# Patient Record
Sex: MALE | Race: BLACK OR AFRICAN AMERICAN | NOT HISPANIC OR LATINO | URBAN - METROPOLITAN AREA
[De-identification: names, ages, dates, MRNs, and addresses within clinical notes are randomized per-mention and may not be internally consistent; named-entity substitution may affect disease eponyms.]

---

## 2021-04-05 ENCOUNTER — EMERGENCY (EMERGENCY)
Facility: HOSPITAL | Age: 81
LOS: 0 days | Discharge: HOME | End: 2021-04-05
Attending: EMERGENCY MEDICINE
Payer: MEDICAID

## 2021-04-05 VITALS
TEMPERATURE: 99 F | HEART RATE: 58 BPM | OXYGEN SATURATION: 100 % | DIASTOLIC BLOOD PRESSURE: 59 MMHG | SYSTOLIC BLOOD PRESSURE: 132 MMHG | RESPIRATION RATE: 18 BRPM

## 2021-04-05 VITALS
HEART RATE: 62 BPM | OXYGEN SATURATION: 100 % | RESPIRATION RATE: 16 BRPM | TEMPERATURE: 99 F | DIASTOLIC BLOOD PRESSURE: 62 MMHG | SYSTOLIC BLOOD PRESSURE: 128 MMHG

## 2021-04-05 DIAGNOSIS — Z79.4 LONG TERM (CURRENT) USE OF INSULIN: ICD-10-CM

## 2021-04-05 DIAGNOSIS — E11.65 TYPE 2 DIABETES MELLITUS WITH HYPERGLYCEMIA: ICD-10-CM

## 2021-04-05 DIAGNOSIS — Z20.822 CONTACT WITH AND (SUSPECTED) EXPOSURE TO COVID-19: ICD-10-CM

## 2021-04-05 DIAGNOSIS — E16.2 HYPOGLYCEMIA, UNSPECIFIED: ICD-10-CM

## 2021-04-05 LAB
ALBUMIN SERPL ELPH-MCNC: 3.8 G/DL — SIGNIFICANT CHANGE UP (ref 3.3–5)
ALP SERPL-CCNC: 94 U/L — SIGNIFICANT CHANGE UP (ref 40–120)
ALT FLD-CCNC: 29 U/L — SIGNIFICANT CHANGE UP (ref 12–78)
ANION GAP SERPL CALC-SCNC: 7 MMOL/L — SIGNIFICANT CHANGE UP (ref 5–17)
APPEARANCE UR: CLEAR — SIGNIFICANT CHANGE UP
APTT BLD: 30 SEC — SIGNIFICANT CHANGE UP (ref 27.5–35.5)
AST SERPL-CCNC: 15 U/L — SIGNIFICANT CHANGE UP (ref 15–37)
BASOPHILS # BLD AUTO: 0.04 K/UL — SIGNIFICANT CHANGE UP (ref 0–0.2)
BASOPHILS NFR BLD AUTO: 0.8 % — SIGNIFICANT CHANGE UP (ref 0–2)
BILIRUB SERPL-MCNC: 0.4 MG/DL — SIGNIFICANT CHANGE UP (ref 0.2–1.2)
BILIRUB UR-MCNC: NEGATIVE — SIGNIFICANT CHANGE UP
BUN SERPL-MCNC: 34 MG/DL — HIGH (ref 7–23)
CALCIUM SERPL-MCNC: 8.4 MG/DL — LOW (ref 8.5–10.1)
CHLORIDE SERPL-SCNC: 104 MMOL/L — SIGNIFICANT CHANGE UP (ref 96–108)
CO2 SERPL-SCNC: 22 MMOL/L — SIGNIFICANT CHANGE UP (ref 22–31)
COLOR SPEC: YELLOW — SIGNIFICANT CHANGE UP
CREAT SERPL-MCNC: 1.93 MG/DL — HIGH (ref 0.5–1.3)
DIFF PNL FLD: NEGATIVE — SIGNIFICANT CHANGE UP
EOSINOPHIL # BLD AUTO: 0.02 K/UL — SIGNIFICANT CHANGE UP (ref 0–0.5)
EOSINOPHIL NFR BLD AUTO: 0.4 % — SIGNIFICANT CHANGE UP (ref 0–6)
FLUAV AG NPH QL: SIGNIFICANT CHANGE UP
FLUBV AG NPH QL: SIGNIFICANT CHANGE UP
GLUCOSE BLDC GLUCOMTR-MCNC: 116 MG/DL — HIGH (ref 70–99)
GLUCOSE BLDC GLUCOMTR-MCNC: 121 MG/DL — HIGH (ref 70–99)
GLUCOSE BLDC GLUCOMTR-MCNC: 65 MG/DL — LOW (ref 70–99)
GLUCOSE SERPL-MCNC: 108 MG/DL — HIGH (ref 70–99)
GLUCOSE UR QL: NEGATIVE MG/DL — SIGNIFICANT CHANGE UP
HCT VFR BLD CALC: 34.1 % — LOW (ref 39–50)
HGB BLD-MCNC: 11.5 G/DL — LOW (ref 13–17)
IMM GRANULOCYTES NFR BLD AUTO: 0.2 % — SIGNIFICANT CHANGE UP (ref 0–1.5)
INR BLD: 1.05 RATIO — SIGNIFICANT CHANGE UP (ref 0.88–1.16)
KETONES UR-MCNC: NEGATIVE — SIGNIFICANT CHANGE UP
LEUKOCYTE ESTERASE UR-ACNC: NEGATIVE — SIGNIFICANT CHANGE UP
LYMPHOCYTES # BLD AUTO: 1.53 K/UL — SIGNIFICANT CHANGE UP (ref 1–3.3)
LYMPHOCYTES # BLD AUTO: 30.2 % — SIGNIFICANT CHANGE UP (ref 13–44)
MCHC RBC-ENTMCNC: 27.8 PG — SIGNIFICANT CHANGE UP (ref 27–34)
MCHC RBC-ENTMCNC: 33.7 GM/DL — SIGNIFICANT CHANGE UP (ref 32–36)
MCV RBC AUTO: 82.6 FL — SIGNIFICANT CHANGE UP (ref 80–100)
MONOCYTES # BLD AUTO: 0.66 K/UL — SIGNIFICANT CHANGE UP (ref 0–0.9)
MONOCYTES NFR BLD AUTO: 13 % — SIGNIFICANT CHANGE UP (ref 2–14)
NEUTROPHILS # BLD AUTO: 2.8 K/UL — SIGNIFICANT CHANGE UP (ref 1.8–7.4)
NEUTROPHILS NFR BLD AUTO: 55.4 % — SIGNIFICANT CHANGE UP (ref 43–77)
NITRITE UR-MCNC: NEGATIVE — SIGNIFICANT CHANGE UP
NRBC # BLD: 0 /100 WBCS — SIGNIFICANT CHANGE UP (ref 0–0)
PH UR: 5 — SIGNIFICANT CHANGE UP (ref 5–8)
PLATELET # BLD AUTO: 185 K/UL — SIGNIFICANT CHANGE UP (ref 150–400)
POTASSIUM SERPL-MCNC: 3.9 MMOL/L — SIGNIFICANT CHANGE UP (ref 3.5–5.3)
POTASSIUM SERPL-SCNC: 3.9 MMOL/L — SIGNIFICANT CHANGE UP (ref 3.5–5.3)
PROT SERPL-MCNC: 8 GM/DL — SIGNIFICANT CHANGE UP (ref 6–8.3)
PROT UR-MCNC: NEGATIVE MG/DL — SIGNIFICANT CHANGE UP
PROTHROM AB SERPL-ACNC: 12.1 SEC — SIGNIFICANT CHANGE UP (ref 10.6–13.6)
RBC # BLD: 4.13 M/UL — LOW (ref 4.2–5.8)
RBC # FLD: 13 % — SIGNIFICANT CHANGE UP (ref 10.3–14.5)
SARS-COV-2 RNA SPEC QL NAA+PROBE: SIGNIFICANT CHANGE UP
SODIUM SERPL-SCNC: 133 MMOL/L — LOW (ref 135–145)
SP GR SPEC: 1.01 — SIGNIFICANT CHANGE UP (ref 1.01–1.02)
TROPONIN I SERPL-MCNC: <.015 NG/ML — SIGNIFICANT CHANGE UP (ref 0.01–0.04)
UROBILINOGEN FLD QL: NEGATIVE MG/DL — SIGNIFICANT CHANGE UP
WBC # BLD: 5.06 K/UL — SIGNIFICANT CHANGE UP (ref 3.8–10.5)
WBC # FLD AUTO: 5.06 K/UL — SIGNIFICANT CHANGE UP (ref 3.8–10.5)

## 2021-04-05 PROCEDURE — 70450 CT HEAD/BRAIN W/O DYE: CPT | Mod: 26,MA

## 2021-04-05 PROCEDURE — 93010 ELECTROCARDIOGRAM REPORT: CPT

## 2021-04-05 PROCEDURE — 99285 EMERGENCY DEPT VISIT HI MDM: CPT

## 2021-04-05 RX ORDER — SODIUM CHLORIDE 9 MG/ML
1000 INJECTION INTRAMUSCULAR; INTRAVENOUS; SUBCUTANEOUS ONCE
Refills: 0 | Status: COMPLETED | OUTPATIENT
Start: 2021-04-05 | End: 2021-04-05

## 2021-04-05 RX ADMIN — SODIUM CHLORIDE 1000 MILLILITER(S): 9 INJECTION INTRAMUSCULAR; INTRAVENOUS; SUBCUTANEOUS at 18:29

## 2021-04-05 NOTE — ED PROVIDER NOTE - CLINICAL SUMMARY MEDICAL DECISION MAKING FREE TEXT BOX
hypoglycemia noted - will give food and hydrate - otherwise will dc with family pending continued improvement of sugar levels. hypoglycemia noted - will give food and hydrate - otherwise will dc with family pending continued improvement of sugar levels -noted improvement - will dc with PMD follow up.

## 2021-04-05 NOTE — ED PROVIDER NOTE - OBJECTIVE STATEMENT
80 year old male with PMH of insulin dependent DM II presenting to ED due to low sugar level and syncope related to hypoglycemia noted to 20 - EMS gave D10 250mL en route with improvement - pt at triage fully awake without issue or complaint.  Pt admits to having very light breakfast with some rice and coffee but had not yet had lunch at 3pm when this occurred. No new doses of insulin taken otherwise FS here in ED was

## 2021-04-05 NOTE — ED ADULT TRIAGE NOTE - CHIEF COMPLAINT QUOTE
pt became unresponsive at home. on ems blood glucose 20. d 10 given by ems. history of diabetes on insulin. unknown when he last took insulin. fs 116 at triage. pt awake alert.

## 2021-04-05 NOTE — ED PROVIDER NOTE - PATIENT PORTAL LINK FT
You can access the FollowMyHealth Patient Portal offered by [facility] by registering at the following website: [email or web address]. By joining [employer]’s FollowMyHealth portal, you will also be able to view your health information using other applications (apps) compatible with our system.

## 2021-04-06 LAB
CULTURE RESULTS: NO GROWTH — SIGNIFICANT CHANGE UP
SPECIMEN SOURCE: SIGNIFICANT CHANGE UP

## 2023-01-29 ENCOUNTER — INPATIENT (INPATIENT)
Facility: HOSPITAL | Age: 83
LOS: 2 days | Discharge: HOME | End: 2023-02-01
Attending: INTERNAL MEDICINE | Admitting: INTERNAL MEDICINE
Payer: COMMERCIAL

## 2023-01-29 VITALS
HEART RATE: 57 BPM | TEMPERATURE: 98 F | RESPIRATION RATE: 18 BRPM | DIASTOLIC BLOOD PRESSURE: 54 MMHG | SYSTOLIC BLOOD PRESSURE: 121 MMHG | OXYGEN SATURATION: 100 %

## 2023-01-29 DIAGNOSIS — I10 ESSENTIAL (PRIMARY) HYPERTENSION: ICD-10-CM

## 2023-01-29 DIAGNOSIS — Z29.9 ENCOUNTER FOR PROPHYLACTIC MEASURES, UNSPECIFIED: ICD-10-CM

## 2023-01-29 DIAGNOSIS — E11.9 TYPE 2 DIABETES MELLITUS WITHOUT COMPLICATIONS: ICD-10-CM

## 2023-01-29 DIAGNOSIS — G93.41 METABOLIC ENCEPHALOPATHY: ICD-10-CM

## 2023-01-29 DIAGNOSIS — N40.1 BENIGN PROSTATIC HYPERPLASIA WITH LOWER URINARY TRACT SYMPTOMS: ICD-10-CM

## 2023-01-29 DIAGNOSIS — R55 SYNCOPE AND COLLAPSE: ICD-10-CM

## 2023-01-29 DIAGNOSIS — N39.0 URINARY TRACT INFECTION, SITE NOT SPECIFIED: ICD-10-CM

## 2023-01-29 DIAGNOSIS — N18.30 CHRONIC KIDNEY DISEASE, STAGE 3 UNSPECIFIED: ICD-10-CM

## 2023-01-29 DIAGNOSIS — B20 HUMAN IMMUNODEFICIENCY VIRUS [HIV] DISEASE: ICD-10-CM

## 2023-01-29 LAB
ALBUMIN SERPL ELPH-MCNC: 3.5 G/DL — SIGNIFICANT CHANGE UP (ref 3.3–5)
ALP SERPL-CCNC: 96 U/L — SIGNIFICANT CHANGE UP (ref 40–120)
ALT FLD-CCNC: SIGNIFICANT CHANGE UP U/L (ref 4–41)
ANION GAP SERPL CALC-SCNC: 10 MMOL/L — SIGNIFICANT CHANGE UP (ref 7–14)
ANION GAP SERPL CALC-SCNC: 13 MMOL/L — SIGNIFICANT CHANGE UP (ref 7–14)
APPEARANCE UR: ABNORMAL
AST SERPL-CCNC: 78 U/L — HIGH (ref 4–40)
BACTERIA # UR AUTO: ABNORMAL
BASOPHILS # BLD AUTO: 0.03 K/UL — SIGNIFICANT CHANGE UP (ref 0–0.2)
BASOPHILS NFR BLD AUTO: 0.6 % — SIGNIFICANT CHANGE UP (ref 0–2)
BILIRUB SERPL-MCNC: 0.4 MG/DL — SIGNIFICANT CHANGE UP (ref 0.2–1.2)
BILIRUB UR-MCNC: NEGATIVE — SIGNIFICANT CHANGE UP
BLD GP AB SCN SERPL QL: NEGATIVE — SIGNIFICANT CHANGE UP
BUN SERPL-MCNC: 42 MG/DL — HIGH (ref 7–23)
BUN SERPL-MCNC: 42 MG/DL — HIGH (ref 7–23)
CALCIUM SERPL-MCNC: 8.9 MG/DL — SIGNIFICANT CHANGE UP (ref 8.4–10.5)
CALCIUM SERPL-MCNC: 9 MG/DL — SIGNIFICANT CHANGE UP (ref 8.4–10.5)
CHLORIDE SERPL-SCNC: 101 MMOL/L — SIGNIFICANT CHANGE UP (ref 98–107)
CHLORIDE SERPL-SCNC: 99 MMOL/L — SIGNIFICANT CHANGE UP (ref 98–107)
CO2 SERPL-SCNC: 17 MMOL/L — LOW (ref 22–31)
CO2 SERPL-SCNC: 19 MMOL/L — LOW (ref 22–31)
COLOR SPEC: SIGNIFICANT CHANGE UP
CREAT SERPL-MCNC: 1.62 MG/DL — HIGH (ref 0.5–1.3)
CREAT SERPL-MCNC: 1.73 MG/DL — HIGH (ref 0.5–1.3)
DIFF PNL FLD: ABNORMAL
EGFR: 39 ML/MIN/1.73M2 — LOW
EGFR: 42 ML/MIN/1.73M2 — LOW
EOSINOPHIL # BLD AUTO: 0.04 K/UL — SIGNIFICANT CHANGE UP (ref 0–0.5)
EOSINOPHIL NFR BLD AUTO: 0.7 % — SIGNIFICANT CHANGE UP (ref 0–6)
EPI CELLS # UR: 1 /HPF — SIGNIFICANT CHANGE UP (ref 0–5)
FLUAV AG NPH QL: SIGNIFICANT CHANGE UP
FLUBV AG NPH QL: SIGNIFICANT CHANGE UP
GLUCOSE SERPL-MCNC: 236 MG/DL — HIGH (ref 70–99)
GLUCOSE SERPL-MCNC: 237 MG/DL — HIGH (ref 70–99)
GLUCOSE UR QL: NEGATIVE — SIGNIFICANT CHANGE UP
HCT VFR BLD CALC: 37.3 % — LOW (ref 39–50)
HGB BLD-MCNC: 12 G/DL — LOW (ref 13–17)
HYALINE CASTS # UR AUTO: 1 /LPF — SIGNIFICANT CHANGE UP (ref 0–7)
IANC: 3.16 K/UL — SIGNIFICANT CHANGE UP (ref 1.8–7.4)
IMM GRANULOCYTES NFR BLD AUTO: 0.6 % — SIGNIFICANT CHANGE UP (ref 0–0.9)
KETONES UR-MCNC: NEGATIVE — SIGNIFICANT CHANGE UP
LEUKOCYTE ESTERASE UR-ACNC: ABNORMAL
LYMPHOCYTES # BLD AUTO: 1.53 K/UL — SIGNIFICANT CHANGE UP (ref 1–3.3)
LYMPHOCYTES # BLD AUTO: 28.1 % — SIGNIFICANT CHANGE UP (ref 13–44)
MCHC RBC-ENTMCNC: 27 PG — SIGNIFICANT CHANGE UP (ref 27–34)
MCHC RBC-ENTMCNC: 32.2 GM/DL — SIGNIFICANT CHANGE UP (ref 32–36)
MCV RBC AUTO: 83.8 FL — SIGNIFICANT CHANGE UP (ref 80–100)
MONOCYTES # BLD AUTO: 0.65 K/UL — SIGNIFICANT CHANGE UP (ref 0–0.9)
MONOCYTES NFR BLD AUTO: 11.9 % — SIGNIFICANT CHANGE UP (ref 2–14)
NEUTROPHILS # BLD AUTO: 3.16 K/UL — SIGNIFICANT CHANGE UP (ref 1.8–7.4)
NEUTROPHILS NFR BLD AUTO: 58.1 % — SIGNIFICANT CHANGE UP (ref 43–77)
NITRITE UR-MCNC: NEGATIVE — SIGNIFICANT CHANGE UP
NRBC # BLD: 0 /100 WBCS — SIGNIFICANT CHANGE UP (ref 0–0)
NRBC # FLD: 0 K/UL — SIGNIFICANT CHANGE UP (ref 0–0)
PH UR: 5.5 — SIGNIFICANT CHANGE UP (ref 5–8)
PLATELET # BLD AUTO: 216 K/UL — SIGNIFICANT CHANGE UP (ref 150–400)
POTASSIUM SERPL-MCNC: 4.3 MMOL/L — SIGNIFICANT CHANGE UP (ref 3.5–5.3)
POTASSIUM SERPL-MCNC: SIGNIFICANT CHANGE UP MMOL/L (ref 3.5–5.3)
POTASSIUM SERPL-SCNC: 4.3 MMOL/L — SIGNIFICANT CHANGE UP (ref 3.5–5.3)
POTASSIUM SERPL-SCNC: SIGNIFICANT CHANGE UP MMOL/L (ref 3.5–5.3)
PROT SERPL-MCNC: SIGNIFICANT CHANGE UP G/DL (ref 6–8.3)
PROT UR-MCNC: ABNORMAL
RBC # BLD: 4.45 M/UL — SIGNIFICANT CHANGE UP (ref 4.2–5.8)
RBC # FLD: 13.2 % — SIGNIFICANT CHANGE UP (ref 10.3–14.5)
RBC CASTS # UR COMP ASSIST: 20 /HPF — SIGNIFICANT CHANGE UP (ref 0–4)
RH IG SCN BLD-IMP: POSITIVE — SIGNIFICANT CHANGE UP
RSV RNA NPH QL NAA+NON-PROBE: SIGNIFICANT CHANGE UP
SARS-COV-2 RNA SPEC QL NAA+PROBE: SIGNIFICANT CHANGE UP
SODIUM SERPL-SCNC: 126 MMOL/L — LOW (ref 135–145)
SODIUM SERPL-SCNC: 133 MMOL/L — LOW (ref 135–145)
SP GR SPEC: 1.01 — SIGNIFICANT CHANGE UP (ref 1.01–1.05)
TROPONIN T, HIGH SENSITIVITY RESULT: 10 NG/L — SIGNIFICANT CHANGE UP
UROBILINOGEN FLD QL: SIGNIFICANT CHANGE UP
WBC # BLD: 5.44 K/UL — SIGNIFICANT CHANGE UP (ref 3.8–10.5)
WBC # FLD AUTO: 5.44 K/UL — SIGNIFICANT CHANGE UP (ref 3.8–10.5)
WBC UR QL: >720 /HPF — SIGNIFICANT CHANGE UP (ref 0–5)

## 2023-01-29 PROCEDURE — 99223 1ST HOSP IP/OBS HIGH 75: CPT

## 2023-01-29 PROCEDURE — 71045 X-RAY EXAM CHEST 1 VIEW: CPT | Mod: 26

## 2023-01-29 PROCEDURE — 99053 MED SERV 10PM-8AM 24 HR FAC: CPT

## 2023-01-29 PROCEDURE — 72125 CT NECK SPINE W/O DYE: CPT | Mod: 26,MA

## 2023-01-29 PROCEDURE — 76770 US EXAM ABDO BACK WALL COMP: CPT | Mod: 26

## 2023-01-29 PROCEDURE — 70450 CT HEAD/BRAIN W/O DYE: CPT | Mod: 26,MA

## 2023-01-29 PROCEDURE — 99285 EMERGENCY DEPT VISIT HI MDM: CPT

## 2023-01-29 RX ORDER — METOPROLOL TARTRATE 50 MG
25 TABLET ORAL DAILY
Refills: 0 | Status: DISCONTINUED | OUTPATIENT
Start: 2023-01-29 | End: 2023-02-01

## 2023-01-29 RX ORDER — HEPARIN SODIUM 5000 [USP'U]/ML
5000 INJECTION INTRAVENOUS; SUBCUTANEOUS EVERY 12 HOURS
Refills: 0 | Status: DISCONTINUED | OUTPATIENT
Start: 2023-01-29 | End: 2023-02-01

## 2023-01-29 RX ORDER — INSULIN LISPRO 100/ML
VIAL (ML) SUBCUTANEOUS
Refills: 0 | Status: DISCONTINUED | OUTPATIENT
Start: 2023-01-29 | End: 2023-02-01

## 2023-01-29 RX ORDER — INSULIN GLARGINE 100 [IU]/ML
30 INJECTION, SOLUTION SUBCUTANEOUS
Qty: 0 | Refills: 0 | DISCHARGE

## 2023-01-29 RX ORDER — INSULIN GLARGINE 100 [IU]/ML
30 INJECTION, SOLUTION SUBCUTANEOUS AT BEDTIME
Refills: 0 | Status: DISCONTINUED | OUTPATIENT
Start: 2023-01-29 | End: 2023-02-01

## 2023-01-29 RX ORDER — INSULIN LISPRO 100/ML
VIAL (ML) SUBCUTANEOUS AT BEDTIME
Refills: 0 | Status: DISCONTINUED | OUTPATIENT
Start: 2023-01-29 | End: 2023-02-01

## 2023-01-29 RX ORDER — TAMSULOSIN HYDROCHLORIDE 0.4 MG/1
0.4 CAPSULE ORAL AT BEDTIME
Refills: 0 | Status: DISCONTINUED | OUTPATIENT
Start: 2023-01-29 | End: 2023-02-01

## 2023-01-29 RX ORDER — CEFTRIAXONE 500 MG/1
1000 INJECTION, POWDER, FOR SOLUTION INTRAMUSCULAR; INTRAVENOUS ONCE
Refills: 0 | Status: COMPLETED | OUTPATIENT
Start: 2023-01-29 | End: 2023-01-29

## 2023-01-29 RX ORDER — ATORVASTATIN CALCIUM 80 MG/1
1 TABLET, FILM COATED ORAL
Qty: 0 | Refills: 0 | DISCHARGE

## 2023-01-29 RX ORDER — DEXTROSE 50 % IN WATER 50 %
25 SYRINGE (ML) INTRAVENOUS ONCE
Refills: 0 | Status: DISCONTINUED | OUTPATIENT
Start: 2023-01-29 | End: 2023-02-01

## 2023-01-29 RX ORDER — INSULIN LISPRO 100/ML
4 VIAL (ML) SUBCUTANEOUS
Refills: 0 | Status: DISCONTINUED | OUTPATIENT
Start: 2023-01-29 | End: 2023-02-01

## 2023-01-29 RX ORDER — ATORVASTATIN CALCIUM 80 MG/1
10 TABLET, FILM COATED ORAL AT BEDTIME
Refills: 0 | Status: DISCONTINUED | OUTPATIENT
Start: 2023-01-29 | End: 2023-02-01

## 2023-01-29 RX ORDER — TAMSULOSIN HYDROCHLORIDE 0.4 MG/1
1 CAPSULE ORAL
Qty: 0 | Refills: 0 | DISCHARGE

## 2023-01-29 RX ORDER — INSULIN ASPART 100 [IU]/ML
0 INJECTION, SOLUTION SUBCUTANEOUS
Qty: 0 | Refills: 0 | DISCHARGE

## 2023-01-29 RX ORDER — METOPROLOL TARTRATE 50 MG
1 TABLET ORAL
Qty: 0 | Refills: 0 | DISCHARGE

## 2023-01-29 RX ORDER — DEXTROSE 50 % IN WATER 50 %
12.5 SYRINGE (ML) INTRAVENOUS ONCE
Refills: 0 | Status: DISCONTINUED | OUTPATIENT
Start: 2023-01-29 | End: 2023-02-01

## 2023-01-29 RX ORDER — DEXTROSE 50 % IN WATER 50 %
15 SYRINGE (ML) INTRAVENOUS ONCE
Refills: 0 | Status: DISCONTINUED | OUTPATIENT
Start: 2023-01-29 | End: 2023-02-01

## 2023-01-29 RX ORDER — CEFTRIAXONE 500 MG/1
1000 INJECTION, POWDER, FOR SOLUTION INTRAMUSCULAR; INTRAVENOUS EVERY 24 HOURS
Refills: 0 | Status: COMPLETED | OUTPATIENT
Start: 2023-01-30 | End: 2023-02-01

## 2023-01-29 RX ADMIN — Medication 25 MILLIGRAM(S): at 16:23

## 2023-01-29 RX ADMIN — INSULIN GLARGINE 30 UNIT(S): 100 INJECTION, SOLUTION SUBCUTANEOUS at 22:21

## 2023-01-29 RX ADMIN — ATORVASTATIN CALCIUM 10 MILLIGRAM(S): 80 TABLET, FILM COATED ORAL at 22:19

## 2023-01-29 RX ADMIN — HEPARIN SODIUM 5000 UNIT(S): 5000 INJECTION INTRAVENOUS; SUBCUTANEOUS at 17:58

## 2023-01-29 RX ADMIN — CEFTRIAXONE 100 MILLIGRAM(S): 500 INJECTION, POWDER, FOR SOLUTION INTRAMUSCULAR; INTRAVENOUS at 08:08

## 2023-01-29 RX ADMIN — TAMSULOSIN HYDROCHLORIDE 0.4 MILLIGRAM(S): 0.4 CAPSULE ORAL at 22:20

## 2023-01-29 RX ADMIN — Medication 3: at 16:24

## 2023-01-29 RX ADMIN — Medication 4 UNIT(S): at 16:23

## 2023-01-29 NOTE — ED PROVIDER NOTE - ATTENDING APP SHARED VISIT CONTRIBUTION OF CARE
I have personally performed a face to face medical and diagnostic evaluation of the patient. I have discussed with and reviewed the PREETHI's note and agree with the History, ROS, Physical Exam and MDM unless otherwise indicated. A brief summary of my personal evaluation and impression can be found below.    Shantell MACEDO: 82-year-old male with a history of diabetes hypertension presents with son with a chief complaint of concern for syncopal episode earlier this morning.  Patient is a limited historian son providing most of the history, patient and son reporting that patient woke up this morning went to the bathroom and strained while urinating and was found on the floor, unclear if had head strike, however his son reported the patient complaining of chest pain shortly thereafter.  Patient son was in [place] was called by family earlier in the week in the setting of poor p.o. intake and patient possibly not acting like his normal self.  Son reports that this happened a few years ago requiring hospitalization in [place] in the setting of difficulty urinating as well.  No fever.  No nausea no vomiting.  Son noted after syncopal episode today possible alterations in patient's breathing.  However he is more acting like his normal self now.  However on extended questioning patient is unable to recall the season and the year and according to the son this is not normal for him.    All other ROS negative, except as above and as per HPI and ROS section.    VITALS: Initial triage and subsequent vitals have been reviewed by me.  GEN APPEARANCE: WDWN, alert, non-toxic, NAD  HEAD: Atraumatic.  EYES: PERRLa, EOMI, vision grossly intact.   NECK: Supple  CV: RRR, S1S2, no c/r/m/g. Cap refill <2 seconds. No bruits.   LUNGS: CTAB. No abnormal breath sounds.  ABDOMEN: Soft, NTND. No guarding or rebound.   MSK/EXT: No spinal or extremity point tenderness. No CVA ttp. Pelvis stable. No peripheral edema.  NEURO: Alert, follows commands. Weight bearing normal. Speech normal. Sensation and motor normal x4 extremities.   SKIN: Warm, dry and intact. No rash.  PSYCH: Appropriate    Plan/MDM: Shantell MACEDO: 82-year-old male with a history of diabetes hypertension presents with son with a chief complaint of concern for syncopal episode earlier this morning.  Patient is a limited historian son providing most of the history, patient and son reporting that patient woke up this morning went to the bathroom and strained while urinating and was found on the floor, unclear if had head strike, however his son reported the patient complaining of chest pain shortly thereafter.  Patient son was in [place] was called by family earlier in the week in the setting of poor p.o. intake and patient possibly not acting like his normal self.  Son reports that this happened a few years ago requiring hospitalization in [place] in the setting of difficulty urinating as well.  Exam vital signs stable nontoxic and well-appearing with physical exam as above.  No obvious trauma on exam, DDx concern for infectious metabolic etiology causing patient's possible syncope today, consider urinary tract infection, given age and unclear head strike will check CT head labs chest x-ray urine, EKG is reassuring without acute ischemia and aside from first-degree block intervals are normal, will reassess and dispo accordingly thereafter.

## 2023-01-29 NOTE — H&P ADULT - NSHPPHYSICALEXAM_GEN_ALL_CORE
T(C): 36.6 (01-29-23 @ 12:20), Max: 36.9 (01-29-23 @ 05:56)  HR: 66 (01-29-23 @ 12:20) (55 - 66)  BP: 120/92 (01-29-23 @ 12:20) (112/54 - 126/51)  RR: 18 (01-29-23 @ 12:20) (18 - 20)  SpO2: 100% (01-29-23 @ 12:20) (99% - 100%)    CAPILLARY BLOOD GLUCOSE  POCT Blood Glucose.: 211 mg/dL (29 Jan 2023 05:59)    PHYSICAL EXAM:  GENERAL: NAD, thin   HEAD:  Atraumatic, Normocephalic  EYES: EOMI, PERRLA, conjunctiva and sclera clear  NECK: Supple, no JVD  CHEST/LUNG: Clear to auscultation bilaterally; no wheeze  HEART: Regular rate and rhythm; no murmurs, rubs, or gallops  ABDOMEN: Soft, Nontender, Nondistended; Bowel sounds present  EXTREMITIES:  warm and well perfused, no clubbing, cyanosis, or edema  PSYCH: AAOx2  NEUROLOGY: non-focal  SKIN: no rashes or lesions on visible skin

## 2023-01-29 NOTE — H&P ADULT - PROBLEM SELECTOR PLAN 2
Baseline has mild cognitive dysfxn, now off baseline more confused likely due to infection   - CTH microvascular change  - treat UTI  - orient as needed

## 2023-01-29 NOTE — H&P ADULT - HISTORY OF PRESENT ILLNESS
patient seen in spot 25A in ED, no family present    82M HIV (well controlled, VLUD, 20 yrs), HTN, DM2 on insulin, CKD3,  BPH presents to the ED s/p syncope this morning.   Patient states went to BR to urinate, nothing would come out and passed out.  States it hurts, but cannot state where.  Patient unable to report current or prior symptoms, does know his name and that he is in hospital.  Per ED notes  pt states this morning he went to urinate and only small amount of urine came out. pt states he synopsizes and was found on the floor by his wife.  Not known how long he was out or if he hit his head. Has been having painful with urination for the last one week. pt denies back pain,   Daughter confirms this story,  states could not have been down for long. At home having intermittent CP and dizziness. Family thinks he had diarrhea because for imodium.   Mild dementia worse than usual.  Family unaware of CKD.

## 2023-01-29 NOTE — ED ADULT NURSE NOTE - OBJECTIVE STATEMENT
83yo male received in room 25. pt A&Ox4, ambulatory with cane, hx of BPH, HTN, DM, BIBEMS for syncope. Family states patient went to the bathroom and he was found passed out on the toilet five minutes later. Denies fall or head strike. Patient states he was having pain urinating that's why he passed out. Pt offered no complains at present. Denies cp, sob, dizziness, and palpitation. Respiration even and non-labored. in NAD. NSR on monitor. MD emerson in progress. Awaiting for further order. Side rails up, bed at lowest position, call bell within reach, patient oriented to the unit, safety maintained. 83yo male received in room 25. pt A&Ox4, ambulatory with cane, hx of BPH, HTN, DM, BIBEMS for syncope. Family states patient went to the bathroom and he was found passed out on the toilet five minutes later. Denies fall or head strike. Patient states he was having pain urinating that's why he passed out. Pt offered no complains at present. Denies cp, sob, dizziness, and palpitation. Respiration even and non-labored. in NAD. NSR on monitor. MD emerson in progress. Awaiting for further order. 20G IV placed in LAC, lab drawn and sent. Side rails up, bed at lowest position, call bell within reach, patient oriented to the unit, safety maintained. 83yo male received in room 25. pt A&Ox4, ambulatory with cane, hx of BPH, HTN, DM, BIBEMS for syncope. Family states patient went to the bathroom and he was found passed out on the toilet five minutes later. Unsure if he had strike his head, denies blood thinner use. Patient states he was having pain urinating that's why he passed out. Pt offered no complains at present. Denies cp, sob, dizziness, and palpitation. Respiration even and non-labored. in NAD. NSR on monitor. MD emerson in progress. Awaiting for further order. 20G IV placed in LAC, lab drawn and sent. Side rails up, bed at lowest position, call bell within reach, patient oriented to the unit, safety maintained.

## 2023-01-29 NOTE — ED ADULT NURSE NOTE - CHIEF COMPLAINT QUOTE
Pt BIBEMS for syncopal episode while using toilet today. Denies chest pain, sob, abd pain, n/v/d, fevers/chills. Found by family on toilet, Pt denies fall to ground or head trauma. Pmhx: HTN, DM, BPH

## 2023-01-29 NOTE — H&P ADULT - NSHPLABSRESULTS_GEN_ALL_CORE
LABS:                        12.0   5.44  )-----------( 216      ( 2023 06:50 )             37.3         133<L>  |  101  |  42<H>  ----------------------------<  237<H>  4.3   |  19<L>  |  1.73<H>    Ca    9.0      2023 09:36    TPro  TNP  /  Alb  3.5  /  TBili  0.4  /  DBili  x   /  AST  78<H>  /  ALT  TNP  /  AlkPhos  96      Urinalysis Basic - ( 2023 06:51 )  Color: Light Yellow / Appearance: Turbid / S.014 / pH: x  Gluc: x / Ketone: Negative  / Bili: Negative / Urobili: <2 mg/dL   Blood: x / Protein: 100 mg/dL / Nitrite: Negative   Leuk Esterase: Large / RBC: 20 /HPF / WBC >720 /HPF   Sq Epi: x / Non Sq Epi: 1 /HPF / Bacteria: Few      EKG personally reviewed SR w/ 1st degree and PVCs

## 2023-01-29 NOTE — ED PROVIDER NOTE - OBJECTIVE STATEMENT
Universal Safety Interventions
81 yo male with pmhx of HTN, DM, BPH presents to the ED s/p syncope this morning. pt states this morning he went to urinate and only small amount of urine came out. pt states he synopsizes and was found on the floor by his wife. pt states he does not know how long he was out. pt states he does not know if he hid his head. pt states he also has been having painful with urination for the last one week. pt denies back pain, N/V, abd pain, fever, chills.

## 2023-01-29 NOTE — ED PROVIDER NOTE - CLINICAL SUMMARY MEDICAL DECISION MAKING FREE TEXT BOX
83 yo male with syncope and urianry symptoms.  ddx is vaso vagal vs UTI  Will obtain Labs, CT head, UA, EKG, CXR

## 2023-01-29 NOTE — ED ADULT TRIAGE NOTE - CHIEF COMPLAINT QUOTE
Pt BIBEMS for syncopal episode while using toilet today. Denies chest pain, sob, abd pain, n/v/d, fevers/chills. Found by family on toilet, denies fall to ground or head trauma. Pmhx: HTN, DM, BPH Pt BIBEMS for syncopal episode while using toilet today. Denies chest pain, sob, abd pain, n/v/d, fevers/chills. Found by family on toilet, Pt denies fall to ground or head trauma. Pmhx: HTN, DM, BPH

## 2023-01-29 NOTE — ED ADULT NURSE REASSESSMENT NOTE - NS ED NURSE REASSESS COMMENT FT1
BREAK RN: pt. remains A&Ox4, awake and resting. pt. offers no new complaints at this time. no acute distress noted. respirations even and unlabored. NSR on cardiac monitor. VS as noted.

## 2023-01-29 NOTE — H&P ADULT - PROBLEM SELECTOR PLAN 1
Possible vasovagal in context of micturition, cannot r/o arrthymia, family reports dizziness in preceding days  - HsT 10  - c/w tele  - TTE  - orthostatics

## 2023-01-29 NOTE — H&P ADULT - ASSESSMENT
82M HIV (well controlled, VLUD, 20 yrs), HTN, DM2 on insulin, CKD3,  BPH presents to the ED s/p syncope this morning.

## 2023-01-29 NOTE — ED ADULT NURSE NOTE - NS ED NOTE  TALK SOMEONE YN
What Is The Reason For Today's Visit?: Full Body Skin Examination
What Is The Reason For Today's Visit? (Being Monitored For X): concerning skin lesions on an annual basis
How Severe Are Your Spot(S)?: mild
No

## 2023-01-30 LAB
ANION GAP SERPL CALC-SCNC: 10 MMOL/L — SIGNIFICANT CHANGE UP (ref 7–14)
BUN SERPL-MCNC: 33 MG/DL — HIGH (ref 7–23)
CALCIUM SERPL-MCNC: 9.6 MG/DL — SIGNIFICANT CHANGE UP (ref 8.4–10.5)
CHLORIDE SERPL-SCNC: 103 MMOL/L — SIGNIFICANT CHANGE UP (ref 98–107)
CO2 SERPL-SCNC: 21 MMOL/L — LOW (ref 22–31)
CREAT SERPL-MCNC: 1.34 MG/DL — HIGH (ref 0.5–1.3)
EGFR: 53 ML/MIN/1.73M2 — LOW
GLUCOSE BLDC GLUCOMTR-MCNC: 137 MG/DL — HIGH (ref 70–99)
GLUCOSE BLDC GLUCOMTR-MCNC: 163 MG/DL — HIGH (ref 70–99)
GLUCOSE BLDC GLUCOMTR-MCNC: 180 MG/DL — HIGH (ref 70–99)
GLUCOSE BLDC GLUCOMTR-MCNC: 187 MG/DL — HIGH (ref 70–99)
GLUCOSE SERPL-MCNC: 122 MG/DL — HIGH (ref 70–99)
HCT VFR BLD CALC: 40.9 % — SIGNIFICANT CHANGE UP (ref 39–50)
HGB BLD-MCNC: 12.9 G/DL — LOW (ref 13–17)
MAGNESIUM SERPL-MCNC: 2.1 MG/DL — SIGNIFICANT CHANGE UP (ref 1.6–2.6)
MCHC RBC-ENTMCNC: 26.7 PG — LOW (ref 27–34)
MCHC RBC-ENTMCNC: 31.5 GM/DL — LOW (ref 32–36)
MCV RBC AUTO: 84.5 FL — SIGNIFICANT CHANGE UP (ref 80–100)
NRBC # BLD: 0 /100 WBCS — SIGNIFICANT CHANGE UP (ref 0–0)
NRBC # FLD: 0 K/UL — SIGNIFICANT CHANGE UP (ref 0–0)
PHOSPHATE SERPL-MCNC: 2.4 MG/DL — LOW (ref 2.5–4.5)
PLATELET # BLD AUTO: 224 K/UL — SIGNIFICANT CHANGE UP (ref 150–400)
POTASSIUM SERPL-MCNC: 4.6 MMOL/L — SIGNIFICANT CHANGE UP (ref 3.5–5.3)
POTASSIUM SERPL-SCNC: 4.6 MMOL/L — SIGNIFICANT CHANGE UP (ref 3.5–5.3)
RBC # BLD: 4.84 M/UL — SIGNIFICANT CHANGE UP (ref 4.2–5.8)
RBC # FLD: 13.2 % — SIGNIFICANT CHANGE UP (ref 10.3–14.5)
SODIUM SERPL-SCNC: 134 MMOL/L — LOW (ref 135–145)
WBC # BLD: 5.78 K/UL — SIGNIFICANT CHANGE UP (ref 3.8–10.5)
WBC # FLD AUTO: 5.78 K/UL — SIGNIFICANT CHANGE UP (ref 3.8–10.5)

## 2023-01-30 PROCEDURE — 93306 TTE W/DOPPLER COMPLETE: CPT | Mod: 26

## 2023-01-30 RX ORDER — POTASSIUM PHOSPHATE, MONOBASIC POTASSIUM PHOSPHATE, DIBASIC 236; 224 MG/ML; MG/ML
15 INJECTION, SOLUTION INTRAVENOUS ONCE
Refills: 0 | Status: COMPLETED | OUTPATIENT
Start: 2023-01-30 | End: 2023-01-30

## 2023-01-30 RX ADMIN — INSULIN GLARGINE 30 UNIT(S): 100 INJECTION, SOLUTION SUBCUTANEOUS at 21:10

## 2023-01-30 RX ADMIN — ATORVASTATIN CALCIUM 10 MILLIGRAM(S): 80 TABLET, FILM COATED ORAL at 21:10

## 2023-01-30 RX ADMIN — Medication 4 UNIT(S): at 09:49

## 2023-01-30 RX ADMIN — HEPARIN SODIUM 5000 UNIT(S): 5000 INJECTION INTRAVENOUS; SUBCUTANEOUS at 19:25

## 2023-01-30 RX ADMIN — TAMSULOSIN HYDROCHLORIDE 0.4 MILLIGRAM(S): 0.4 CAPSULE ORAL at 21:10

## 2023-01-30 RX ADMIN — CEFTRIAXONE 100 MILLIGRAM(S): 500 INJECTION, POWDER, FOR SOLUTION INTRAMUSCULAR; INTRAVENOUS at 04:13

## 2023-01-30 RX ADMIN — Medication 4 UNIT(S): at 17:45

## 2023-01-30 RX ADMIN — POTASSIUM PHOSPHATE, MONOBASIC POTASSIUM PHOSPHATE, DIBASIC 62.5 MILLIMOLE(S): 236; 224 INJECTION, SOLUTION INTRAVENOUS at 16:49

## 2023-01-30 RX ADMIN — Medication 4 UNIT(S): at 13:41

## 2023-01-30 RX ADMIN — Medication 1: at 13:41

## 2023-01-30 RX ADMIN — HEPARIN SODIUM 5000 UNIT(S): 5000 INJECTION INTRAVENOUS; SUBCUTANEOUS at 05:37

## 2023-01-30 NOTE — PROGRESS NOTE ADULT - SUBJECTIVE AND OBJECTIVE BOX
Patient is a 82y old  Male who presents with a chief complaint of syncope, dysuria/UTI (2023 13:06)      INTERVAL HPI/OVERNIGHT EVENTS: NAD   T(C): 37.1 (23 @ 20:04), Max: 37.1 (23 @ 20:04)  HR: 69 (23 @ 20:04) (58 - 70)  BP: 127/55 (23 @ 20:04) (124/59 - 141/66)  RR: 16 (23 @ 20:04) (16 - 18)  SpO2: 100% (23 @ 20:04) (100% - 100%)  Wt(kg): --  I&O's Summary    2023 07:01  -  2023 07:00  --------------------------------------------------------  IN: 120 mL / OUT: 560 mL / NET: -440 mL        PAST MEDICAL & SURGICAL HISTORY:  Diabetes      HTN (hypertension)      Family history of BPH      No significant past surgical history          SOCIAL HISTORY  Alcohol:  Tobacco:  Illicit substance use:    FAMILY HISTORY:    REVIEW OF SYSTEMS:  CONSTITUTIONAL: No fever, weight loss, or fatigue  EYES: No eye pain, visual disturbances, or discharge  ENMT:  No difficulty hearing, tinnitus, vertigo; No sinus or throat pain  NECK: No pain or stiffness  RESPIRATORY: No cough, wheezing, chills or hemoptysis; No shortness of breath  CARDIOVASCULAR: No chest pain, palpitations, dizziness, or leg swelling  GASTROINTESTINAL: No abdominal or epigastric pain. No nausea, vomiting, or hematemesis; No diarrhea or constipation. No melena or hematochezia.  GENITOURINARY: No dysuria, frequency, hematuria, or incontinence  NEUROLOGICAL: No headaches, memory loss, loss of strength, numbness, or tremors  SKIN: No itching, burning, rashes, or lesions   LYMPH NODES: No enlarged glands  ENDOCRINE: No heat or cold intolerance; No hair loss  MUSCULOSKELETAL: No joint pain or swelling; No muscle, back, or extremity pain  PSYCHIATRIC: No depression, anxiety, mood swings, or difficulty sleeping  HEME/LYMPH: No easy bruising, or bleeding gums  ALLERY AND IMMUNOLOGIC: No hives or eczema    RADIOLOGY & ADDITIONAL TESTS:    Imaging Personally Reviewed:  [ ] YES  [ ] NO    Consultant(s) Notes Reviewed:  [ ] YES  [ ] NO    PHYSICAL EXAM:  GENERAL: NAD, well-groomed, well-developed  HEAD:  Atraumatic, Normocephalic  EYES: EOMI, PERRLA, conjunctiva and sclera clear  ENMT: No tonsillar erythema, exudates, or enlargement; Moist mucous membranes, Good dentition, No lesions  NECK: Supple, No JVD, Normal thyroid  NERVOUS SYSTEM:  Alert & Oriented X3, Good concentration; Motor Strength 5/5 B/L upper and lower extremities; DTRs 2+ intact and symmetric  CHEST/LUNG: Clear to percussion bilaterally; No rales, rhonchi, wheezing, or rubs  HEART: Regular rate and rhythm; No murmurs, rubs, or gallops  ABDOMEN: Soft, Nontender, Nondistended; Bowel sounds present  EXTREMITIES:  2+ Peripheral Pulses, No clubbing, cyanosis, or edema  LYMPH: No lymphadenopathy noted  SKIN: No rashes or lesions    LABS:                        12.9   5.78  )-----------( 224      ( 2023 05:15 )             40.9         134<L>  |  103  |  33<H>  ----------------------------<  122<H>  4.6   |  21<L>  |  1.34<H>    Ca    9.6      2023 05:15  Phos  2.4       Mg     2.10         TPro  TNP  /  Alb  3.5  /  TBili  0.4  /  DBili  x   /  AST  78<H>  /  ALT  TNP  /  AlkPhos  96        Urinalysis Basic - ( 2023 06:51 )    Color: Light Yellow / Appearance: Turbid / S.014 / pH: x  Gluc: x / Ketone: Negative  / Bili: Negative / Urobili: <2 mg/dL   Blood: x / Protein: 100 mg/dL / Nitrite: Negative   Leuk Esterase: Large / RBC: 20 /HPF / WBC >720 /HPF   Sq Epi: x / Non Sq Epi: 1 /HPF / Bacteria: Few      CAPILLARY BLOOD GLUCOSE      POCT Blood Glucose.: 180 mg/dL (2023 22:21)  POCT Blood Glucose.: 187 mg/dL (2023 21:18)  POCT Blood Glucose.: 137 mg/dL (2023 17:40)  POCT Blood Glucose.: 163 mg/dL (2023 12:58)  POCT Blood Glucose.: 141 mg/dL (2023 09:10)        Urinalysis Basic - ( 2023 06:51 )    Color: Light Yellow / Appearance: Turbid / S.014 / pH: x  Gluc: x / Ketone: Negative  / Bili: Negative / Urobili: <2 mg/dL   Blood: x / Protein: 100 mg/dL / Nitrite: Negative   Leuk Esterase: Large / RBC: 20 /HPF / WBC >720 /HPF   Sq Epi: x / Non Sq Epi: 1 /HPF / Bacteria: Few        MEDICATIONS  (STANDING):  atorvastatin 10 milliGRAM(s) Oral at bedtime  cefTRIAXone   IVPB 1000 milliGRAM(s) IV Intermittent every 24 hours  dextrose 50% Injectable 25 Gram(s) IV Push once  dextrose 50% Injectable 12.5 Gram(s) IV Push once  dextrose 50% Injectable 25 Gram(s) IV Push once  heparin   Injectable 5000 Unit(s) SubCutaneous every 12 hours  insulin glargine Injectable (LANTUS) 30 Unit(s) SubCutaneous at bedtime  insulin lispro (ADMELOG) corrective regimen sliding scale   SubCutaneous three times a day before meals  insulin lispro (ADMELOG) corrective regimen sliding scale   SubCutaneous at bedtime  insulin lispro Injectable (ADMELOG) 4 Unit(s) SubCutaneous three times a day before meals  metoprolol succinate ER 25 milliGRAM(s) Oral daily  tamsulosin 0.4 milliGRAM(s) Oral at bedtime    MEDICATIONS  (PRN):  dextrose Oral Gel 15 Gram(s) Oral once PRN Blood Glucose LESS THAN 70 milliGRAM(s)/deciliter      Care Discussed with Consultants/Other Providers [ ] YES  [ ] NO

## 2023-01-31 LAB
ANION GAP SERPL CALC-SCNC: 14 MMOL/L — SIGNIFICANT CHANGE UP (ref 7–14)
BUN SERPL-MCNC: 33 MG/DL — HIGH (ref 7–23)
CALCIUM SERPL-MCNC: 9.4 MG/DL — SIGNIFICANT CHANGE UP (ref 8.4–10.5)
CHLORIDE SERPL-SCNC: 103 MMOL/L — SIGNIFICANT CHANGE UP (ref 98–107)
CO2 SERPL-SCNC: 19 MMOL/L — LOW (ref 22–31)
CREAT SERPL-MCNC: 1.31 MG/DL — HIGH (ref 0.5–1.3)
EGFR: 54 ML/MIN/1.73M2 — LOW
GLUCOSE BLDC GLUCOMTR-MCNC: 156 MG/DL — HIGH (ref 70–99)
GLUCOSE BLDC GLUCOMTR-MCNC: 187 MG/DL — HIGH (ref 70–99)
GLUCOSE BLDC GLUCOMTR-MCNC: 193 MG/DL — HIGH (ref 70–99)
GLUCOSE BLDC GLUCOMTR-MCNC: 215 MG/DL — HIGH (ref 70–99)
GLUCOSE BLDC GLUCOMTR-MCNC: 92 MG/DL — SIGNIFICANT CHANGE UP (ref 70–99)
GLUCOSE SERPL-MCNC: 84 MG/DL — SIGNIFICANT CHANGE UP (ref 70–99)
HCT VFR BLD CALC: 38.8 % — LOW (ref 39–50)
HGB BLD-MCNC: 12.5 G/DL — LOW (ref 13–17)
MAGNESIUM SERPL-MCNC: 2.1 MG/DL — SIGNIFICANT CHANGE UP (ref 1.6–2.6)
MCHC RBC-ENTMCNC: 27.1 PG — SIGNIFICANT CHANGE UP (ref 27–34)
MCHC RBC-ENTMCNC: 32.2 GM/DL — SIGNIFICANT CHANGE UP (ref 32–36)
MCV RBC AUTO: 84 FL — SIGNIFICANT CHANGE UP (ref 80–100)
NRBC # BLD: 0 /100 WBCS — SIGNIFICANT CHANGE UP (ref 0–0)
NRBC # FLD: 0 K/UL — SIGNIFICANT CHANGE UP (ref 0–0)
PHOSPHATE SERPL-MCNC: 3.1 MG/DL — SIGNIFICANT CHANGE UP (ref 2.5–4.5)
PLATELET # BLD AUTO: 262 K/UL — SIGNIFICANT CHANGE UP (ref 150–400)
POTASSIUM SERPL-MCNC: 4.1 MMOL/L — SIGNIFICANT CHANGE UP (ref 3.5–5.3)
POTASSIUM SERPL-SCNC: 4.1 MMOL/L — SIGNIFICANT CHANGE UP (ref 3.5–5.3)
RBC # BLD: 4.62 M/UL — SIGNIFICANT CHANGE UP (ref 4.2–5.8)
RBC # FLD: 13.2 % — SIGNIFICANT CHANGE UP (ref 10.3–14.5)
SODIUM SERPL-SCNC: 136 MMOL/L — SIGNIFICANT CHANGE UP (ref 135–145)
WBC # BLD: 4.62 K/UL — SIGNIFICANT CHANGE UP (ref 3.8–10.5)
WBC # FLD AUTO: 4.62 K/UL — SIGNIFICANT CHANGE UP (ref 3.8–10.5)

## 2023-01-31 RX ORDER — DARUNAVIR, COBICISTAT, EMTRICITABINE, AND TENOFOVIR ALAFENAMIDE 800; 150; 200; 10 MG/1; MG/1; MG/1; MG/1
1 TABLET, FILM COATED ORAL DAILY
Refills: 0 | Status: DISCONTINUED | OUTPATIENT
Start: 2023-01-31 | End: 2023-02-01

## 2023-01-31 RX ADMIN — Medication 4 UNIT(S): at 13:34

## 2023-01-31 RX ADMIN — Medication 2: at 17:54

## 2023-01-31 RX ADMIN — Medication 1: at 13:43

## 2023-01-31 RX ADMIN — CEFTRIAXONE 100 MILLIGRAM(S): 500 INJECTION, POWDER, FOR SOLUTION INTRAMUSCULAR; INTRAVENOUS at 03:29

## 2023-01-31 RX ADMIN — ATORVASTATIN CALCIUM 10 MILLIGRAM(S): 80 TABLET, FILM COATED ORAL at 22:05

## 2023-01-31 RX ADMIN — HEPARIN SODIUM 5000 UNIT(S): 5000 INJECTION INTRAVENOUS; SUBCUTANEOUS at 17:53

## 2023-01-31 RX ADMIN — INSULIN GLARGINE 30 UNIT(S): 100 INJECTION, SOLUTION SUBCUTANEOUS at 22:12

## 2023-01-31 RX ADMIN — HEPARIN SODIUM 5000 UNIT(S): 5000 INJECTION INTRAVENOUS; SUBCUTANEOUS at 05:04

## 2023-01-31 RX ADMIN — DARUNAVIR, COBICISTAT, EMTRICITABINE, AND TENOFOVIR ALAFENAMIDE 1 TABLET(S): 800; 150; 200; 10 TABLET, FILM COATED ORAL at 13:31

## 2023-01-31 RX ADMIN — Medication 4 UNIT(S): at 17:54

## 2023-01-31 RX ADMIN — TAMSULOSIN HYDROCHLORIDE 0.4 MILLIGRAM(S): 0.4 CAPSULE ORAL at 22:05

## 2023-01-31 NOTE — CONSULT NOTE ADULT - ASSESSMENT
82M HIV (well controlled, VLUD, 20 yrs), HTN, DM2 on insulin, CKD3,  BPH presents to the ED s/p syncope     #Syncope  -in setting of UTI  -no tele events  -TTE normal  -orthostatics    #HTN  -bp well controlled  -cont current meds    #DM  -mgmt per med    #UTI  -abx per med    70 minutes spent on total encounter; more than 50% of the visit was spent counseling and/or coordinating care by the attending physician.

## 2023-01-31 NOTE — DIETITIAN INITIAL EVALUATION ADULT - ORAL INTAKE PTA/DIET HISTORY
Pt is poor historian, collateral obtained from chart review and RN. Pt does not comply to CHO consistent diet.

## 2023-01-31 NOTE — DIETITIAN INITIAL EVALUATION ADULT - PERTINENT MEDS FT
MEDICATIONS  (STANDING):  atorvastatin 10 milliGRAM(s) Oral at bedtime  cefTRIAXone   IVPB 1000 milliGRAM(s) IV Intermittent every 24 hours  darunavir 800 mG/cobicistat 150 mG/emtricitabine 200 mG/tenofovir alafenamide 10 mG (SYMTUZA) 1 Tablet(s) Oral daily  dextrose 50% Injectable 25 Gram(s) IV Push once  dextrose 50% Injectable 12.5 Gram(s) IV Push once  dextrose 50% Injectable 25 Gram(s) IV Push once  heparin   Injectable 5000 Unit(s) SubCutaneous every 12 hours  insulin glargine Injectable (LANTUS) 30 Unit(s) SubCutaneous at bedtime  insulin lispro (ADMELOG) corrective regimen sliding scale   SubCutaneous three times a day before meals  insulin lispro (ADMELOG) corrective regimen sliding scale   SubCutaneous at bedtime  insulin lispro Injectable (ADMELOG) 4 Unit(s) SubCutaneous three times a day before meals  metoprolol succinate ER 25 milliGRAM(s) Oral daily  tamsulosin 0.4 milliGRAM(s) Oral at bedtime    MEDICATIONS  (PRN):  dextrose Oral Gel 15 Gram(s) Oral once PRN Blood Glucose LESS THAN 70 milliGRAM(s)/deciliter

## 2023-01-31 NOTE — DIETITIAN INITIAL EVALUATION ADULT - PERTINENT LABORATORY DATA
01-31    136  |  103  |  33<H>  ----------------------------<  84  4.1   |  19<L>  |  1.31<H>    Ca    9.4      31 Jan 2023 05:14  Phos  3.1     01-31  Mg     2.10     01-31    POCT Blood Glucose.: 156 mg/dL (01-31-23 @ 13:33)  A1C with Estimated Average Glucose Result: 10.0 % (01-29-23 @ 06:50)

## 2023-01-31 NOTE — DIETITIAN INITIAL EVALUATION ADULT - ADD RECOMMEND
Honor food and beverage preferences within diet restriction of patient in an effort to maximize level of nutrient intake;   Monitor PO intake, tolerance to nutrition supplement, weight trends, nutrition related lab values, BMs/GI distress, hydration status, skin integrity.

## 2023-01-31 NOTE — PROGRESS NOTE ADULT - SUBJECTIVE AND OBJECTIVE BOX
Patient is a 82y old  Male who presents with a chief complaint of Syncope and collapse     (31 Jan 2023 14:25)      INTERVAL HPI/OVERNIGHT EVENTS:  T(C): 37.1 (01-31-23 @ 22:20), Max: 37.4 (01-31-23 @ 05:02)  HR: 91 (01-31-23 @ 22:20) (58 - 91)  BP: 153/71 (01-31-23 @ 22:20) (111/55 - 153/71)  RR: 18 (01-31-23 @ 22:20) (16 - 18)  SpO2: 97% (01-31-23 @ 22:20) (97% - 100%)  Wt(kg): --  I&O's Summary    30 Jan 2023 07:01  -  31 Jan 2023 07:00  --------------------------------------------------------  IN: 0 mL / OUT: 500 mL / NET: -500 mL        PAST MEDICAL & SURGICAL HISTORY:  Diabetes      HTN (hypertension)      Family history of BPH      No significant past surgical history          SOCIAL HISTORY  Alcohol:  Tobacco:  Illicit substance use:    FAMILY HISTORY:    REVIEW OF SYSTEMS:  CONSTITUTIONAL: No fever, weight loss, or fatigue  EYES: No eye pain, visual disturbances, or discharge  ENMT:  No difficulty hearing, tinnitus, vertigo; No sinus or throat pain  NECK: No pain or stiffness  RESPIRATORY: No cough, wheezing, chills or hemoptysis; No shortness of breath  CARDIOVASCULAR: No chest pain, palpitations, dizziness, or leg swelling  GASTROINTESTINAL: No abdominal or epigastric pain. No nausea, vomiting, or hematemesis; No diarrhea or constipation. No melena or hematochezia.  GENITOURINARY: No dysuria, frequency, hematuria, or incontinence  NEUROLOGICAL: No headaches, memory loss, loss of strength, numbness, or tremors  SKIN: No itching, burning, rashes, or lesions   LYMPH NODES: No enlarged glands  ENDOCRINE: No heat or cold intolerance; No hair loss  MUSCULOSKELETAL: No joint pain or swelling; No muscle, back, or extremity pain  PSYCHIATRIC: No depression, anxiety, mood swings, or difficulty sleeping  HEME/LYMPH: No easy bruising, or bleeding gums  ALLERY AND IMMUNOLOGIC: No hives or eczema    RADIOLOGY & ADDITIONAL TESTS:    Imaging Personally Reviewed:  [ ] YES  [ ] NO    Consultant(s) Notes Reviewed:  [ ] YES  [ ] NO    PHYSICAL EXAM:  GENERAL: NAD, well-groomed, well-developed  HEAD:  Atraumatic, Normocephalic  EYES: EOMI, PERRLA, conjunctiva and sclera clear  ENMT: No tonsillar erythema, exudates, or enlargement; Moist mucous membranes, Good dentition, No lesions  NECK: Supple, No JVD, Normal thyroid  NERVOUS SYSTEM:  Alert & Oriented X3, Good concentration; Motor Strength 5/5 B/L upper and lower extremities; DTRs 2+ intact and symmetric  CHEST/LUNG: Clear to percussion bilaterally; No rales, rhonchi, wheezing, or rubs  HEART: Regular rate and rhythm; No murmurs, rubs, or gallops  ABDOMEN: Soft, Nontender, Nondistended; Bowel sounds present  EXTREMITIES:  2+ Peripheral Pulses, No clubbing, cyanosis, or edema  LYMPH: No lymphadenopathy noted  SKIN: No rashes or lesions    LABS:                        12.5   4.62  )-----------( 262      ( 31 Jan 2023 05:14 )             38.8     01-31    136  |  103  |  33<H>  ----------------------------<  84  4.1   |  19<L>  |  1.31<H>    Ca    9.4      31 Jan 2023 05:14  Phos  3.1     01-31  Mg     2.10     01-31          CAPILLARY BLOOD GLUCOSE      POCT Blood Glucose.: 193 mg/dL (31 Jan 2023 22:11)  POCT Blood Glucose.: 215 mg/dL (31 Jan 2023 17:45)  POCT Blood Glucose.: 156 mg/dL (31 Jan 2023 13:33)  POCT Blood Glucose.: 187 mg/dL (31 Jan 2023 12:27)  POCT Blood Glucose.: 92 mg/dL (31 Jan 2023 08:44)            MEDICATIONS  (STANDING):  atorvastatin 10 milliGRAM(s) Oral at bedtime  cefTRIAXone   IVPB 1000 milliGRAM(s) IV Intermittent every 24 hours  darunavir 800 mG/cobicistat 150 mG/emtricitabine 200 mG/tenofovir alafenamide 10 mG (SYMTUZA) 1 Tablet(s) Oral daily  dextrose 50% Injectable 25 Gram(s) IV Push once  dextrose 50% Injectable 12.5 Gram(s) IV Push once  dextrose 50% Injectable 25 Gram(s) IV Push once  heparin   Injectable 5000 Unit(s) SubCutaneous every 12 hours  insulin glargine Injectable (LANTUS) 30 Unit(s) SubCutaneous at bedtime  insulin lispro (ADMELOG) corrective regimen sliding scale   SubCutaneous three times a day before meals  insulin lispro (ADMELOG) corrective regimen sliding scale   SubCutaneous at bedtime  insulin lispro Injectable (ADMELOG) 4 Unit(s) SubCutaneous three times a day before meals  metoprolol succinate ER 25 milliGRAM(s) Oral daily  tamsulosin 0.4 milliGRAM(s) Oral at bedtime    MEDICATIONS  (PRN):  dextrose Oral Gel 15 Gram(s) Oral once PRN Blood Glucose LESS THAN 70 milliGRAM(s)/deciliter      Care Discussed with Consultants/Other Providers [ ] YES  [ ] NO Patient is a 82y old  Male who presents with a chief complaint of Syncope and collapse     (31 Jan 2023 14:25)      INTERVAL HPI/OVERNIGHT EVENTS: seen and examined   T(C): 37.1 (01-31-23 @ 22:20), Max: 37.4 (01-31-23 @ 05:02)  HR: 91 (01-31-23 @ 22:20) (58 - 91)  BP: 153/71 (01-31-23 @ 22:20) (111/55 - 153/71)  RR: 18 (01-31-23 @ 22:20) (16 - 18)  SpO2: 97% (01-31-23 @ 22:20) (97% - 100%)  Wt(kg): --  I&O's Summary    30 Jan 2023 07:01  -  31 Jan 2023 07:00  --------------------------------------------------------  IN: 0 mL / OUT: 500 mL / NET: -500 mL        PAST MEDICAL & SURGICAL HISTORY:  Diabetes      HTN (hypertension)      Family history of BPH      No significant past surgical history          SOCIAL HISTORY  Alcohol:  Tobacco:  Illicit substance use:    FAMILY HISTORY:    REVIEW OF SYSTEMS:  CONSTITUTIONAL: No fever, weight loss, or fatigue  EYES: No eye pain, visual disturbances, or discharge  ENMT:  No difficulty hearing, tinnitus, vertigo; No sinus or throat pain  NECK: No pain or stiffness  RESPIRATORY: No cough, wheezing, chills or hemoptysis; No shortness of breath  CARDIOVASCULAR: No chest pain, palpitations, dizziness, or leg swelling  GASTROINTESTINAL: No abdominal or epigastric pain. No nausea, vomiting, or hematemesis; No diarrhea or constipation. No melena or hematochezia.  GENITOURINARY: No dysuria, frequency, hematuria, or incontinence  NEUROLOGICAL: No headaches, memory loss, loss of strength, numbness, or tremors  SKIN: No itching, burning, rashes, or lesions   LYMPH NODES: No enlarged glands  ENDOCRINE: No heat or cold intolerance; No hair loss  MUSCULOSKELETAL: No joint pain or swelling; No muscle, back, or extremity pain  PSYCHIATRIC: No depression, anxiety, mood swings, or difficulty sleeping  HEME/LYMPH: No easy bruising, or bleeding gums  ALLERY AND IMMUNOLOGIC: No hives or eczema    RADIOLOGY & ADDITIONAL TESTS:    Imaging Personally Reviewed:  [ ] YES  [ ] NO    Consultant(s) Notes Reviewed:  [ ] YES  [ ] NO    PHYSICAL EXAM:  GENERAL: NAD, well-groomed, well-developed  HEAD:  Atraumatic, Normocephalic  EYES: EOMI, PERRLA, conjunctiva and sclera clear  ENMT: No tonsillar erythema, exudates, or enlargement; Moist mucous membranes, Good dentition, No lesions  NECK: Supple, No JVD, Normal thyroid  NERVOUS SYSTEM:  Alert & Oriented X3, Good concentration; Motor Strength 5/5 B/L upper and lower extremities; DTRs 2+ intact and symmetric  CHEST/LUNG: Clear to percussion bilaterally; No rales, rhonchi, wheezing, or rubs  HEART: Regular rate and rhythm; No murmurs, rubs, or gallops  ABDOMEN: Soft, Nontender, Nondistended; Bowel sounds present  EXTREMITIES:  2+ Peripheral Pulses, No clubbing, cyanosis, or edema  LYMPH: No lymphadenopathy noted  SKIN: No rashes or lesions    LABS:                        12.5   4.62  )-----------( 262      ( 31 Jan 2023 05:14 )             38.8     01-31    136  |  103  |  33<H>  ----------------------------<  84  4.1   |  19<L>  |  1.31<H>    Ca    9.4      31 Jan 2023 05:14  Phos  3.1     01-31  Mg     2.10     01-31          CAPILLARY BLOOD GLUCOSE      POCT Blood Glucose.: 193 mg/dL (31 Jan 2023 22:11)  POCT Blood Glucose.: 215 mg/dL (31 Jan 2023 17:45)  POCT Blood Glucose.: 156 mg/dL (31 Jan 2023 13:33)  POCT Blood Glucose.: 187 mg/dL (31 Jan 2023 12:27)  POCT Blood Glucose.: 92 mg/dL (31 Jan 2023 08:44)            MEDICATIONS  (STANDING):  atorvastatin 10 milliGRAM(s) Oral at bedtime  cefTRIAXone   IVPB 1000 milliGRAM(s) IV Intermittent every 24 hours  darunavir 800 mG/cobicistat 150 mG/emtricitabine 200 mG/tenofovir alafenamide 10 mG (SYMTUZA) 1 Tablet(s) Oral daily  dextrose 50% Injectable 25 Gram(s) IV Push once  dextrose 50% Injectable 12.5 Gram(s) IV Push once  dextrose 50% Injectable 25 Gram(s) IV Push once  heparin   Injectable 5000 Unit(s) SubCutaneous every 12 hours  insulin glargine Injectable (LANTUS) 30 Unit(s) SubCutaneous at bedtime  insulin lispro (ADMELOG) corrective regimen sliding scale   SubCutaneous three times a day before meals  insulin lispro (ADMELOG) corrective regimen sliding scale   SubCutaneous at bedtime  insulin lispro Injectable (ADMELOG) 4 Unit(s) SubCutaneous three times a day before meals  metoprolol succinate ER 25 milliGRAM(s) Oral daily  tamsulosin 0.4 milliGRAM(s) Oral at bedtime    MEDICATIONS  (PRN):  dextrose Oral Gel 15 Gram(s) Oral once PRN Blood Glucose LESS THAN 70 milliGRAM(s)/deciliter      Care Discussed with Consultants/Other Providers [ ] YES  [ ] NO

## 2023-01-31 NOTE — CONSULT NOTE ADULT - SUBJECTIVE AND OBJECTIVE BOX
CARDIOLOGY CONSULT - Dr. Donaldson  Date of Service: 1/31/23      HPI:  patient seen in spot 25A in ED, no family present    82M HIV (well controlled, VLUD, 20 yrs), HTN, DM2 on insulin, CKD3,  BPH presents to the ED s/p syncope this morning.   Patient states went to BR to urinate, nothing would come out and passed out.  States it hurts, but cannot state where.  Patient unable to report current or prior symptoms, does know his name and that he is in hospital.  Per ED notes  pt states this morning he went to urinate and only small amount of urine came out. pt states he synopsizes and was found on the floor by his wife.  Not known how long he was out or if he hit his head. Has been having painful with urination for the last one week. pt denies back pain,   Daughter confirms this story,  states could not have been down for long. At home having intermittent CP and dizziness. Family thinks he had diarrhea because for imodium.   Mild dementia worse than usual.  Family unaware of CKD.      (29 Jan 2023 13:06)      PAST MEDICAL & SURGICAL HISTORY:  Diabetes      HTN (hypertension)      Family history of BPH      No significant past surgical history              PREVIOUS DIAGNOSTIC TESTING:    [ ] Echocardiogram:  [ ]  Catheterization:  [ ] Stress Test:  	    MEDICATIONS:  MEDICATIONS  (STANDING):  atorvastatin 10 milliGRAM(s) Oral at bedtime  cefTRIAXone   IVPB 1000 milliGRAM(s) IV Intermittent every 24 hours  darunavir 800 mG/cobicistat 150 mG/emtricitabine 200 mG/tenofovir alafenamide 10 mG (SYMTUZA) 1 Tablet(s) Oral daily  dextrose 50% Injectable 25 Gram(s) IV Push once  dextrose 50% Injectable 12.5 Gram(s) IV Push once  dextrose 50% Injectable 25 Gram(s) IV Push once  heparin   Injectable 5000 Unit(s) SubCutaneous every 12 hours  insulin glargine Injectable (LANTUS) 30 Unit(s) SubCutaneous at bedtime  insulin lispro (ADMELOG) corrective regimen sliding scale   SubCutaneous three times a day before meals  insulin lispro (ADMELOG) corrective regimen sliding scale   SubCutaneous at bedtime  insulin lispro Injectable (ADMELOG) 4 Unit(s) SubCutaneous three times a day before meals  metoprolol succinate ER 25 milliGRAM(s) Oral daily  tamsulosin 0.4 milliGRAM(s) Oral at bedtime      FAMILY HISTORY:  No pertinent family history in first degree relatives        SOCIAL HISTORY:    [ ] Non-smoker  [ ] Smoker  [ ] Alcohol    Allergies    No Known Allergies    Intolerances    	    REVIEW OF SYSTEMS:  CONSTITUTIONAL: No fever, weight loss, or fatigue  EYES: No eye pain, visual disturbances, or discharge  ENMT:  No difficulty hearing, tinnitus, vertigo; No sinus or throat pain  NECK: No pain or stiffness  RESPIRATORY: No cough, wheezing, chills or hemoptysis; No Shortness of Breath  CARDIOVASCULAR: No chest pain, palpitations, passing out, dizziness, or leg swelling  GASTROINTESTINAL: No abdominal or epigastric pain. No nausea, vomiting, or hematemesis; No diarrhea or constipation. No melena or hematochezia.  GENITOURINARY: No dysuria, frequency, hematuria, or incontinence  NEUROLOGICAL: No headaches, memory loss, loss of strength, numbness, or tremors  SKIN: No itching, burning, rashes, or lesions   	    [ ] All others negative	  [ ] Unable to obtain    PHYSICAL EXAM:  T(C): 37.4 (01-31-23 @ 05:02), Max: 37.4 (01-31-23 @ 05:02)  HR: 58 (01-31-23 @ 05:02) (58 - 70)  BP: 114/57 (01-31-23 @ 05:02) (114/57 - 141/66)  RR: 17 (01-31-23 @ 05:02) (16 - 17)  SpO2: 100% (01-31-23 @ 05:02) (100% - 100%)  Wt(kg): --  I&O's Summary    30 Jan 2023 07:01  -  31 Jan 2023 07:00  --------------------------------------------------------  IN: 0 mL / OUT: 500 mL / NET: -500 mL        Appearance: Normal	  Psychiatry: A & O x 3, Mood & affect appropriate  HEENT:   Normal oral mucosa, PERRL, EOMI	  Lymphatic: No lymphadenopathy  Cardiovascular: Normal S1 S2,RRR, No JVD, No murmurs  Respiratory: Lungs clear to auscultation	  Gastrointestinal:  Soft, Non-tender, + BS	  Skin: No rashes, No ecchymoses, No cyanosis	  Neurologic: Non-focal  Extremities: Normal range of motion, No clubbing, cyanosis or edema  Vascular: Peripheral pulses palpable 2+ bilaterally    TELEMETRY: 	    ECG:  	  RADIOLOGY:  OTHER: 	  	  LABS:	 	    CARDIAC MARKERS:                                  12.5   4.62  )-----------( 262      ( 31 Jan 2023 05:14 )             38.8     01-31    136  |  103  |  33<H>  ----------------------------<  84  4.1   |  19<L>  |  1.31<H>    Ca    9.4      31 Jan 2023 05:14  Phos  3.1     01-31  Mg     2.10     01-31        proBNP:   Lipid Profile:   HgA1c:   TSH:     ASSESSMENT/PLAN: 	              70 minutes spent on total encounter; more than 50% of the visit was spent counseling and/or coordinating care by the attending physician.

## 2023-01-31 NOTE — DIETITIAN INITIAL EVALUATION ADULT - OTHER INFO
82M HIV (well controlled, VLUD, 20 yrs), HTN, DM2 on insulin, CKD3,  BPH presents to the ED s/p syncope this morning.     Pt seen and RN reports pt consumed 50% meals today with No GI distress (nausea/vomiting/diarrhea/constipation.) at present. Current wt- 202# (1/30/23). Labs reviewed. A1c- 10% (1/29/23) due to uncontrolled DM. Pt refused diet teaching.

## 2023-02-01 VITALS
OXYGEN SATURATION: 99 % | HEART RATE: 72 BPM | TEMPERATURE: 98 F | RESPIRATION RATE: 16 BRPM | DIASTOLIC BLOOD PRESSURE: 81 MMHG | SYSTOLIC BLOOD PRESSURE: 141 MMHG

## 2023-02-01 LAB
-  AMIKACIN: SIGNIFICANT CHANGE UP
-  AMOXICILLIN/CLAVULANIC ACID: SIGNIFICANT CHANGE UP
-  AMPICILLIN/SULBACTAM: SIGNIFICANT CHANGE UP
-  AMPICILLIN: SIGNIFICANT CHANGE UP
-  AZTREONAM: SIGNIFICANT CHANGE UP
-  CEFAZOLIN: SIGNIFICANT CHANGE UP
-  CEFEPIME: SIGNIFICANT CHANGE UP
-  CEFOXITIN: SIGNIFICANT CHANGE UP
-  CEFTRIAXONE: SIGNIFICANT CHANGE UP
-  CEFUROXIME: SIGNIFICANT CHANGE UP
-  CIPROFLOXACIN: SIGNIFICANT CHANGE UP
-  ERTAPENEM: SIGNIFICANT CHANGE UP
-  GENTAMICIN: SIGNIFICANT CHANGE UP
-  IMIPENEM: SIGNIFICANT CHANGE UP
-  LEVOFLOXACIN: SIGNIFICANT CHANGE UP
-  MEROPENEM: SIGNIFICANT CHANGE UP
-  NITROFURANTOIN: SIGNIFICANT CHANGE UP
-  PIPERACILLIN/TAZOBACTAM: SIGNIFICANT CHANGE UP
-  TOBRAMYCIN: SIGNIFICANT CHANGE UP
-  TRIMETHOPRIM/SULFAMETHOXAZOLE: SIGNIFICANT CHANGE UP
ANION GAP SERPL CALC-SCNC: 10 MMOL/L — SIGNIFICANT CHANGE UP (ref 7–14)
BUN SERPL-MCNC: 28 MG/DL — HIGH (ref 7–23)
CALCIUM SERPL-MCNC: 9.9 MG/DL — SIGNIFICANT CHANGE UP (ref 8.4–10.5)
CHLORIDE SERPL-SCNC: 105 MMOL/L — SIGNIFICANT CHANGE UP (ref 98–107)
CO2 SERPL-SCNC: 20 MMOL/L — LOW (ref 22–31)
CREAT SERPL-MCNC: 1.36 MG/DL — HIGH (ref 0.5–1.3)
CULTURE RESULTS: SIGNIFICANT CHANGE UP
EGFR: 52 ML/MIN/1.73M2 — LOW
GLUCOSE BLDC GLUCOMTR-MCNC: 108 MG/DL — HIGH (ref 70–99)
GLUCOSE BLDC GLUCOMTR-MCNC: 127 MG/DL — HIGH (ref 70–99)
GLUCOSE SERPL-MCNC: 117 MG/DL — HIGH (ref 70–99)
HCT VFR BLD CALC: 38.7 % — LOW (ref 39–50)
HGB BLD-MCNC: 12.3 G/DL — LOW (ref 13–17)
MAGNESIUM SERPL-MCNC: 2.1 MG/DL — SIGNIFICANT CHANGE UP (ref 1.6–2.6)
MCHC RBC-ENTMCNC: 26.8 PG — LOW (ref 27–34)
MCHC RBC-ENTMCNC: 31.8 GM/DL — LOW (ref 32–36)
MCV RBC AUTO: 84.3 FL — SIGNIFICANT CHANGE UP (ref 80–100)
METHOD TYPE: SIGNIFICANT CHANGE UP
NRBC # BLD: 0 /100 WBCS — SIGNIFICANT CHANGE UP (ref 0–0)
NRBC # FLD: 0 K/UL — SIGNIFICANT CHANGE UP (ref 0–0)
ORGANISM # SPEC MICROSCOPIC CNT: SIGNIFICANT CHANGE UP
ORGANISM # SPEC MICROSCOPIC CNT: SIGNIFICANT CHANGE UP
PHOSPHATE SERPL-MCNC: 3.2 MG/DL — SIGNIFICANT CHANGE UP (ref 2.5–4.5)
PLATELET # BLD AUTO: 307 K/UL — SIGNIFICANT CHANGE UP (ref 150–400)
POTASSIUM SERPL-MCNC: 4.7 MMOL/L — SIGNIFICANT CHANGE UP (ref 3.5–5.3)
POTASSIUM SERPL-SCNC: 4.7 MMOL/L — SIGNIFICANT CHANGE UP (ref 3.5–5.3)
RBC # BLD: 4.59 M/UL — SIGNIFICANT CHANGE UP (ref 4.2–5.8)
RBC # FLD: 13.2 % — SIGNIFICANT CHANGE UP (ref 10.3–14.5)
SODIUM SERPL-SCNC: 135 MMOL/L — SIGNIFICANT CHANGE UP (ref 135–145)
SPECIMEN SOURCE: SIGNIFICANT CHANGE UP
WBC # BLD: 5.1 K/UL — SIGNIFICANT CHANGE UP (ref 3.8–10.5)
WBC # FLD AUTO: 5.1 K/UL — SIGNIFICANT CHANGE UP (ref 3.8–10.5)

## 2023-02-01 RX ORDER — AMLODIPINE BESYLATE 2.5 MG/1
1 TABLET ORAL
Qty: 0 | Refills: 0 | DISCHARGE
Start: 2023-02-01

## 2023-02-01 RX ORDER — AMLODIPINE BESYLATE 2.5 MG/1
1 TABLET ORAL
Qty: 0 | Refills: 0 | DISCHARGE

## 2023-02-01 RX ORDER — LISINOPRIL 2.5 MG/1
1 TABLET ORAL
Qty: 0 | Refills: 0 | DISCHARGE

## 2023-02-01 RX ORDER — SODIUM CHLORIDE 9 MG/ML
1000 INJECTION INTRAMUSCULAR; INTRAVENOUS; SUBCUTANEOUS
Refills: 0 | Status: DISCONTINUED | OUTPATIENT
Start: 2023-02-01 | End: 2023-02-01

## 2023-02-01 RX ADMIN — HEPARIN SODIUM 5000 UNIT(S): 5000 INJECTION INTRAVENOUS; SUBCUTANEOUS at 06:07

## 2023-02-01 RX ADMIN — DARUNAVIR, COBICISTAT, EMTRICITABINE, AND TENOFOVIR ALAFENAMIDE 1 TABLET(S): 800; 150; 200; 10 TABLET, FILM COATED ORAL at 12:35

## 2023-02-01 RX ADMIN — Medication 4 UNIT(S): at 09:55

## 2023-02-01 RX ADMIN — Medication 25 MILLIGRAM(S): at 06:06

## 2023-02-01 RX ADMIN — CEFTRIAXONE 100 MILLIGRAM(S): 500 INJECTION, POWDER, FOR SOLUTION INTRAMUSCULAR; INTRAVENOUS at 02:50

## 2023-02-01 NOTE — PROGRESS NOTE ADULT - SUBJECTIVE AND OBJECTIVE BOX
CARDIOLOGY FOLLOW UP - Dr. Donaldson  Date of Service: 2/1/23  CC: no events    Review of Systems:  Constitutional: No fever, weight loss, or fatigue  Respiratory: No cough, wheezing, or hemoptysis, no shortness of breath  Cardiovascular: No chest pain, palpitations, passing out, dizziness, or leg swelling  Gastrointestinal: No abd or epigastric pain. No nausea, vomiting, or hematemesis; no diarrhea or consiptaiton, no melena or hematochezia  Vascular: No edema     TELEMETRY:    PHYSICAL EXAM:  T(C): 37.3 (02-01-23 @ 06:00), Max: 37.3 (02-01-23 @ 06:00)  HR: 72 (02-01-23 @ 06:00) (72 - 91)  BP: 134/80 (02-01-23 @ 06:00) (111/55 - 153/71)  RR: 17 (02-01-23 @ 06:00) (16 - 18)  SpO2: 99% (02-01-23 @ 06:00) (97% - 100%)  Wt(kg): --  I&O's Summary    31 Jan 2023 07:01  -  01 Feb 2023 07:00  --------------------------------------------------------  IN: 550 mL / OUT: 750 mL / NET: -200 mL        Appearance: Normal	  Cardiovascular: Normal S1 S2,RRR, No JVD, No murmurs  Respiratory: Lungs clear to auscultation	  Gastrointestinal:  Soft, Non-tender, + BS	  Extremities: Normal range of motion, No clubbing, cyanosis or edema  Vascular: Peripheral pulses palpable 2+ bilaterally       Home Medications:  amLODIPine 5 mg oral tablet: 1 tab(s) orally once a day (29 Jan 2023 13:40)  atorvastatin 10 mg oral tablet: 1 tab(s) orally once a day (at bedtime) (29 Jan 2023 13:40)  Basaglar KwikPen 100 units/mL subcutaneous solution: 30 unit(s) subcutaneous once a day (29 Jan 2023 13:40)  Flomax 0.4 mg oral capsule: 1 cap(s) orally once a day (29 Jan 2023 13:40)  insulin aspart 100 units/mL injectable solution:  (29 Jan 2023 13:40)  lisinopril 40 mg oral tablet: 1 tab(s) orally once a day (29 Jan 2023 13:40)  Metoprolol Succinate  mg oral tablet, extended release: 1 tab(s) orally once a day (29 Jan 2023 13:40)  Symtuza oral tablet: 1 tab(s) orally once a day (29 Jan 2023 13:40)        MEDICATIONS  (STANDING):  atorvastatin 10 milliGRAM(s) Oral at bedtime  darunavir 800 mG/cobicistat 150 mG/emtricitabine 200 mG/tenofovir alafenamide 10 mG (SYMTUZA) 1 Tablet(s) Oral daily  dextrose 50% Injectable 25 Gram(s) IV Push once  dextrose 50% Injectable 12.5 Gram(s) IV Push once  dextrose 50% Injectable 25 Gram(s) IV Push once  heparin   Injectable 5000 Unit(s) SubCutaneous every 12 hours  insulin glargine Injectable (LANTUS) 30 Unit(s) SubCutaneous at bedtime  insulin lispro (ADMELOG) corrective regimen sliding scale   SubCutaneous three times a day before meals  insulin lispro (ADMELOG) corrective regimen sliding scale   SubCutaneous at bedtime  insulin lispro Injectable (ADMELOG) 4 Unit(s) SubCutaneous three times a day before meals  metoprolol succinate ER 25 milliGRAM(s) Oral daily  sodium chloride 0.9%. 1000 milliLiter(s) (75 mL/Hr) IV Continuous <Continuous>  tamsulosin 0.4 milliGRAM(s) Oral at bedtime        EKG:  RADIOLOGY:  DIAGNOSTIC TESTING:  [ ] Echocardiogram:  [ ] Catherterization:  [ ] Stress Test:  OTHER:     LABS:	 	                          12.3   5.10  )-----------( 307      ( 01 Feb 2023 06:05 )             38.7     02-01    135  |  105  |  28<H>  ----------------------------<  117<H>  4.7   |  20<L>  |  1.36<H>    Ca    9.9      01 Feb 2023 06:05  Phos  3.2     02-01  Mg     2.10     02-01            CARDIAC MARKERS:

## 2023-02-01 NOTE — DISCHARGE NOTE PROVIDER - NSDCMRMEDTOKEN_GEN_ALL_CORE_FT
atorvastatin 10 mg oral tablet: 1 tab(s) orally once a day (at bedtime)  Basaglar KwikPen 100 units/mL subcutaneous solution: 30 unit(s) subcutaneous once a day  Flomax 0.4 mg oral capsule: 1 cap(s) orally once a day  insulin aspart 100 units/mL injectable solution:   Metoprolol Succinate  mg oral tablet, extended release: 1 tab(s) orally once a day  Norvasc 5 mg oral tablet: 1 tab(s) orally once a day  Symtuza oral tablet: 1 tab(s) orally once a day

## 2023-02-01 NOTE — DISCHARGE NOTE NURSING/CASE MANAGEMENT/SOCIAL WORK - PATIENT PORTAL LINK FT
You can access the FollowMyHealth Patient Portal offered by [facility] by registering at the following website: [email or web address]. By joining [facility]’s FollowMyHealth portal, you will also be able to view your health information using other applications (apps) compatible with our system.

## 2023-02-01 NOTE — PROGRESS NOTE ADULT - ASSESSMENT
82M HIV (well controlled, VLUD, 20 yrs), HTN, DM2 on insulin, CKD3,  BPH presents to the ED s/p syncope     #Syncope  -in setting of UTI  -no tele events  -TTE normal  -orthostatics    #HTN  -bp well controlled  -cont current meds    #DM  -mgmt per med    #UTI  -abx per med    35 minutes spent on total encounter; more than 50% of the visit was spent counseling and/or coordinating care by the attending physician.  
82M HIV (well controlled, VLUD, 20 yrs), HTN, DM2 on insulin, CKD3,  BPH presents to the ED s/p syncope this morning.      Problem/Plan - 1:  ·  Problem: Syncope.   ·  Plan: Possible vasovagal in context of micturition, cannot r/o arrthymia, family reports dizziness in preceding days  cardio consulted   better      Problem/Plan - 2:  ·  Problem: Metabolic encephalopathy.   ·  Plan: Baseline has mild cognitive dysfxn, now off baseline more confused likely due to infection   - CTH microvascular change  better since admission      Problem/Plan - 3:  ·  Problem: UTI (urinary tract infection).   ·  Plan: UA +++  - f/u urine cx  - c/w ctx (1/29-).     Problem/Plan - 4:  ·  Problem: BPH with obstruction/lower urinary tract symptoms.   ·  Plan: - resume home flomax  - check renal US/PVR.: minimal      Problem/Plan - 5:  ·  Problem: HTN (hypertension).   ·  Plan: restart home meds      Problem/Plan - 6:  ·  Problem: Diabetes.   ·  Plan: - check HbA1c  - DM diet  - basal/bolus + ESPERANZA.     Problem/Plan - 7:  ·  Problem: HIV disease.   ·  Plan: long-standing, 20 years, VLUD per daughter, unknown CD4  - advised family to bring home ARVT.     Problem/Plan - 8:  ·  Problem: Stage 3 chronic kidney disease.   ·  Plan: - trend Cr, renally dose meds, avoid nephrotoxins  - renal US.      
82M HIV (well controlled, VLUD, 20 yrs), HTN, DM2 on insulin, CKD3,  BPH presents to the ED s/p syncope this morning.      Problem/Plan - 1:  ·  Problem: Syncope.   ·  Plan: Possible vasovagal  cardio consulted   better      Problem/Plan - 2:  ·  Problem: Metabolic encephalopathy.   ·  Plan: Baseline has mild cognitive dysfxn, now off baseline more confused likely due to infection   - CTH microvascular change  better since admission      Problem/Plan - 3:  ·  Problem: UTI (urinary tract infection).   ·  Plan: UA +++  - f/u urine cx : growing GNR  - c/w ctx (1/29-).     Problem/Plan - 4:  ·  Problem: BPH with obstruction/lower urinary tract symptoms.   ·  Plan: - resume home flomax  - check renal US/PVR.: minimal      Problem/Plan - 5:  ·  Problem: HTN (hypertension).   ·  Plan: restart home meds      Problem/Plan - 6:  ·  Problem: Diabetes.   ·  Plan: - check HbA1c  - DM diet  - basal/bolus + ESPERANZA.     Problem/Plan - 7:  ·  Problem: HIV disease.   ·  Plan: long-standing, 20 years, VLUD per daughter, unknown CD4  - advised family to bring home ARVT.     Problem/Plan - 8:  ·  Problem: Stage 3 chronic kidney disease.   ·  Plan: - trend Cr, renally dose meds, avoid nephrotoxins  - renal US.

## 2023-02-01 NOTE — DISCHARGE NOTE PROVIDER - NSDCCPCAREPLAN_GEN_ALL_CORE_FT
PRINCIPAL DISCHARGE DIAGNOSIS  Diagnosis: Urinary tract infection  Assessment and Plan of Treatment: You completed antibiotics for a urinary infection. To help prevent future infections maintain healthy hygiene and avoid taking long baths.   Monitor for signs/symptoms of infection, such as, fever/chills, burning/pain with urination, urinary frequency/hesitancy, cloudy urine, or blood in urine and follow-up with your primary care provider as outpatient for further care.      SECONDARY DISCHARGE DIAGNOSES  Diagnosis: HTN (hypertension)  Assessment and Plan of Treatment: Continue blood pressure medication regimen as directed. Monitor for any visual changes, headaches or dizziness.  Monitor blood pressure regularly.  Follow up with your primary care provider for further management for high blood pressure.      Diagnosis: Syncope  Assessment and Plan of Treatment: You were evaluated for passing out also known as syncope. You had an echocardiogram which was normal . Follow up with your PCP.    Diagnosis: Diabetes  Assessment and Plan of Treatment: Your HgA1C during hospitalization was noted to be 10% (Provide such information to your primary care).  Continue your medication regimen and a consistent carbohydrate diet (Meaning eating the same amount of carbohydrates at the same time each day). Monitor blood glucose levels throughout the day before meals and at bedtime. Record blood sugars and bring to outpatient providers appointment in order to be reviewed by your doctor for management modifications. If your sugars are more than 400 or less than 70 you should contact your PCP immediately.   Monitor for signs/symptoms of low blood glucose, such as, dizziness, altered mental status, or cool/clammy skin. In addition, monitor for signs/symptoms of high blood glucose, such as, feeling hot, dry, fatigued, or with increased thirst/urination.   Make regular podiatry appointments in order to have feet checked for wounds and uncontrolled toe nail growth to prevent infections, as well as, appointments with an ophthalmologist to monitor your vision.      Diagnosis: Stage 3 chronic kidney disease  Assessment and Plan of Treatment: Follow up with your PCP/ nephrologist for continued monitoring of your kidney function    Diagnosis: HIV disease  Assessment and Plan of Treatment: Continue to comply with your anti retro viral therapy and follow up with your PCP and ID doctors.    Diagnosis: BPH with obstruction/lower urinary tract symptoms  Assessment and Plan of Treatment: Continue to comply with your medication regimen.

## 2023-02-01 NOTE — DISCHARGE NOTE PROVIDER - HOSPITAL COURSE
82M HIV (well controlled, VLUD, 20 yrs), HTN, DM2 on insulin, CKD3,  BPH presents to the ED s/p syncope this morning.     Hospital Course:    Syncope.   - Possible vasovagal  - cardio consulted   - better     Metabolic encephalopathy.   - Baseline has mild cognitive dysfxn, now off baseline more confused likely due to infection   - CTH microvascular change  - Resolved     UTI (urinary tract infection).   - UA +++  - urine cx : growing GNR  - S/p CTX 3 days     BPH with obstruction/lower urinary tract symptoms.   - resume home flomax  - check renal US/PVR.: minimal     HTN (hypertension).   - restart home meds     Diabetes.   - check HbA1c 10%  - DM diet  - basal/bolus + ESPERANZA.    HIV disease.   - long-standing, 20 years, VLUD per daughter, unknown CD4  - advised family to bring home ARVT.     Stage 3 chronic kidney disease.   - trend Cr, renally dose meds, avoid nephrotoxins  - renal US.    On 2/1/23 this case was reviewed with Dr. Casiano, the patient is medically stable and optimized for discharge. All medications were reviewed and prescriptions were sent to mutually agreed upon pharmacy.

## 2023-02-01 NOTE — DISCHARGE NOTE NURSING/CASE MANAGEMENT/SOCIAL WORK - NSDCPEFALRISK_GEN_ALL_CORE
For information on Fall & Injury Prevention, visit: [email or web address] OR  [email or web address] OR  https://www.cdc.gov/steadi/patient.html

## 2023-09-14 NOTE — PHYSICAL THERAPY INITIAL EVALUATION ADULT - NSPTDMEREC_GEN_A_CORE
to ensure safety for outdoor ambulation/rolling walker Dorsal Nasal Flap Text: The defect edges were debeveled with a #15 scalpel blade.  Given the location of the defect and the proximity to free margins a dorsal nasal flap was deemed most appropriate.  Using a sterile surgical marker, an appropriate dorsal nasal flap was drawn around the defect.    The area thus outlined was incised deep to adipose tissue with a #15 scalpel blade.  The skin margins were undermined to an appropriate distance in all directions utilizing iris scissors.

## 2023-09-14 NOTE — ED PROVIDER NOTE - CPE EDP NEURO NORM
specific directions regarding restrictions to diet and bowel prep instructions including laxatives. Please read these instructions one week prior to your scheduled procedure to ensure that you are prepared. If you have any questions regarding these instructions please call our office Mon through Fri from 8:00 am to 4:00 pm.     Follow prep instructions provided for bowel prep. Take all of the bowel prep as directed. If you are having problems with nausea, stop your prep for 30-45 min to allow the nausea to subside before resuming your prep. It is important to drink plenty of fluids throughout the day before taking your laxatives. This will help to protect your kidneys, prevent dehydration and maximize the effect of the bowel prep. Your diet before a colonoscopy bowel preparation is very important to ensure a successful colon exam. It is recommended to consider certain changes to your diet three to four days prior to the procedure. Remember that your bowels need to be completely empty for the exam.    What foods are good to eat? Cut down on heavy solid foods three to four days before the procedure and start introducing lighter meals to your diet. The following food suggestions are a good part of your diet before a colonoscopy bowel preparation. Light meat that is easily digestible such as chicken (without the skin)   Potatoes without skin   Cheese   Eggs   A light meal of steamed white fish   Light clear soups    Foods and drinks to avoid  Avoid foods that contain too much fiber. Stay clear of dark colored beverages. They can stick to the walls of the digestive tract and make it difficult to differentiate from blood.  Some of these foods are:  Red meat, rice, nuts and vegetables   Milk, other milk based fluids and cream   Most fruit and puddings   Whole grain pasta   Cereals, bran and seeds   Colored beverages, especially those that are red or purple in color   Red colored Jell-O   On the day before the normal...

## 2024-06-21 ENCOUNTER — EMERGENCY (EMERGENCY)
Facility: HOSPITAL | Age: 84
LOS: 1 days | Discharge: HOME | End: 2024-06-21
Attending: EMERGENCY MEDICINE | Admitting: EMERGENCY MEDICINE
Payer: MEDICAID

## 2024-06-21 VITALS
DIASTOLIC BLOOD PRESSURE: 84 MMHG | HEART RATE: 74 BPM | TEMPERATURE: 98 F | OXYGEN SATURATION: 100 % | SYSTOLIC BLOOD PRESSURE: 136 MMHG | RESPIRATION RATE: 16 BRPM

## 2024-06-21 PROBLEM — I10 ESSENTIAL (PRIMARY) HYPERTENSION: Chronic | Status: ACTIVE | Noted: 2023-01-29

## 2024-06-21 LAB
ADD ON TEST-SPECIMEN IN LAB: SIGNIFICANT CHANGE UP
ALBUMIN SERPL ELPH-MCNC: 4 G/DL — SIGNIFICANT CHANGE UP (ref 3.3–5)
ALP SERPL-CCNC: 118 U/L — SIGNIFICANT CHANGE UP (ref 40–120)
ALT FLD-CCNC: 26 U/L — SIGNIFICANT CHANGE UP (ref 4–41)
ANION GAP SERPL CALC-SCNC: 13 MMOL/L — SIGNIFICANT CHANGE UP (ref 7–14)
ANION GAP SERPL CALC-SCNC: 14 MMOL/L — SIGNIFICANT CHANGE UP (ref 7–14)
APPEARANCE UR: ABNORMAL
AST SERPL-CCNC: 28 U/L — SIGNIFICANT CHANGE UP (ref 4–40)
B-OH-BUTYR SERPL-SCNC: 0.2 MMOL/L — SIGNIFICANT CHANGE UP (ref 0–0.4)
BACTERIA # UR AUTO: NEGATIVE /HPF — SIGNIFICANT CHANGE UP
BASE EXCESS BLDV CALC-SCNC: 0.1 MMOL/L — SIGNIFICANT CHANGE UP (ref -2–3)
BASOPHILS # BLD AUTO: 0.06 K/UL — SIGNIFICANT CHANGE UP (ref 0–0.2)
BASOPHILS NFR BLD AUTO: 1.1 % — SIGNIFICANT CHANGE UP (ref 0–2)
BILIRUB SERPL-MCNC: 0.6 MG/DL — SIGNIFICANT CHANGE UP (ref 0.2–1.2)
BILIRUB UR-MCNC: NEGATIVE — SIGNIFICANT CHANGE UP
BUN SERPL-MCNC: 23 MG/DL — SIGNIFICANT CHANGE UP (ref 7–23)
BUN SERPL-MCNC: 25 MG/DL — HIGH (ref 7–23)
CA-I SERPL-SCNC: 1.36 MMOL/L — HIGH (ref 1.15–1.33)
CALCIUM SERPL-MCNC: 9.3 MG/DL — SIGNIFICANT CHANGE UP (ref 8.4–10.5)
CALCIUM SERPL-MCNC: 9.8 MG/DL — SIGNIFICANT CHANGE UP (ref 8.4–10.5)
CAST: 1 /LPF — SIGNIFICANT CHANGE UP (ref 0–4)
CHLORIDE BLDV-SCNC: 106 MMOL/L — SIGNIFICANT CHANGE UP (ref 96–108)
CHLORIDE SERPL-SCNC: 102 MMOL/L — SIGNIFICANT CHANGE UP (ref 98–107)
CHLORIDE SERPL-SCNC: 102 MMOL/L — SIGNIFICANT CHANGE UP (ref 98–107)
CO2 BLDV-SCNC: 27.4 MMOL/L — HIGH (ref 22–26)
CO2 SERPL-SCNC: 20 MMOL/L — LOW (ref 22–31)
CO2 SERPL-SCNC: 21 MMOL/L — LOW (ref 22–31)
COLOR SPEC: YELLOW — SIGNIFICANT CHANGE UP
CREAT SERPL-MCNC: 1.34 MG/DL — HIGH (ref 0.5–1.3)
CREAT SERPL-MCNC: 1.47 MG/DL — HIGH (ref 0.5–1.3)
DIFF PNL FLD: ABNORMAL
EGFR: 47 ML/MIN/1.73M2 — LOW
EGFR: 52 ML/MIN/1.73M2 — LOW
EOSINOPHIL # BLD AUTO: 0.04 K/UL — SIGNIFICANT CHANGE UP (ref 0–0.5)
EOSINOPHIL NFR BLD AUTO: 0.7 % — SIGNIFICANT CHANGE UP (ref 0–6)
GAS PNL BLDV: 134 MMOL/L — LOW (ref 136–145)
GAS PNL BLDV: SIGNIFICANT CHANGE UP
GLUCOSE BLDV-MCNC: 308 MG/DL — HIGH (ref 70–99)
GLUCOSE SERPL-MCNC: 244 MG/DL — HIGH (ref 70–99)
GLUCOSE SERPL-MCNC: 284 MG/DL — HIGH (ref 70–99)
GLUCOSE UR QL: 500 MG/DL
HCO3 BLDV-SCNC: 26 MMOL/L — SIGNIFICANT CHANGE UP (ref 22–29)
HCT VFR BLD CALC: 36.3 % — LOW (ref 39–50)
HCT VFR BLDA CALC: 38 % — LOW (ref 39–51)
HGB BLD CALC-MCNC: 12.6 G/DL — SIGNIFICANT CHANGE UP (ref 12.6–17.4)
HGB BLD-MCNC: 12 G/DL — LOW (ref 13–17)
IANC: 2.61 K/UL — SIGNIFICANT CHANGE UP (ref 1.8–7.4)
IMM GRANULOCYTES NFR BLD AUTO: 0.5 % — SIGNIFICANT CHANGE UP (ref 0–0.9)
KETONES UR-MCNC: NEGATIVE MG/DL — SIGNIFICANT CHANGE UP
LACTATE BLDV-MCNC: 2.1 MMOL/L — HIGH (ref 0.5–2)
LEUKOCYTE ESTERASE UR-ACNC: ABNORMAL
LYMPHOCYTES # BLD AUTO: 2.42 K/UL — SIGNIFICANT CHANGE UP (ref 1–3.3)
LYMPHOCYTES # BLD AUTO: 42.6 % — SIGNIFICANT CHANGE UP (ref 13–44)
MCHC RBC-ENTMCNC: 27.6 PG — SIGNIFICANT CHANGE UP (ref 27–34)
MCHC RBC-ENTMCNC: 33.1 GM/DL — SIGNIFICANT CHANGE UP (ref 32–36)
MCV RBC AUTO: 83.4 FL — SIGNIFICANT CHANGE UP (ref 80–100)
MONOCYTES # BLD AUTO: 0.52 K/UL — SIGNIFICANT CHANGE UP (ref 0–0.9)
MONOCYTES NFR BLD AUTO: 9.2 % — SIGNIFICANT CHANGE UP (ref 2–14)
NEUTROPHILS # BLD AUTO: 2.61 K/UL — SIGNIFICANT CHANGE UP (ref 1.8–7.4)
NEUTROPHILS NFR BLD AUTO: 45.9 % — SIGNIFICANT CHANGE UP (ref 43–77)
NITRITE UR-MCNC: POSITIVE
NRBC # BLD: 0 /100 WBCS — SIGNIFICANT CHANGE UP (ref 0–0)
NRBC # FLD: 0 K/UL — SIGNIFICANT CHANGE UP (ref 0–0)
PCO2 BLDV: 46 MMHG — SIGNIFICANT CHANGE UP (ref 42–55)
PH BLDV: 7.36 — SIGNIFICANT CHANGE UP (ref 7.32–7.43)
PH UR: 6 — SIGNIFICANT CHANGE UP (ref 5–8)
PLATELET # BLD AUTO: 228 K/UL — SIGNIFICANT CHANGE UP (ref 150–400)
PO2 BLDV: 28 MMHG — SIGNIFICANT CHANGE UP (ref 25–45)
POTASSIUM BLDV-SCNC: 4.5 MMOL/L — SIGNIFICANT CHANGE UP (ref 3.5–5.1)
POTASSIUM SERPL-MCNC: 4.2 MMOL/L — SIGNIFICANT CHANGE UP (ref 3.5–5.3)
POTASSIUM SERPL-MCNC: 4.7 MMOL/L — SIGNIFICANT CHANGE UP (ref 3.5–5.3)
POTASSIUM SERPL-SCNC: 4.2 MMOL/L — SIGNIFICANT CHANGE UP (ref 3.5–5.3)
POTASSIUM SERPL-SCNC: 4.7 MMOL/L — SIGNIFICANT CHANGE UP (ref 3.5–5.3)
PROT SERPL-MCNC: 8.5 G/DL — HIGH (ref 6–8.3)
PROT UR-MCNC: 30 MG/DL
RBC # BLD: 4.35 M/UL — SIGNIFICANT CHANGE UP (ref 4.2–5.8)
RBC # FLD: 12.8 % — SIGNIFICANT CHANGE UP (ref 10.3–14.5)
RBC CASTS # UR COMP ASSIST: 0 /HPF — SIGNIFICANT CHANGE UP (ref 0–4)
REVIEW: SIGNIFICANT CHANGE UP
SAO2 % BLDV: 49.6 % — LOW (ref 67–88)
SODIUM SERPL-SCNC: 135 MMOL/L — SIGNIFICANT CHANGE UP (ref 135–145)
SODIUM SERPL-SCNC: 137 MMOL/L — SIGNIFICANT CHANGE UP (ref 135–145)
SP GR SPEC: 1.01 — SIGNIFICANT CHANGE UP (ref 1–1.03)
SQUAMOUS # UR AUTO: 1 /HPF — SIGNIFICANT CHANGE UP (ref 0–5)
UROBILINOGEN FLD QL: 0.2 MG/DL — SIGNIFICANT CHANGE UP (ref 0.2–1)
WBC # BLD: 5.68 K/UL — SIGNIFICANT CHANGE UP (ref 3.8–10.5)
WBC # FLD AUTO: 5.68 K/UL — SIGNIFICANT CHANGE UP (ref 3.8–10.5)
WBC UR QL: >50 /HPF — SIGNIFICANT CHANGE UP (ref 0–5)

## 2024-06-21 PROCEDURE — 99053 MED SERV 10PM-8AM 24 HR FAC: CPT

## 2024-06-21 PROCEDURE — 99223 1ST HOSP IP/OBS HIGH 75: CPT

## 2024-06-21 RX ORDER — GLUCAGON INJECTION, SOLUTION 0.5 MG/.1ML
1 INJECTION, SOLUTION SUBCUTANEOUS ONCE
Refills: 0 | Status: ACTIVE | OUTPATIENT
Start: 2024-06-21 | End: 2025-05-20

## 2024-06-21 RX ORDER — PHENAZOPYRIDINE HCL 100 MG
100 TABLET ORAL EVERY 8 HOURS
Refills: 0 | Status: ACTIVE | OUTPATIENT
Start: 2024-06-21 | End: 2024-06-23

## 2024-06-21 RX ORDER — INSULIN LISPRO 100/ML
VIAL (ML) SUBCUTANEOUS
Refills: 0 | Status: ACTIVE | OUTPATIENT
Start: 2024-06-22 | End: 2025-05-21

## 2024-06-21 RX ORDER — DEXTROSE 50 % IN WATER 50 %
25 SYRINGE (ML) INTRAVENOUS ONCE
Refills: 0 | Status: ACTIVE | OUTPATIENT
Start: 2024-06-21

## 2024-06-21 RX ORDER — SODIUM CHLORIDE 9 MG/ML
1000 INJECTION, SOLUTION INTRAVENOUS
Refills: 0 | Status: ACTIVE | OUTPATIENT
Start: 2024-06-21 | End: 2025-05-20

## 2024-06-21 RX ORDER — INSULIN LISPRO 100/ML
4 VIAL (ML) SUBCUTANEOUS
Refills: 0 | Status: ACTIVE | OUTPATIENT
Start: 2024-06-21 | End: 2025-05-20

## 2024-06-21 RX ORDER — CEFTRIAXONE 500 MG/1
1000 INJECTION, POWDER, FOR SOLUTION INTRAMUSCULAR; INTRAVENOUS ONCE
Refills: 0 | Status: COMPLETED | OUTPATIENT
Start: 2024-06-21 | End: 2024-06-21

## 2024-06-21 RX ORDER — DEXTROSE 50 % IN WATER 50 %
15 SYRINGE (ML) INTRAVENOUS ONCE
Refills: 0 | Status: ACTIVE | OUTPATIENT
Start: 2024-06-21 | End: 2025-05-20

## 2024-06-21 RX ORDER — CEFTRIAXONE 500 MG/1
1000 INJECTION, POWDER, FOR SOLUTION INTRAMUSCULAR; INTRAVENOUS EVERY 24 HOURS
Refills: 0 | Status: ACTIVE | OUTPATIENT
Start: 2024-06-22 | End: 2025-05-20

## 2024-06-21 RX ORDER — DEXTROSE 10 % IN WATER 10 %
125 INTRAVENOUS SOLUTION INTRAVENOUS ONCE
Refills: 0 | Status: ACTIVE | OUTPATIENT
Start: 2024-06-21 | End: 2025-05-20

## 2024-06-21 RX ORDER — DEXTROSE 50 % IN WATER 50 %
12.5 SYRINGE (ML) INTRAVENOUS ONCE
Refills: 0 | Status: ACTIVE | OUTPATIENT
Start: 2024-06-21

## 2024-06-21 RX ORDER — SODIUM CHLORIDE 9 MG/ML
1000 INJECTION INTRAMUSCULAR; INTRAVENOUS; SUBCUTANEOUS ONCE
Refills: 0 | Status: COMPLETED | OUTPATIENT
Start: 2024-06-21 | End: 2024-06-21

## 2024-06-21 RX ORDER — INSULIN GLARGINE 100 [IU]/ML
12 INJECTION, SOLUTION SUBCUTANEOUS ONCE
Refills: 0 | Status: COMPLETED | OUTPATIENT
Start: 2024-06-21 | End: 2024-06-21

## 2024-06-21 RX ORDER — INSULIN LISPRO 100/ML
VIAL (ML) SUBCUTANEOUS AT BEDTIME
Refills: 0 | Status: ACTIVE | OUTPATIENT
Start: 2024-06-21 | End: 2025-05-20

## 2024-06-21 RX ADMIN — CEFTRIAXONE 100 MILLIGRAM(S): 500 INJECTION, POWDER, FOR SOLUTION INTRAMUSCULAR; INTRAVENOUS at 11:05

## 2024-06-21 RX ADMIN — SODIUM CHLORIDE 1000 MILLILITER(S): 9 INJECTION INTRAMUSCULAR; INTRAVENOUS; SUBCUTANEOUS at 11:05

## 2024-06-21 NOTE — ED CDU PROVIDER INITIAL DAY NOTE - PROGRESS NOTE DETAILS
spoke with patient's daughter Ana Luisa is going to find out patient's home medications and call back. pt pharmacy is closed. This patient was signed out to me at 1900 hrs by IONA Rasmussen.  In the interim, pt objectively noted to be resting comfortably; pt has been clinically stable; no issues thus far.  Pt's granddaughter "Bijal" (cell # [phone]; lives in [place]) called and provided home medication list:  Tamsulosin once daily, amlodipine 5 milligrams once daily, oxybutynin 10 milligrams once daily, atorvastatin 10 milligrams once daily, metoprolol succ 100 milligrams once daily, Symtuza 800/150/200/10 milligrams once daily, and an unknown name insulin which she states pt has not been compliant with for ~6 months.  She states that pt has had episodes in the past of "diabetic coma due to low blood sugar" from taking too much insulin by accident, and therefore has become afraid to take insulin, so stopped taking it.  Pt does not take any other meds for DM.  Pt lives with his daughter who granddaughter states is visually impaired.  Granddaughter also verbalizes that pt's wife passed away in March 2024, and since, family has noticed intermittent episodes of confusion; she is not sure to what degree this has been discussed with pt's PMD.  CDU plan expanded to include management of uncontrolled DM as A1c is currently 13.3 with no lab findings c/f DKA; Endocrine was notified of consult by PHI email process as is customary, and plan for case management eval 6/22 for concern of safe home medication plan (pt may need VNS services to help with medication administration).

## 2024-06-21 NOTE — ED ADULT NURSE NOTE - OBJECTIVE STATEMENT
Patient arrives to room #13, AOx4 ambulatory. Hx of HTN, DMT2, CKD, BPH. c/o of difficulty urinating x 3 days. Pt states he has been having increased urge but only having a small amount of urine output when trying to urinate. Pt reports suprapubic pain w/ urination. Denies burning, denies hematuria, denies flank pain. Denies chest pain, palpitations, SOB, HA, vision changes, n/v/d, constipation, abdominal pain, fever, chills, cough, dizziness, numbness, tingling. Breathing even and unlabored on room air, no signs of dyspnea or respiratory distress. Skin warm, dry, intact, appropriate for race, No signs of cyanosis/pallor noted. 20G IV placed in left ac. Labs drawn and sent. Urine collected and sent. Vital signs as charted. Safety maintained, stretcher locked in lowest position with siderails up x2, call bell and personal items within reach. Pt pending US of bladder.

## 2024-06-21 NOTE — ED PROVIDER NOTE - PHYSICAL EXAMINATION
PHYSICAL EXAM:  GEN: No acute distress  HEAD: Atraumatic  CARDIAC: Normal rate, regular rhythm. +S1/S2. No murmurs, rubs or gallops.  RESPIRATORY: Breathing unlabored. Breath sounds clear and equal bilaterally.  ABDOMEN: Soft, nontender, nondistended.  no suprapubic fullness  SKIN: Skin warm and dry. No evidence of rashes or lesions.

## 2024-06-21 NOTE — ED PROVIDER NOTE - CLINICAL SUMMARY MEDICAL DECISION MAKING FREE TEXT BOX
83M HIV (well controlled, VLUD, 20 yrs), HTN, DM2 on insulin, CKD3,  BPH presents to the ED  complaining of dysuria and urinary retention x 2 weeks.  Vital signs nonactionable.  Exam without any suprapubic fullness or tenderness.  Blood glucose checked in triage in the 300s.  Will check gas CBC CMP urinalysis and culture bladder scan and reassess.  May require Lawson placement and urology follow-up. 83M HIV (well controlled, VLUD, 20 yrs), HTN, DM2 on insulin, CKD3,  BPH presents to the ED  complaining of dysuria and urinary retention x 2 weeks.  Vital signs nonactionable.  Exam without any suprapubic fullness or tenderness.  Blood glucose checked in triage in the 300s.  Will check gas CBC CMP urinalysis and culture bladder scan and reassess.  May require Lawson placement and urology follow-up.    Kayleigh Mcadams MD attending physician patient is an 83-year-old gentleman who comes in complaining about ongoing issues with feeling he is not emptying his bladder all the way.  Patient with long history of HIV 20 years well-controlled hypertension type 2 diabetes on insulin CKD 3 BPH comes to the ED with dysuria as well.  Noticed it more this morning.    Denies fever does have some lower abdominal discomfort.  No nausea no vomiting no chest pain or shortness of breath    Vital signs include blood pressure 136/84 respiratory rate 16 heart rate 74 temp 98.4 O2 sat is normal    Well-appearing nontoxic.    Pt alert and can phonate well  h at/nc  perrl, conj clear, sclera anicteric,  neck supple  abd soft no r/g/t  ext no edema no deformities  neueo awake, lucid normal gait moves all extremities with strength  psych normal affect  vs reasonable    Patient ultrasound showed less than 200 cc as a postvoid.  Urine is clearly positive for UTI.  Creatinine has bumped to closer to 1.6 with a GFR that is dropped patient does not appear to be obstructed but in fact appears to have infection.  We are offering CDU with hydration and IV antibiotics

## 2024-06-21 NOTE — ED ADULT NURSE REASSESSMENT NOTE - NS ED NURSE REASSESS COMMENT FT1
Patient remains at baseline mental status. Appears to be resting comfortably in stretcher, breathing even and unlabored on room air. NAD noted at this time. Pt offers no new complaints at this time. Medications administered as ordered as per eMAR. IV site clean dry and intact. Safety maintained, stretcher locked in lowest position with siderails up x2, call bell and personal items within reach.

## 2024-06-21 NOTE — ED CDU PROVIDER INITIAL DAY NOTE - CLINICAL SUMMARY MEDICAL DECISION MAKING FREE TEXT BOX
82 y/o male with pmhx of HIV (does not know his medication), HTN, DM on insulin, CKD3, BPH presents to ED c/o dysuria x 2 weeks. Pt states he has difficulty urinating and feeling like he cannot completely empty his bladder. UA with UTI. sent to cdu for IV antibiotics, hydration, repeat labs.

## 2024-06-21 NOTE — ED PROVIDER NOTE - OBJECTIVE STATEMENT
83M HIV (well controlled, VLUD, 20 yrs), HTN, DM2 on insulin, CKD3,  BPH presents to the ED Complaining of 2 weeks of dysuria and sensation of incomplete emptying.   States that his discomfort is acutely worse this morning.  Patient is unsure if he was BPH–but has BPH documented in the chart. Patient also endorses lower abdominal pain.  Patient denies fever nausea vomiting chest pain shortness of breath diarrhea.

## 2024-06-21 NOTE — ED ADULT NURSE REASSESSMENT NOTE - NS ED NURSE REASSESS COMMENT FT1
Report given to CDU RN Anisha. Patient remains at baseline mental status. Appears to be resting comfortably in stretcher, breathing even and unlabored on room air. Vital signs as charted. NAD noted at this time. Pt offers no new complaints at this time. Pt to be placed in CDU for observation. Safety maintained, stretcher locked in lowest position with siderails up x2, call bell and personal items within reach.

## 2024-06-21 NOTE — ED CDU PROVIDER INITIAL DAY NOTE - OBJECTIVE STATEMENT
84 y/o male with pmhx of HIV (does not know his medication), HTN, DM on insulin, CKD3, BPH presents to ED c/o dysuria x 2 weeks. Pt states he has difficulty urinating and feeling like he cannot completely empty his bladder. Pt denies hematuria, fever, chills, abd pain, n/v/d, flank pain. Pt found with UTI and sent to CDU for IV antibiotics.

## 2024-06-21 NOTE — ED PROVIDER NOTE - ATTENDING CONTRIBUTION TO CARE
Kayleigh Mcadams MD attending physician patient is an 83-year-old gentleman who comes in complaining about ongoing issues with feeling he is not emptying his bladder all the way.  Patient with long history of HIV 20 years well-controlled hypertension type 2 diabetes on insulin CKD 3 BPH comes to the ED with dysuria as well.  Noticed it more this morning.    Denies fever does have some lower abdominal discomfort.  No nausea no vomiting no chest pain or shortness of breath    Vital signs include blood pressure 136/84 respiratory rate 16 heart rate 74 temp 98.4 O2 sat is normal    Well-appearing nontoxic.    Pt alert and can phonate well  h at/nc  perrl, conj clear, sclera anicteric,  neck supple  abd soft no r/g/t  ext no edema no deformities  neueo awake, lucid normal gait moves all extremities with strength  psych normal affect  vs reasonable    Patient ultrasound showed less than 200 cc as a postvoid.  Urine is clearly positive for UTI.  Creatinine has bumped to closer to 1.6 with a GFR that is dropped patient does not appear to be obstructed but in fact appears to have infection.  We are offering CDU with hydration and IV antibiotics    I performed a history and physical exam of the patient and discussed their management with the resident and /or advanced care provider. I personally made/approved the management plan and take responsibility for the patient management. I reviewed the resident and /or ACP's note and agree with the documented findings and plan of care. My medical decison making and observations are found above.

## 2024-06-21 NOTE — ED ADULT NURSE NOTE - HIV OFFER
Discharge paperwork reviewed with patient and printed prescriptions provided for patient. PIV and tele removed. [employer] called for cab pickup. Patient provided with cab voucher.    Previously positive

## 2024-06-21 NOTE — ED ADULT TRIAGE NOTE - CHIEF COMPLAINT QUOTE
Pt c/o urinary retention x 3 days. pt states he has been just dribbling and has the urgency to go. No complaints of chest pain, headache, nausea, dizziness, vomiting  SOB, fever, chills verbalized.

## 2024-06-22 DIAGNOSIS — E78.5 HYPERLIPIDEMIA, UNSPECIFIED: ICD-10-CM

## 2024-06-22 DIAGNOSIS — I10 ESSENTIAL (PRIMARY) HYPERTENSION: ICD-10-CM

## 2024-06-22 DIAGNOSIS — E11.65 TYPE 2 DIABETES MELLITUS WITH HYPERGLYCEMIA: ICD-10-CM

## 2024-06-22 LAB
ALBUMIN SERPL ELPH-MCNC: 4.2 G/DL — SIGNIFICANT CHANGE UP (ref 3.3–5)
ALP SERPL-CCNC: 116 U/L — SIGNIFICANT CHANGE UP (ref 40–120)
ALT FLD-CCNC: 23 U/L — SIGNIFICANT CHANGE UP (ref 4–41)
ANION GAP SERPL CALC-SCNC: 14 MMOL/L — SIGNIFICANT CHANGE UP (ref 7–14)
AST SERPL-CCNC: 20 U/L — SIGNIFICANT CHANGE UP (ref 4–40)
BASE EXCESS BLDV CALC-SCNC: -1.5 MMOL/L — SIGNIFICANT CHANGE UP (ref -2–3)
BASOPHILS # BLD AUTO: 0.05 K/UL — SIGNIFICANT CHANGE UP (ref 0–0.2)
BASOPHILS NFR BLD AUTO: 0.7 % — SIGNIFICANT CHANGE UP (ref 0–2)
BILIRUB SERPL-MCNC: 0.4 MG/DL — SIGNIFICANT CHANGE UP (ref 0.2–1.2)
BLOOD GAS VENOUS COMPREHENSIVE RESULT: SIGNIFICANT CHANGE UP
BUN SERPL-MCNC: 26 MG/DL — HIGH (ref 7–23)
CALCIUM SERPL-MCNC: 9.7 MG/DL — SIGNIFICANT CHANGE UP (ref 8.4–10.5)
CHLORIDE BLDV-SCNC: 106 MMOL/L — SIGNIFICANT CHANGE UP (ref 96–108)
CHLORIDE SERPL-SCNC: 104 MMOL/L — SIGNIFICANT CHANGE UP (ref 98–107)
CHOLEST SERPL-MCNC: 169 MG/DL — SIGNIFICANT CHANGE UP
CO2 BLDV-SCNC: 25 MMOL/L — SIGNIFICANT CHANGE UP (ref 22–26)
CO2 SERPL-SCNC: 20 MMOL/L — LOW (ref 22–31)
CREAT SERPL-MCNC: 1.13 MG/DL — SIGNIFICANT CHANGE UP (ref 0.5–1.3)
EGFR: 64 ML/MIN/1.73M2 — SIGNIFICANT CHANGE UP
EOSINOPHIL # BLD AUTO: 0.05 K/UL — SIGNIFICANT CHANGE UP (ref 0–0.5)
EOSINOPHIL NFR BLD AUTO: 0.7 % — SIGNIFICANT CHANGE UP (ref 0–6)
GAS PNL BLDV: 136 MMOL/L — SIGNIFICANT CHANGE UP (ref 136–145)
GLUCOSE BLDV-MCNC: 194 MG/DL — HIGH (ref 70–99)
GLUCOSE SERPL-MCNC: 190 MG/DL — HIGH (ref 70–99)
HCO3 BLDV-SCNC: 24 MMOL/L — SIGNIFICANT CHANGE UP (ref 22–29)
HCT VFR BLD CALC: 37 % — LOW (ref 39–50)
HCT VFR BLDA CALC: 39 % — SIGNIFICANT CHANGE UP (ref 39–51)
HDLC SERPL-MCNC: 36 MG/DL — LOW
HGB BLD CALC-MCNC: 12.9 G/DL — SIGNIFICANT CHANGE UP (ref 12.6–17.4)
HGB BLD-MCNC: 12.7 G/DL — LOW (ref 13–17)
IANC: 3.54 K/UL — SIGNIFICANT CHANGE UP (ref 1.8–7.4)
IMM GRANULOCYTES NFR BLD AUTO: 0.4 % — SIGNIFICANT CHANGE UP (ref 0–0.9)
LACTATE BLDV-MCNC: 1.4 MMOL/L — SIGNIFICANT CHANGE UP (ref 0.5–2)
LIPID PNL WITH DIRECT LDL SERPL: 68 MG/DL — SIGNIFICANT CHANGE UP
LYMPHOCYTES # BLD AUTO: 2.89 K/UL — SIGNIFICANT CHANGE UP (ref 1–3.3)
LYMPHOCYTES # BLD AUTO: 40.1 % — SIGNIFICANT CHANGE UP (ref 13–44)
MAGNESIUM SERPL-MCNC: 1.8 MG/DL — SIGNIFICANT CHANGE UP (ref 1.6–2.6)
MCHC RBC-ENTMCNC: 28.3 PG — SIGNIFICANT CHANGE UP (ref 27–34)
MCHC RBC-ENTMCNC: 34.3 GM/DL — SIGNIFICANT CHANGE UP (ref 32–36)
MCV RBC AUTO: 82.4 FL — SIGNIFICANT CHANGE UP (ref 80–100)
MONOCYTES # BLD AUTO: 0.64 K/UL — SIGNIFICANT CHANGE UP (ref 0–0.9)
MONOCYTES NFR BLD AUTO: 8.9 % — SIGNIFICANT CHANGE UP (ref 2–14)
NEUTROPHILS # BLD AUTO: 3.54 K/UL — SIGNIFICANT CHANGE UP (ref 1.8–7.4)
NEUTROPHILS NFR BLD AUTO: 49.2 % — SIGNIFICANT CHANGE UP (ref 43–77)
NON HDL CHOLESTEROL: 133 MG/DL — HIGH
NRBC # BLD: 0 /100 WBCS — SIGNIFICANT CHANGE UP (ref 0–0)
NRBC # FLD: 0 K/UL — SIGNIFICANT CHANGE UP (ref 0–0)
PCO2 BLDV: 41 MMHG — LOW (ref 42–55)
PH BLDV: 7.37 — SIGNIFICANT CHANGE UP (ref 7.32–7.43)
PHOSPHATE SERPL-MCNC: 2.2 MG/DL — LOW (ref 2.5–4.5)
PLATELET # BLD AUTO: 238 K/UL — SIGNIFICANT CHANGE UP (ref 150–400)
PO2 BLDV: 43 MMHG — SIGNIFICANT CHANGE UP (ref 25–45)
POTASSIUM BLDV-SCNC: 3.9 MMOL/L — SIGNIFICANT CHANGE UP (ref 3.5–5.1)
POTASSIUM SERPL-MCNC: 3.9 MMOL/L — SIGNIFICANT CHANGE UP (ref 3.5–5.3)
POTASSIUM SERPL-SCNC: 3.9 MMOL/L — SIGNIFICANT CHANGE UP (ref 3.5–5.3)
PROT SERPL-MCNC: 8.5 G/DL — HIGH (ref 6–8.3)
RBC # BLD: 4.49 M/UL — SIGNIFICANT CHANGE UP (ref 4.2–5.8)
RBC # FLD: 12.9 % — SIGNIFICANT CHANGE UP (ref 10.3–14.5)
SAO2 % BLDV: 73.8 % — SIGNIFICANT CHANGE UP (ref 67–88)
SODIUM SERPL-SCNC: 138 MMOL/L — SIGNIFICANT CHANGE UP (ref 135–145)
TRIGL SERPL-MCNC: 324 MG/DL — HIGH
WBC # BLD: 7.2 K/UL — SIGNIFICANT CHANGE UP (ref 3.8–10.5)
WBC # FLD AUTO: 7.2 K/UL — SIGNIFICANT CHANGE UP (ref 3.8–10.5)

## 2024-06-22 PROCEDURE — 93010 ELECTROCARDIOGRAM REPORT: CPT

## 2024-06-22 PROCEDURE — 99233 SBSQ HOSP IP/OBS HIGH 50: CPT

## 2024-06-22 PROCEDURE — 99205 OFFICE O/P NEW HI 60 MIN: CPT

## 2024-06-22 RX ORDER — INSULIN GLARGINE 100 [IU]/ML
12 INJECTION, SOLUTION SUBCUTANEOUS AT BEDTIME
Refills: 0 | Status: ACTIVE | OUTPATIENT
Start: 2024-06-22 | End: 2025-05-21

## 2024-06-22 RX ORDER — AMLODIPINE BESYLATE 2.5 MG/1
5 TABLET ORAL DAILY
Refills: 0 | Status: ACTIVE | OUTPATIENT
Start: 2024-06-22 | End: 2025-05-21

## 2024-06-22 RX ORDER — SEMAGLUTIDE 0.68 MG/ML
0.5 INJECTION, SOLUTION SUBCUTANEOUS
Qty: 3 | Refills: 0
Start: 2024-06-22

## 2024-06-22 RX ORDER — METFORMIN HYDROCHLORIDE 850 MG/1
1 TABLET ORAL
Qty: 60 | Refills: 0
Start: 2024-06-22 | End: 2024-07-21

## 2024-06-22 RX ORDER — CEFDINIR 250 MG/5ML
1 POWDER, FOR SUSPENSION ORAL
Qty: 14 | Refills: 0
Start: 2024-06-22 | End: 2024-06-28

## 2024-06-22 RX ORDER — INSULIN GLARGINE 100 [IU]/ML
12 INJECTION, SOLUTION SUBCUTANEOUS
Qty: 5 | Refills: 0
Start: 2024-06-22

## 2024-06-22 RX ORDER — SEMAGLUTIDE 1.34 MG/ML
0.5 INJECTION, SOLUTION SUBCUTANEOUS
Qty: 3 | Refills: 0 | DISCHARGE
Start: 2024-06-22

## 2024-06-22 RX ORDER — INSULIN GLARGINE 100 [IU]/ML
10 INJECTION, SOLUTION SUBCUTANEOUS
Qty: 5 | Refills: 0 | DISCHARGE
Start: 2024-06-22

## 2024-06-22 RX ORDER — ATORVASTATIN CALCIUM 80 MG/1
10 TABLET, FILM COATED ORAL AT BEDTIME
Refills: 0 | Status: ACTIVE | OUTPATIENT
Start: 2024-06-22 | End: 2025-05-21

## 2024-06-22 RX ORDER — TAMSULOSIN HYDROCHLORIDE 0.4 MG/1
0.4 CAPSULE ORAL AT BEDTIME
Refills: 0 | Status: ACTIVE | OUTPATIENT
Start: 2024-06-22 | End: 2025-05-21

## 2024-06-22 RX ORDER — OXYBUTYNIN CHLORIDE 5 MG
10 TABLET ORAL DAILY
Refills: 0 | Status: ACTIVE | OUTPATIENT
Start: 2024-06-22 | End: 2025-05-21

## 2024-06-22 RX ORDER — DARUNAVIR, COBICISTAT, EMTRICITABINE, AND TENOFOVIR ALAFENAMIDE 800; 150; 200; 10 MG/1; MG/1; MG/1; MG/1
1 TABLET, FILM COATED ORAL DAILY
Refills: 0 | Status: ACTIVE | OUTPATIENT
Start: 2024-06-22 | End: 2025-05-21

## 2024-06-22 RX ORDER — OXYBUTYNIN CHLORIDE 5 MG
10 TABLET ORAL DAILY
Refills: 0 | Status: DISCONTINUED | OUTPATIENT
Start: 2024-06-22 | End: 2024-06-22

## 2024-06-22 RX ADMIN — Medication 1: at 07:33

## 2024-06-22 RX ADMIN — Medication 100 MILLIGRAM(S): at 02:12

## 2024-06-22 RX ADMIN — Medication 10 MILLIGRAM(S): at 11:25

## 2024-06-22 RX ADMIN — Medication 4 UNIT(S): at 11:33

## 2024-06-22 RX ADMIN — DARUNAVIR, COBICISTAT, EMTRICITABINE, AND TENOFOVIR ALAFENAMIDE 1 TABLET(S): 800; 150; 200; 10 TABLET, FILM COATED ORAL at 11:25

## 2024-06-22 RX ADMIN — AMLODIPINE BESYLATE 5 MILLIGRAM(S): 2.5 TABLET ORAL at 05:59

## 2024-06-22 RX ADMIN — CEFTRIAXONE 100 MILLIGRAM(S): 500 INJECTION, POWDER, FOR SOLUTION INTRAMUSCULAR; INTRAVENOUS at 11:26

## 2024-06-22 RX ADMIN — INSULIN GLARGINE 12 UNIT(S): 100 INJECTION, SOLUTION SUBCUTANEOUS at 00:40

## 2024-06-22 RX ADMIN — Medication 4 UNIT(S): at 07:33

## 2024-06-22 RX ADMIN — Medication 4 UNIT(S): at 16:49

## 2024-06-22 RX ADMIN — ATORVASTATIN CALCIUM 10 MILLIGRAM(S): 80 TABLET, FILM COATED ORAL at 22:09

## 2024-06-22 RX ADMIN — Medication 100 MILLIGRAM(S): at 11:26

## 2024-06-22 RX ADMIN — TAMSULOSIN HYDROCHLORIDE 0.4 MILLIGRAM(S): 0.4 CAPSULE ORAL at 22:09

## 2024-06-22 RX ADMIN — INSULIN GLARGINE 12 UNIT(S): 100 INJECTION, SOLUTION SUBCUTANEOUS at 22:09

## 2024-06-22 NOTE — CONSULT NOTE ADULT - SUBJECTIVE AND OBJECTIVE BOX
Ralph Sutton MD   |   PGY-4  Endocrinology Fellow  Available on Microsoft Teams    HPI:      Endocrine History:      PAST MEDICAL & SURGICAL HISTORY:  Diabetes      HTN (hypertension)      HIV infection      Stage 3 chronic kidney disease      BPH (benign prostatic hyperplasia)      No significant past surgical history          MEDICATIONS  (STANDING):  amLODIPine   Tablet 5 milliGRAM(s) Oral daily  atorvastatin 10 milliGRAM(s) Oral at bedtime  cefTRIAXone   IVPB 1000 milliGRAM(s) IV Intermittent every 24 hours  darunavir 800 mG/cobicistat 150 mG/emtricitabine 200 mG/tenofovir alafenamide 10 mG (SYMTUZA) 1 Tablet(s) Oral daily  dextrose 10% Bolus 125 milliLiter(s) IV Bolus once  dextrose 5%. 1000 milliLiter(s) (100 mL/Hr) IV Continuous <Continuous>  dextrose 5%. 1000 milliLiter(s) (50 mL/Hr) IV Continuous <Continuous>  dextrose 50% Injectable 12.5 Gram(s) IV Push once  dextrose 50% Injectable 25 Gram(s) IV Push once  glucagon  Injectable 1 milliGRAM(s) IntraMuscular once  insulin lispro (ADMELOG) corrective regimen sliding scale   SubCutaneous at bedtime  insulin lispro (ADMELOG) corrective regimen sliding scale   SubCutaneous three times a day before meals  insulin lispro Injectable (ADMELOG) 4 Unit(s) SubCutaneous three times a day before meals  oxybutynin XL 10 milliGRAM(s) Oral daily  tamsulosin 0.4 milliGRAM(s) Oral at bedtime    MEDICATIONS  (PRN):  dextrose Oral Gel 15 Gram(s) Oral once PRN Blood Glucose LESS THAN 70 milliGRAM(s)/deciliter  phenazopyridine 100 milliGRAM(s) Oral every 8 hours PRN dysuria      Allergies    No Known Allergies    Intolerances      Review of Systems:  Constitutional: No fever  Eyes: No blurry vision  Neuro: No tremors  HEENT: No pain  Cardiovascular: No chest pain, palpitations  Respiratory: No SOB, no cough  GI: No nausea, vomiting, abdominal pain  : No dysuria  Skin: no rash  Psych: no depression  Endocrine: no polyuria, polydipsia  Hem/lymph: no swelling  Osteoporosis: no fractures    ===================PHYSICAL EXAM=======================  VITALS: T(C): 36.4 (06-22-24 @ 13:52)  T(F): 97.5 (06-22-24 @ 13:52), Max: 98 (06-21-24 @ 22:20)  HR: 61 (06-22-24 @ 13:52) (61 - 70)  BP: 121/64 (06-22-24 @ 13:52) (121/64 - 161/70)  RR:  (15 - 18)  SpO2:  (98% - 100%)  Wt(kg): --  GENERAL: NAD  EYES: No proptosis, no lid lag, anicteric  THYROID: Normal size, no palpable nodules  RESPIRATORY: Clear to auscultation bilaterally  CARDIOVASCULAR: Regular rate and rhythm  GI: Soft, nontender, non distended  EXT: b/l feet without wounds; 2+ pulses  PSYCH: Alert and oriented x 3, reactive mood  ======================================================  POCT Blood Glucose.: 143 mg/dL (06-22-24 @ 11:26)  POCT Blood Glucose.: 167 mg/dL (06-22-24 @ 07:28)  POCT Blood Glucose.: 240 mg/dL (06-21-24 @ 23:29)  POCT Blood Glucose.: 204 mg/dL (06-21-24 @ 16:56)  POCT Blood Glucose.: 226 mg/dL (06-21-24 @ 14:37)  POCT Blood Glucose.: 327 mg/dL (06-21-24 @ 08:03)                            12.7   7.20  )-----------( 238      ( 22 Jun 2024 06:14 )             37.0       06-22    138  |  104  |  26<H>  ----------------------------<  190<H>  3.9   |  20<L>  |  1.13    eGFR: 64    Ca    9.7      06-22  Mg     1.80     06-22  Phos  2.2     06-22    TPro  8.5<H>  /  Alb  4.2  /  TBili  0.4  /  DBili  x   /  AST  20  /  ALT  23  /  AlkPhos  116  06-22      Thyroid Function Tests:      A1C with Estimated Average Glucose Result: 13.3 % (06-21-24 @ 09:30)      06-22 Chol 169 Direct LDL -- LDL calculated 68 HDL 36<L> Trig 324<H>    Radiology:              Ralph Sutton MD   |   PGY-4  Endocrinology Fellow  Available on Microsoft Teams    HPI:  83-year-old male hx of HIV, HTN, DM, CKD3, BPH who presented to the emergency department for symptoms of urinary retention. In the ED was found not to be in retention, but rather have a UTI; was placed in CDU for further management and IV antibiotic treatment. While in CDU also found to have an A1c of 13.3, no lab findings concerning for DKA.      --Type of Diabetes: T2DM (A1c: 13.3%)   --How long diabetes: ~>10 years   --Endocrinologist: Does not have   --Outpatient meds: Basaglar  (unsure of dose) + Aspart (unsure of dose) TID insulin – stopped 6 months ago d/t frequent low sugars, daughter reports having low sugars (<70s)   --Side effects to medications? Reports hx of UTIs, this is 2nd time, denies pancreatitis   --what medications have you tried in the past?  Metformin 500mg BID, glipizide – also stopped 6 months ago d/t low sugars   --who administers medications? himself   --live with: jenniferugther   --Home sugars/log: has not checked recently   --Any hypoglycemic episodes? the past: has had episodes of diabetic coma due to low blood sugar as a result of accidentally taking too much insulin, and therefore pt became afraid to take insulin and so stopped taking it.   --Any hx of DKA? Denies   --Complications: Denies MI, CVA, Reports Nephropathy, neuropathy   --Outpatient Diet: Drinks diet soda, eats sweets   --Appetite: not good   --Statin: atorvastatin 10mg at home- not sure of compliance   --ACE/ARB: not on   --Insurance: [employer]   --Inpatient Diet? CC   --Inpatient diabetes regimen? 4/low/low   --On steroids inpatient? no   --BMI:         weight: 61.5kg   --GFR? 47 -> 64       PAST MEDICAL & SURGICAL HISTORY:  Diabetes      HTN (hypertension)      HIV infection      Stage 3 chronic kidney disease      BPH (benign prostatic hyperplasia)      No significant past surgical history          MEDICATIONS  (STANDING):  amLODIPine   Tablet 5 milliGRAM(s) Oral daily  atorvastatin 10 milliGRAM(s) Oral at bedtime  cefTRIAXone   IVPB 1000 milliGRAM(s) IV Intermittent every 24 hours  darunavir 800 mG/cobicistat 150 mG/emtricitabine 200 mG/tenofovir alafenamide 10 mG (SYMTUZA) 1 Tablet(s) Oral daily  dextrose 10% Bolus 125 milliLiter(s) IV Bolus once  dextrose 5%. 1000 milliLiter(s) (100 mL/Hr) IV Continuous <Continuous>  dextrose 5%. 1000 milliLiter(s) (50 mL/Hr) IV Continuous <Continuous>  dextrose 50% Injectable 12.5 Gram(s) IV Push once  dextrose 50% Injectable 25 Gram(s) IV Push once  glucagon  Injectable 1 milliGRAM(s) IntraMuscular once  insulin lispro (ADMELOG) corrective regimen sliding scale   SubCutaneous at bedtime  insulin lispro (ADMELOG) corrective regimen sliding scale   SubCutaneous three times a day before meals  insulin lispro Injectable (ADMELOG) 4 Unit(s) SubCutaneous three times a day before meals  oxybutynin XL 10 milliGRAM(s) Oral daily  tamsulosin 0.4 milliGRAM(s) Oral at bedtime    MEDICATIONS  (PRN):  dextrose Oral Gel 15 Gram(s) Oral once PRN Blood Glucose LESS THAN 70 milliGRAM(s)/deciliter  phenazopyridine 100 milliGRAM(s) Oral every 8 hours PRN dysuria      Allergies    No Known Allergies    Intolerances      Review of Systems:  Constitutional: No fever  Eyes: No blurry vision  Neuro: No tremors  HEENT: No pain  Cardiovascular: No chest pain, palpitations  Respiratory: No SOB, no cough  GI: No nausea, vomiting, abdominal pain  : No dysuria  Skin: no rash  Psych: no depression  Endocrine: no polyuria, polydipsia  Hem/lymph: no swelling  Osteoporosis: no fractures    ===================PHYSICAL EXAM=======================  VITALS: T(C): 36.4 (06-22-24 @ 13:52)  T(F): 97.5 (06-22-24 @ 13:52), Max: 98 (06-21-24 @ 22:20)  HR: 61 (06-22-24 @ 13:52) (61 - 70)  BP: 121/64 (06-22-24 @ 13:52) (121/64 - 161/70)  RR:  (15 - 18)  SpO2:  (98% - 100%)  Wt(kg): --  GENERAL: NAD  EYES: No proptosis, no lid lag, anicteric  THYROID: Normal size, no palpable nodules  RESPIRATORY: Clear to auscultation bilaterally  CARDIOVASCULAR: Regular rate and rhythm  GI: Soft, nontender, non distended  EXT: b/l feet without wounds; 2+ pulses  PSYCH: Alert and oriented x 3, reactive mood  ======================================================  POCT Blood Glucose.: 143 mg/dL (06-22-24 @ 11:26)  POCT Blood Glucose.: 167 mg/dL (06-22-24 @ 07:28)  POCT Blood Glucose.: 240 mg/dL (06-21-24 @ 23:29)  POCT Blood Glucose.: 204 mg/dL (06-21-24 @ 16:56)  POCT Blood Glucose.: 226 mg/dL (06-21-24 @ 14:37)  POCT Blood Glucose.: 327 mg/dL (06-21-24 @ 08:03)                            12.7   7.20  )-----------( 238      ( 22 Jun 2024 06:14 )             37.0       06-22    138  |  104  |  26<H>  ----------------------------<  190<H>  3.9   |  20<L>  |  1.13    eGFR: 64    Ca    9.7      06-22  Mg     1.80     06-22  Phos  2.2     06-22    TPro  8.5<H>  /  Alb  4.2  /  TBili  0.4  /  DBili  x   /  AST  20  /  ALT  23  /  AlkPhos  116  06-22      Thyroid Function Tests:      A1C with Estimated Average Glucose Result: 13.3 % (06-21-24 @ 09:30)      06-22 Chol 169 Direct LDL -- LDL calculated 68 HDL 36<L> Trig 324<H>    Radiology:

## 2024-06-22 NOTE — PROVIDER CONTACT NOTE (OTHER) - RECOMMENDATIONS
Patient PCP is Dr. Sal Mustafa [address] [place] [address] [phone].  Last visit 1 month ago.  Patient has next scheduled PCP visit on 06/26/24.  RN to begin inpatient DM teaching.  CM to

## 2024-06-22 NOTE — PROVIDER CONTACT NOTE (OTHER) - ASSESSMENT
Spoke with HONG Perez, who is requesting assistance with DM care and management at home. Patient lives with daughter who is completely blind and patient's spouse passed away 1 month ago.  Patient has been unable to accurately draw up home insulin which is resulting in hypoglycemic episodes.  Writer spoke with daughter Efraín Ballard [phone], who confirms supportive help from several family members, however family arrives at different times and days throughout the week, and will be difficult to have a family member commit everyday at the same time for medication administration.  Daughter is amenable to Home Care for DM teaching.  Endocrinology recommendation pending at this time.  CM will continue to follow up for a safe discharge home.

## 2024-06-22 NOTE — ED CDU PROVIDER SUBSEQUENT DAY NOTE - PHYSICAL EXAMINATION
CONSTITUTIONAL:  Well appearing, awake, alert, oriented to person, place, time/situation and in no apparent distress.  Pt. is objectively comfortable appearing and verbalizing in full, clear, effortless sentences.  ENMT: NC/AT.  Airway patent.  Nasal mucosa clear.  Moist mucous membranes.  Neck supple.  EYES:  Clear OU.  CARDIAC:  Normal rate, regular rhythm.  Heart sounds S1 S2.  No murmurs, gallops, or rubs.  RESPIRATORY:  Breath sounds clear and equal bilaterally.  No wheezes, no rales, no rhonchi.  GASTROINTESTINAL:  Abdomen soft, non-distended, non-tender.  No rebound, no guarding.  NEUROLOGICAL:  Alert and oriented to person/place/time.  No focal deficits, no tremors noted.   MUSCULOSKELETAL:  Range of motion is not limited.  Gait stable.    SKIN:  Skin color unremarkable.  Skin warm, dry, and intact.    PSYCHIATRIC:  Alert and oriented to person/place/time/situation.  Mood and affect WNL.  No apparent risk to self or others.

## 2024-06-22 NOTE — PROVIDER CONTACT NOTE (OTHER) - ACTION/TREATMENT ORDERED:
f/u with final discharge and home care acceptance.  Family will transport patient home when medically cleared for discharge.

## 2024-06-22 NOTE — PROVIDER CONTACT NOTE (OTHER) - ASSESSMENT
Patient evaluated by Endocrinology, plan is for discharge on new DM medication regimen.  Final medication decision to be determined by insurance coverage.  Spoke with patient daughter Efraín Ballard [phone], who states will have a family member learn DM care.  Home care offered and accepted.  List of [facility] discussed and chose [facility].  Referral sent with auth pending.

## 2024-06-22 NOTE — CONSULT NOTE ADULT - ASSESSMENT
#T2DM   A1c: 13.3%  Home meds: Has not been on any medications for the past 6 months  Endocrinologist: Does not have    INPATIENT RECOMMENDATIONS:  - c/w Lantus 12 units at bedtime  - c/w Admelog 4 units premeal TID (HOLD IF NPO)  - c/w low admelog correctional scale premeal   - c/w low admelog correctional scale at bedtime  - Registered Dietician consult placed     DISCHARGE RECOMMENDATIONS:  - Basal insulin 12 units at bedtime + GLP1RA + metformin 500mg BID (can increase to 1000mg BID after 7 days if tolerating well)  - Pt will need perscription for basal insulin pen (ie. Lantus Solostar pen, if not covered then ask pharmacy which brand of basal insulin is covered by their insurance plan - other brands include: Basaglar, Lantus, Tresiba, Toujeo). Dispense 5 insulin pens with 3 refills.   - Pt will need GLP1 receptor agonist on discharge depending on insurance coverage. Please send Ozempic 2mg/3ml pen (0.25mg SQ once a week) - PLEASE MAKE SURE MEDICATION IS COVERED BY INSURANCE BEFORE DISCHARGE. If Ozempic is not covered by insurance, please try Trulicity  0.75 mg SQ once a week. If Trulicity is not covered by insurance, please try Mounjaro 2.5mg SQ once a week. Please notify our team if none of these medications are covered by insurance.  - Please send prescriptions for diabetes supplies (glucometer, test strips, lancets, alcohol swabs, insulin pen needles (BD melonie 4mm)) - dispense #100, use as directed (3 refills).  - CGM: Patient agreeable to starting continuous glucose monitor (CGM) on discharge - please send continuous glucose monitor to pharmacy: Freestyle Best 3 sensor (Qt 2, change every 14 days, Refills 5). Patient does not have a smart phone compatible device, so please also send Freestyle Best [place] (Qt 1, use as directed, 0 refills)  - OUT-PATIENT FOLLOW UP: Patient should follow up with Endocrinology office ([phone]) at [address] [place] [address] - I emailed the appt coordinator on 6/22/24 to set up appt    #HLD  - patient on atorvastatin 10mg - unclear compliance, encourage compliance  - goal LDL in diabetes mellitus is <70    #HTN  - goal BP in diabetes mellitus is <130/80  - defer to primary team for management      Discussed recommendations with primary team.    Ralph Sutton MD  Endocrine Fellow  Can be reached via [employer] teams.    For follow up questions, discharge recommendations, or new consults, please email [email or web address]) or [email or web address] ([facility]) or call answering service at [phone] (weekdays); [phone] (nights/weekends).  For emergencies please page fellow on call.     83-year-old male hx of HIV, HTN, DM, CKD3, BPH who presented to the emergency department for symptoms of urinary retention. In the ED was found not to be in retention, but rather have a UTI; was placed in CDU for further management and IV antibiotic treatment. While in CDU also found to have an A1c of 13.3, no lab findings concerning for DKA.     #T2DM   A1c: 13.3%  Home meds: Has not been on any medications for the past 6 months  Endocrinologist: Does not have    INPATIENT RECOMMENDATIONS:  - c/w Lantus 12 units at bedtime  - c/w Admelog 4 units premeal TID (HOLD IF NPO)  - c/w low admelog correctional scale premeal   - c/w low admelog correctional scale at bedtime  - Registered Dietician consult placed     DISCHARGE RECOMMENDATIONS:  - Basal insulin 12 units at bedtime + GLP1RA + metformin 500mg BID (can increase to 1000mg BID after 7 days if tolerating well)  - Pt will need perscription for basal insulin pen (ie. Lantus Solostar pen, if not covered then ask pharmacy which brand of basal insulin is covered by their insurance plan - other brands include: Basaglar, Lantus, Tresiba, Toujeo). Dispense 5 insulin pens with 3 refills.   - Pt will need GLP1 receptor agonist on discharge depending on insurance coverage. Please send Ozempic 2mg/3ml pen (0.25mg SQ once a week) - PLEASE MAKE SURE MEDICATION IS COVERED BY INSURANCE BEFORE DISCHARGE. If Ozempic is not covered by insurance, please try Trulicity  0.75 mg SQ once a week. If Trulicity is not covered by insurance, please try Mounjaro 2.5mg SQ once a week. Please notify our team if none of these medications are covered by insurance.  - Please send prescriptions for diabetes supplies (glucometer, test strips, lancets, alcohol swabs, insulin pen needles (BD melonie 4mm)) - dispense #100, use as directed (3 refills).  - CGM: Patient agreeable to starting continuous glucose monitor (CGM) on discharge - please send continuous glucose monitor to pharmacy: Freestyle Best 3 sensor (Qt 2, change every 14 days, Refills 5). Patient does not have a smart phone compatible device, so please also send Freestyle Best [place] (Qt 1, use as directed, 0 refills)  - OUT-PATIENT FOLLOW UP: Patient should follow up with Endocrinology office ([phone]) at [address], [place] [address] - I emailed the appt coordinator on 6/22/24 to set up appt    #HLD  - patient on atorvastatin 10mg - unclear compliance, encourage compliance  - goal LDL in diabetes mellitus is <70    #HTN  - goal BP in diabetes mellitus is <130/80  - defer to primary team for management      Discussed recommendations with primary team.    Ralph Sutton MD  Endocrine Fellow  Can be reached via [employer] teams.    For follow up questions, discharge recommendations, or new consults, please email [email or web address] ([email or web address]) or [email or web address] ([facility]) or call answering service at [phone] (weekdays); [phone] (nights/weekends).  For emergencies please page fellow on call.

## 2024-06-23 VITALS
HEART RATE: 74 BPM | TEMPERATURE: 98 F | DIASTOLIC BLOOD PRESSURE: 67 MMHG | SYSTOLIC BLOOD PRESSURE: 136 MMHG | OXYGEN SATURATION: 99 % | RESPIRATION RATE: 17 BRPM

## 2024-06-23 LAB
ALBUMIN SERPL ELPH-MCNC: 3.9 G/DL — SIGNIFICANT CHANGE UP (ref 3.3–5)
ALBUMIN SERPL ELPH-MCNC: 4 G/DL — SIGNIFICANT CHANGE UP (ref 3.3–5)
ALP SERPL-CCNC: 103 U/L — SIGNIFICANT CHANGE UP (ref 40–120)
ALP SERPL-CCNC: 98 U/L — SIGNIFICANT CHANGE UP (ref 40–120)
ALT FLD-CCNC: 17 U/L — SIGNIFICANT CHANGE UP (ref 4–41)
ALT FLD-CCNC: 20 U/L — SIGNIFICANT CHANGE UP (ref 4–41)
ANION GAP SERPL CALC-SCNC: 13 MMOL/L — SIGNIFICANT CHANGE UP (ref 7–14)
ANION GAP SERPL CALC-SCNC: 13 MMOL/L — SIGNIFICANT CHANGE UP (ref 7–14)
AST SERPL-CCNC: 17 U/L — SIGNIFICANT CHANGE UP (ref 4–40)
AST SERPL-CCNC: 17 U/L — SIGNIFICANT CHANGE UP (ref 4–40)
BILIRUB SERPL-MCNC: 0.3 MG/DL — SIGNIFICANT CHANGE UP (ref 0.2–1.2)
BILIRUB SERPL-MCNC: 0.4 MG/DL — SIGNIFICANT CHANGE UP (ref 0.2–1.2)
BUN SERPL-MCNC: 30 MG/DL — HIGH (ref 7–23)
BUN SERPL-MCNC: 31 MG/DL — HIGH (ref 7–23)
CALCIUM SERPL-MCNC: 9 MG/DL — SIGNIFICANT CHANGE UP (ref 8.4–10.5)
CALCIUM SERPL-MCNC: 9.5 MG/DL — SIGNIFICANT CHANGE UP (ref 8.4–10.5)
CHLORIDE SERPL-SCNC: 105 MMOL/L — SIGNIFICANT CHANGE UP (ref 98–107)
CHLORIDE SERPL-SCNC: 105 MMOL/L — SIGNIFICANT CHANGE UP (ref 98–107)
CO2 SERPL-SCNC: 18 MMOL/L — LOW (ref 22–31)
CO2 SERPL-SCNC: 20 MMOL/L — LOW (ref 22–31)
CREAT SERPL-MCNC: 1.17 MG/DL — SIGNIFICANT CHANGE UP (ref 0.5–1.3)
CREAT SERPL-MCNC: 1.35 MG/DL — HIGH (ref 0.5–1.3)
EGFR: 52 ML/MIN/1.73M2 — LOW
EGFR: 61 ML/MIN/1.73M2 — SIGNIFICANT CHANGE UP
GLUCOSE SERPL-MCNC: 192 MG/DL — HIGH (ref 70–99)
GLUCOSE SERPL-MCNC: 233 MG/DL — HIGH (ref 70–99)
POTASSIUM SERPL-MCNC: 3.8 MMOL/L — SIGNIFICANT CHANGE UP (ref 3.5–5.3)
POTASSIUM SERPL-MCNC: 3.8 MMOL/L — SIGNIFICANT CHANGE UP (ref 3.5–5.3)
POTASSIUM SERPL-SCNC: 3.8 MMOL/L — SIGNIFICANT CHANGE UP (ref 3.5–5.3)
POTASSIUM SERPL-SCNC: 3.8 MMOL/L — SIGNIFICANT CHANGE UP (ref 3.5–5.3)
PROT SERPL-MCNC: 7.4 G/DL — SIGNIFICANT CHANGE UP (ref 6–8.3)
PROT SERPL-MCNC: 7.9 G/DL — SIGNIFICANT CHANGE UP (ref 6–8.3)
SODIUM SERPL-SCNC: 136 MMOL/L — SIGNIFICANT CHANGE UP (ref 135–145)
SODIUM SERPL-SCNC: 138 MMOL/L — SIGNIFICANT CHANGE UP (ref 135–145)

## 2024-06-23 PROCEDURE — 99239 HOSP IP/OBS DSCHRG MGMT >30: CPT

## 2024-06-23 RX ORDER — SODIUM CHLORIDE 9 MG/ML
1000 INJECTION INTRAMUSCULAR; INTRAVENOUS; SUBCUTANEOUS ONCE
Refills: 0 | Status: COMPLETED | OUTPATIENT
Start: 2024-06-23 | End: 2024-06-23

## 2024-06-23 RX ORDER — FAMOTIDINE 10 MG/ML
20 INJECTION INTRAVENOUS ONCE
Refills: 0 | Status: COMPLETED | OUTPATIENT
Start: 2024-06-23 | End: 2024-06-23

## 2024-06-23 RX ADMIN — CEFTRIAXONE 1000 MILLIGRAM(S): 500 INJECTION, POWDER, FOR SOLUTION INTRAMUSCULAR; INTRAVENOUS at 11:30

## 2024-06-23 RX ADMIN — Medication 30 MILLILITER(S): at 16:09

## 2024-06-23 RX ADMIN — Medication 4 UNIT(S): at 12:07

## 2024-06-23 RX ADMIN — Medication 4 UNIT(S): at 18:26

## 2024-06-23 RX ADMIN — Medication 10 MILLIGRAM(S): at 11:33

## 2024-06-23 RX ADMIN — SODIUM CHLORIDE 1000 MILLILITER(S): 9 INJECTION INTRAMUSCULAR; INTRAVENOUS; SUBCUTANEOUS at 07:48

## 2024-06-23 RX ADMIN — Medication 2: at 12:06

## 2024-06-23 RX ADMIN — Medication 1: at 07:49

## 2024-06-23 RX ADMIN — CEFTRIAXONE 100 MILLIGRAM(S): 500 INJECTION, POWDER, FOR SOLUTION INTRAMUSCULAR; INTRAVENOUS at 11:33

## 2024-06-23 RX ADMIN — Medication 100 MILLIGRAM(S): at 07:50

## 2024-06-23 RX ADMIN — AMLODIPINE BESYLATE 5 MILLIGRAM(S): 2.5 TABLET ORAL at 06:32

## 2024-06-23 RX ADMIN — DARUNAVIR, COBICISTAT, EMTRICITABINE, AND TENOFOVIR ALAFENAMIDE 1 TABLET(S): 800; 150; 200; 10 TABLET, FILM COATED ORAL at 11:33

## 2024-06-23 RX ADMIN — Medication 2: at 18:26

## 2024-06-23 RX ADMIN — FAMOTIDINE 20 MILLIGRAM(S): 10 INJECTION INTRAVENOUS at 16:09

## 2024-06-23 RX ADMIN — Medication 4 UNIT(S): at 07:48

## 2024-06-23 NOTE — ED CDU PROVIDER SUBSEQUENT DAY NOTE - ATTENDING APP SHARED VISIT CONTRIBUTION OF CARE
Called patient left voice mail message that Dr. Awad refilled 1 month of medication for him and he needs to make an appointment for a visit before any further refills are given.  
Attending note:   After face to face evaluation of this patient, I concur with above noted hx, pe, and care plan for this patient.  Mckeon: 83-year-old male with history of diabetes no longer taking insulin due to hypoglycemia due to incorrect dosing in the past.  Patient also known to have HIV, hypertension, chronic kidney disease, BPH.  Patient presented to ED with difficulty urinating and sensation of incomplete emptying for 2 weeks.  Patient was diagnosed with a UTI and while in ED also noted to have very elevated hemoglobin A1c of over 13.  Patient was not in DKA.  Patient was placed on IV antibiotics and started on insulin regimen including long-acting and sliding scale.  Patient awaiting evaluation by endocrine.  Patient states that his dysuria and difficulty emptying his bladder sensation have both improved.  Patient denies any fevers or chills.  Patient denies any back pain or abdominal pain.  On exam patient's vital signs show slightly elevated blood pressure but heart rate is normal.  Lungs are clear and heart is regular rate and rhythm.  There is no CVA tenderness or midline spinal tenderness.  Abdomen is soft with no suprapubic tenderness.  Will continue IV antibiotics while patient is in the ED and awaiting endocrine evaluation for insulin recommendations.  Of note patient lives with daughter who is legally blind and there is concern that neither will be able to appropriately dose insulin.  Will discuss with  and case management for possible workaround's.  Will also notify endocrine.
Seen and examined, have discussed plan of care with PA.   I agree with note as stated above, having amended the EMR as needed to reflect the findings.

## 2024-06-23 NOTE — ED CDU PROVIDER DISPOSITION NOTE - PATIENT PORTAL LINK FT
You can access the FollowMyHealth Patient Portal offered by [facility] by registering at the following website: [email or web address]. By joining [facility]’s FollowMyHealth portal, you will also be able to view your health information using other applications (apps) compatible with our system.

## 2024-06-23 NOTE — ED CDU PROVIDER DISPOSITION NOTE - NSFOLLOWUPINSTRUCTIONS_ED_ALL_ED_FT
Medications:  For Diabetes    Take Basaglar 12 units at bedtime   Take metformin 500mg twice a day (can increase to 1000mg twice a day after 7 days if tolerating well)  Take Ozempic 2mg/3ml pen 0.25mg injection once a week     For UTI    Take Cefdinir 1 tablet twice a day until complete course.      - OUT-PATIENT FOLLOW UP: Patient should follow up with Endocrinology office ([phone]) at [address] [place] [address] [address] [place] [address]      Worsening pain, new fever, chills, nausea, vomiting return to ER     Follow up with your Primary Medical Doctor within 2-3days. If results or reports were given to you, show copies of your reports given to you. Take all of your medications as previously prescribed.

## 2024-06-23 NOTE — ED CDU PROVIDER SUBSEQUENT DAY NOTE - NS ED ATTENDING STATEMENT MOD
This was a shared visit with the PREETHI. I reviewed and verified the documentation.
This was a shared visit with the PREETHI. I reviewed and verified the documentation.

## 2024-06-23 NOTE — ED CDU PROVIDER DISPOSITION NOTE - CLINICAL COURSE
83-year-old male with past medical history of diabetes, HIV, hypertension, CKD, BPH presented to the ER complain about dysuria x 2 weeks.  Patient initially showing elevated creatinine to 1.47 improved to 1.17 prior to discharge.  Patient was seen and cleared by endocrinology on basal bolus insulin and Ozempic.  As well as cefdinir patient was provided meds yesterday.  Remained in the CDU for continued insulin teaching.  2 family at bedside and multiple conversations with family in [place] revealed that patient has previously been on insulin and feels competent in administering his medications at home and has family to help as different times during day. CM involved as well, home nursing service.  Patient cleared from Endo perspective.  Cleared to go h without patient follow-up. patient urinating well, vitally and clinically well for dc.

## 2024-06-23 NOTE — ED CDU PROVIDER SUBSEQUENT DAY NOTE - HISTORY
84 yo male, PMH HIV (on Symtuza daily), DM (not compliant with insulin), HTN, CKD Stage 3, BPH, presented to ED c/o difficulty urinating and feeling like he cannot completely empty his bladder x 2 weeks. Pt denied hematuria, fever, chills, abd pain, n/v/d, flank pain.  In the ED, VSS, pt afebrile, pt found with UTI and determined not to be in urinary retention; was started on IV ceftriaxone and sent to CDU for continued IV antibiotics.    In the interim, pt objectively noted to be resting comfortably; pt has been clinically stable; no issues thus far.  Pt's granddaughter "Bijal" (cell # [phone]; lives in [place]) called and provided home medication list:  Tamsulosin once daily, amlodipine 5 milligrams once daily, oxybutynin 10 milligrams once daily, atorvastatin 10 milligrams once daily, metoprolol succ 100 milligrams once daily, Symtuza 800/150/200/10 milligrams once daily, and an unknown name insulin which she states pt has not been compliant with for ~6 months.  She states that pt has had episodes in the past of "diabetic coma due to low blood sugar" from taking too much insulin by accident, and therefore has become afraid to take insulin, so stopped taking it.  Pt does not take any other meds for DM.  Pt lives with his daughter who granddaughter states is visually impaired.  Granddaughter also verbalizes that pt's wife passed away in March 2024, and since, family has noticed intermittent episodes of confusion; she is not sure to what degree this has been discussed with pt's PMD.  CDU plan expanded to include management of uncontrolled DM as A1c is currently 13.3 with no lab findings c/f DKA; Endocrine was notified of consult by PHI email process as is customary, and plan for case management eval 6/22 for concern of safe home medication plan (pt may need VNS services to help with medication administration).
84 y/o male pmh htn dm w/ UTI and uncontrolled DM. Sent to CDU for IV abx and glucose control. Seen and eval by endocrine who gave medication recs.

## 2024-06-23 NOTE — ED CDU PROVIDER SUBSEQUENT DAY NOTE - NEUROLOGICAL, MLM
Alert and oriented, no focal deficits, no motor or sensory deficits. right labia majora and labia minora  edema, tenderness+, some area of fluctuance, no active drainage, exam limited die to pain,

## 2024-06-23 NOTE — ED CDU PROVIDER SUBSEQUENT DAY NOTE - CLINICAL SUMMARY MEDICAL DECISION MAKING FREE TEXT BOX
1.  UTI:  IV ceftriaxone, trend labs, supportive care, general observation care / monitoring.  2.  Uncontrolled DM, A1c 13.3:  Hyperglycemia management, glucose monitoring, trend labs, Endocrine consultation, diabetic education, supportive care, general observation care / monitoring.
84 y/o male pmh htn, DM w/ UTI and uncontrolled DM- iv rocephin, weight based insulin, endocrine, reassess.

## 2024-06-23 NOTE — PROVIDER CONTACT NOTE (OTHER) - ASSESSMENT
CM notified of possible d/c post family education on insulin injections. Spoke with pts family who states pt lives with daughter who is blind and does not have any other family members in area to assist with insulin administration. Daughter Phoebe [phone] who states she is NP in [facility] and wanted HC to come in for each injection. CM explained that is not in practice with HC and  family is needed to be taught and available for safe insulin administration. Referral sent to [facility] yesterday and accepted with the presumption that family was able to assist. CM spoke with IONA Arteaga in CDU about safety concerns. Family currently having a family meeting to discuss next possible steps for pts care. CM remains available. CM notified of possible d/c post family education on insulin injections. Spoke with Daughter Phoebe [phone] who states she is NP in [facility] and wanted HC to come in for each injection. CM explained that is not in practice with HC and Pt/family is needed to be taught and available for safe DM teaching. Referral sent to [facility] yesterday and accepted with SOC 6/24. As per CDU IONA Arteaga, family members as well as pt taught insulin administration with understanding. Plan for d/c tonight. Family will be available for transport home. Topical Sulfur Applications Counseling: Topical Sulfur Counseling: Patient counseled that this medication may cause skin irritation or allergic reactions.  In the event of skin irritation, the patient was advised to reduce the amount of the drug applied or use it less frequently.   The patient verbalized understanding of the proper use and possible adverse effects of topical sulfur application.  All of the patient's questions and concerns were addressed.

## 2024-06-24 LAB
-  GENTAMICIN: SIGNIFICANT CHANGE UP
-  OXACILLIN: SIGNIFICANT CHANGE UP
-  RIFAMPIN: SIGNIFICANT CHANGE UP
-  TETRACYCLINE: SIGNIFICANT CHANGE UP
-  TRIMETHOPRIM/SULFAMETHOXAZOLE: SIGNIFICANT CHANGE UP
-  VANCOMYCIN: SIGNIFICANT CHANGE UP
CULTURE RESULTS: ABNORMAL
METHOD TYPE: SIGNIFICANT CHANGE UP
ORGANISM # SPEC MICROSCOPIC CNT: ABNORMAL
ORGANISM # SPEC MICROSCOPIC CNT: ABNORMAL
SPECIMEN SOURCE: SIGNIFICANT CHANGE UP

## 2024-06-24 NOTE — ED POST DISCHARGE NOTE - REASON FOR FOLLOW-UP
Home care, [facility] did not accept the case, since Pt's insurance was not in the network.Called Pt's daughter ( Efraín at [phone]) she stated resides with father, however has vision impairment, Pt has a private hire, assist them with shopping, cooking and cleaning, she reported, pt performs insulin injections and have a visiting nurse helps the patient. Other Case accepted by [facility] for DM education and management. SOC 06/27/24.

## 2024-06-24 NOTE — ED POST DISCHARGE NOTE - ADDITIONAL DOCUMENTATION
spoke with pt and daughter at same time, pt feels better, has a primary care he can followup with, and is supposed to get a followup with endocrinology

## 2024-07-03 ENCOUNTER — INPATIENT (INPATIENT)
Facility: HOSPITAL | Age: 84
LOS: 4 days | Discharge: HOME | End: 2024-07-08
Attending: STUDENT IN AN ORGANIZED HEALTH CARE EDUCATION/TRAINING PROGRAM | Admitting: STUDENT IN AN ORGANIZED HEALTH CARE EDUCATION/TRAINING PROGRAM
Payer: MEDICAID

## 2024-07-03 VITALS
OXYGEN SATURATION: 99 % | SYSTOLIC BLOOD PRESSURE: 164 MMHG | DIASTOLIC BLOOD PRESSURE: 71 MMHG | WEIGHT: 149.91 LBS | HEART RATE: 79 BPM | RESPIRATION RATE: 18 BRPM | TEMPERATURE: 97 F

## 2024-07-03 DIAGNOSIS — N17.9 ACUTE KIDNEY FAILURE, UNSPECIFIED: ICD-10-CM

## 2024-07-03 PROBLEM — N18.30 CHRONIC KIDNEY DISEASE, STAGE 3 UNSPECIFIED: Chronic | Status: ACTIVE | Noted: 2024-06-22

## 2024-07-03 PROBLEM — B20 HUMAN IMMUNODEFICIENCY VIRUS [HIV] DISEASE: Chronic | Status: ACTIVE | Noted: 2024-06-22

## 2024-07-03 PROBLEM — N40.0 BENIGN PROSTATIC HYPERPLASIA WITHOUT LOWER URINARY TRACT SYMPTOMS: Chronic | Status: ACTIVE | Noted: 2024-06-22

## 2024-07-03 LAB
ADD ON TEST-SPECIMEN IN LAB: SIGNIFICANT CHANGE UP
ALBUMIN SERPL ELPH-MCNC: 3.8 G/DL — SIGNIFICANT CHANGE UP (ref 3.3–5)
ALP SERPL-CCNC: 97 U/L — SIGNIFICANT CHANGE UP (ref 40–120)
ALT FLD-CCNC: 10 U/L — SIGNIFICANT CHANGE UP (ref 4–41)
ANION GAP SERPL CALC-SCNC: 18 MMOL/L — HIGH (ref 7–14)
ANION GAP SERPL CALC-SCNC: 18 MMOL/L — HIGH (ref 7–14)
ANION GAP SERPL CALC-SCNC: 20 MMOL/L — HIGH (ref 7–14)
APPEARANCE UR: ABNORMAL
AST SERPL-CCNC: 15 U/L — SIGNIFICANT CHANGE UP (ref 4–40)
BASOPHILS # BLD AUTO: 0.05 K/UL — SIGNIFICANT CHANGE UP (ref 0–0.2)
BASOPHILS NFR BLD AUTO: 0.7 % — SIGNIFICANT CHANGE UP (ref 0–2)
BILIRUB SERPL-MCNC: 0.3 MG/DL — SIGNIFICANT CHANGE UP (ref 0.2–1.2)
BILIRUB UR-MCNC: NEGATIVE — SIGNIFICANT CHANGE UP
BLOOD GAS VENOUS COMPREHENSIVE RESULT: SIGNIFICANT CHANGE UP
BLOOD GAS VENOUS COMPREHENSIVE RESULT: SIGNIFICANT CHANGE UP
BUN SERPL-MCNC: 49 MG/DL — HIGH (ref 7–23)
BUN SERPL-MCNC: 63 MG/DL — HIGH (ref 7–23)
BUN SERPL-MCNC: 68 MG/DL — HIGH (ref 7–23)
CALCIUM SERPL-MCNC: 7.9 MG/DL — LOW (ref 8.4–10.5)
CALCIUM SERPL-MCNC: 9 MG/DL — SIGNIFICANT CHANGE UP (ref 8.4–10.5)
CALCIUM SERPL-MCNC: 9 MG/DL — SIGNIFICANT CHANGE UP (ref 8.4–10.5)
CHLORIDE SERPL-SCNC: 106 MMOL/L — SIGNIFICANT CHANGE UP (ref 98–107)
CHLORIDE SERPL-SCNC: 109 MMOL/L — HIGH (ref 98–107)
CHLORIDE SERPL-SCNC: 110 MMOL/L — HIGH (ref 98–107)
CO2 SERPL-SCNC: 14 MMOL/L — LOW (ref 22–31)
CO2 SERPL-SCNC: 9 MMOL/L — CRITICAL LOW (ref 22–31)
CO2 SERPL-SCNC: 9 MMOL/L — CRITICAL LOW (ref 22–31)
COLOR SPEC: ABNORMAL
CREAT SERPL-MCNC: 3.95 MG/DL — HIGH (ref 0.5–1.3)
CREAT SERPL-MCNC: 6.22 MG/DL — HIGH (ref 0.5–1.3)
CREAT SERPL-MCNC: 8.32 MG/DL — HIGH (ref 0.5–1.3)
DIFF PNL FLD: ABNORMAL
EGFR: 14 ML/MIN/1.73M2 — LOW
EGFR: 6 ML/MIN/1.73M2 — LOW
EGFR: 8 ML/MIN/1.73M2 — LOW
EOSINOPHIL # BLD AUTO: 0.01 K/UL — SIGNIFICANT CHANGE UP (ref 0–0.5)
EOSINOPHIL NFR BLD AUTO: 0.1 % — SIGNIFICANT CHANGE UP (ref 0–6)
GLUCOSE BLDC GLUCOMTR-MCNC: 168 MG/DL — HIGH (ref 70–99)
GLUCOSE SERPL-MCNC: 134 MG/DL — HIGH (ref 70–99)
GLUCOSE SERPL-MCNC: 155 MG/DL — HIGH (ref 70–99)
GLUCOSE SERPL-MCNC: 164 MG/DL — HIGH (ref 70–99)
GLUCOSE UR QL: NEGATIVE MG/DL — SIGNIFICANT CHANGE UP
HCT VFR BLD CALC: 34.4 % — LOW (ref 39–50)
HGB BLD-MCNC: 10.6 G/DL — LOW (ref 13–17)
IANC: 4.31 K/UL — SIGNIFICANT CHANGE UP (ref 1.8–7.4)
IMM GRANULOCYTES NFR BLD AUTO: 0.3 % — SIGNIFICANT CHANGE UP (ref 0–0.9)
KETONES UR-MCNC: ABNORMAL MG/DL
LEUKOCYTE ESTERASE UR-ACNC: ABNORMAL
LYMPHOCYTES # BLD AUTO: 1.81 K/UL — SIGNIFICANT CHANGE UP (ref 1–3.3)
LYMPHOCYTES # BLD AUTO: 26.6 % — SIGNIFICANT CHANGE UP (ref 13–44)
MCHC RBC-ENTMCNC: 26.8 PG — LOW (ref 27–34)
MCHC RBC-ENTMCNC: 30.8 GM/DL — LOW (ref 32–36)
MCV RBC AUTO: 87.1 FL — SIGNIFICANT CHANGE UP (ref 80–100)
MONOCYTES # BLD AUTO: 0.61 K/UL — SIGNIFICANT CHANGE UP (ref 0–0.9)
MONOCYTES NFR BLD AUTO: 9 % — SIGNIFICANT CHANGE UP (ref 2–14)
NEUTROPHILS # BLD AUTO: 4.31 K/UL — SIGNIFICANT CHANGE UP (ref 1.8–7.4)
NEUTROPHILS NFR BLD AUTO: 63.3 % — SIGNIFICANT CHANGE UP (ref 43–77)
NITRITE UR-MCNC: NEGATIVE — SIGNIFICANT CHANGE UP
NRBC # BLD: 0 /100 WBCS — SIGNIFICANT CHANGE UP (ref 0–0)
NRBC # FLD: 0 K/UL — SIGNIFICANT CHANGE UP (ref 0–0)
PH UR: 5 — SIGNIFICANT CHANGE UP (ref 5–8)
PLATELET # BLD AUTO: 235 K/UL — SIGNIFICANT CHANGE UP (ref 150–400)
POTASSIUM SERPL-MCNC: 4.3 MMOL/L — SIGNIFICANT CHANGE UP (ref 3.5–5.3)
POTASSIUM SERPL-MCNC: 4.7 MMOL/L — SIGNIFICANT CHANGE UP (ref 3.5–5.3)
POTASSIUM SERPL-MCNC: 5 MMOL/L — SIGNIFICANT CHANGE UP (ref 3.5–5.3)
POTASSIUM SERPL-SCNC: 4.3 MMOL/L — SIGNIFICANT CHANGE UP (ref 3.5–5.3)
POTASSIUM SERPL-SCNC: 4.7 MMOL/L — SIGNIFICANT CHANGE UP (ref 3.5–5.3)
POTASSIUM SERPL-SCNC: 5 MMOL/L — SIGNIFICANT CHANGE UP (ref 3.5–5.3)
PROT SERPL-MCNC: 8.2 G/DL — SIGNIFICANT CHANGE UP (ref 6–8.3)
PROT UR-MCNC: 30 MG/DL
RBC # BLD: 3.95 M/UL — LOW (ref 4.2–5.8)
RBC # FLD: 14.2 % — SIGNIFICANT CHANGE UP (ref 10.3–14.5)
SODIUM SERPL-SCNC: 135 MMOL/L — SIGNIFICANT CHANGE UP (ref 135–145)
SODIUM SERPL-SCNC: 137 MMOL/L — SIGNIFICANT CHANGE UP (ref 135–145)
SODIUM SERPL-SCNC: 141 MMOL/L — SIGNIFICANT CHANGE UP (ref 135–145)
SP GR SPEC: 1.02 — SIGNIFICANT CHANGE UP (ref 1–1.03)
UROBILINOGEN FLD QL: 0.2 MG/DL — SIGNIFICANT CHANGE UP (ref 0.2–1)
WBC # BLD: 6.81 K/UL — SIGNIFICANT CHANGE UP (ref 3.8–10.5)
WBC # FLD AUTO: 6.81 K/UL — SIGNIFICANT CHANGE UP (ref 3.8–10.5)

## 2024-07-03 PROCEDURE — 71250 CT THORAX DX C-: CPT | Mod: 26,MC

## 2024-07-03 PROCEDURE — 99285 EMERGENCY DEPT VISIT HI MDM: CPT | Mod: 25

## 2024-07-03 PROCEDURE — 99223 1ST HOSP IP/OBS HIGH 75: CPT

## 2024-07-03 PROCEDURE — 70450 CT HEAD/BRAIN W/O DYE: CPT | Mod: 26,MC

## 2024-07-03 PROCEDURE — 76775 US EXAM ABDO BACK WALL LIM: CPT | Mod: 26

## 2024-07-03 PROCEDURE — 74176 CT ABD & PELVIS W/O CONTRAST: CPT | Mod: 26,MC

## 2024-07-03 RX ORDER — DEXTROSE MONOHYDRATE AND SODIUM CHLORIDE 5; .3 G/100ML; G/100ML
1000 INJECTION, SOLUTION INTRAVENOUS
Refills: 0 | Status: DISCONTINUED | OUTPATIENT
Start: 2024-07-03 | End: 2024-07-08

## 2024-07-03 RX ORDER — GLUCAGON HYDROCHLORIDE 1 MG/ML
1 INJECTION, POWDER, FOR SOLUTION INTRAMUSCULAR; INTRAVENOUS; SUBCUTANEOUS ONCE
Refills: 0 | Status: DISCONTINUED | OUTPATIENT
Start: 2024-07-03 | End: 2024-07-08

## 2024-07-03 RX ORDER — INSULIN LISPRO 100 [IU]/ML
INJECTION, SOLUTION SUBCUTANEOUS
Refills: 0 | Status: DISCONTINUED | OUTPATIENT
Start: 2024-07-03 | End: 2024-07-08

## 2024-07-03 RX ORDER — LIDOCAINE HYDROCHLORIDE 20 MG/ML
10 INJECTION, SOLUTION EPIDURAL; INFILTRATION; INTRACAUDAL; PERINEURAL ONCE
Refills: 0 | Status: COMPLETED | OUTPATIENT
Start: 2024-07-03 | End: 2024-07-03

## 2024-07-03 RX ORDER — INSULIN LISPRO 100 [IU]/ML
4 INJECTION, SOLUTION SUBCUTANEOUS
Refills: 0 | Status: DISCONTINUED | OUTPATIENT
Start: 2024-07-03 | End: 2024-07-08

## 2024-07-03 RX ORDER — INSULIN LISPRO 100 [IU]/ML
INJECTION, SOLUTION SUBCUTANEOUS AT BEDTIME
Refills: 0 | Status: DISCONTINUED | OUTPATIENT
Start: 2024-07-03 | End: 2024-07-08

## 2024-07-03 RX ORDER — INSULIN GLARGINE 100 [IU]/ML
12 INJECTION, SOLUTION SUBCUTANEOUS ONCE
Refills: 0 | Status: COMPLETED | OUTPATIENT
Start: 2024-07-03 | End: 2024-07-03

## 2024-07-03 RX ORDER — DEXTROSE 30 % IN WATER 30 %
12.5 VIAL (ML) INTRAVENOUS ONCE
Refills: 0 | Status: DISCONTINUED | OUTPATIENT
Start: 2024-07-03 | End: 2024-07-08

## 2024-07-03 RX ORDER — SODIUM BICARBONATE 650 MG/1
0.17 TABLET ORAL
Qty: 150 | Refills: 0 | Status: DISCONTINUED | OUTPATIENT
Start: 2024-07-03 | End: 2024-07-05

## 2024-07-03 RX ORDER — DEXTROSE MONOHYDRATE AND SODIUM CHLORIDE 5; .3 G/100ML; G/100ML
1000 INJECTION, SOLUTION INTRAVENOUS ONCE
Refills: 0 | Status: COMPLETED | OUTPATIENT
Start: 2024-07-03 | End: 2024-07-03

## 2024-07-03 RX ORDER — DEXTROSE MONOHYDRATE 100 MG/ML
125 INJECTION, SOLUTION INTRAVENOUS ONCE
Refills: 0 | Status: DISCONTINUED | OUTPATIENT
Start: 2024-07-03 | End: 2024-07-08

## 2024-07-03 RX ORDER — DEXTROSE MONOHYDRATE AND SODIUM CHLORIDE 5; .3 G/100ML; G/100ML
1000 INJECTION, SOLUTION INTRAVENOUS ONCE
Refills: 0 | Status: DISCONTINUED | OUTPATIENT
Start: 2024-07-03 | End: 2024-07-03

## 2024-07-03 RX ORDER — SODIUM CHLORIDE 0.9 % (FLUSH) 0.9 %
1000 SYRINGE (ML) INJECTION ONCE
Refills: 0 | Status: COMPLETED | OUTPATIENT
Start: 2024-07-03 | End: 2024-07-03

## 2024-07-03 RX ORDER — DEXTROSE 30 % IN WATER 30 %
15 VIAL (ML) INTRAVENOUS ONCE
Refills: 0 | Status: DISCONTINUED | OUTPATIENT
Start: 2024-07-03 | End: 2024-07-08

## 2024-07-03 RX ORDER — DEXTROSE 30 % IN WATER 30 %
25 VIAL (ML) INTRAVENOUS ONCE
Refills: 0 | Status: DISCONTINUED | OUTPATIENT
Start: 2024-07-03 | End: 2024-07-08

## 2024-07-03 RX ORDER — INSULIN GLARGINE 100 [IU]/ML
12 INJECTION, SOLUTION SUBCUTANEOUS AT BEDTIME
Refills: 0 | Status: DISCONTINUED | OUTPATIENT
Start: 2024-07-04 | End: 2024-07-08

## 2024-07-03 RX ORDER — CEFTRIAXONE SODIUM 500 MG
1000 VIAL (EA) INJECTION ONCE
Refills: 0 | Status: COMPLETED | OUTPATIENT
Start: 2024-07-03 | End: 2024-07-03

## 2024-07-03 RX ADMIN — LIDOCAINE HYDROCHLORIDE 10 MILLILITER(S): 20 INJECTION, SOLUTION EPIDURAL; INFILTRATION; INTRACAUDAL; PERINEURAL at 11:07

## 2024-07-03 RX ADMIN — Medication 1000 MILLILITER(S): at 12:37

## 2024-07-03 RX ADMIN — SODIUM BICARBONATE 75 MEQ/KG/HR: 650 TABLET ORAL at 19:01

## 2024-07-03 RX ADMIN — Medication 100 MILLIGRAM(S): at 10:14

## 2024-07-03 NOTE — ED PROVIDER NOTE - PHYSICAL EXAMINATION
ATTENDING PHYSICAL EXAM  GEN - NAD;  answers questions appropriately  HEAD - NC/AT; EYES/NOSE - PERRL, EOMI, mucous membranes moist, no discharge; THROAT: Oral cavity and pharynx normal. No inflammation, swelling, exudate, or lesions  NECK: Neck supple, non-tender without lymphadenopathy, no masses, no JVD  PULMONARY - CTA b/l, symmetric breath sounds, no w/r/r  CARDIAC -s1s2, RRR, no M,R,G  ABDOMEN - +NABS, + lower abd distention and tenderness   - normal penis and scrotum. No phimosis/paraphimosis.  BACK - no CVA tenderness, No vertebral or paravertebral tenderness  EXTREMITIES - symmetric pulses, 2+ dp, capillary refill < 2 seconds, no clubbing, no cyanosis, no edema  SKIN - no rash or bruising   NEUROLOGIC - alert, CN 2-12 intact, no focal deficits

## 2024-07-03 NOTE — ED ADULT NURSE REASSESSMENT NOTE - NS ED NURSE REASSESS COMMENT FT1
report received from KUMAR Santamaria. pt on assessment remains A&OX2 per report from RN. RR even unlabored completing full clear sentences. pt resting in stretcher comfortably at this time, no new complaints offered. bicarb drip remains running as ordered at 75mL/hr. stretcher lowest position siderails up safety measures in place. report received from KUMAR Santamaria. pt on assessment remains A&OX2 per report from RN. RR even unlabored completing full clear sentences. pt resting in stretcher comfortably at this time, no new complaints offered. bicarb drip remains running as ordered at 75mL/hr. 20fr coude forrester catheter in place, draining yellow output well. stretcher lowest position siderails up safety measures in place.

## 2024-07-03 NOTE — H&P ADULT - ASSESSMENT
83M with PMHx HIV (unknown medication), HTN, DM2 on insulin, CKD, BPH, mild cognitive impairment? presenting with difficulty voiding x2 days.

## 2024-07-03 NOTE — ED ADULT NURSE NOTE - CAS EDN DISCHARGE INTERVENTIONS
"Chief Complaint   Patient presents with     Hospital F/U       Initial /70 (BP Location: Left arm, Patient Position: Chair, Cuff Size: Adult Regular)  Pulse 67  Temp 98.1  F (36.7  C) (Tympanic)  Ht 5' 7\" (1.702 m)  Wt 165 lb (74.8 kg)  LMP 01/01/1985  SpO2 99%  BMI 25.84 kg/m2 Estimated body mass index is 25.84 kg/(m^2) as calculated from the following:    Height as of this encounter: 5' 7\" (1.702 m).    Weight as of this encounter: 165 lb (74.8 kg).  Medication Reconciliation: complete  "
Arm band on/IV intact/Admission wristband placed

## 2024-07-03 NOTE — ED PROVIDER NOTE - CARE PLAN
1 Principal Discharge DX:	DONELL (acute kidney injury)  Secondary Diagnosis:	UTI (urinary tract infection)

## 2024-07-03 NOTE — ED PROVIDER NOTE - PROGRESS NOTE DETAILS
bedside US with 972cc of urine. bedside US with 972cc of urine by volume calculation. Labs reviewed.  ARF with acidemia.  Likely due to post-renal etiology which has now been corrected with forrester catheter.  May also have intrinsic renal etiology.  Urine studies sent. Per nephrology recommending bicarb infusion. pt pending CT abdomen. CT with no hydro although possible acute left rib fracture. no known trauma per pt and daughter who is blind at home history not reliable will check ct head and ct chest. Per nephrology recommending bicarb infusion. pt pending CT abdomen for asymmetric hydronephrosis found on US. Sylvester MACEDO: Patient in signout.  83 male with history of diabetes, hypertension, CKD stage III, presenting with urinary tension for 2 days.  Patient presenting with a DONELL of 8.3 –postrenal, Lawson's placed and patient voided 1700 mL, patient also having high anion gap metabolic acidosis, discussed with nephrology and patient on diluted bicarb infusion for possible intrinsic renal disease.  Ultrasound initially concerning for hydronephrosis, CT scan abdomen performed which does not show hydronephrosis/nephroureterolithiasis, however incidental left lateral fifth rib fracture with no history of trauma.  Patient currently pending results for CT scan head and chest.  Patient on my assessment remains asymptomatic and hemodynamically stable.  Plan to admit to hospital for medical optimization.

## 2024-07-03 NOTE — H&P ADULT - PROBLEM SELECTOR PLAN 7
Seen by endo on CDU admission  -per endo lantus 12u qhs and admelog 4u TID while inpatient, ordered  -ISS  -A1C 11.9

## 2024-07-03 NOTE — H&P ADULT - PROBLEM SELECTOR PLAN 2
Likely 2/2 ARF from obstructive uropathy  -s/p 1L NS in ED, additional 1L LR ordered  -nephrology consulted and rec'd bicarb gtt. Bicarb 9--14. Rec'd continuing bicarb at current rate 75cc/hr. Repeat BMP in AM

## 2024-07-03 NOTE — ED ADULT TRIAGE NOTE - CHIEF COMPLAINT QUOTE
Patient brought to ER by EMS from home for urinary retention times five days. Was dc 6/23. Distention, rigidity and tenderness to lower abdomen. Type 2 DM: FS: 154

## 2024-07-03 NOTE — H&P ADULT - NSHPLABSRESULTS_GEN_ALL_CORE
Personally reviewed labs:                        10.6   6.81  )-----------( 235      ( 03 Jul 2024 10:26 )             34.4     07-03-24 @ 21:10    141  |  109<H>  |  49<H>             --------------------------< 164<H>     4.3  |  14<L>  | 3.95<H>    eGFR AA: --  eGFR N-AA: --    Calcium: 9.0  Phosphorus: --  Magnesium: --    AST: --    ALT: --  AlkPhos: --  Protein: --  Albumin: --  TBili: --  D-Bili: --  07-03-24 @ 13:57    137  |  110<H>  |  63<H>             --------------------------< 134<H>     4.7  |  9<LL>  | 6.22<H>    eGFR AA: --  eGFR N-AA: --    Calcium: 7.9<L>  Phosphorus: --  Magnesium: --    AST: --    ALT: --  AlkPhos: --  Protein: --  Albumin: --  TBili: --  D-Bili: --  07-03-24 @ 10:26    135  |  106  |  68<H>             --------------------------< 155<H>     5.0  |  9<LL>  | 8.32<H>    eGFR AA: --  eGFR N-AA: --    Calcium: 9.0  Phosphorus: --  Magnesium: --    AST: 15    ALT: 10  AlkPhos: 97  Protein: 8.2  Albumin: 3.8  TBili: 0.3  D-Bili: --          RADIOLOGY & ADDITIONAL TESTS:    Imaging personally reviewed:  CT A/P:  IMPRESSION:  No hydronephrosis or nephroureterolithiasis.  Buckling of the left lateral fifth rib, possibly an acute fracture.    CTH:  IMPRESSION: Mild periventricular white matter ischemia.    CT chest:  FINDINGS:    LUNGS AND LARGE AIRWAYS: Patent central airways. No pulmonary nodules.  PLEURA: No pleural effusion.  VESSELS: Within normal limits.  HEART: Heart size is normal. No pericardial effusion. Coronary artery   calcification.  MEDIASTINUM AND JACOBO: No lymphadenopathy.  CHEST WALL AND LOWER NECK: Within normal limits.  VISUALIZED UPPER ABDOMEN: Gallstones.  BONES: Several old rib fractures. No acute fracture    IMPRESSION:  No acute fracture

## 2024-07-03 NOTE — ED ADULT NURSE NOTE - OBJECTIVE STATEMENT
pt is a 83y old male received wake and responsive, c/o of lower abd pain and dysuria for past few days, pt denies any nausea or vomiting, labs drawn and sent as per MD order

## 2024-07-03 NOTE — H&P ADULT - PROBLEM SELECTOR PLAN 1
Last creatinine 1.17 6/23. Now 8.32 initially and improved to 6.22-->3.95 s/p forrester. 1700cc UOP over an unclear amount of time after placement  ARF likely 2/2 obstructive uropathy from urinary retention. Possible BPH related  -flomax  -maintain forrester  - consulted and to see patient nonurgently. The patient will need close  f/u on discharge  -monitor for postobstructive diuresis. Strict I's and O's  -avoid nephrotoxic agents  -nephrology consulted and rec'd bicarb gtt. Bicarb 9--14. Rec'd continuing bicarb at current rate 75cc/hr. Repeat BMP in AM  -CT A/P without hydronephrosis  -s/p 1L NS in ED, additional 1L LR ordered

## 2024-07-03 NOTE — H&P ADULT - PROBLEM SELECTOR PLAN 4
Buckling of the left lateral fifth rib, possibly an acute fracture on CT  Denies trauma. Unable to clarify with daughter as number went to . Per ED collateral no hx trauma. CTH and CT chest abdomen pelvis neg for other findings of trauma  No respiratory issues

## 2024-07-03 NOTE — ED PROVIDER NOTE - NSICDXPASTMEDICALHX_GEN_ALL_CORE_FT
PAST MEDICAL HISTORY:  BPH (benign prostatic hyperplasia)     Diabetes     HIV infection     HTN (hypertension)     Stage 3 chronic kidney disease

## 2024-07-03 NOTE — H&P ADULT - PROBLEM SELECTOR PLAN 3
-flomax  -maintain forrester  - consulted and to see patient nonurgently. The patient will need close  f/u on discharge  -monitor for postobstructive diuresis. Strict I's and O's -flomax  -maintain forrester  - consulted and to see patient nonurgently. The patient will need close  f/u on discharge  -monitor for postobstructive diuresis. Strict I's and O's    #UTI  -UA positive, CTX qd

## 2024-07-03 NOTE — H&P ADULT - NSHPPHYSICALEXAM_GEN_ALL_CORE
PHYSICAL EXAM:  Vital Signs Last 24 Hrs  T(C): 36.9 (07-03-24 @ 20:32)  T(F): 98.5 (07-03-24 @ 20:32), Max: 98.6 (07-03-24 @ 17:18)  HR: 82 (07-03-24 @ 20:32) (77 - 85)  BP: 148/72 (07-03-24 @ 20:32)  BP(mean): --  RR: 17 (07-03-24 @ 20:32) (17 - 18)  SpO2: 98% (07-03-24 @ 20:32) (98% - 100%)  Wt(kg): --    07-03 @ 07:01  -  07-04 @ 00:28  --------------------------------------------------------  IN: 0 mL / OUT: 1450 mL / NET: -1450 mL      Constitutional: NAD, awake and alert, well developed  EYES: EOMI, conjunctiva clear  ENT:  Normal Hearing, no tonsillar exudates   Neck: Soft and supple , no thyromegaly   Respiratory: Breath sounds are clear bilaterally, No wheezing, rales or rhonchi, no tachypnea, no accessory muscle use  Cardiovascular: S1 and S2, regular rate and rhythm, no Murmurs, gallops or rubs, no JVD, no leg edema  Gastrointestinal: Bowel Sounds present, soft, nontender, nondistended, no guarding, no rebound   forrester with clear yellow urine. no kevin blood  Extremities: No cyanosis or clubbing; warm to touch  Vascular: 2+ peripheral pulses lower ex  Neurological: No focal deficits, CN II-XII intact bilaterally, sensation to light touch intact in all extremities.   Musculoskeletal: 5/5 strength b/l upper and lower extremities; no joint swelling.  Skin: No rashes, no ulcerations

## 2024-07-03 NOTE — H&P ADULT - HISTORY OF PRESENT ILLNESS
83M with PMHx HIV (unknown medication), HTN, DM2 on insulin, CKD, BPH, mild cognitive impairment? presenting with difficulty voiding x2 days. The patient is a limited historian and is oriented x2 (name, place). Per prior admission 1/2023 he was oriented x2 and has reported hx of mild cognitive impairment. He endorses 4-5 days of difficulty urinating and lower abdominal pain. Denies having hx of this prior. Patient was in the CDU 6/21-6/23 for UTI and had PVR <200 - no forrester placed. Also seen by endo with recs for basal/bolus insulin given elevated A1C. Per ED notes regarding collateral with daughter, he was unable to get abx at home to take to complete course. Unable to reach daughter at both numbers for collateral - went to . Patient denies fevers, chills, cough, CP, SOB, abdominal pain, vomiting, diarrhea, dysuria, hematuria.    In ED, found to have urinary retention on POCUS - 972cc urine volume. Forrester was placed with 1700cc output (unclear over what time). Also noted to have high anion gap metabolic acidosis of which nephrology was consulted. Patient given 1L NS and started on bicarb gtt. Bicarb improved from 9 - 14.  Imaging with incidental L lateral fifth rib fracture - patient denies fall/LOC. Unable to reach daughter. CTH and CT chest neg    Unable to obtain complete med rec

## 2024-07-03 NOTE — ED PROVIDER NOTE - OBJECTIVE STATEMENT
84 y/o male with pmhx of HIV (does not know his medication), HTN, DM on insulin, CKD3, BPH presents to ED c/o urinary retention x 2 days. Pt is AxOx2 in Ed currently, lives with daughter at home who is blind. 84 y/o male with pmhx of HIV (does not know his medication), HTN, DM on insulin, CKD3, BPH presents to ED c/o urinary retention x 2 days. Pt is AxOx2 in ED currently, lives with daughter at home who is blind. Pt does not know how he got here or why he's here. Pt daughter at home via phone states pt last voided 2 days ago. States he was found to have UTI last week per chart review pt was in CDU for IV antibiotics. Pt prescription was sent to [facility] and daughter states medication was suppose to be delivered however was not delivered till last night and pt did not take medicine. No fevers or chills, n/v. 84 y/o male with pmhx of HIV (does not know his medication), HTN, DM on insulin, CKD3, BPH presents to ED c/o urinary retention x 2 days. Pt is AxOx2 in ED currently, lives with daughter at home who is blind. Pt does not know how he got here or why he's here. Pt daughter at home via phone states pt last voided 2 days ago. States he was found to have UTI last week per chart review pt was in CDU for IV antibiotics. Pt prescription was sent to [facility] and daughter states medication was suppose to be delivered however was not delivered till last night and pt did not take medicine. No fevers or chills, n/v.  Attending - Agree with above.  I evaluated patient myself. 84 y/o M with hx T2DM on insulin, HIV, HTN, CKD3, BPH.  In ED 6/21/24 and treated in CDU for UTI.  Unable to get abx after leaving.  RTER now with c/o urinary retention x 2 days.  Denies fever.  No dysuria or hematuria.  No chest pain or back pain. 84 y/o male with pmhx of HIV (does not know his medication), HTN, DM on insulin, CKD3, BPH presents to ED c/o urinary retention x 2 days. Pt is AxOx2 in ED currently, lives with daughter at home who is blind. Pt does not know how he got here or why he's here. Pt daughter at home via phone states pt last voided 2 days ago. States he was found to have UTI last week per chart review pt was in CDU for IV antibiotics. Pt prescription was sent to [facility] and daughter states medication was suppose to be delivered however was not delivered till last night and pt did not take medicine. No fevers or chills, n/v.  Attending - Agree with above.  I evaluated patient myself. 84 y/o M with hx T2DM on insulin, HIV, HTN, CKD3, BPH.  In ED 6/21/24 and treated in CDU for UTI.  Unable to get abx after leaving.  RTER now with c/o urinary retention x 2 days.  Denies fever.  No dysuria or hematuria.  No chest pain or back pain.  Previous [facility] records reviewed including endocrinology recommendations.

## 2024-07-03 NOTE — ED ADULT NURSE REASSESSMENT NOTE - NS ED NURSE REASSESS COMMENT FT1
Patient appears to be resting comfortably in bed, no complaints noted at this time. Breathing even and unlabored, pallor/diaphoresis not noted.  called, IOAN COLBERT made aware of lab values. will continue to monitor.

## 2024-07-03 NOTE — H&P ADULT - PROBLEM SELECTOR PLAN 6
Unclear what anti-HTNs he is on. On prior admission was on metoprolol 25mg qd alone - will c/w this for now  Needs med rec

## 2024-07-04 DIAGNOSIS — Z29.9 ENCOUNTER FOR PROPHYLACTIC MEASURES, UNSPECIFIED: ICD-10-CM

## 2024-07-04 DIAGNOSIS — N17.9 ACUTE KIDNEY FAILURE, UNSPECIFIED: ICD-10-CM

## 2024-07-04 DIAGNOSIS — N40.0 BENIGN PROSTATIC HYPERPLASIA WITHOUT LOWER URINARY TRACT SYMPTOMS: ICD-10-CM

## 2024-07-04 DIAGNOSIS — E11.65 TYPE 2 DIABETES MELLITUS WITH HYPERGLYCEMIA: ICD-10-CM

## 2024-07-04 DIAGNOSIS — Z79.899 OTHER LONG TERM (CURRENT) DRUG THERAPY: ICD-10-CM

## 2024-07-04 DIAGNOSIS — E87.20 ACIDOSIS, UNSPECIFIED: ICD-10-CM

## 2024-07-04 DIAGNOSIS — B20 HUMAN IMMUNODEFICIENCY VIRUS [HIV] DISEASE: ICD-10-CM

## 2024-07-04 DIAGNOSIS — S22.39XA FRACTURE OF ONE RIB, UNSPECIFIED SIDE, INITIAL ENCOUNTER FOR CLOSED FRACTURE: ICD-10-CM

## 2024-07-04 DIAGNOSIS — R33.9 RETENTION OF URINE, UNSPECIFIED: ICD-10-CM

## 2024-07-04 DIAGNOSIS — I10 ESSENTIAL (PRIMARY) HYPERTENSION: ICD-10-CM

## 2024-07-04 LAB
ALBUMIN SERPL ELPH-MCNC: 3.5 G/DL — SIGNIFICANT CHANGE UP (ref 3.3–5)
ALP SERPL-CCNC: 89 U/L — SIGNIFICANT CHANGE UP (ref 40–120)
ALT FLD-CCNC: 7 U/L — SIGNIFICANT CHANGE UP (ref 4–41)
ANION GAP SERPL CALC-SCNC: 13 MMOL/L — SIGNIFICANT CHANGE UP (ref 7–14)
AST SERPL-CCNC: 13 U/L — SIGNIFICANT CHANGE UP (ref 4–40)
BASOPHILS # BLD AUTO: 0.04 K/UL — SIGNIFICANT CHANGE UP (ref 0–0.2)
BASOPHILS NFR BLD AUTO: 0.7 % — SIGNIFICANT CHANGE UP (ref 0–2)
BILIRUB SERPL-MCNC: 0.4 MG/DL — SIGNIFICANT CHANGE UP (ref 0.2–1.2)
BUN SERPL-MCNC: 35 MG/DL — HIGH (ref 7–23)
CALCIUM SERPL-MCNC: 8.7 MG/DL — SIGNIFICANT CHANGE UP (ref 8.4–10.5)
CHLORIDE SERPL-SCNC: 109 MMOL/L — HIGH (ref 98–107)
CO2 SERPL-SCNC: 19 MMOL/L — LOW (ref 22–31)
CREAT SERPL-MCNC: 2.39 MG/DL — HIGH (ref 0.5–1.3)
CULTURE RESULTS: NO GROWTH — SIGNIFICANT CHANGE UP
EGFR: 26 ML/MIN/1.73M2 — LOW
EOSINOPHIL # BLD AUTO: 0.03 K/UL — SIGNIFICANT CHANGE UP (ref 0–0.5)
EOSINOPHIL NFR BLD AUTO: 0.5 % — SIGNIFICANT CHANGE UP (ref 0–6)
GLUCOSE BLDC GLUCOMTR-MCNC: 132 MG/DL — HIGH (ref 70–99)
GLUCOSE BLDC GLUCOMTR-MCNC: 137 MG/DL — HIGH (ref 70–99)
GLUCOSE BLDC GLUCOMTR-MCNC: 138 MG/DL — HIGH (ref 70–99)
GLUCOSE BLDC GLUCOMTR-MCNC: 165 MG/DL — HIGH (ref 70–99)
GLUCOSE BLDC GLUCOMTR-MCNC: 195 MG/DL — HIGH (ref 70–99)
GLUCOSE BLDC GLUCOMTR-MCNC: 200 MG/DL — HIGH (ref 70–99)
GLUCOSE BLDC GLUCOMTR-MCNC: 240 MG/DL — HIGH (ref 70–99)
GLUCOSE BLDC GLUCOMTR-MCNC: 246 MG/DL — HIGH (ref 70–99)
GLUCOSE SERPL-MCNC: 145 MG/DL — HIGH (ref 70–99)
HCT VFR BLD CALC: 30.2 % — LOW (ref 39–50)
HGB BLD-MCNC: 10.2 G/DL — LOW (ref 13–17)
IANC: 3.54 K/UL — SIGNIFICANT CHANGE UP (ref 1.8–7.4)
IMM GRANULOCYTES NFR BLD AUTO: 0.2 % — SIGNIFICANT CHANGE UP (ref 0–0.9)
LYMPHOCYTES # BLD AUTO: 1.75 K/UL — SIGNIFICANT CHANGE UP (ref 1–3.3)
LYMPHOCYTES # BLD AUTO: 29.1 % — SIGNIFICANT CHANGE UP (ref 13–44)
MAGNESIUM SERPL-MCNC: 1.6 MG/DL — SIGNIFICANT CHANGE UP (ref 1.6–2.6)
MCHC RBC-ENTMCNC: 28 PG — SIGNIFICANT CHANGE UP (ref 27–34)
MCHC RBC-ENTMCNC: 33.8 GM/DL — SIGNIFICANT CHANGE UP (ref 32–36)
MCV RBC AUTO: 83 FL — SIGNIFICANT CHANGE UP (ref 80–100)
MONOCYTES # BLD AUTO: 0.64 K/UL — SIGNIFICANT CHANGE UP (ref 0–0.9)
MONOCYTES NFR BLD AUTO: 10.6 % — SIGNIFICANT CHANGE UP (ref 2–14)
NEUTROPHILS # BLD AUTO: 3.54 K/UL — SIGNIFICANT CHANGE UP (ref 1.8–7.4)
NEUTROPHILS NFR BLD AUTO: 58.9 % — SIGNIFICANT CHANGE UP (ref 43–77)
NRBC # BLD: 0 /100 WBCS — SIGNIFICANT CHANGE UP (ref 0–0)
NRBC # FLD: 0 K/UL — SIGNIFICANT CHANGE UP (ref 0–0)
PLATELET # BLD AUTO: 218 K/UL — SIGNIFICANT CHANGE UP (ref 150–400)
POTASSIUM SERPL-MCNC: 4.1 MMOL/L — SIGNIFICANT CHANGE UP (ref 3.5–5.3)
POTASSIUM SERPL-SCNC: 4.1 MMOL/L — SIGNIFICANT CHANGE UP (ref 3.5–5.3)
PROT SERPL-MCNC: 7.3 G/DL — SIGNIFICANT CHANGE UP (ref 6–8.3)
RBC # BLD: 3.64 M/UL — LOW (ref 4.2–5.8)
RBC # FLD: 13.8 % — SIGNIFICANT CHANGE UP (ref 10.3–14.5)
SODIUM SERPL-SCNC: 141 MMOL/L — SIGNIFICANT CHANGE UP (ref 135–145)
SPECIMEN SOURCE: SIGNIFICANT CHANGE UP
WBC # BLD: 6.01 K/UL — SIGNIFICANT CHANGE UP (ref 3.8–10.5)
WBC # FLD AUTO: 6.01 K/UL — SIGNIFICANT CHANGE UP (ref 3.8–10.5)

## 2024-07-04 PROCEDURE — 99232 SBSQ HOSP IP/OBS MODERATE 35: CPT

## 2024-07-04 PROCEDURE — 99223 1ST HOSP IP/OBS HIGH 75: CPT | Mod: GC

## 2024-07-04 RX ORDER — TAMSULOSIN HYDROCHLORIDE 0.4 MG/1
0.4 CAPSULE ORAL AT BEDTIME
Refills: 0 | Status: DISCONTINUED | OUTPATIENT
Start: 2024-07-03 | End: 2024-07-08

## 2024-07-04 RX ORDER — ATORVASTATIN CALCIUM 20 MG/1
10 TABLET, FILM COATED ORAL AT BEDTIME
Refills: 0 | Status: DISCONTINUED | OUTPATIENT
Start: 2024-07-03 | End: 2024-07-08

## 2024-07-04 RX ORDER — ACETAMINOPHEN 325 MG
650 TABLET ORAL EVERY 6 HOURS
Refills: 0 | Status: DISCONTINUED | OUTPATIENT
Start: 2024-07-04 | End: 2024-07-08

## 2024-07-04 RX ORDER — HEPARIN SODIUM 50 [USP'U]/ML
5000 INJECTION, SOLUTION INTRAVENOUS EVERY 12 HOURS
Refills: 0 | Status: DISCONTINUED | OUTPATIENT
Start: 2024-07-04 | End: 2024-07-08

## 2024-07-04 RX ORDER — METOPROLOL TARTRATE 50 MG
25 TABLET ORAL DAILY
Refills: 0 | Status: DISCONTINUED | OUTPATIENT
Start: 2024-07-04 | End: 2024-07-08

## 2024-07-04 RX ORDER — CEFTRIAXONE SODIUM 500 MG
1000 VIAL (EA) INJECTION EVERY 24 HOURS
Refills: 0 | Status: DISCONTINUED | OUTPATIENT
Start: 2024-07-04 | End: 2024-07-04

## 2024-07-04 RX ADMIN — ATORVASTATIN CALCIUM 10 MILLIGRAM(S): 20 TABLET, FILM COATED ORAL at 22:14

## 2024-07-04 RX ADMIN — INSULIN LISPRO 4 UNIT(S): 100 INJECTION, SOLUTION SUBCUTANEOUS at 18:57

## 2024-07-04 RX ADMIN — Medication 25 MILLIGRAM(S): at 06:16

## 2024-07-04 RX ADMIN — TAMSULOSIN HYDROCHLORIDE 0.4 MILLIGRAM(S): 0.4 CAPSULE ORAL at 22:14

## 2024-07-04 RX ADMIN — Medication 100 MILLIGRAM(S): at 09:56

## 2024-07-04 RX ADMIN — DEXTROSE MONOHYDRATE AND SODIUM CHLORIDE 1000 MILLILITER(S): 5; .3 INJECTION, SOLUTION INTRAVENOUS at 02:00

## 2024-07-04 RX ADMIN — INSULIN GLARGINE 12 UNIT(S): 100 INJECTION, SOLUTION SUBCUTANEOUS at 00:27

## 2024-07-04 RX ADMIN — SODIUM BICARBONATE 75 MEQ/KG/HR: 650 TABLET ORAL at 11:36

## 2024-07-04 RX ADMIN — INSULIN LISPRO 1: 100 INJECTION, SOLUTION SUBCUTANEOUS at 18:56

## 2024-07-04 RX ADMIN — HEPARIN SODIUM 5000 UNIT(S): 50 INJECTION, SOLUTION INTRAVENOUS at 17:48

## 2024-07-04 RX ADMIN — HEPARIN SODIUM 5000 UNIT(S): 50 INJECTION, SOLUTION INTRAVENOUS at 06:16

## 2024-07-04 RX ADMIN — INSULIN GLARGINE 12 UNIT(S): 100 INJECTION, SOLUTION SUBCUTANEOUS at 22:24

## 2024-07-04 RX ADMIN — INSULIN LISPRO 4 UNIT(S): 100 INJECTION, SOLUTION SUBCUTANEOUS at 09:04

## 2024-07-04 NOTE — PATIENT PROFILE ADULT - FALL HARM RISK - HARM RISK INTERVENTIONS
Assistance with ambulation/Assistance OOB with selected safe patient handling equipment/Communicate Risk of Fall with Harm to all staff/Discuss with provider need for PT consult/Monitor gait and stability/Provide patient with walking aids - walker, cane, crutches/Reinforce activity limits and safety measures with patient and family/Tailored Fall Risk Interventions/Use of alarms - bed, chair and/or voice tab/Visual Cue: Yellow wristband and red socks/Bed in lowest position, wheels locked, appropriate side rails in place/Call bell, personal items and telephone in reach/Instruct patient to call for assistance before getting out of bed or chair/Non-slip footwear when patient is out of bed/[place] to call system/Physically safe environment - no spills, clutter or unnecessary equipment/Purposeful Proactive Rounding/Room/bathroom lighting operational, light cord in reach

## 2024-07-04 NOTE — ED ADULT NURSE REASSESSMENT NOTE - NS ED NURSE REASSESS COMMENT FT1
Patient received into my care report from the night shift RN, patient a & 0 x 3. No verbal complaints at this time.

## 2024-07-04 NOTE — PROGRESS NOTE ADULT - PROBLEM SELECTOR PLAN 2
Likely 2/2 ARF from obstructive uropathy  -s/p 1L NS in ED, additional 1L LR ordered  -nephrology consulted and rec'd bicarb gtt. Bicarb 9--14. Rec'd continuing bicarb at current rate 75cc/hr.  - per renal

## 2024-07-04 NOTE — CONSULT NOTE ADULT - PROBLEM SELECTOR RECOMMENDATION 9
Patient with DONELL on CKD in the setting of obstructive uropathy/BPH. On review of labs on [place], last Scr was 1.17 on 6/23/24. Admission Scr elevated at 8.32 on 7/3/24 and remains elevated but improved to 3.95 on repeat. UA is cloudy, trace ketones, small LE. Documented UOP is ~1.4L since admission. POCUS in ED showed mild right and moderate left hydronephrosis. CT abd/pelv showed right pelvic fullness without kevin hydronephrosis, no left hydronephrosis, right renal cyst, decompressed bladder around Lawson catheter, and enlarged prostate. Patient with obstructive uropathy. Agree with bicarb infusion. Recommend Urology evaluation for persistent urinary retention. Monitor labs and urine output. Avoid nephrotoxins. Dose medications as per eGFR. Patient with DONELL on CKD in the setting of obstructive uropathy/urine retention/BPH. On review of labs on [place], last Scr was 1.17 on 6/23/24. Admission Scr elevated at 8.32 on 7/3/24 and remains elevated but improved to 3.95 on repeat labs. UA is cloudy, trace ketones, small LE. Documented UOP is ~1.4L since admission. POCUS in ED showed mild right and moderate left hydronephrosis. CT A/P showed right pelvic fullness without kevin hydronephrosis, no left hydronephrosis, right renal cyst, decompressed bladder around Lawson catheter, and enlarged prostate. Recommend urology evaluation. Monitor labs and urine output. Avoid nephrotoxins. Dose medications as per eGFR.

## 2024-07-04 NOTE — CONSULT NOTE ADULT - SUBJECTIVE AND OBJECTIVE BOX
[facility] DIVISION OF KIDNEY DISEASES AND HYPERTENSION -- [phone]  -- INITIAL CONSULT NOTE  --------------------------------------------------------------------------------  HPI: 83-year-old male with PMH of HTN, HLD, DM2, HIV, CKD 3a, BPH, and recently in the ED at [facility] for urinary retention, now complaining of abdominal pain and urinary retention over the past 2 days. Nephrology consulted for DONELL on CKD and metabolic acidosis. On review of labs on [place], last Scr was 1.17 on 6/23/24. Admission Scr elevated at 8.32 on 7/3/24. Lawson catheter placed in ED with ~1L output. Scr remains elevated but improved to 6.22 on repeat. SCO2 low at 9 on admission and remains low at 9.     Patient seen and evaluated in the ED. Reports decreased urine output and improvement in abdominal pain. Denies fevers/chills, HA, CP, SOB, or leg swelling.     PAST HISTORY  --------------------------------------------------------------------------------  PAST MEDICAL & SURGICAL HISTORY:  Diabetes  HTN (hypertension)  HIV infection  Stage 3 chronic kidney disease  BPH (benign prostatic hyperplasia)    No significant past surgical history    FAMILY HISTORY:  No pertinent family history in first degree relatives    PAST SOCIAL HISTORY:  Denies smoking, alcohol or illicit drug use (03 Jul 2024 23:55)    ALLERGIES & MEDICATIONS  --------------------------------------------------------------------------------  Allergies  No Known Allergies    Intolerances    Standing Inpatient Medicationsatorvastatin 10 milliGRAM(s) Oral at bedtime  cefTRIAXone   IVPB 1000 milliGRAM(s) IV Intermittent every 24 hours  dextrose 10% Bolus 125 milliLiter(s) IV Bolus once  dextrose 5%. 1000 milliLiter(s) IV Continuous <Continuous>  dextrose 5%. 1000 milliLiter(s) IV Continuous <Continuous>  dextrose 50% Injectable 25 Gram(s) IV Push once  dextrose 50% Injectable 12.5 Gram(s) IV Push once  glucagon  Injectable 1 milliGRAM(s) IntraMuscular once  heparin   Injectable 5000 Unit(s) SubCutaneous every 12 hours  insulin glargine Injectable (LANTUS) 12 Unit(s) SubCutaneous at bedtime  insulin lispro (ADMELOG) corrective regimen sliding scale   SubCutaneous three times a day before meals  insulin lispro (ADMELOG) corrective regimen sliding scale   SubCutaneous at bedtime  insulin lispro Injectable (ADMELOG) 4 Unit(s) SubCutaneous three times a day before meals  lactated ringers Bolus 1000 milliLiter(s) IV Bolus once  metoprolol succinate ER 25 milliGRAM(s) Oral daily  sodium bicarbonate  Infusion 0.165 mEq/kG/Hr IV Continuous <Continuous>  tamsulosin 0.4 milliGRAM(s) Oral at bedtime    PRN Inpatient Medicationsacetaminophen     Tablet .. 650 milliGRAM(s) Oral every 6 hours PRN  dextrose Oral Gel 15 Gram(s) Oral once PRN  melatonin 3 milliGRAM(s) Oral at bedtime PRN    REVIEW OF SYSTEMS  --------------------------------------------------------------------------------  Gen: No fevers/chills  Skin: No rashes  Head/Eyes/Ears: No HA  Respiratory: No dyspnea, cough  CV: No chest pain  GI: +abdominal pain  : +decreased urine output  MSK: No edema  Heme: No easy bruising or bleeding  Psych: No significant depression    All other systems were reviewed and are negative, except as noted.    VITALS/PHYSICAL EXAM  --------------------------------------------------------------------------------  T(C): 37.2 (07-04-24 @ 00:37), Max: 37.2 (07-04-24 @ 00:37)  HR: 95 (07-04-24 @ 00:37) (77 - 95)  BP: 148/65 (07-04-24 @ 00:37) (144/72 - 164/71)  RR: 18 (07-04-24 @ 00:37) (17 - 18)  SpO2: 98% (07-04-24 @ 00:37) (98% - 100%)  Wt(kg): --    Weight (kg): 68 (07-03-24 @ 08:28)    07-03-24 @ 07:01  -  07-04-24 @ 01:58  --------------------------------------------------------  IN: 0 mL / OUT: 1450 mL / NET: -1450 mL    Physical Exam:  Gen: NAD  HEENT: MMM  Pulm: CTA B/L  CV: S1S2  Abd: Soft, +BS, no tenderness to palpation  : Lawson catheter  Ext: No B/L LE edema  Neuro: Awake  Skin: Warm and dry    LABS/STUDIES  --------------------------------------------------------------------------------              10.6   6.81  >-----------<  235      [07-03-24 @ 10:26]              34.4     141  |  109  |  49  ----------------------------<  164      [07-03-24 @ 21:10]  4.3   |  14  |  3.95        Ca     9.0     [07-03-24 @ 21:10]    TPro  8.2  /  Alb  3.8  /  TBili  0.3  /  DBili  x   /  AST  15  /  ALT  10  /  AlkPhos  97  [07-03-24 @ 10:26]    Creatinine Trend:  SCr 3.95 [07-03 @ 21:10]  SCr 6.22 [07-03 @ 13:57]  SCr 8.32 [07-03 @ 10:26]  SCr 1.17 [06-23 @ 13:00]  SCr 1.35 [06-23 @ 06:31]    Lipid: chol 169, , HDL 36, LDL --      [06-22-24 @ 06:14] [facility] DIVISION OF KIDNEY DISEASES AND HYPERTENSION -- [phone]  -- INITIAL CONSULT NOTE  --------------------------------------------------------------------------------  HPI: 83-year-old male with PMH of HTN, HLD, DM2, HIV, CKD 3a, BPH, and recently in the ED at [facility] for urinary retention, now complaining of abdominal pain and urinary retention over the past 2 days. Nephrology consulted for DONELL on CKD and metabolic acidosis. On review of labs on [place], last Scr was 1.17 on 6/23/24. Admission Scr elevated at 8.32 on 7/3/24. Lawson catheter placed in ED with ~1L output. Scr remains elevated but improved to 6.22 on repeat labs. SCO2 low at 9 on admission and remained low at 9. Pt. started on sodium bicarbonate infusion. Last Scr decreased to 3.95 and SCO2 improved to 14.       Patient seen and evaluated in the ED. Reports decreased urine output and improvement in abdominal pain. Denies fevers/chills, HA, CP, SOB, or leg swelling.     PAST HISTORY  --------------------------------------------------------------------------------  PAST MEDICAL & SURGICAL HISTORY:  Diabetes  HTN (hypertension)  HIV infection  Stage 3 chronic kidney disease  BPH (benign prostatic hyperplasia)    No significant past surgical history    FAMILY HISTORY:  No pertinent family history in first degree relatives    PAST SOCIAL HISTORY:  Denies smoking, alcohol or illicit drug use (03 Jul 2024 23:55)    ALLERGIES & MEDICATIONS  --------------------------------------------------------------------------------  Allergies  No Known Allergies    Intolerances    Standing Inpatient Medicationsatorvastatin 10 milliGRAM(s) Oral at bedtime  cefTRIAXone   IVPB 1000 milliGRAM(s) IV Intermittent every 24 hours  dextrose 10% Bolus 125 milliLiter(s) IV Bolus once  dextrose 5%. 1000 milliLiter(s) IV Continuous <Continuous>  dextrose 5%. 1000 milliLiter(s) IV Continuous <Continuous>  dextrose 50% Injectable 25 Gram(s) IV Push once  dextrose 50% Injectable 12.5 Gram(s) IV Push once  glucagon  Injectable 1 milliGRAM(s) IntraMuscular once  heparin   Injectable 5000 Unit(s) SubCutaneous every 12 hours  insulin glargine Injectable (LANTUS) 12 Unit(s) SubCutaneous at bedtime  insulin lispro (ADMELOG) corrective regimen sliding scale   SubCutaneous three times a day before meals  insulin lispro (ADMELOG) corrective regimen sliding scale   SubCutaneous at bedtime  insulin lispro Injectable (ADMELOG) 4 Unit(s) SubCutaneous three times a day before meals  lactated ringers Bolus 1000 milliLiter(s) IV Bolus once  metoprolol succinate ER 25 milliGRAM(s) Oral daily  sodium bicarbonate  Infusion 0.165 mEq/kG/Hr IV Continuous <Continuous>  tamsulosin 0.4 milliGRAM(s) Oral at bedtime    PRN Inpatient Medicationsacetaminophen     Tablet .. 650 milliGRAM(s) Oral every 6 hours PRN  dextrose Oral Gel 15 Gram(s) Oral once PRN  melatonin 3 milliGRAM(s) Oral at bedtime PRN    REVIEW OF SYSTEMS  --------------------------------------------------------------------------------  Gen: No fevers/chills  Skin: No rashes  Head/Eyes/Ears: No HA  Respiratory: No dyspnea, cough  CV: No chest pain  GI: +abdominal pain  : +decreased urine output  MSK: No edema  Heme: No easy bruising or bleeding  Psych: No significant depression    All other systems were reviewed and are negative, except as noted.    VITALS/PHYSICAL EXAM  --------------------------------------------------------------------------------  T(C): 37.2 (07-04-24 @ 00:37), Max: 37.2 (07-04-24 @ 00:37)  HR: 95 (07-04-24 @ 00:37) (77 - 95)  BP: 148/65 (07-04-24 @ 00:37) (144/72 - 164/71)  RR: 18 (07-04-24 @ 00:37) (17 - 18)  SpO2: 98% (07-04-24 @ 00:37) (98% - 100%)  Wt(kg): --    Weight (kg): 68 (07-03-24 @ 08:28)    07-03-24 @ 07:01  -  07-04-24 @ 01:58  --------------------------------------------------------  IN: 0 mL / OUT: 1450 mL / NET: -1450 mL    Physical Exam:  Gen: resting  HEENT: MMM  Pulm: CTA B/L  CV: S1S2  Abd: Soft, +BS, no tenderness to palpation  : Lawson catheter  Ext: No B/L LE edema  Neuro: Awake, alert  Skin: Warm and dry    LABS/STUDIES  --------------------------------------------------------------------------------              10.6   6.81  >-----------<  235      [07-03-24 @ 10:26]              34.4     141  |  109  |  49  ----------------------------<  164      [07-03-24 @ 21:10]  4.3   |  14  |  3.95        Ca     9.0     [07-03-24 @ 21:10]    TPro  8.2  /  Alb  3.8  /  TBili  0.3  /  DBili  x   /  AST  15  /  ALT  10  /  AlkPhos  97  [07-03-24 @ 10:26]    07-04    141  |  109<H>  |  35<H>  ----------------------------<  145<H>  4.1   |  19<L>  |  2.39<H>    Ca    8.7      04 Jul 2024 06:04  Mg     1.60     07-04    TPro  7.3  /  Alb  3.5  /  TBili  0.4  /  DBili  x   /  AST  13  /  ALT  7   /  AlkPhos  89  07-04    Creatinine Trend:  SCr 3.95 [07-03 @ 21:10]  SCr 6.22 [07-03 @ 13:57]  SCr 8.32 [07-03 @ 10:26]  SCr 1.17 [06-23 @ 13:00]  SCr 1.35 [06-23 @ 06:31]    Lipid: chol 169, , HDL 36, LDL --      [06-22-24 @ 06:14]

## 2024-07-04 NOTE — ED ADULT NURSE REASSESSMENT NOTE - NS ED NURSE REASSESS COMMENT FT1
pt remains at baseline mental status, RR even unlabored completing full sentences. pt resting in stretcher comfortably at this time, no new complaints offered. forrester catheter remains in place, draining yellow output.  stretcher lowest position siderails up safety measures in place. pt admitted pending bed placement

## 2024-07-04 NOTE — PROGRESS NOTE ADULT - PROBLEM SELECTOR PLAN 1
Last creatinine 1.17 6/23. Now 8.32 s/p forrester. 1700cc UOP over an unclear amount of time after placement  ARF likely 2/2 obstructive uropathy from urinary retention. Possible BPH related  - DONELL resolving   -flomax  -maintain forrester for now pending  eval - no hydro on CT  -monitor for postobstructive diuresis. Strict I's and O's  -avoid nephrotoxic agents  -nephrology consulted kevin - bicarb per renal

## 2024-07-04 NOTE — CONSULT NOTE ADULT - PROBLEM SELECTOR RECOMMENDATION 2
Patient with metabolic acidosis in the setting of DONELL on CKD. SCO2 low at 9 on admission and remains low but improved to 14. Initial pH low at 7.23, remains low but improved to 7.27. Lactate WNL at 0.9. Agree with bicarb infusion. Monitor SCO2.     Discussed with attending on call.   If you have any questions, please feel free to contact me.  Alphonso Diop MD  Nephrology Fellow  [email or web address] / [employer] (Preferred)  (After 4pm or on weekends, please call the on-call Fellow) Patient with metabolic acidosis in the setting of DONELL on CKD. SCO2 low at 9 on admission. Pt. started on sodium bicarbonate infusion. SCO2 increased to 14. Initial pH low at 7.23, remains low but improved to 7.27. Lactate WNL at 0.9. Monitor SCO2.     Discussed with attending on call.     If you have any questions, please feel free to contact me.  Alphonso Diop MD  Nephrology Fellow  [id number] / [employer] Teams (Preferred)  (After 4pm or on weekends, please call the on-call Fellow)

## 2024-07-04 NOTE — PROGRESS NOTE ADULT - PROBLEM SELECTOR PLAN 5
Will need to clarify HIV med and have family bring in  CD4/CD8 subsets and HIV VL ordered for AM  called no ans

## 2024-07-04 NOTE — ED ADULT NURSE REASSESSMENT NOTE - NS ED NURSE REASSESS COMMENT FT1
6am labs drawn and sent as per orders. forrester remains draining yellow output. fsg as per chart. safety measures maintained throughout care. pt admitted awaiting bed placement at this time

## 2024-07-04 NOTE — PROGRESS NOTE ADULT - PROBLEM SELECTOR PLAN 3
-flomax  -maintain forrester  - consulted and to see patient nonurgently. The patient will need close  f/u on discharge  -monitor for postobstructive diuresis. Strict I's and O's    #UTI  -UA positive, CTX qd - f.u ucx -flomax  -maintain forrester  - consulted and to see patient nonurgently. The patient will need close  f/u on discharge  -monitor for postobstructive diuresis. Strict I's and O's    #UA + but ucx neg - dc abx

## 2024-07-04 NOTE — PROGRESS NOTE ADULT - SUBJECTIVE AND OBJECTIVE BOX
Patient is a 83y old  Male who presents with a chief complaint of urinary retention (04 Jul 2024 01:58)      SUBJECTIVE / OVERNIGHT EVENTS: no events, feels well     ROS:  No HA/DZ  No Vision changes   No CP, SOB  No N/V/D  No Edema  No Rash  NO weakness, numbness    MEDICATIONS  (STANDING):  atorvastatin 10 milliGRAM(s) Oral at bedtime  cefTRIAXone   IVPB 1000 milliGRAM(s) IV Intermittent every 24 hours  dextrose 10% Bolus 125 milliLiter(s) IV Bolus once  dextrose 5%. 1000 milliLiter(s) (100 mL/Hr) IV Continuous <Continuous>  dextrose 5%. 1000 milliLiter(s) (50 mL/Hr) IV Continuous <Continuous>  dextrose 50% Injectable 25 Gram(s) IV Push once  dextrose 50% Injectable 12.5 Gram(s) IV Push once  glucagon  Injectable 1 milliGRAM(s) IntraMuscular once  heparin   Injectable 5000 Unit(s) SubCutaneous every 12 hours  insulin glargine Injectable (LANTUS) 12 Unit(s) SubCutaneous at bedtime  insulin lispro (ADMELOG) corrective regimen sliding scale   SubCutaneous three times a day before meals  insulin lispro (ADMELOG) corrective regimen sliding scale   SubCutaneous at bedtime  insulin lispro Injectable (ADMELOG) 4 Unit(s) SubCutaneous three times a day before meals  metoprolol succinate ER 25 milliGRAM(s) Oral daily  sodium bicarbonate  Infusion 0.165 mEq/kG/Hr (75 mL/Hr) IV Continuous <Continuous>  tamsulosin 0.4 milliGRAM(s) Oral at bedtime    MEDICATIONS  (PRN):  acetaminophen     Tablet .. 650 milliGRAM(s) Oral every 6 hours PRN Temp greater or equal to 38C (100.4F), Mild Pain (1 - 3)  dextrose Oral Gel 15 Gram(s) Oral once PRN Blood Glucose LESS THAN 70 milliGRAM(s)/deciliter  melatonin 3 milliGRAM(s) Oral at bedtime PRN Insomnia      T(C): 36.9 (07-04-24 @ 10:42)  HR: 82 (07-04-24 @ 10:42)  BP: 139/57 (07-04-24 @ 10:42)  RR: 16 (07-04-24 @ 10:42)  SpO2: 100% (07-04-24 @ 10:42)  CAPILLARY BLOOD GLUCOSE      POCT Blood Glucose.: 138 mg/dL (04 Jul 2024 09:00)  POCT Blood Glucose.: 132 mg/dL (04 Jul 2024 06:10)  POCT Blood Glucose.: 168 mg/dL (03 Jul 2024 23:42)    I&O's Summary    03 Jul 2024 07:01  -  04 Jul 2024 07:00  --------------------------------------------------------  IN: 0 mL / OUT: 2250 mL / NET: -2250 mL        PHYSICAL EXAM:  GENERAL: NAD, well-developed, AOx3  HEAD:  Atraumatic, Normocephalic  EYES: EOMI, PERRL, conjunctiva and sclera clear  NECK: Supple, No JVD  CHEST/LUNG: Clear to auscultation bilaterally  HEART: Regular rate and rhythm; No murmurs, rubs, or gallops, No Edema  ABDOMEN: Soft, Nontender, Nondistended; Bowel sounds present  EXTREMITIES:  2+ Peripheral Pulses, No clubbing, cyanosis  PSYCH: No SI/HI  NEUROLOGY: non-focal  SKIN: No rashes or lesions  + Lawson    LABS:                        10.2   6.01  )-----------( 218      ( 04 Jul 2024 06:04 )             30.2     07-04    141  |  109<H>  |  35<H>  ----------------------------<  145<H>  4.1   |  19<L>  |  2.39<H>    Ca    8.7      04 Jul 2024 06:04  Mg     1.60     07-04    TPro  7.3  /  Alb  3.5  /  TBili  0.4  /  DBili  x   /  AST  13  /  ALT  7   /  AlkPhos  89  07-04          Urinalysis Basic - ( 04 Jul 2024 06:04 )    Color: x / Appearance: x / SG: x / pH: x  Gluc: 145 mg/dL / Ketone: x  / Bili: x / Urobili: x   Blood: x / Protein: x / Nitrite: x   Leuk Esterase: x / RBC: x / WBC x   Sq Epi: x / Non Sq Epi: x / Bacteria: x            RADIOLOGY & ADDITIONAL TESTS:    Imaging Personally Reviewed:    Consultant(s) Notes Reviewed:      Care Discussed with Consultants/Other Providers:

## 2024-07-05 LAB
4/8 RATIO: 0.82 RATIO — LOW (ref 0.86–4.14)
ABS CD8: 727 CELLS/UL — SIGNIFICANT CHANGE UP (ref 90–775)
ANION GAP SERPL CALC-SCNC: 12 MMOL/L — SIGNIFICANT CHANGE UP (ref 7–14)
BUN SERPL-MCNC: 15 MG/DL — SIGNIFICANT CHANGE UP (ref 7–23)
CALCIUM SERPL-MCNC: 8.3 MG/DL — LOW (ref 8.4–10.5)
CD16+CD56+ CELLS NFR BLD: 18 % — SIGNIFICANT CHANGE UP (ref 7–27)
CD16+CD56+ CELLS NFR SPEC: 342 CELLS/UL — SIGNIFICANT CHANGE UP (ref 80–426)
CD19 BLASTS SPEC-ACNC: 120 CELLS/UL — SIGNIFICANT CHANGE UP (ref 32–326)
CD19 BLASTS SPEC-ACNC: 6 % — SIGNIFICANT CHANGE UP (ref 4–18)
CD3 BLASTS SPEC-ACNC: 1357 CELLS/UL — SIGNIFICANT CHANGE UP (ref 396–2024)
CD3 BLASTS SPEC-ACNC: 73 % — SIGNIFICANT CHANGE UP (ref 58–84)
CD4 %: 33 % — SIGNIFICANT CHANGE UP (ref 30–56)
CD8 %: 40 % — SIGNIFICANT CHANGE UP (ref 11–43)
CHLORIDE SERPL-SCNC: 101 MMOL/L — SIGNIFICANT CHANGE UP (ref 98–107)
CO2 SERPL-SCNC: 28 MMOL/L — SIGNIFICANT CHANGE UP (ref 22–31)
CREAT SERPL-MCNC: 1.16 MG/DL — SIGNIFICANT CHANGE UP (ref 0.5–1.3)
EGFR: 62 ML/MIN/1.73M2 — SIGNIFICANT CHANGE UP
GLUCOSE BLDC GLUCOMTR-MCNC: 108 MG/DL — HIGH (ref 70–99)
GLUCOSE BLDC GLUCOMTR-MCNC: 153 MG/DL — HIGH (ref 70–99)
GLUCOSE BLDC GLUCOMTR-MCNC: 160 MG/DL — HIGH (ref 70–99)
GLUCOSE BLDC GLUCOMTR-MCNC: 90 MG/DL — SIGNIFICANT CHANGE UP (ref 70–99)
GLUCOSE SERPL-MCNC: 126 MG/DL — HIGH (ref 70–99)
HCT VFR BLD CALC: 30.3 % — LOW (ref 39–50)
HGB BLD-MCNC: 10 G/DL — LOW (ref 13–17)
MAGNESIUM SERPL-MCNC: 1.3 MG/DL — LOW (ref 1.6–2.6)
MCHC RBC-ENTMCNC: 27.6 PG — SIGNIFICANT CHANGE UP (ref 27–34)
MCHC RBC-ENTMCNC: 33 GM/DL — SIGNIFICANT CHANGE UP (ref 32–36)
MCV RBC AUTO: 83.7 FL — SIGNIFICANT CHANGE UP (ref 80–100)
NRBC # BLD: 0 /100 WBCS — SIGNIFICANT CHANGE UP (ref 0–0)
NRBC # FLD: 0 K/UL — SIGNIFICANT CHANGE UP (ref 0–0)
PHOSPHATE SERPL-MCNC: 1.9 MG/DL — LOW (ref 2.5–4.5)
PLATELET # BLD AUTO: 218 K/UL — SIGNIFICANT CHANGE UP (ref 150–400)
POTASSIUM SERPL-MCNC: 3.3 MMOL/L — LOW (ref 3.5–5.3)
POTASSIUM SERPL-SCNC: 3.3 MMOL/L — LOW (ref 3.5–5.3)
RBC # BLD: 3.62 M/UL — LOW (ref 4.2–5.8)
RBC # FLD: 13.3 % — SIGNIFICANT CHANGE UP (ref 10.3–14.5)
SODIUM SERPL-SCNC: 141 MMOL/L — SIGNIFICANT CHANGE UP (ref 135–145)
T-CELL CD4 SUBSET PNL BLD: 599 CELLS/UL — SIGNIFICANT CHANGE UP (ref 325–1251)
WBC # BLD: 5.28 K/UL — SIGNIFICANT CHANGE UP (ref 3.8–10.5)
WBC # FLD AUTO: 5.28 K/UL — SIGNIFICANT CHANGE UP (ref 3.8–10.5)

## 2024-07-05 PROCEDURE — 99232 SBSQ HOSP IP/OBS MODERATE 35: CPT

## 2024-07-05 RX ADMIN — TAMSULOSIN HYDROCHLORIDE 0.4 MILLIGRAM(S): 0.4 CAPSULE ORAL at 22:18

## 2024-07-05 RX ADMIN — INSULIN GLARGINE 12 UNIT(S): 100 INJECTION, SOLUTION SUBCUTANEOUS at 22:18

## 2024-07-05 RX ADMIN — Medication 25 MILLIGRAM(S): at 06:50

## 2024-07-05 RX ADMIN — ATORVASTATIN CALCIUM 10 MILLIGRAM(S): 20 TABLET, FILM COATED ORAL at 22:18

## 2024-07-05 RX ADMIN — INSULIN LISPRO 1: 100 INJECTION, SOLUTION SUBCUTANEOUS at 09:00

## 2024-07-05 RX ADMIN — INSULIN LISPRO 1: 100 INJECTION, SOLUTION SUBCUTANEOUS at 12:28

## 2024-07-05 RX ADMIN — INSULIN LISPRO 4 UNIT(S): 100 INJECTION, SOLUTION SUBCUTANEOUS at 08:59

## 2024-07-05 RX ADMIN — INSULIN LISPRO 4 UNIT(S): 100 INJECTION, SOLUTION SUBCUTANEOUS at 12:29

## 2024-07-05 RX ADMIN — HEPARIN SODIUM 5000 UNIT(S): 50 INJECTION, SOLUTION INTRAVENOUS at 06:51

## 2024-07-05 RX ADMIN — HEPARIN SODIUM 5000 UNIT(S): 50 INJECTION, SOLUTION INTRAVENOUS at 18:40

## 2024-07-05 RX ADMIN — INSULIN LISPRO 4 UNIT(S): 100 INJECTION, SOLUTION SUBCUTANEOUS at 18:25

## 2024-07-05 NOTE — PROGRESS NOTE ADULT - PROBLEM SELECTOR PLAN 2
Likely 2/2 ARF from obstructive uropathy  -s/p 1L NS in ED, additional 1L LR ordered  -nephrology consulted and rec'd bicarb gtt. Bicarb 9--14. Rec'd continuing bicarb at current rate 75cc/hr.  - per renal Likely 2/2 ARF from obstructive uropathy  -s/p 1L NS in ED, additional 1L LR ordered  -nephrology consulted and rec'd bicarb gtt. Bicarb 9--14.   - Metabolic acidosis resolved, renal rec to dc bicarb infusion

## 2024-07-05 NOTE — PHYSICAL THERAPY INITIAL EVALUATION ADULT - ADDITIONAL COMMENTS
History obtained from patients sister Maddy via phone. Patient lives in a two family house with 8 stairs to enter. Sister lives in unit below patient. NO assistive device for ambulation. Independent with most ADLs, however receives assistance from a "PCP" family member for 4 hours x 5 days/week History obtained from patients daughter Maddy via phone. Patient lives in a two family house with 8 stairs to enter. Daughter lives in unit below patient. NO assistive device for ambulation. Independent with most ADLs, however receives assistance from a CDP[employer] home health aide for 4 hours x 5 days/week.

## 2024-07-05 NOTE — PHARMACOTHERAPY INTERVENTION NOTE - COMMENTS
Medication history is complete. Medication list updated in Outpatient Medication Record (OMR).     Of note:  -Patient prescribed Ozempic 0.5 mg once a week 6/22 but had not started yet.    Please call spectra [phone] if you have any questions.   
Medication history is incomplete. Unable to verify patient's medication list with two sources. Patient pharmacy is closed. Family member could not verify medications. Another family member will bring in patient's medication bottles later today. Will follow up with family member and pharmacy when they reopen.    Please call spectra [phone] if you have any questions.

## 2024-07-05 NOTE — PROGRESS NOTE ADULT - PROBLEM SELECTOR PLAN 10
HSQ  DASH/TLC CC puree. Passed dysphagia screen HSQ  DASH/TLC CC puree. Passed dysphagia screen  PT eval   plan of care d/w pt and ACP

## 2024-07-05 NOTE — CONSULT NOTE ADULT - SUBJECTIVE AND OBJECTIVE BOX
HPI  83M with PMHx HIV (unknown medication), HTN, DM2 on insulin, CKD, BPH, mild cognitive impairment? presenting with difficulty voiding x2 days. The patient is a limited historian and is oriented x2 (name, place). Per prior admission 1/2023 he was oriented x2 and has reported hx of mild cognitive impairment. He endorses 4-5 days of difficulty urinating and lower abdominal pain. Denies having hx of this prior. Patient was in the CDU 6/21-6/23 for UTI and had PVR <200 - no forrester placed. Also seen by endo with recs for basal/bolus insulin given elevated A1C. Per ED notes regarding collateral with daughter, he was unable to get abx at home to take to complete course. Unable to reach daughter at both numbers for collateral - went to . Patient denies fevers, chills, cough, CP, SOB, abdominal pain, vomiting, diarrhea, dysuria, hematuria.    In ED, found to have urinary retention on POCUS - 972cc urine volume. Forrester was placed with 1700cc output (unclear over what time). Also noted to have high anion gap metabolic acidosis of which nephrology was consulted. Patient given 1L NS and started on bicarb gtt. Bicarb improved from 9 - 14.  Imaging with incidental L lateral fifth rib fracture - patient denies fall/LOC. Unable to reach daughter. CTH and CT chest neg    Patient seen and examined at the bedside. Interview limited by pt mental status.     PAST MEDICAL & SURGICAL HISTORY:  Diabetes      HTN (hypertension)      HIV infection      Stage 3 chronic kidney disease      BPH (benign prostatic hyperplasia)      No significant past surgical history          MEDICATIONS  (STANDING):  atorvastatin 10 milliGRAM(s) Oral at bedtime  dextrose 10% Bolus 125 milliLiter(s) IV Bolus once  dextrose 5%. 1000 milliLiter(s) (100 mL/Hr) IV Continuous <Continuous>  dextrose 5%. 1000 milliLiter(s) (50 mL/Hr) IV Continuous <Continuous>  dextrose 50% Injectable 25 Gram(s) IV Push once  dextrose 50% Injectable 12.5 Gram(s) IV Push once  glucagon  Injectable 1 milliGRAM(s) IntraMuscular once  heparin   Injectable 5000 Unit(s) SubCutaneous every 12 hours  insulin glargine Injectable (LANTUS) 12 Unit(s) SubCutaneous at bedtime  insulin lispro (ADMELOG) corrective regimen sliding scale   SubCutaneous three times a day before meals  insulin lispro (ADMELOG) corrective regimen sliding scale   SubCutaneous at bedtime  insulin lispro Injectable (ADMELOG) 4 Unit(s) SubCutaneous three times a day before meals  metoprolol succinate ER 25 milliGRAM(s) Oral daily  tamsulosin 0.4 milliGRAM(s) Oral at bedtime    MEDICATIONS  (PRN):  acetaminophen     Tablet .. 650 milliGRAM(s) Oral every 6 hours PRN Temp greater or equal to 38C (100.4F), Mild Pain (1 - 3)  dextrose Oral Gel 15 Gram(s) Oral once PRN Blood Glucose LESS THAN 70 milliGRAM(s)/deciliter  melatonin 3 milliGRAM(s) Oral at bedtime PRN Insomnia      FAMILY HISTORY:  No pertinent family history in first degree relatives        Allergies    No Known Allergies    Intolerances        SOCIAL HISTORY:    REVIEW OF SYSTEMS: Otherwise negative as stated in HPI    Physical Exam  Vital signs  T(C): 36.8 (07-05-24 @ 18:19), Max: 37.2 (07-05-24 @ 02:10)  HR: 70 (07-05-24 @ 18:19)  BP: 136/62 (07-05-24 @ 18:19)  SpO2: 100% (07-05-24 @ 18:19)  Wt(kg): --    Output    OUT:    Indwelling Catheter - Urethral (mL): 1500 mL    Voided (mL): 400 mL  Total OUT: 1900 mL    Total NET: -1900 mL      OUT:    Indwelling Catheter - Urethral (mL): 775 mL  Total OUT: 775 mL    Total NET: -775 mL          Gen: NAD, speaks incoherently   Pulm: No respiratory distress	  CV: RRR  GI: S/ND/NT  : Forrester draining CYU  MSK: moves all 4 limbs spontaneously    LABS:      07-05 @ 04:43    WBC 5.28  / Hct 30.3  / SCr 1.16     07-04 @ 06:04    WBC 6.01  / Hct 30.2  / SCr 2.39     07-05    141  |  101  |  15  ----------------------------<  126<H>  3.3<L>   |  28  |  1.16    Ca    8.3<L>      05 Jul 2024 04:43  Phos  1.9     07-05  Mg     1.30     07-05    TPro  7.3  /  Alb  3.5  /  TBili  0.4  /  DBili  x   /  AST  13  /  ALT  7   /  AlkPhos  89  07-04      Urinalysis Basic - ( 05 Jul 2024 04:43 )    Color: x / Appearance: x / SG: x / pH: x  Gluc: 126 mg/dL / Ketone: x  / Bili: x / Urobili: x   Blood: x / Protein: x / Nitrite: x   Leuk Esterase: x / RBC: x / WBC x   Sq Epi: x / Non Sq Epi: x / Bacteria: x

## 2024-07-05 NOTE — PROGRESS NOTE ADULT - PROBLEM SELECTOR PLAN 1
Last creatinine 1.17 6/23. Now 8.32 s/p forrester. 1700cc UOP over an unclear amount of time after placement  ARF likely 2/2 obstructive uropathy from urinary retention. Possible BPH related  - DONELL resolving   -flomax  -maintain forrester for now pending  eval - no hydro on CT  -monitor for postobstructive diuresis. Strict I's and O's  -avoid nephrotoxic agents  -nephrology consulted kevin - bicarb per renal Last creatinine 1.17 6/23. Now 8.32 s/p forrester. 1700cc UOP over an unclear amount of time after placement  ARF likely 2/2 obstructive uropathy from urinary retention. Possible BPH related  - DONELL resolved   -flomax  -maintain forrester for now pending  eval - no hydro on CT(  was consulted in ED)   -monitor for postobstructive diuresis. Strict I's and O's  -avoid nephrotoxic agents  -nephrology consulted kevin - bicarb infusion was started,

## 2024-07-05 NOTE — PHYSICAL THERAPY INITIAL EVALUATION ADULT - GENERAL OBSERVATIONS, REHAB EVAL
Patient received semi supine in bed with all lines intact in NAD. +forrester +IV. /63 Patient received semi supine in bed with all lines intact in NAD. +forrester +IV. /63 HR 75

## 2024-07-05 NOTE — PROGRESS NOTE ADULT - PROBLEM SELECTOR PLAN 4
Buckling of the left lateral fifth rib, possibly an acute fracture on CT - but CT chest neg Buckling of the left lateral fifth rib, possibly an acute fracture on CT - but CT chest neg  pt not reporting pain

## 2024-07-05 NOTE — PROGRESS NOTE ADULT - SUBJECTIVE AND OBJECTIVE BOX
Patient is a 83y old  Male who presents with a chief complaint of urinary retention (04 Jul 2024 11:03)      SUBJECTIVE / OVERNIGHT EVENTS:    MEDICATIONS  (STANDING):  atorvastatin 10 milliGRAM(s) Oral at bedtime  dextrose 10% Bolus 125 milliLiter(s) IV Bolus once  dextrose 5%. 1000 milliLiter(s) (100 mL/Hr) IV Continuous <Continuous>  dextrose 5%. 1000 milliLiter(s) (50 mL/Hr) IV Continuous <Continuous>  dextrose 50% Injectable 12.5 Gram(s) IV Push once  dextrose 50% Injectable 25 Gram(s) IV Push once  glucagon  Injectable 1 milliGRAM(s) IntraMuscular once  heparin   Injectable 5000 Unit(s) SubCutaneous every 12 hours  insulin glargine Injectable (LANTUS) 12 Unit(s) SubCutaneous at bedtime  insulin lispro (ADMELOG) corrective regimen sliding scale   SubCutaneous three times a day before meals  insulin lispro (ADMELOG) corrective regimen sliding scale   SubCutaneous at bedtime  insulin lispro Injectable (ADMELOG) 4 Unit(s) SubCutaneous three times a day before meals  metoprolol succinate ER 25 milliGRAM(s) Oral daily  sodium bicarbonate  Infusion 0.165 mEq/kG/Hr (75 mL/Hr) IV Continuous <Continuous>  tamsulosin 0.4 milliGRAM(s) Oral at bedtime    MEDICATIONS  (PRN):  acetaminophen     Tablet .. 650 milliGRAM(s) Oral every 6 hours PRN Temp greater or equal to 38C (100.4F), Mild Pain (1 - 3)  dextrose Oral Gel 15 Gram(s) Oral once PRN Blood Glucose LESS THAN 70 milliGRAM(s)/deciliter  melatonin 3 milliGRAM(s) Oral at bedtime PRN Insomnia      Vital Signs Last 24 Hrs  T(C): 36.6 (05 Jul 2024 10:03), Max: 37.2 (05 Jul 2024 02:10)  T(F): 97.8 (05 Jul 2024 10:03), Max: 98.9 (05 Jul 2024 02:10)  HR: 74 (05 Jul 2024 10:03) (64 - 80)  BP: 143/68 (05 Jul 2024 10:03) (126/58 - 143/72)  BP(mean): 83 (05 Jul 2024 02:10) (83 - 83)  RR: 18 (05 Jul 2024 10:03) (18 - 18)  SpO2: 98% (05 Jul 2024 10:03) (98% - 100%)    Parameters below as of 05 Jul 2024 10:03  Patient On (Oxygen Delivery Method): room air      CAPILLARY BLOOD GLUCOSE      POCT Blood Glucose.: 160 mg/dL (05 Jul 2024 08:38)  POCT Blood Glucose.: 137 mg/dL (04 Jul 2024 21:03)  POCT Blood Glucose.: 200 mg/dL (04 Jul 2024 18:53)  POCT Blood Glucose.: 246 mg/dL (04 Jul 2024 17:41)  POCT Blood Glucose.: 240 mg/dL (04 Jul 2024 15:17)  POCT Blood Glucose.: 195 mg/dL (04 Jul 2024 15:01)  POCT Blood Glucose.: 165 mg/dL (04 Jul 2024 12:53)    I&O's Summary    04 Jul 2024 07:01  -  05 Jul 2024 07:00  --------------------------------------------------------  IN: 1010 mL / OUT: 1900 mL / NET: -890 mL        PHYSICAL EXAM:  GENERAL: NAD, well-developed  HEAD:  Atraumatic, Normocephalic  EYES: EOMI, PERRLA, conjunctiva and sclera clear  NECK: Supple, No JVD  CHEST/LUNG: Clear to auscultation bilaterally; No wheeze  HEART: Regular rate and rhythm; No murmurs, rubs, or gallops  ABDOMEN: Soft, Nontender, Nondistended; Bowel sounds present  EXTREMITIES:  2+ Peripheral Pulses, No clubbing, cyanosis, or edema  PSYCH: AAOx3  NEUROLOGY: non-focal  SKIN: No rashes or lesions    LABS:                        10.0   5.28  )-----------( 218      ( 05 Jul 2024 04:43 )             30.3     07-05    141  |  101  |  15  ----------------------------<  126<H>  3.3<L>   |  28  |  1.16    Ca    8.3<L>      05 Jul 2024 04:43  Phos  1.9     07-05  Mg     1.30     07-05    TPro  7.3  /  Alb  3.5  /  TBili  0.4  /  DBili  x   /  AST  13  /  ALT  7   /  AlkPhos  89  07-04          Urinalysis Basic - ( 05 Jul 2024 04:43 )    Color: x / Appearance: x / SG: x / pH: x  Gluc: 126 mg/dL / Ketone: x  / Bili: x / Urobili: x   Blood: x / Protein: x / Nitrite: x   Leuk Esterase: x / RBC: x / WBC x   Sq Epi: x / Non Sq Epi: x / Bacteria: x        RADIOLOGY & ADDITIONAL TESTS:    Imaging Personally Reviewed:    Consultant(s) Notes Reviewed:      Care Discussed with Consultants/Other Providers:   Patient is a 83y old  Male who presents with a chief complaint of urinary retention (04 Jul 2024 11:03)      SUBJECTIVE / OVERNIGHT EVENTS: patient seen and examined by bedside, pt reports feeling better , pt afebrile, denies headache, dizziness, SOB, CP, Palpitations , N/V/D, abdominal pain      MEDICATIONS  (STANDING):  atorvastatin 10 milliGRAM(s) Oral at bedtime  dextrose 10% Bolus 125 milliLiter(s) IV Bolus once  dextrose 5%. 1000 milliLiter(s) (100 mL/Hr) IV Continuous <Continuous>  dextrose 5%. 1000 milliLiter(s) (50 mL/Hr) IV Continuous <Continuous>  dextrose 50% Injectable 12.5 Gram(s) IV Push once  dextrose 50% Injectable 25 Gram(s) IV Push once  glucagon  Injectable 1 milliGRAM(s) IntraMuscular once  heparin   Injectable 5000 Unit(s) SubCutaneous every 12 hours  insulin glargine Injectable (LANTUS) 12 Unit(s) SubCutaneous at bedtime  insulin lispro (ADMELOG) corrective regimen sliding scale   SubCutaneous three times a day before meals  insulin lispro (ADMELOG) corrective regimen sliding scale   SubCutaneous at bedtime  insulin lispro Injectable (ADMELOG) 4 Unit(s) SubCutaneous three times a day before meals  metoprolol succinate ER 25 milliGRAM(s) Oral daily  sodium bicarbonate  Infusion 0.165 mEq/kG/Hr (75 mL/Hr) IV Continuous <Continuous>  tamsulosin 0.4 milliGRAM(s) Oral at bedtime    MEDICATIONS  (PRN):  acetaminophen     Tablet .. 650 milliGRAM(s) Oral every 6 hours PRN Temp greater or equal to 38C (100.4F), Mild Pain (1 - 3)  dextrose Oral Gel 15 Gram(s) Oral once PRN Blood Glucose LESS THAN 70 milliGRAM(s)/deciliter  melatonin 3 milliGRAM(s) Oral at bedtime PRN Insomnia      Vital Signs Last 24 Hrs  T(C): 36.6 (05 Jul 2024 10:03), Max: 37.2 (05 Jul 2024 02:10)  T(F): 97.8 (05 Jul 2024 10:03), Max: 98.9 (05 Jul 2024 02:10)  HR: 74 (05 Jul 2024 10:03) (64 - 80)  BP: 143/68 (05 Jul 2024 10:03) (126/58 - 143/72)  BP(mean): 83 (05 Jul 2024 02:10) (83 - 83)  RR: 18 (05 Jul 2024 10:03) (18 - 18)  SpO2: 98% (05 Jul 2024 10:03) (98% - 100%)    Parameters below as of 05 Jul 2024 10:03  Patient On (Oxygen Delivery Method): room air      CAPILLARY BLOOD GLUCOSE      POCT Blood Glucose.: 160 mg/dL (05 Jul 2024 08:38)  POCT Blood Glucose.: 137 mg/dL (04 Jul 2024 21:03)  POCT Blood Glucose.: 200 mg/dL (04 Jul 2024 18:53)  POCT Blood Glucose.: 246 mg/dL (04 Jul 2024 17:41)  POCT Blood Glucose.: 240 mg/dL (04 Jul 2024 15:17)  POCT Blood Glucose.: 195 mg/dL (04 Jul 2024 15:01)  POCT Blood Glucose.: 165 mg/dL (04 Jul 2024 12:53)    I&O's Summary    04 Jul 2024 07:01  -  05 Jul 2024 07:00  --------------------------------------------------------  IN: 1010 mL / OUT: 1900 mL / NET: -890 mL      GENERAL: NAD, well-developed, AOx3  HEAD:  Atraumatic, Normocephalic  EYES: EOMI, PERRL, conjunctiva and sclera clear  NECK: Supple, No JVD  CHEST/LUNG: Clear to auscultation bilaterally  HEART: Regular rate and rhythm; No murmurs, rubs, or gallops, No Edema  ABDOMEN: Soft, Nontender, Nondistended; Bowel sounds present  : Lawson with clear urine in bag   EXTREMITIES:  2+ Peripheral Pulses, No clubbing, cyanosis  PSYCH: No SI/HI  NEUROLOGY: non-focal  SKIN: No rashes or lesions        LABS:                        10.0   5.28  )-----------( 218      ( 05 Jul 2024 04:43 )             30.3     07-05    141  |  101  |  15  ----------------------------<  126<H>  3.3<L>   |  28  |  1.16    Ca    8.3<L>      05 Jul 2024 04:43  Phos  1.9     07-05  Mg     1.30     07-05    TPro  7.3  /  Alb  3.5  /  TBili  0.4  /  DBili  x   /  AST  13  /  ALT  7   /  AlkPhos  89  07-04          Urinalysis Basic - ( 05 Jul 2024 04:43 )    Color: x / Appearance: x / SG: x / pH: x  Gluc: 126 mg/dL / Ketone: x  / Bili: x / Urobili: x   Blood: x / Protein: x / Nitrite: x   Leuk Esterase: x / RBC: x / WBC x   Sq Epi: x / Non Sq Epi: x / Bacteria: x        RADIOLOGY & ADDITIONAL TESTS:    Imaging Personally Reviewed:    Consultant(s) Notes Reviewed:  Nephrology     Care Discussed with Consultants/Other Providers:

## 2024-07-05 NOTE — CONSULT NOTE ADULT - ASSESSMENT
83 y.o M w/ HIV, BPH, cognitive impairment presents w/ urinary retention i/s/o recent UTI. Course c/b DONELL now resolved s/p forrester decompression.    Recommendations  - No acute urologic intervention indicated at this time  - trial of void [must void three times with post void residuals <200]  - Pt fails trial of void place forrester  - Minimize benzo's opioids, antihistamines and anticholinergics as tolerated.   - Minimize constipation  - Follow up with Dr. Og Ortiz for Cystoscopy void trial    Plan discussed w/ Dr. Ortiz  83 y.o M w/ HIV, BPH, cognitive impairment presents w/ urinary retention i/s/o recent UTI. Course c/b DONELL now resolved s/p forrester decompression.    Recommendations  - No acute urologic intervention indicated at this time  - trial of void [must void three times with post void residuals <200]  - Pt fails trial of void place forrester  - Minimize benzo's opioids, antihistamines and anticholinergics as tolerated.   - Minimize constipation  - Follow up with Dr. Og Ortiz for Cystoscopy void trial    Plan discussed w/ Dr. Diana Suggs [facility]  [address] [place] [id number] [phone]  83 y.o M w/ HIV, BPH, cognitive impairment presents w/ urinary retention i/s/o recent UTI. Course c/b DONELL now resolved s/p forrester decompression.    Recommendations  - No acute urologic intervention indicated at this time  - trial of void [must void three times with post void residuals <200]  - Pt fails trial of void place forrester  - Minimize benzo's opioids, antihistamines and anticholinergics as tolerated.   - Minimize constipation  - Follow up with Dr. Og Ortiz in 1 -2 weeks for Cystoscopy void trial    Plan discussed w/ Dr. Ortiz     [facility]  [address] [place], [place] [address] [phone]

## 2024-07-05 NOTE — CHART NOTE - NSCHARTNOTEFT_GEN_A_CORE
82yo M w/ DONELL on CKD in the setting of obstructive uropathy/urine retention/BPH. On review of labs on [facility], last Scr was 1.17 on 6/23/24. Admission Scr elevated at 8.32 on 7/3/24. Scr improved to 1.16 today on 7/5/24. Pt also w/ metabolic acidosis now resolved. SCO2 WNL at 28 today. Recommend to discontinue sodium bicarb infusion.    Nephrology will sign off. Please call with new questions/concerns.     If you have any questions, please feel free to contact me  Eliezer Iraheta  Nephrology Fellow  [place] [phone]  (After 4pm or on weekends please page the on-call fellow)

## 2024-07-05 NOTE — PROGRESS NOTE ADULT - PROBLEM SELECTOR PLAN 3
-flomax  -maintain forrester  - consulted and to see patient nonurgently. The patient will need close  f/u on discharge  -monitor for postobstructive diuresis. Strict I's and O's    #UA + but ucx neg - dc abx

## 2024-07-05 NOTE — CONSULT NOTE ADULT - ATTENDING COMMENTS
NAD ncat abd nt nd       urinary retention cath in place   alpha blocker if there are no contraindications.  primary team wanting to attempt void trial Sunday.  Through most probably will need to replace cath it is not unreasonable to attempt if any of 3 pvrs > 200 replace forrester.  whether cath requires replacement or not,  f/u [facility] 1-2 weeks after discharge for cystoscopy.
Pt. with DONELL in setting of obstructive uropathy/urinary retention. DONELL resolving after Lawson catheter placement. Scr decreased to 2.39 today. Assessment and plan for DONELL and metabolic acidosis as outlined above. SCO2 improved to 19 on AM labs. Monitor labs and urine output. Avoid any potential nephrotoxins. Dose medications as per eGFR.

## 2024-07-06 LAB
ANION GAP SERPL CALC-SCNC: 13 MMOL/L — SIGNIFICANT CHANGE UP (ref 7–14)
BUN SERPL-MCNC: 14 MG/DL — SIGNIFICANT CHANGE UP (ref 7–23)
CALCIUM SERPL-MCNC: 8.3 MG/DL — LOW (ref 8.4–10.5)
CHLORIDE SERPL-SCNC: 101 MMOL/L — SIGNIFICANT CHANGE UP (ref 98–107)
CO2 SERPL-SCNC: 27 MMOL/L — SIGNIFICANT CHANGE UP (ref 22–31)
CREAT SERPL-MCNC: 1.21 MG/DL — SIGNIFICANT CHANGE UP (ref 0.5–1.3)
EGFR: 59 ML/MIN/1.73M2 — LOW
GLUCOSE BLDC GLUCOMTR-MCNC: 104 MG/DL — HIGH (ref 70–99)
GLUCOSE BLDC GLUCOMTR-MCNC: 89 MG/DL — SIGNIFICANT CHANGE UP (ref 70–99)
GLUCOSE BLDC GLUCOMTR-MCNC: 90 MG/DL — SIGNIFICANT CHANGE UP (ref 70–99)
GLUCOSE BLDC GLUCOMTR-MCNC: 99 MG/DL — SIGNIFICANT CHANGE UP (ref 70–99)
GLUCOSE SERPL-MCNC: 85 MG/DL — SIGNIFICANT CHANGE UP (ref 70–99)
HCT VFR BLD CALC: 29 % — LOW (ref 39–50)
HGB BLD-MCNC: 9.8 G/DL — LOW (ref 13–17)
HIV-1 VIRAL LOAD RESULT: ABNORMAL
HIV1 RNA # SERPL NAA+PROBE: ABNORMAL COPIES/ML
HIV1 RNA SER-IMP: SIGNIFICANT CHANGE UP
HIV1 RNA SERPL NAA+PROBE-ACNC: ABNORMAL
HIV1 RNA SERPL NAA+PROBE-LOG#: ABNORMAL LG COP/ML
MAGNESIUM SERPL-MCNC: 1.3 MG/DL — LOW (ref 1.6–2.6)
MCHC RBC-ENTMCNC: 28.5 PG — SIGNIFICANT CHANGE UP (ref 27–34)
MCHC RBC-ENTMCNC: 33.8 GM/DL — SIGNIFICANT CHANGE UP (ref 32–36)
MCV RBC AUTO: 84.3 FL — SIGNIFICANT CHANGE UP (ref 80–100)
NRBC # BLD: 0 /100 WBCS — SIGNIFICANT CHANGE UP (ref 0–0)
NRBC # FLD: 0 K/UL — SIGNIFICANT CHANGE UP (ref 0–0)
PHOSPHATE SERPL-MCNC: 2.7 MG/DL — SIGNIFICANT CHANGE UP (ref 2.5–4.5)
PLATELET # BLD AUTO: 230 K/UL — SIGNIFICANT CHANGE UP (ref 150–400)
POTASSIUM SERPL-MCNC: 3.4 MMOL/L — LOW (ref 3.5–5.3)
POTASSIUM SERPL-SCNC: 3.4 MMOL/L — LOW (ref 3.5–5.3)
RBC # BLD: 3.44 M/UL — LOW (ref 4.2–5.8)
RBC # FLD: 13.6 % — SIGNIFICANT CHANGE UP (ref 10.3–14.5)
SODIUM SERPL-SCNC: 141 MMOL/L — SIGNIFICANT CHANGE UP (ref 135–145)
WBC # BLD: 4.77 K/UL — SIGNIFICANT CHANGE UP (ref 3.8–10.5)
WBC # FLD AUTO: 4.77 K/UL — SIGNIFICANT CHANGE UP (ref 3.8–10.5)

## 2024-07-06 PROCEDURE — 99232 SBSQ HOSP IP/OBS MODERATE 35: CPT

## 2024-07-06 PROCEDURE — 99222 1ST HOSP IP/OBS MODERATE 55: CPT

## 2024-07-06 PROCEDURE — 99254 IP/OBS CNSLTJ NEW/EST MOD 60: CPT

## 2024-07-06 RX ORDER — MAGNESIUM SULFATE 100 %
2 POWDER (GRAM) MISCELLANEOUS ONCE
Refills: 0 | Status: COMPLETED | OUTPATIENT
Start: 2024-07-06 | End: 2024-07-06

## 2024-07-06 RX ORDER — POTASSIUM CHLORIDE 600 MG/1
40 TABLET, FILM COATED, EXTENDED RELEASE ORAL ONCE
Refills: 0 | Status: COMPLETED | OUTPATIENT
Start: 2024-07-06 | End: 2024-07-06

## 2024-07-06 RX ADMIN — INSULIN LISPRO 4 UNIT(S): 100 INJECTION, SOLUTION SUBCUTANEOUS at 18:40

## 2024-07-06 RX ADMIN — HEPARIN SODIUM 5000 UNIT(S): 50 INJECTION, SOLUTION INTRAVENOUS at 18:30

## 2024-07-06 RX ADMIN — TAMSULOSIN HYDROCHLORIDE 0.4 MILLIGRAM(S): 0.4 CAPSULE ORAL at 21:48

## 2024-07-06 RX ADMIN — INSULIN LISPRO 4 UNIT(S): 100 INJECTION, SOLUTION SUBCUTANEOUS at 13:10

## 2024-07-06 RX ADMIN — Medication 25 GRAM(S): at 12:08

## 2024-07-06 RX ADMIN — Medication 25 MILLIGRAM(S): at 05:03

## 2024-07-06 RX ADMIN — INSULIN GLARGINE 12 UNIT(S): 100 INJECTION, SOLUTION SUBCUTANEOUS at 21:48

## 2024-07-06 RX ADMIN — HEPARIN SODIUM 5000 UNIT(S): 50 INJECTION, SOLUTION INTRAVENOUS at 05:03

## 2024-07-06 RX ADMIN — ATORVASTATIN CALCIUM 10 MILLIGRAM(S): 20 TABLET, FILM COATED ORAL at 21:48

## 2024-07-06 RX ADMIN — POTASSIUM CHLORIDE 40 MILLIEQUIVALENT(S): 600 TABLET, FILM COATED, EXTENDED RELEASE ORAL at 12:08

## 2024-07-06 RX ADMIN — INSULIN LISPRO 4 UNIT(S): 100 INJECTION, SOLUTION SUBCUTANEOUS at 09:02

## 2024-07-06 NOTE — PROGRESS NOTE ADULT - PROBLEM SELECTOR PLAN 10
HSQ  DASH/TLC CC puree. Passed dysphagia screen  PT eval   plan of care d/w pt and ACP HSQ  DASH/TLC CC puree. Passed dysphagia screen  PT eval : No skilled PT needs  plan of care d/w pt and ACP   DC planning if passes TOV tomorrow

## 2024-07-06 NOTE — PROGRESS NOTE ADULT - PROBLEM SELECTOR PLAN 2
Likely 2/2 ARF from obstructive uropathy  -s/p 1L NS in ED, additional 1L LR ordered  -nephrology consulted and rec'd bicarb gtt. Bicarb 9--14.   - Metabolic acidosis resolved, renal rec to dc bicarb infusion

## 2024-07-06 NOTE — PROGRESS NOTE ADULT - PROBLEM SELECTOR PLAN 7
Seen by endo on CDU admission  -per endo lantus 12u qhs and admelog 4u TID while inpatient, ordered  -ISS  -A1C 11.9 Seen by endo on CDU admission  -per endo lantus 12u qhs and admelog 4u TID while inpatient, ordered  -ISS  -A1C 11.9  mild hypoglycemia 89, 90, will CTM

## 2024-07-06 NOTE — PROGRESS NOTE ADULT - PROBLEM SELECTOR PLAN 4
Buckling of the left lateral fifth rib, possibly an acute fracture on CT - but CT chest neg  pt not reporting pain

## 2024-07-06 NOTE — PROGRESS NOTE ADULT - PROBLEM SELECTOR PLAN 6
Unclear what anti-HTNs he is on. On prior admission was on metoprolol 25mg qd alone - will c/w this for now  Needs med rec Unclear what anti-HTNs he is on. On prior admission was on metoprolol 25mg qd alone - will c/w this for now  Per med rec - pt on norvasc 5 mg and Metoprolol Xl 100 mg daily

## 2024-07-06 NOTE — PROGRESS NOTE ADULT - PROBLEM SELECTOR PLAN 1
Last creatinine 1.17 6/23. Now 8.32 s/p forrester. 1700cc UOP over an unclear amount of time after placement  ARF likely 2/2 obstructive uropathy from urinary retention. Possible BPH related  - DONELL resolved   -flomax  -maintain forrester for now pending  eval - no hydro on CT(  was consulted in ED)   -monitor for postobstructive diuresis. Strict I's and O's  -avoid nephrotoxic agents  -nephrology consulted kevin - bicarb infusion was started, Last creatinine 1.17 6/23. Now 8.32 s/p forrester. 1700cc UOP over an unclear amount of time after placement  ARF likely 2/2 obstructive uropathy from urinary retention. Possible BPH related  - DONELL resolved   -flomax  -maintain forrester for now, Will try to TOV tonight   -monitor for postobstructive diuresis. Strict I's and O's  -avoid nephrotoxic agents  -nephrology consulted kevin - bicarb infusion was started, now dced

## 2024-07-06 NOTE — PROGRESS NOTE ADULT - PROBLEM SELECTOR PLAN 5
Will need to clarify HIV med and have family bring in  CD4/CD8 subsets and HIV VL ordered for AM  called no ans Will need to clarify HIV med and have family bring in  CD4/CD8 subsets and HIV VL noted, , CD4 599, Viral load  low   called no ans

## 2024-07-06 NOTE — PROGRESS NOTE ADULT - PROBLEM SELECTOR PLAN 3
-flomax  -maintain forrester  - consulted and to see patient nonurgently. The patient will need close  f/u on discharge  -monitor for postobstructive diuresis. Strict I's and O's    #UA + but ucx neg - dc abx -flomax  -maintain forrester   - ki appreciated , agree with TOV tonight ,  if any of 3 pvrs > 200 replace forrester.  whether cath requires replacement or not,  f/u [facility] 1-2 weeks after discharge for cystoscopy.       #UA + but ucx neg - dc abx

## 2024-07-06 NOTE — PROGRESS NOTE ADULT - SUBJECTIVE AND OBJECTIVE BOX
Patient is a 83y old  Male who presents with a chief complaint of urinary retention (05 Jul 2024 19:15)      SUBJECTIVE / OVERNIGHT EVENTS:    MEDICATIONS  (STANDING):  atorvastatin 10 milliGRAM(s) Oral at bedtime  dextrose 10% Bolus 125 milliLiter(s) IV Bolus once  dextrose 5%. 1000 milliLiter(s) (100 mL/Hr) IV Continuous <Continuous>  dextrose 5%. 1000 milliLiter(s) (50 mL/Hr) IV Continuous <Continuous>  dextrose 50% Injectable 12.5 Gram(s) IV Push once  dextrose 50% Injectable 25 Gram(s) IV Push once  glucagon  Injectable 1 milliGRAM(s) IntraMuscular once  heparin   Injectable 5000 Unit(s) SubCutaneous every 12 hours  insulin glargine Injectable (LANTUS) 12 Unit(s) SubCutaneous at bedtime  insulin lispro (ADMELOG) corrective regimen sliding scale   SubCutaneous three times a day before meals  insulin lispro (ADMELOG) corrective regimen sliding scale   SubCutaneous at bedtime  insulin lispro Injectable (ADMELOG) 4 Unit(s) SubCutaneous three times a day before meals  magnesium sulfate  IVPB 2 Gram(s) IV Intermittent once  metoprolol succinate ER 25 milliGRAM(s) Oral daily  potassium chloride   Powder 40 milliEquivalent(s) Oral once  tamsulosin 0.4 milliGRAM(s) Oral at bedtime    MEDICATIONS  (PRN):  acetaminophen     Tablet .. 650 milliGRAM(s) Oral every 6 hours PRN Temp greater or equal to 38C (100.4F), Mild Pain (1 - 3)  dextrose Oral Gel 15 Gram(s) Oral once PRN Blood Glucose LESS THAN 70 milliGRAM(s)/deciliter  melatonin 3 milliGRAM(s) Oral at bedtime PRN Insomnia      Vital Signs Last 24 Hrs  T(C): 36.4 (06 Jul 2024 10:20), Max: 37 (05 Jul 2024 22:05)  T(F): 97.5 (06 Jul 2024 10:20), Max: 98.6 (05 Jul 2024 22:05)  HR: 69 (06 Jul 2024 10:20) (63 - 77)  BP: 147/60 (06 Jul 2024 10:20) (111/61 - 147/60)  BP(mean): --  RR: 18 (06 Jul 2024 10:20) (17 - 18)  SpO2: 100% (06 Jul 2024 10:20) (99% - 100%)    Parameters below as of 06 Jul 2024 10:20  Patient On (Oxygen Delivery Method): room air      CAPILLARY BLOOD GLUCOSE      POCT Blood Glucose.: 89 mg/dL (06 Jul 2024 08:47)  POCT Blood Glucose.: 108 mg/dL (05 Jul 2024 21:47)  POCT Blood Glucose.: 90 mg/dL (05 Jul 2024 17:54)  POCT Blood Glucose.: 153 mg/dL (05 Jul 2024 11:51)    I&O's Summary    05 Jul 2024 07:01  -  06 Jul 2024 07:00  --------------------------------------------------------  IN: 600 mL / OUT: 1590 mL / NET: -990 mL    06 Jul 2024 07:01  -  06 Jul 2024 11:24  --------------------------------------------------------  IN: 0 mL / OUT: 450 mL / NET: -450 mL        PHYSICAL EXAM:  GENERAL: NAD, well-developed  HEAD:  Atraumatic, Normocephalic  EYES: EOMI, PERRLA, conjunctiva and sclera clear  NECK: Supple, No JVD  CHEST/LUNG: Clear to auscultation bilaterally; No wheeze  HEART: Regular rate and rhythm; No murmurs, rubs, or gallops  ABDOMEN: Soft, Nontender, Nondistended; Bowel sounds present  EXTREMITIES:  2+ Peripheral Pulses, No clubbing, cyanosis, or edema  PSYCH: AAOx3  NEUROLOGY: non-focal  SKIN: No rashes or lesions    LABS:                        9.8    4.77  )-----------( 230      ( 06 Jul 2024 07:00 )             29.0     07-06    141  |  101  |  14  ----------------------------<  85  3.4<L>   |  27  |  1.21    Ca    8.3<L>      06 Jul 2024 07:00  Phos  2.7     07-06  Mg     1.30     07-06            Urinalysis Basic - ( 06 Jul 2024 07:00 )    Color: x / Appearance: x / SG: x / pH: x  Gluc: 85 mg/dL / Ketone: x  / Bili: x / Urobili: x   Blood: x / Protein: x / Nitrite: x   Leuk Esterase: x / RBC: x / WBC x   Sq Epi: x / Non Sq Epi: x / Bacteria: x        RADIOLOGY & ADDITIONAL TESTS:    Imaging Personally Reviewed:    Consultant(s) Notes Reviewed:      Care Discussed with Consultants/Other Providers:   Patient is a 83y old  Male who presents with a chief complaint of urinary retention (05 Jul 2024 19:15)      SUBJECTIVE / OVERNIGHT EVENTS: patient seen and examined by bedside, pt reports feeling better , denies headache, dizziness, SOB, CP, Palpitations , N/V/D, abdominal pain        MEDICATIONS  (STANDING):  atorvastatin 10 milliGRAM(s) Oral at bedtime  dextrose 10% Bolus 125 milliLiter(s) IV Bolus once  dextrose 5%. 1000 milliLiter(s) (100 mL/Hr) IV Continuous <Continuous>  dextrose 5%. 1000 milliLiter(s) (50 mL/Hr) IV Continuous <Continuous>  dextrose 50% Injectable 12.5 Gram(s) IV Push once  dextrose 50% Injectable 25 Gram(s) IV Push once  glucagon  Injectable 1 milliGRAM(s) IntraMuscular once  heparin   Injectable 5000 Unit(s) SubCutaneous every 12 hours  insulin glargine Injectable (LANTUS) 12 Unit(s) SubCutaneous at bedtime  insulin lispro (ADMELOG) corrective regimen sliding scale   SubCutaneous three times a day before meals  insulin lispro (ADMELOG) corrective regimen sliding scale   SubCutaneous at bedtime  insulin lispro Injectable (ADMELOG) 4 Unit(s) SubCutaneous three times a day before meals  magnesium sulfate  IVPB 2 Gram(s) IV Intermittent once  metoprolol succinate ER 25 milliGRAM(s) Oral daily  potassium chloride   Powder 40 milliEquivalent(s) Oral once  tamsulosin 0.4 milliGRAM(s) Oral at bedtime    MEDICATIONS  (PRN):  acetaminophen     Tablet .. 650 milliGRAM(s) Oral every 6 hours PRN Temp greater or equal to 38C (100.4F), Mild Pain (1 - 3)  dextrose Oral Gel 15 Gram(s) Oral once PRN Blood Glucose LESS THAN 70 milliGRAM(s)/deciliter  melatonin 3 milliGRAM(s) Oral at bedtime PRN Insomnia      Vital Signs Last 24 Hrs  T(C): 36.4 (06 Jul 2024 10:20), Max: 37 (05 Jul 2024 22:05)  T(F): 97.5 (06 Jul 2024 10:20), Max: 98.6 (05 Jul 2024 22:05)  HR: 69 (06 Jul 2024 10:20) (63 - 77)  BP: 147/60 (06 Jul 2024 10:20) (111/61 - 147/60)  BP(mean): --  RR: 18 (06 Jul 2024 10:20) (17 - 18)  SpO2: 100% (06 Jul 2024 10:20) (99% - 100%)    Parameters below as of 06 Jul 2024 10:20  Patient On (Oxygen Delivery Method): room air      CAPILLARY BLOOD GLUCOSE      POCT Blood Glucose.: 89 mg/dL (06 Jul 2024 08:47)  POCT Blood Glucose.: 108 mg/dL (05 Jul 2024 21:47)  POCT Blood Glucose.: 90 mg/dL (05 Jul 2024 17:54)  POCT Blood Glucose.: 153 mg/dL (05 Jul 2024 11:51)    I&O's Summary    05 Jul 2024 07:01  -  06 Jul 2024 07:00  --------------------------------------------------------  IN: 600 mL / OUT: 1590 mL / NET: -990 mL    06 Jul 2024 07:01  -  06 Jul 2024 11:24  --------------------------------------------------------  IN: 0 mL / OUT: 450 mL / NET: -450 mL      GENERAL: NAD,   HEAD:  Atraumatic, Normocephalic  EYES: EOMI, PERRL, conjunctiva and sclera clear  CHEST/LUNG: Clear to auscultation bilaterally  HEART: Regular rate and rhythm; No murmurs, rubs, or gallops, No Edema  ABDOMEN: Soft, Nontender, Nondistended; Bowel sounds present  : Lawson with clear urine in bag   EXTREMITIES:  2+ Peripheral Pulses, No clubbing, cyanosis  PSYCH: No SI/HI  NEUROLOGY: non-focal  SKIN: No rashes or lesions      LABS:                        9.8    4.77  )-----------( 230      ( 06 Jul 2024 07:00 )             29.0     07-06    141  |  101  |  14  ----------------------------<  85  3.4<L>   |  27  |  1.21    Ca    8.3<L>      06 Jul 2024 07:00  Phos  2.7     07-06  Mg     1.30     07-06            Urinalysis Basic - ( 06 Jul 2024 07:00 )    Color: x / Appearance: x / SG: x / pH: x  Gluc: 85 mg/dL / Ketone: x  / Bili: x / Urobili: x   Blood: x / Protein: x / Nitrite: x   Leuk Esterase: x / RBC: x / WBC x   Sq Epi: x / Non Sq Epi: x / Bacteria: x        RADIOLOGY & ADDITIONAL TESTS:    Imaging Personally Reviewed:    Consultant(s) Notes Reviewed:  Urology     Care Discussed with Consultants/Other Providers:

## 2024-07-07 LAB
ANION GAP SERPL CALC-SCNC: 14 MMOL/L — SIGNIFICANT CHANGE UP (ref 7–14)
BUN SERPL-MCNC: 12 MG/DL — SIGNIFICANT CHANGE UP (ref 7–23)
CALCIUM SERPL-MCNC: 8.7 MG/DL — SIGNIFICANT CHANGE UP (ref 8.4–10.5)
CHLORIDE SERPL-SCNC: 103 MMOL/L — SIGNIFICANT CHANGE UP (ref 98–107)
CO2 SERPL-SCNC: 22 MMOL/L — SIGNIFICANT CHANGE UP (ref 22–31)
CREAT SERPL-MCNC: 1.09 MG/DL — SIGNIFICANT CHANGE UP (ref 0.5–1.3)
EGFR: 67 ML/MIN/1.73M2 — SIGNIFICANT CHANGE UP
GLUCOSE BLDC GLUCOMTR-MCNC: 112 MG/DL — HIGH (ref 70–99)
GLUCOSE BLDC GLUCOMTR-MCNC: 121 MG/DL — HIGH (ref 70–99)
GLUCOSE BLDC GLUCOMTR-MCNC: 97 MG/DL — SIGNIFICANT CHANGE UP (ref 70–99)
GLUCOSE BLDC GLUCOMTR-MCNC: 99 MG/DL — SIGNIFICANT CHANGE UP (ref 70–99)
GLUCOSE SERPL-MCNC: 81 MG/DL — SIGNIFICANT CHANGE UP (ref 70–99)
HCT VFR BLD CALC: 29.5 % — LOW (ref 39–50)
HGB BLD-MCNC: 9.6 G/DL — LOW (ref 13–17)
MAGNESIUM SERPL-MCNC: 1.8 MG/DL — SIGNIFICANT CHANGE UP (ref 1.6–2.6)
MCHC RBC-ENTMCNC: 27.6 PG — SIGNIFICANT CHANGE UP (ref 27–34)
MCHC RBC-ENTMCNC: 32.5 GM/DL — SIGNIFICANT CHANGE UP (ref 32–36)
MCV RBC AUTO: 84.8 FL — SIGNIFICANT CHANGE UP (ref 80–100)
MRSA PCR RESULT.: SIGNIFICANT CHANGE UP
NRBC # BLD: 0 /100 WBCS — SIGNIFICANT CHANGE UP (ref 0–0)
NRBC # FLD: 0 K/UL — SIGNIFICANT CHANGE UP (ref 0–0)
PHOSPHATE SERPL-MCNC: 2.7 MG/DL — SIGNIFICANT CHANGE UP (ref 2.5–4.5)
PLATELET # BLD AUTO: 249 K/UL — SIGNIFICANT CHANGE UP (ref 150–400)
POTASSIUM SERPL-MCNC: 3.5 MMOL/L — SIGNIFICANT CHANGE UP (ref 3.5–5.3)
POTASSIUM SERPL-SCNC: 3.5 MMOL/L — SIGNIFICANT CHANGE UP (ref 3.5–5.3)
RBC # BLD: 3.48 M/UL — LOW (ref 4.2–5.8)
RBC # FLD: 13 % — SIGNIFICANT CHANGE UP (ref 10.3–14.5)
S AUREUS DNA NOSE QL NAA+PROBE: DETECTED
SODIUM SERPL-SCNC: 139 MMOL/L — SIGNIFICANT CHANGE UP (ref 135–145)
WBC # BLD: 4.64 K/UL — SIGNIFICANT CHANGE UP (ref 3.8–10.5)
WBC # FLD AUTO: 4.64 K/UL — SIGNIFICANT CHANGE UP (ref 3.8–10.5)

## 2024-07-07 PROCEDURE — 99232 SBSQ HOSP IP/OBS MODERATE 35: CPT

## 2024-07-07 PROCEDURE — 51702 INSERT TEMP BLADDER CATH: CPT

## 2024-07-07 RX ORDER — MUPIROCIN 20 MG/G
1 OINTMENT TOPICAL
Refills: 0 | Status: DISCONTINUED | OUTPATIENT
Start: 2024-07-07 | End: 2024-07-08

## 2024-07-07 RX ORDER — METOPROLOL TARTRATE 50 MG
1 TABLET ORAL
Qty: 30 | Refills: 0
Start: 2024-07-07 | End: 2024-08-05

## 2024-07-07 RX ORDER — DARUNAVIR, COBICISTAT, EMTRICITABINE, AND TENOFOVIR ALAFENAMIDE 800; 150; 200; 10 MG/1; MG/1; MG/1; MG/1
1 TABLET, FILM COATED ORAL DAILY
Refills: 0 | Status: DISCONTINUED | OUTPATIENT
Start: 2024-07-07 | End: 2024-07-08

## 2024-07-07 RX ORDER — OXYBUTYNIN CHLORIDE 5 MG
1 TABLET ORAL
Refills: 0 | DISCHARGE

## 2024-07-07 RX ADMIN — MUPIROCIN 1 APPLICATION(S): 20 OINTMENT TOPICAL at 17:58

## 2024-07-07 RX ADMIN — ATORVASTATIN CALCIUM 10 MILLIGRAM(S): 20 TABLET, FILM COATED ORAL at 21:49

## 2024-07-07 RX ADMIN — DARUNAVIR, COBICISTAT, EMTRICITABINE, AND TENOFOVIR ALAFENAMIDE 1 TABLET(S): 800; 150; 200; 10 TABLET, FILM COATED ORAL at 17:58

## 2024-07-07 RX ADMIN — INSULIN LISPRO 4 UNIT(S): 100 INJECTION, SOLUTION SUBCUTANEOUS at 17:58

## 2024-07-07 RX ADMIN — Medication 1 APPLICATION(S): at 12:23

## 2024-07-07 RX ADMIN — INSULIN LISPRO 4 UNIT(S): 100 INJECTION, SOLUTION SUBCUTANEOUS at 13:11

## 2024-07-07 RX ADMIN — Medication 25 MILLIGRAM(S): at 07:00

## 2024-07-07 RX ADMIN — HEPARIN SODIUM 5000 UNIT(S): 50 INJECTION, SOLUTION INTRAVENOUS at 07:00

## 2024-07-07 RX ADMIN — HEPARIN SODIUM 5000 UNIT(S): 50 INJECTION, SOLUTION INTRAVENOUS at 17:58

## 2024-07-07 RX ADMIN — INSULIN LISPRO 4 UNIT(S): 100 INJECTION, SOLUTION SUBCUTANEOUS at 09:12

## 2024-07-07 RX ADMIN — TAMSULOSIN HYDROCHLORIDE 0.4 MILLIGRAM(S): 0.4 CAPSULE ORAL at 21:49

## 2024-07-07 RX ADMIN — INSULIN GLARGINE 12 UNIT(S): 100 INJECTION, SOLUTION SUBCUTANEOUS at 21:49

## 2024-07-07 NOTE — DISCHARGE NOTE PROVIDER - NSDCMRMEDTOKEN_GEN_ALL_CORE_FT
atorvastatin 10 mg oral tablet: 1 tab(s) orally once a day (at bedtime)  Basaglar KwikPen 100 units/mL subcutaneous solution: 12 unit(s) subcutaneous once a day (at bedtime)  Flomax 0.4 mg oral capsule: 1 cap(s) orally once a day  metFORMIN 500 mg oral tablet: 1 tab(s) orally 2 times a day  Metoprolol Succinate  mg oral tablet, extended release: 1 tab(s) orally once a day  Norvasc 5 mg oral tablet: 1 tab(s) orally once a day  oxyBUTYnin 10 mg/24 hr oral tablet, extended release: 1 tab(s) orally once a day  Ozempic 2 mg/3 mL (0.25 mg or 0.5 mg dose) subcutaneous solution: 0.5 milligram(s) subcutaneously once a week (patient prescribed ozempic but had not started medication yet)  Symtuza oral tablet: 1 tab(s) orally once a day   atorvastatin 10 mg oral tablet: 1 tab(s) orally once a day (at bedtime)  Basaglar KwikPen 100 units/mL subcutaneous solution: 10 unit(s) subcutaneous once a day (at bedtime)  Flomax 0.4 mg oral capsule: 1 cap(s) orally once a day  metFORMIN 500 mg oral tablet: 1 tab(s) orally 2 times a day  Ozempic 2 mg/3 mL (0.25 mg or 0.5 mg dose) subcutaneous solution: 0.5 milligram(s) subcutaneously once a week (patient prescribed ozempic but had not started medication yet)  Symtuza oral tablet: 1 tab(s) orally once a day   atorvastatin 10 mg oral tablet: 1 tab(s) orally once a day (at bedtime)  Basaglar KwikPen 100 units/mL subcutaneous solution: 10 unit(s) subcutaneous once a day (at bedtime)  Flomax 0.4 mg oral capsule: 1 cap(s) orally once a day  metFORMIN 500 mg oral tablet: 1 tab(s) orally 2 times a day  metoprolol succinate 25 mg oral tablet, extended release: 1 tab(s) orally once a day  Symtuza oral tablet: 1 tab(s) orally once a day   atorvastatin 10 mg oral tablet: 1 tab(s) orally once a day (at bedtime)  Basaglar KwikPen 100 units/mL subcutaneous solution: 10 unit(s) subcutaneous once a day (at bedtime)  Flomax 0.4 mg oral capsule: 1 cap(s) orally once a day  metoprolol succinate 25 mg oral tablet, extended release: 1 tab(s) orally once a day  Symtuza oral tablet: 1 tab(s) orally once a day

## 2024-07-07 NOTE — DISCHARGE NOTE PROVIDER - PROVIDER TOKENS
FREE:[LAST:[Dr. Ortiz],FIRST:[Og (Urology)],PHONE:[(   )    -],FAX:[(   )    -]] PROVIDER:[TOKEN:[[id number]]]

## 2024-07-07 NOTE — DISCHARGE NOTE NURSING/CASE MANAGEMENT/SOCIAL WORK - PATIENT PORTAL LINK FT
You can access the FollowMyHealth Patient Portal offered by [facility] by registering at the following website: [email or web address]. By joining [employer]’s FollowMyHealth portal, you will also be able to view your health information using other applications (apps) compatible with our system.

## 2024-07-07 NOTE — DISCHARGE NOTE NURSING/CASE MANAGEMENT/SOCIAL WORK - NSDCPEFALRISK_GEN_ALL_CORE
For information on Fall & Injury Prevention, visit: [email or web address] OR  [email or web address] OR  https://www.cdc.gov/steadi/patient.html

## 2024-07-07 NOTE — DISCHARGE NOTE PROVIDER - NSDCCPCAREPLAN_GEN_ALL_CORE_FT
PRINCIPAL DISCHARGE DIAGNOSIS  Diagnosis: DONELL (acute kidney injury)  Assessment and Plan of Treatment: You were admitted with acute kidney injury , likely secondary to urine retention, You were evaluated by nephrology , Lawson was placed with resolution of the DONELL . Please follow up with Urology as outpatient      SECONDARY DISCHARGE DIAGNOSES  Diagnosis: Urinary retention  Assessment and Plan of Treatment: You were noted to have  urine retention, You were evaluated by Urology  , Lawson was placed and flomax was started  . Trial of void was done and you were noted to be reatining still . Lawson was replaced Please follow up with Urology as outpatient    Diagnosis: Metabolic acidosis  Assessment and Plan of Treatment: You were noted to have metaboic acidosis  due to acute kidney injury , from  urine retention, You were evaluated by nephrology and started on bicarbonate infusion . , Lawson was placed with resolution of the DONELL and metabolic infusion Please follow up with Urology as outpatient    Diagnosis: History of HIV infection  Assessment and Plan of Treatment: COntinue Youe home medications .    Diagnosis: HTN (hypertension)  Assessment and Plan of Treatment: Continue blood pressure medication regimen as directed. Monitor for any visual changes, headaches or dizziness.  Monitor blood pressure regularly.  Follow up with your PCP for further management for high blood pressure.      Diagnosis: Type 2 diabetes mellitus with hyperglycemia, with long-term current use of insulin  Assessment and Plan of Treatment: Continue consistent carbohydrate diet.  Monitor blood glucose levels throughout the day before meals and at bedtime.  Record blood sugars and bring to outpatient providers appointment in order to be reviewed by your doctor for management modifications.  Be aware of diabetes management symptoms including feeling cool and clammy, which may be related to low glucose levels.  Feeling hot and dry may indicate high glucose levels.  If  you feel these symptoms, check your blood sugar.  Make regular podiatry appointments in order to have feet checked for wounds and toe nails cut by a doctor to prevent infections.      Diagnosis: BPH (benign prostatic hyperplasia)  Assessment and Plan of Treatment: continue Flomax . Follow up with Urology as outpatient     PRINCIPAL DISCHARGE DIAGNOSIS  Diagnosis: DONELL (acute kidney injury)  Assessment and Plan of Treatment: You were admitted with acute kidney injury , likely secondary to urine retention, You were evaluated by nephrology , Lawson was placed with resolution of the DONELL . Please follow up with Urology as outpatient      SECONDARY DISCHARGE DIAGNOSES  Diagnosis: Metabolic acidosis  Assessment and Plan of Treatment: You were noted to have metaboic acidosis  due to acute kidney injury , from  urine retention, You were evaluated by nephrology and started on bicarbonate infusion . , Lawson was placed with resolution of the DONELL and metabolic infusion Please follow up with Urology as outpatient    Diagnosis: Urinary retention  Assessment and Plan of Treatment: You were noted to have  urine retention, You were evaluated by Urology  , Lawson was placed and flomax was started  . Trial of void was done and you were noted to be reatining still . Lawson was replaced Please follow up with Urology as outpatient    Diagnosis: HTN (hypertension)  Assessment and Plan of Treatment: Continue blood pressure medication regimen as directed. Monitor for any visual changes, headaches or dizziness.  Monitor blood pressure regularly.  Follow up with your PCP for further management for high blood pressure.      Diagnosis: History of HIV infection  Assessment and Plan of Treatment: COntinue Youe home medications .    Diagnosis: Type 2 diabetes mellitus with hyperglycemia, with long-term current use of insulin  Assessment and Plan of Treatment: Your fingersticks were low and tightly controlled. HOLD OFF on Metformin and Ozempic on discharge. Cotninue Basaglar 10 units nightly. Follow up with PCP and/or endocrinology within 1-2 weeks for continuous monitoring and management of blood sugars and medication regimen.   Continue consistent carbohydrate diet.  Monitor blood glucose levels throughout the day before meals and at bedtime.  Record blood sugars and bring to outpatient providers appointment in order to be reviewed by your doctor for management modifications.  Be aware of diabetes management symptoms including feeling cool and clammy, which may be related to low glucose levels.  Feeling hot and dry may indicate high glucose levels.  If  you feel these symptoms, check your blood sugar.  Make regular podiatry appointments in order to have feet checked for wounds and toe nails cut by a doctor to prevent infections.      Diagnosis: BPH (benign prostatic hyperplasia)  Assessment and Plan of Treatment: continue Flomax . Follow up with Urology as outpatient

## 2024-07-07 NOTE — PROGRESS NOTE ADULT - PROBLEM SELECTOR PLAN 3
-flomax  -maintain forrester   - ki appreciated , agree with TOV tonight ,  if any of 3 pvrs > 200 replace forrester.  whether cath requires replacement or not,  f/u [facility] 1-2 weeks after discharge for cystoscopy.       #UA + but ucx neg - dc abx -flomax  -maintain forrester   - ki appreciated , agree with TOV tonight ,  if any of 3 pvrs > 200 replace forrester.  whether cath requires replacement or not,  f/u [facility] 1-2 weeks after discharge for cystoscopy.   , Bladder scan>850, forrester replaced 7/8    #UA + but ucx neg - dc abx, pt denies dysuria, no fever , no leucocytosis

## 2024-07-07 NOTE — PROGRESS NOTE ADULT - PROBLEM SELECTOR PLAN 10
HSQ  DASH/TLC CC puree. Passed dysphagia screen  PT eval : No skilled PT needs  plan of care d/w pt and ACP   DC planning if passes TOV tomorrow HSQ  DASH/TLC CC puree. Passed dysphagia screen  PT eval : No skilled PT needs  plan of care d/w pt and ACP   DC planning today   outpt f/u with Urology   plan of care d/w daughter Maddy   Patient hemodynamically stable for discharge home

## 2024-07-07 NOTE — PROGRESS NOTE ADULT - PROBLEM SELECTOR PLAN 5
Will need to clarify HIV med and have family bring in  CD4/CD8 subsets and HIV VL noted, , CD4 599, Viral load  low   called no ans Will need to clarify HIV med and have family bring in  CD4/CD8 subsets and HIV VL noted, , CD4 599, Viral load  low    HIV med resumed

## 2024-07-07 NOTE — PROGRESS NOTE ADULT - PROBLEM SELECTOR PLAN 6
Unclear what anti-HTNs he is on. On prior admission was on metoprolol 25mg qd alone - will c/w this for now  Per med rec - pt on norvasc 5 mg and Metoprolol Xl 100 mg daily Unclear what anti-HTNs he is on. On prior admission was on metoprolol 25mg qd alone - will c/w this for now  Per med rec - pt on norvasc 5 mg and Metoprolol Xl 100 mg daily at home   HR in 50-60s, will not increase Metoprolol, BP within goal, can f/u with PMD, for monitoring BP and adjusting BP meds

## 2024-07-07 NOTE — DISCHARGE NOTE PROVIDER - CARE PROVIDER_API CALL
Og Alcazar (Urology)  Phone: (   )    -  Fax: (   )    -  Follow Up Time:    Og Ortiz  Urology  [phone] [place] [address]  Phone: [phone]  Fax: ()-  Follow Up Time:

## 2024-07-07 NOTE — PROGRESS NOTE ADULT - PROBLEM SELECTOR PLAN 2
Likely 2/2 ARF from obstructive uropathy  -s/p 1L NS in ED, additional 1L LR ordered  -nephrology consulted and rec'd bicarb gtt. Bicarb 9--14.   - Metabolic acidosis resolved, renal rec to dc bicarb infusion Likely 2/2 ARF from obstructive uropathy  -s/p 1L NS in ED, additional 1L LR ordered  -nephrology consulted and rec'd bicarb gtt. Bicarb WNL now   - Metabolic acidosis resolved, renal rec to dc bicarb infusion

## 2024-07-07 NOTE — DISCHARGE NOTE PROVIDER - ATTENDING DISCHARGE PHYSICAL EXAMINATION:
VITAL SIGNS:  T(C): 36.8 (07-08-24 @ 11:06), Max: 37.4 (07-08-24 @ 01:56)  T(F): 98.3 (07-08-24 @ 11:06), Max: 99.4 (07-08-24 @ 01:56)  HR: 64 (07-08-24 @ 11:06) (60 - 64)  BP: 132/68 (07-08-24 @ 11:06) (128/55 - 139/61)  BP(mean): --  RR: 18 (07-08-24 @ 11:06) (18 - 18)  SpO2: 98% (07-08-24 @ 11:06) (98% - 100%)  Wt(kg): --    PHYSICAL EXAM:  Constitutional: NAD  Respiratory: CTA B/L; no W/R/R  Cardiac: +S1/S2; RRR; no M/R/G  Gastrointestinal: soft, NT/ND; no rebound or guarding; +BSx4  Extremities: WWP, no clubbing or cyanosis; no peripheral edema  Neurologic: AAOx3; CNII-XII grossly intact; no focal deficits

## 2024-07-07 NOTE — DISCHARGE NOTE PROVIDER - NSFOLLOWUPCLINICS_GEN_ALL_ED_FT
LINDSEY Suggs [facility] [address]  Urology  [address] [place] [address], [place] [address]  Phone: [phone]  Fax:

## 2024-07-07 NOTE — PROGRESS NOTE ADULT - PROBLEM SELECTOR PLAN 7
Seen by endo on CDU admission  -per endo lantus 12u qhs and admelog 4u TID while inpatient, ordered  -ISS  -A1C 11.9  mild hypoglycemia 89, 90, will CTM Seen by endo on CDU admission  -per endo lantus 12u qhs and admelog 4u TID while inpatient, ordered  -ISS  -A1C 11.9  mild hypoglycemia  , will CTM

## 2024-07-07 NOTE — PROGRESS NOTE ADULT - PROBLEM SELECTOR PLAN 1
Last creatinine 1.17 6/23. Now 8.32 s/p forrester. 1700cc UOP over an unclear amount of time after placement  ARF likely 2/2 obstructive uropathy from urinary retention. Possible BPH related  - DONELL resolved   -flomax  -maintain forrester for now, Will try to TOV tonight   -monitor for postobstructive diuresis. Strict I's and O's  -avoid nephrotoxic agents  -nephrology consulted kevin - bicarb infusion was started, now dced Last creatinine 1.17 6/23. Now 8.32 s/p forrester. 1700cc UOP over an unclear amount of time after placement  ARF likely 2/2 obstructive uropathy from urinary retention. Possible BPH related  - DONELL resolved   -flomax  -maintain forrester for now,  Failed TOV   -monitor for postobstructive diuresis. Strict I's and O's  -avoid nephrotoxic agents  -nephrology consulted kevin - bicarb infusion was started, now dced

## 2024-07-07 NOTE — DISCHARGE NOTE PROVIDER - HOSPITAL COURSE
83M with PMHx HIV (unknown medication), HTN, DM2 on insulin, CKD, BPH, mild cognitive impairment? presenting with difficulty voiding x2 days.      Acute renal failure.    Last creatinine 1.17 6/23. Now 8.32 s/p forrester. 1700cc UOP over an unclear amount of time after placement  ARF likely 2/2 obstructive uropathy from urinary retention. Possible BPH related  - DONELL resolved   -flomax  -maintain forrester for now,  Failed TOV   -monitor for postobstructive diuresis. Strict I's and O's  -avoid nephrotoxic agents  -nephrology consulted kevin - bicarb infusion was started, now dced.   Metabolic acidosis.    Likely 2/2 ARF from obstructive uropathy  -s/p 1L NS in ED, additional 1L LR ordered  -nephrology consulted and rec'd bicarb gtt. Bicarb WNL now   - Metabolic acidosis resolved, renal rec to dc bicarb infusion.    : Urinary retention.    -flomax  -maintain forrester   - eval appreciated , agree with TOV tonight ,  if any of 3 pvrs > 200 replace forrester.  whether cath requires replacement or not,  f/u [facility] 1-2 weeks after discharge for cystoscopy.   , Bladder scan>850, forrester replaced 7/8    #UA + but ucx neg - dc abx, pt denies dysuria, no fever , no leucocytosis.   Rib fracture.    Buckling of the left lateral fifth rib, possibly an acute fracture on CT - but CT chest neg  pt not reporting pain.    History of HIV infection.   ·  Plan: Will need to clarify HIV med and have family bring in  CD4/CD8 subsets and HIV VL noted, , CD4 599, Viral load  low    HIV med resumed.  HTN (hypertension).   · Unclear what anti-HTNs he is on. On prior admission was on metoprolol 25mg qd alone - will c/w this for now  Per med rec - pt on norvasc 5 mg and Metoprolol Xl 100 mg daily at home   HR in 50-60s, will not increase Metoprolol, BP within goal, can f/u with PMD, for monitoring BP and adjusting BP meds.    Type 2 diabetes mellitus with hyperglycemia, with long-term current use of insulin.    Seen by endo on CDU admission  -per endo lantus 12u qhs and admelog 4u TID while inpatient, ordered  -ISS  -A1C 11.9  mild hypoglycemia  , will CTM.     BPH (benign prostatic hyperplasia).   ·  Flomax.      PT eval : No skilled PT needs  Patient hemodynamically stable for discharge home. 83M with PMHx HIV (unknown medication), HTN, DM2 on insulin, CKD, BPH, mild cognitive impairment? presenting with difficulty voiding x2 days.      Acute renal failure.    Last creatinine 1.17 6/23. Now 8.32 s/p forrester. 1700cc UOP over an unclear amount of time after placement  ARF likely 2/2 obstructive uropathy from urinary retention. Possible BPH related  - DONELL resolved   -flomax  -maintain forrester for now,  Failed TOV   -monitor for postobstructive diuresis. Strict I's and O's  -avoid nephrotoxic agents  -nephrology consulted kevin - bicarb infusion was started, now dced.     Metabolic acidosis.    Likely 2/2 ARF from obstructive uropathy  -s/p 1L NS in ED, additional 1L LR ordered  -nephrology consulted and rec'd bicarb gtt. Bicarb WNL now   - Metabolic acidosis resolved, renal rec to dc bicarb infusion.    : Urinary retention.    -flomax  -maintain forrester   - eval appreciated , agree with TOV tonight ,  if any of 3 pvrs > 200 replace forrester.  whether cath requires replacement or not,  f/u [facility] 1-2 weeks after discharge for cystoscopy.   , Bladder scan>850, forrester replaced 7/8    #UA + but ucx neg - dc abx, pt denies dysuria, no fever , no leucocytosis.   Rib fracture.    Buckling of the left lateral fifth rib, possibly an acute fracture on CT - but CT chest neg  pt not reporting pain.    History of HIV infection.   ·  Plan: Will need to clarify HIV med and have family bring in  CD4/CD8 subsets and HIV VL noted, , CD4 599, Viral load  low    HIV med resumed.    HTN (hypertension).   · Unclear what anti-HTNs he is on. On prior admission was on metoprolol 25mg qd alone - will c/w this for now  Per med rec - pt on norvasc 5 mg and Metoprolol Xl 100 mg daily at home   HR in 50-60s, will not increase Metoprolol, BP within goal, can f/u with PMD, for monitoring BP and adjusting BP meds.    Type 2 diabetes mellitus with hyperglycemia, with long-term current use of insulin.    Seen by endo on CDU admission  -per endo lantus 12u qhs and admelog 4u TID while inpatient, ordered  -ISS  -A1C 11.9  mild hypoglycemia  , will CTM.     BPH (benign prostatic hyperplasia).   ·  Flomax.      PT eval : No skilled PT needs  Patient hemodynamically stable for discharge home. 83M with PMHx HIV (unknown medication), HTN, DM2 on insulin, CKD, BPH, mild cognitive impairment? presenting with difficulty voiding x2 days.      Acute renal failure.    Last creatinine 1.17 6/23. Now 8.32 s/p forrester. 1700cc UOP over an unclear amount of time after placement  ARF likely 2/2 obstructive uropathy from urinary retention. Possible BPH related  - DONELL resolved   -flomax  -maintain forrester for now,  Failed TOV   -monitor for postobstructive diuresis. Strict I's and O's  -avoid nephrotoxic agents  -nephrology consulted kevin - bicarb infusion was started, now dced.     Metabolic acidosis.    Likely 2/2 ARF from obstructive uropathy  -s/p 1L NS in ED, additional 1L LR ordered  -nephrology consulted and rec'd bicarb gtt. Bicarb WNL now   - Metabolic acidosis resolved, renal rec to dc bicarb infusion.    Urinary retention.    -flomax  -maintain forrester   - eval appreciated , agree with TOV tonight ,  if any of 3 pvrs > 200 replace forrester.  whether cath requires replacement or not,  f/u [facility] 1-2 weeks after discharge for cystoscopy.   , Bladder scan>850, forrester replaced 7/8    #UA + but ucx neg - dc abx, pt denies dysuria, no fever , no leucocytosis.   Rib fracture.    Buckling of the left lateral fifth rib, possibly an acute fracture on CT - but CT chest neg  pt not reporting pain.    History of HIV infection.   ·  Plan: Will need to clarify HIV med and have family bring in  CD4/CD8 subsets and HIV VL noted, , CD4 599, Viral load  low    HIV med resumed.    HTN (hypertension).   · Unclear what anti-HTNs he is on. On prior admission was on metoprolol 25mg qd alone - will c/w this for now  Per med rec - pt on norvasc 5 mg and Metoprolol Xl 100 mg daily at home   HR in 50-60s, will not increase Metoprolol, BP within goal, can f/u with PMD, for monitoring BP and adjusting BP meds.    Type 2 diabetes mellitus with hyperglycemia, with long-term current use of insulin.    Seen by endo on CDU admission  -per endo lantus 12u qhs and admelog 4u TID while inpatient; decreased on 7/8 due to hypoglycemia (now resolved)  -ISS  -A1C 11.9     BPH (benign prostatic hyperplasia).   ·  Flomax.      PT eval : No skilled PT needs  Patient hemodynamically stable for discharge home. HPI:  83M with PMHx HIV (unknown medication), HTN, DM2 on insulin, CKD, BPH, mild cognitive impairment? presenting with difficulty voiding x2 days. The patient is a limited historian and is oriented x2 (name, place). Per prior admission 1/2023 he was oriented x2 and has reported hx of mild cognitive impairment. He endorses 4-5 days of difficulty urinating and lower abdominal pain. Denies having hx of this prior. Patient was in the CDU 6/21-6/23 for UTI and had PVR <200 - no forrester placed. Also seen by endo with recs for basal/bolus insulin given elevated A1C. Per ED notes regarding collateral with daughter, he was unable to get abx at home to take to complete course. Unable to reach daughter at both numbers for collateral - went to . Patient denies fevers, chills, cough, CP, SOB, abdominal pain, vomiting, diarrhea, dysuria, hematuria.    In ED, found to have urinary retention on POCUS - 972cc urine volume. Forrester was placed with 1700cc output (unclear over what time). Also noted to have high anion gap metabolic acidosis of which nephrology was consulted. Patient given 1L NS and started on bicarb gtt. Bicarb improved from 9 - 14.  Imaging with incidental L lateral fifth rib fracture - patient denies fall/LOC. Unable to reach daughter. CTH and CT chest neg      Hospital Course:  patient admitted for acute urinary retention cb renal failure 2/2 obstructive uropathy likely related to BPH. Forrester placed with improvement in renal function.  consulted, pt failed TOV. plan to dc with forrester at home and fu with  in 1-2 weeks. Additionally with uncontrolled DM, Insulin adjusted. recommend holding metformin for now until complete resolution of DONELL, outpt  diabetes medications adjustment.     Important Medication Changes and Reason: Hold Metformin and Ozempic until f/u with PCP and Endocrinologist     Active or Pending Issues Requiring Follow-up: Follow up With Urology, PCP and Endocrinology     Discharge Diagnoses:  DONELL 2/2 obstructive uropathy  acute urinary retention  uncontrolled DM    Follow up Visit Information:   HOME Telehealth Instructions: At the time of your appointment, you will receive a text/email invite for your telehealth session. Please click on the link, enter the patient's name. You will then be redirected to a virtual waiting room, where you will wait until the doctor has connected with you.          HPI:  83M with PMHx HIV (unknown medication), HTN, DM2 on insulin, CKD, BPH, mild cognitive impairment? presenting with difficulty voiding x2 days. The patient is a limited historian and is oriented x2 (name, place). Per prior admission 1/2023 he was oriented x2 and has reported hx of mild cognitive impairment. He endorses 4-5 days of difficulty urinating and lower abdominal pain. Denies having hx of this prior. Patient was in the CDU 6/21-6/23 for UTI and had PVR <200 - no forrester placed. Also seen by endo with recs for basal/bolus insulin given elevated A1C. Per ED notes regarding collateral with daughter, he was unable to get abx at home to take to complete course. Unable to reach daughter at both numbers for collateral - went to . Patient denies fevers, chills, cough, CP, SOB, abdominal pain, vomiting, diarrhea, dysuria, hematuria.    In ED, found to have urinary retention on POCUS - 972cc urine volume. Forrester was placed with 1700cc output (unclear over what time). Also noted to have high anion gap metabolic acidosis of which nephrology was consulted. Patient given 1L NS and started on bicarb gtt. Bicarb improved from 9 - 14.  Imaging with incidental L lateral fifth rib fracture - patient denies fall/LOC. Unable to reach daughter. CTH and CT chest neg      Hospital Course:  patient admitted for acute urinary retention cb renal failure 2/2 obstructive uropathy likely related to BPH. Forrester placed with improvement in renal function.  consulted, pt failed TOV. plan to dc with forrester at home and fu with  in 1-2 weeks. Additionally with uncontrolled DM, Insulin adjusted. recommend holding metformin for now until complete resolution of DONELL, outpt  diabetes medications adjustment.     Important Medication Changes and Reason: Hold Metformin and Ozempic until f/u with PCP and Endocrinologist     Active or Pending Issues Requiring Follow-up: Follow up With Urology, PCP and Endocrinology     Discharge Diagnoses:  DONELL 2/2 obstructive uropathy  acute urinary retention  uncontrolled DM

## 2024-07-07 NOTE — DISCHARGE NOTE PROVIDER - NSDCFUADDAPPT_GEN_ALL_CORE_FT
Follow up with your primary care physician for further monitoring in 1-2 weeks. Please call to arrange appointment.     Follow up with Urology Dr. Og Ortiz in 1 -2 weeks for Cystoscopy void trial.

## 2024-07-07 NOTE — PROGRESS NOTE ADULT - PROBLEM/PLAN-4
normal (ped)...
DISPLAY PLAN FREE TEXT

## 2024-07-07 NOTE — PROGRESS NOTE ADULT - SUBJECTIVE AND OBJECTIVE BOX
Patient is a 83y old  Male who presents with a chief complaint of urinary retention (06 Jul 2024 11:24)      SUBJECTIVE / OVERNIGHT EVENTS:    MEDICATIONS  (STANDING):  atorvastatin 10 milliGRAM(s) Oral at bedtime  chlorhexidine 2% Cloths 1 Application(s) Topical daily  dextrose 10% Bolus 125 milliLiter(s) IV Bolus once  dextrose 5%. 1000 milliLiter(s) (50 mL/Hr) IV Continuous <Continuous>  dextrose 5%. 1000 milliLiter(s) (100 mL/Hr) IV Continuous <Continuous>  dextrose 50% Injectable 12.5 Gram(s) IV Push once  dextrose 50% Injectable 25 Gram(s) IV Push once  glucagon  Injectable 1 milliGRAM(s) IntraMuscular once  heparin   Injectable 5000 Unit(s) SubCutaneous every 12 hours  insulin glargine Injectable (LANTUS) 12 Unit(s) SubCutaneous at bedtime  insulin lispro (ADMELOG) corrective regimen sliding scale   SubCutaneous three times a day before meals  insulin lispro (ADMELOG) corrective regimen sliding scale   SubCutaneous at bedtime  insulin lispro Injectable (ADMELOG) 4 Unit(s) SubCutaneous three times a day before meals  metoprolol succinate ER 25 milliGRAM(s) Oral daily  mupirocin 2% Ointment 1 Application(s) Topical two times a day  tamsulosin 0.4 milliGRAM(s) Oral at bedtime    MEDICATIONS  (PRN):  acetaminophen     Tablet .. 650 milliGRAM(s) Oral every 6 hours PRN Temp greater or equal to 38C (100.4F), Mild Pain (1 - 3)  dextrose Oral Gel 15 Gram(s) Oral once PRN Blood Glucose LESS THAN 70 milliGRAM(s)/deciliter  melatonin 3 milliGRAM(s) Oral at bedtime PRN Insomnia      Vital Signs Last 24 Hrs  T(C): 36.5 (07 Jul 2024 10:20), Max: 36.9 (06 Jul 2024 21:08)  T(F): 97.7 (07 Jul 2024 10:20), Max: 98.5 (07 Jul 2024 07:00)  HR: 58 (07 Jul 2024 10:20) (57 - 67)  BP: 138/60 (07 Jul 2024 10:20) (118/59 - 150/68)  BP(mean): --  RR: 18 (07 Jul 2024 10:20) (16 - 18)  SpO2: 100% (07 Jul 2024 10:20) (97% - 100%)    Parameters below as of 07 Jul 2024 10:20  Patient On (Oxygen Delivery Method): room air      CAPILLARY BLOOD GLUCOSE      POCT Blood Glucose.: 97 mg/dL (07 Jul 2024 08:59)  POCT Blood Glucose.: 90 mg/dL (06 Jul 2024 21:21)  POCT Blood Glucose.: 99 mg/dL (06 Jul 2024 17:55)  POCT Blood Glucose.: 104 mg/dL (06 Jul 2024 12:26)    I&O's Summary    06 Jul 2024 07:01  -  07 Jul 2024 07:00  --------------------------------------------------------  IN: 0 mL / OUT: 1750 mL / NET: -1750 mL    07 Jul 2024 07:01  -  07 Jul 2024 11:31  --------------------------------------------------------  IN: 240 mL / OUT: 850 mL / NET: -610 mL        PHYSICAL EXAM:  GENERAL: NAD, well-developed  HEAD:  Atraumatic, Normocephalic  EYES: EOMI, PERRLA, conjunctiva and sclera clear  NECK: Supple, No JVD  CHEST/LUNG: Clear to auscultation bilaterally; No wheeze  HEART: Regular rate and rhythm; No murmurs, rubs, or gallops  ABDOMEN: Soft, Nontender, Nondistended; Bowel sounds present  EXTREMITIES:  2+ Peripheral Pulses, No clubbing, cyanosis, or edema  PSYCH: AAOx3  NEUROLOGY: non-focal  SKIN: No rashes or lesions    LABS:                        9.6    4.64  )-----------( 249      ( 07 Jul 2024 04:50 )             29.5     07-07    139  |  103  |  12  ----------------------------<  81  3.5   |  22  |  1.09    Ca    8.7      07 Jul 2024 04:50  Phos  2.7     07-07  Mg     1.80     07-07            Urinalysis Basic - ( 07 Jul 2024 04:50 )    Color: x / Appearance: x / SG: x / pH: x  Gluc: 81 mg/dL / Ketone: x  / Bili: x / Urobili: x   Blood: x / Protein: x / Nitrite: x   Leuk Esterase: x / RBC: x / WBC x   Sq Epi: x / Non Sq Epi: x / Bacteria: x        RADIOLOGY & ADDITIONAL TESTS:    Imaging Personally Reviewed:    Consultant(s) Notes Reviewed:      Care Discussed with Consultants/Other Providers:   Patient is a 83y old  Male who presents with a chief complaint of urinary retention (06 Jul 2024 11:24)      SUBJECTIVE / OVERNIGHT EVENTS: patient seen and examined by bedside, pt alert, awake, afebrile, no acute distress noted, Pt failed TOV, forrester reinserted     MEDICATIONS  (STANDING):  atorvastatin 10 milliGRAM(s) Oral at bedtime  chlorhexidine 2% Cloths 1 Application(s) Topical daily  dextrose 10% Bolus 125 milliLiter(s) IV Bolus once  dextrose 5%. 1000 milliLiter(s) (50 mL/Hr) IV Continuous <Continuous>  dextrose 5%. 1000 milliLiter(s) (100 mL/Hr) IV Continuous <Continuous>  dextrose 50% Injectable 12.5 Gram(s) IV Push once  dextrose 50% Injectable 25 Gram(s) IV Push once  glucagon  Injectable 1 milliGRAM(s) IntraMuscular once  heparin   Injectable 5000 Unit(s) SubCutaneous every 12 hours  insulin glargine Injectable (LANTUS) 12 Unit(s) SubCutaneous at bedtime  insulin lispro (ADMELOG) corrective regimen sliding scale   SubCutaneous three times a day before meals  insulin lispro (ADMELOG) corrective regimen sliding scale   SubCutaneous at bedtime  insulin lispro Injectable (ADMELOG) 4 Unit(s) SubCutaneous three times a day before meals  metoprolol succinate ER 25 milliGRAM(s) Oral daily  mupirocin 2% Ointment 1 Application(s) Topical two times a day  tamsulosin 0.4 milliGRAM(s) Oral at bedtime    MEDICATIONS  (PRN):  acetaminophen     Tablet .. 650 milliGRAM(s) Oral every 6 hours PRN Temp greater or equal to 38C (100.4F), Mild Pain (1 - 3)  dextrose Oral Gel 15 Gram(s) Oral once PRN Blood Glucose LESS THAN 70 milliGRAM(s)/deciliter  melatonin 3 milliGRAM(s) Oral at bedtime PRN Insomnia      Vital Signs Last 24 Hrs  T(C): 36.5 (07 Jul 2024 10:20), Max: 36.9 (06 Jul 2024 21:08)  T(F): 97.7 (07 Jul 2024 10:20), Max: 98.5 (07 Jul 2024 07:00)  HR: 58 (07 Jul 2024 10:20) (57 - 67)  BP: 138/60 (07 Jul 2024 10:20) (118/59 - 150/68)  BP(mean): --  RR: 18 (07 Jul 2024 10:20) (16 - 18)  SpO2: 100% (07 Jul 2024 10:20) (97% - 100%)    Parameters below as of 07 Jul 2024 10:20  Patient On (Oxygen Delivery Method): room air      CAPILLARY BLOOD GLUCOSE      POCT Blood Glucose.: 97 mg/dL (07 Jul 2024 08:59)  POCT Blood Glucose.: 90 mg/dL (06 Jul 2024 21:21)  POCT Blood Glucose.: 99 mg/dL (06 Jul 2024 17:55)  POCT Blood Glucose.: 104 mg/dL (06 Jul 2024 12:26)    I&O's Summary    06 Jul 2024 07:01  -  07 Jul 2024 07:00  --------------------------------------------------------  IN: 0 mL / OUT: 1750 mL / NET: -1750 mL    07 Jul 2024 07:01  -  07 Jul 2024 11:31  --------------------------------------------------------  IN: 240 mL / OUT: 850 mL / NET: -610 mL    PHYSICAL EXAM   GENERAL: NAD,   HEAD:  Atraumatic, Normocephalic  EYES: EOMI, PERRL, conjunctiva and sclera clear  CHEST/LUNG: Clear to auscultation bilaterally  HEART: Regular rate and rhythm; No murmurs, rubs, or gallops, No Edema  ABDOMEN: Soft, Nontender, Nondistended; Bowel sounds present  : Forrester with clear urine in bag   EXTREMITIES:  2+ Peripheral Pulses, No clubbing, cyanosis  NEUROLOGY:AAOx3,  non-focal  SKIN: No rashes or lesions        LABS:                        9.6    4.64  )-----------( 249      ( 07 Jul 2024 04:50 )             29.5     07-07    139  |  103  |  12  ----------------------------<  81  3.5   |  22  |  1.09    Ca    8.7      07 Jul 2024 04:50  Phos  2.7     07-07  Mg     1.80     07-07            Urinalysis Basic - ( 07 Jul 2024 04:50 )    Color: x / Appearance: x / SG: x / pH: x  Gluc: 81 mg/dL / Ketone: x  / Bili: x / Urobili: x   Blood: x / Protein: x / Nitrite: x   Leuk Esterase: x / RBC: x / WBC x   Sq Epi: x / Non Sq Epi: x / Bacteria: x        RADIOLOGY & ADDITIONAL TESTS:    Imaging Personally Reviewed:    Consultant(s) Notes Reviewed:      Care Discussed with Consultants/Other Providers:

## 2024-07-08 VITALS
TEMPERATURE: 98 F | OXYGEN SATURATION: 99 % | RESPIRATION RATE: 18 BRPM | HEART RATE: 64 BPM | SYSTOLIC BLOOD PRESSURE: 131 MMHG | DIASTOLIC BLOOD PRESSURE: 71 MMHG

## 2024-07-08 LAB
ANION GAP SERPL CALC-SCNC: 14 MMOL/L — SIGNIFICANT CHANGE UP (ref 7–14)
BUN SERPL-MCNC: 12 MG/DL — SIGNIFICANT CHANGE UP (ref 7–23)
CALCIUM SERPL-MCNC: 8.8 MG/DL — SIGNIFICANT CHANGE UP (ref 8.4–10.5)
CHLORIDE SERPL-SCNC: 104 MMOL/L — SIGNIFICANT CHANGE UP (ref 98–107)
CO2 SERPL-SCNC: 20 MMOL/L — LOW (ref 22–31)
CREAT SERPL-MCNC: 1.11 MG/DL — SIGNIFICANT CHANGE UP (ref 0.5–1.3)
CULTURE RESULTS: SIGNIFICANT CHANGE UP
CULTURE RESULTS: SIGNIFICANT CHANGE UP
EGFR: 65 ML/MIN/1.73M2 — SIGNIFICANT CHANGE UP
GLUCOSE BLDC GLUCOMTR-MCNC: 110 MG/DL — HIGH (ref 70–99)
GLUCOSE BLDC GLUCOMTR-MCNC: 166 MG/DL — HIGH (ref 70–99)
GLUCOSE BLDC GLUCOMTR-MCNC: 68 MG/DL — LOW (ref 70–99)
GLUCOSE SERPL-MCNC: 67 MG/DL — LOW (ref 70–99)
HCT VFR BLD CALC: 29.2 % — LOW (ref 39–50)
HGB BLD-MCNC: 9.6 G/DL — LOW (ref 13–17)
MAGNESIUM SERPL-MCNC: 1.6 MG/DL — SIGNIFICANT CHANGE UP (ref 1.6–2.6)
MCHC RBC-ENTMCNC: 27.9 PG — SIGNIFICANT CHANGE UP (ref 27–34)
MCHC RBC-ENTMCNC: 32.9 GM/DL — SIGNIFICANT CHANGE UP (ref 32–36)
MCV RBC AUTO: 84.9 FL — SIGNIFICANT CHANGE UP (ref 80–100)
NRBC # BLD: 0 /100 WBCS — SIGNIFICANT CHANGE UP (ref 0–0)
NRBC # FLD: 0 K/UL — SIGNIFICANT CHANGE UP (ref 0–0)
PHOSPHATE SERPL-MCNC: 2.4 MG/DL — LOW (ref 2.5–4.5)
PLATELET # BLD AUTO: 264 K/UL — SIGNIFICANT CHANGE UP (ref 150–400)
POTASSIUM SERPL-MCNC: 3.6 MMOL/L — SIGNIFICANT CHANGE UP (ref 3.5–5.3)
POTASSIUM SERPL-SCNC: 3.6 MMOL/L — SIGNIFICANT CHANGE UP (ref 3.5–5.3)
RBC # BLD: 3.44 M/UL — LOW (ref 4.2–5.8)
RBC # FLD: 13.2 % — SIGNIFICANT CHANGE UP (ref 10.3–14.5)
SODIUM SERPL-SCNC: 138 MMOL/L — SIGNIFICANT CHANGE UP (ref 135–145)
SPECIMEN SOURCE: SIGNIFICANT CHANGE UP
SPECIMEN SOURCE: SIGNIFICANT CHANGE UP
WBC # BLD: 6.64 K/UL — SIGNIFICANT CHANGE UP (ref 3.8–10.5)
WBC # FLD AUTO: 6.64 K/UL — SIGNIFICANT CHANGE UP (ref 3.8–10.5)

## 2024-07-08 PROCEDURE — 99239 HOSP IP/OBS DSCHRG MGMT >30: CPT

## 2024-07-08 RX ORDER — SOD PHOS DI, MONO/K PHOS MONO 250 MG
1 TABLET ORAL ONCE
Refills: 0 | Status: COMPLETED | OUTPATIENT
Start: 2024-07-08 | End: 2024-07-08

## 2024-07-08 RX ORDER — INSULIN GLARGINE 100 [IU]/ML
9 INJECTION, SOLUTION SUBCUTANEOUS AT BEDTIME
Refills: 0 | Status: DISCONTINUED | OUTPATIENT
Start: 2024-07-08 | End: 2024-07-08

## 2024-07-08 RX ORDER — INSULIN LISPRO 100 [IU]/ML
2 INJECTION, SOLUTION SUBCUTANEOUS
Refills: 0 | Status: DISCONTINUED | OUTPATIENT
Start: 2024-07-08 | End: 2024-07-08

## 2024-07-08 RX ORDER — POTASSIUM PHOSPHATE, MONOBASIC POTASSIUM PHOSPHATE, DIBASIC INJECTION, 236; 224 MG/ML; MG/ML
15 SOLUTION, CONCENTRATE INTRAVENOUS ONCE
Refills: 0 | Status: DISCONTINUED | OUTPATIENT
Start: 2024-07-08 | End: 2024-07-08

## 2024-07-08 RX ADMIN — Medication 1 APPLICATION(S): at 11:16

## 2024-07-08 RX ADMIN — Medication 25 MILLIGRAM(S): at 05:42

## 2024-07-08 RX ADMIN — DARUNAVIR, COBICISTAT, EMTRICITABINE, AND TENOFOVIR ALAFENAMIDE 1 TABLET(S): 800; 150; 200; 10 TABLET, FILM COATED ORAL at 11:16

## 2024-07-08 RX ADMIN — Medication 1 PACKET(S): at 12:22

## 2024-07-08 RX ADMIN — MUPIROCIN 1 APPLICATION(S): 20 OINTMENT TOPICAL at 05:42

## 2024-07-29 ENCOUNTER — EMERGENCY (EMERGENCY)
Facility: HOSPITAL | Age: 84
LOS: 1 days | Discharge: HOME | End: 2024-07-29
Attending: STUDENT IN AN ORGANIZED HEALTH CARE EDUCATION/TRAINING PROGRAM | Admitting: STUDENT IN AN ORGANIZED HEALTH CARE EDUCATION/TRAINING PROGRAM

## 2024-07-29 VITALS
SYSTOLIC BLOOD PRESSURE: 172 MMHG | HEART RATE: 61 BPM | TEMPERATURE: 98 F | DIASTOLIC BLOOD PRESSURE: 85 MMHG | RESPIRATION RATE: 16 BRPM | HEIGHT: 64 IN | OXYGEN SATURATION: 100 %

## 2024-07-29 PROCEDURE — 99284 EMERGENCY DEPT VISIT MOD MDM: CPT

## 2024-07-29 NOTE — ED ADULT TRIAGE NOTE - CHIEF COMPLAINT QUOTE
c/o urinary retention, reports Lawson Catheter came out this morning. pt appears uncomfortable. hx of HTN, HIV, DM II. .

## 2024-07-29 NOTE — ED PROVIDER NOTE - NSFOLLOWUPCLINICS_GEN_ALL_ED_FT
[facility]  [address] [place] [address], [place] [address]  Phone: [phone]  Fax:

## 2024-07-29 NOTE — ED PROVIDER NOTE - NSFOLLOWUPINSTRUCTIONS_ED_ALL_ED_FT
Thank you for visiting our Emergency Department, it has been a pleasure taking part in your healthcare.  Please read and follow all of your discharge instructions. These instructions contain important information regarding your Emergency Department visit and future medical care.     Your discharge diagnosis is: UTI, Lawson Cath Problem   Please take all discharge medications as indicated below:  Bactrim 800mg twice a day x10 days  Please follow up with your urologist within x48 hours.  Please follow up with your Primary Care Doctor (PMD) within x48 hours.  Bring and show your PMD all documents and results you were given during your ED visit.  If you do not have a primary care doctor please call [phone] to establish primary care.  A copy of resulted labs, imaging, and findings have been provided to you.   You can also access all of your results through the [employer] [facility].  If you have questions about your results, please call the Emergency Department.  During your visit and at time of discharge, you had a detailed discussion with your provider regarding your diagnosis, care management and discharge planning.  Topics that were discussed included but were not limited to: return precautions, follow up visits with existing or new providers, new prescriptions and/or medication changes, wound and/or splint/cast care, incidental laboratory/radiology findings, or other care   aspects specific to your diagnosis and treatment. You have been given the opportunity to have your questions answered. At this time you have been deemed stable and fit for discharge.  Return precautions to the Emergency Department include but are not limited to: unrelenting nausea, vomiting, fever, chills, chest pain, shortness of breath, dizziness, chest or abdominal pain, worsening back pain, syncope, blood in urine or stool, headache that doesn't resolve, numbness or tingling, loss of sensation, loss of motor function, or any other concerning symptoms.    Please bring all ED Documents you were given during your stay to your PMD.   They contain important information for you and your PMD, including incidental lab/radiology findings that your PMD should be aware of.

## 2024-07-29 NOTE — ED PROVIDER NOTE - OBJECTIVE STATEMENT
85 y/o M, 85 y/o M, PMH BPH, HIV, HTN, Diabetes, CKD, cognitive impairment, recent admission for urinary retention with forrester placement 7/3/24, presents for penile discomfort x 2-3 days. Daughter on phone states pt appeared very uncomfortable this afternoon, not being able to sit still from discomfort. Daughter states pt was unable to follow up with urology due to having an incorrect phone number. No reported history of pulling or tugging of the forrester. Pt states he has some pain at the tip of his penis by the forrester insertion site. He denies any fever, nausea, vomiting, abdominal pain, back pain, hematuria, or trauma.

## 2024-07-29 NOTE — ED PROVIDER NOTE - PHYSICAL EXAMINATION
T(C): 36.5 (07-29-24 @ 20:06), Max: 36.5 (07-29-24 @ 20:06)  HR: 61 (07-29-24 @ 20:06) (61 - 61)  BP: 172/85 (07-29-24 @ 20:06) (172/85 - 172/85)  RR: 16 (07-29-24 @ 20:06) (16 - 16)  SpO2: 100% (07-29-24 @ 20:06) (100% - 100%)    GENERAL: patient appears well, no acute distress, appropriate, pleasant  EYES: sclera clear  ENMT: oropharynx clear without erythema, no exudates, moist mucous membranes  NECK: supple, soft  LUNGS: good air entry bilaterally, clear to auscultation, symmetric breath sounds, no wheezing or rhonchi appreciated  HEART: S1/S2, regular rate and rhythm, no murmurs noted, no lower extremity edema  GASTROINTESTINAL: abdomen is soft, nontender, nondistended, normoactive bowel sounds  BACK: No CVA tenderness  : No lesions over penis, no redness, discharge, tenderness, or warmth. No inguinal LAD. No scrotal swelling.   INTEGUMENT: good skin turgor, no lesions noted  MUSCULOSKELETAL: no clubbing or cyanosis, no obvious deformity  NEUROLOGIC: awake, alert, oriented x3, good muscle tone in 4 extremities, no obvious sensory deficits

## 2024-07-29 NOTE — ED PROVIDER NOTE - ATTENDING CONTRIBUTION TO CARE
I have personally performed a face to face medical and diagnostic evaluation of the patient. I have discussed with and reviewed the Resident's note and agree with the History, ROS, Physical Exam and MDM unless otherwise indicated. A brief summary of my personal evaluation and impression can be found below.    Shantell MACEDO: 84M hx of BPH HIV HTN DM CKD cognitive impairment, follows w ID w good recent CD4 and VL, recent admission for UTI c/b retention w forrester placed, presents w a cc of c/f forrester discomfort, no fever, forrester draining but daughter who via phone is able to provide hx reports pt today c/o discomfort around penis, no nausea no vomiting no fever no rash no new back pain no testicular pain - daughter reports they have had issues following up w urology and have not yet done so - the forrester has been in for x1 month. No bleeding.    All other ROS negative, except as above and as per HPI and ROS section.    VITALS: Initial triage and subsequent vitals have been reviewed by me.  GEN APPEARANCE: Alert, non-toxic, well-appearing, NAD.  HEAD: Atraumatic.  EYES: PERRLa, EOMI, vision grossly intact.   NECK: Supple  CV: RRR, S1S2, no c/r/m/g. Cap refill <2 seconds. No bruits.   LUNGS: CTAB. No abnormal breath sounds.  ABDOMEN: Soft, NTND. No guarding or rebound.   MSK/EXT: No spinal or extremity point tenderness. No CVA ttp. Pelvis stable. No peripheral edema.  NEURO: Alert, follows commands. Weight bearing normal. Speech normal. Sensation and motor normal x4 extremities.   SKIN: Warm, dry and intact. No rash.  PSYCH: Appropriate   Exam (Male): External anatomy appears normal, no e/o priapism, no e/o trauma. No testicular swelling, erythema, or tenderness. No urethral discharge or bleeding. Cremasteric reflex intact b/l.  EXAM WITH: Maddy HEATH     Plan/MDM: exam vss non toxic PE as above ddx c/f likely forrester discomfort, no e/o urethritis, abscess or other concerning infectious etiology, forrester appears to be draining well - given forrester in for x1 month will change in ED, check urine for infxn, otherwise HDS well appearing and at baseline low c/f systemic infection, given sx will cover w abx should pt have uti - likely dc w pmd/uro follow up - PA call back service called to check on pt and ensure urology follow up.

## 2024-07-29 NOTE — ED PROVIDER NOTE - CLINICAL SUMMARY MEDICAL DECISION MAKING FREE TEXT BOX
83 y/o M, PMH BPH, HIV, HTN, Diabetes, CKD, cognitive impairment, recent admission for urinary retention with forrester placement 7/3/24, presents for penile discomfort x 2-3 days. Unable to follow up with urology. No lesions over penis, no redness, discharge, tenderness, or warmth. No inguinal LAD. No scrotal swelling.  D/Dx: mild urethritis 2/2 irritation from forrester vs CAUTI  Will exchange forrester, obtain UA

## 2024-07-29 NOTE — ED PROVIDER NOTE - CHIEF COMPLAINT
LAB CALLED PATIENT STOOL CAME BACK WITH ROTAVIRUS  AND HEBER IS OUT. Please advise?    Spoke with  about results. Per  patient continues mesalamine, and add imodium, say hydrated, and hand washing.   The patient is a 84y Male complaining of urinary retention. The patient is a 84y Male complaining of penile discomfort DC instructions

## 2024-07-29 NOTE — ED PROVIDER NOTE - PROGRESS NOTE DETAILS
Shantell MACEDO: disucssed case w with daughter - given forrester in for x1 month will change forrester, possible traumatic insertion however US confirm bladder training and pt passing urine in forrester, blood tinged, UA c/w UTI will give abx and dc w abx, pts daughter is blind and will not be able to  patient, will engage sw for assistance w dispo. Shantell MACEDO: discussed case w with daughter - given forrester in for x1 month will change forrester, possible traumatic insertion however US confirm bladder training and pt passing urine in forrester, blood tinged, UA c/w UTI will give abx and dc w abx, pts daughter is blind and will not be able to  patient, will engage sw for assistance w dispo. Shantell MACEDO: noted BP at time of dispo, pt reports feels well no complaints, feels well, did not take BP meds today, instructed to take BP meds at home. reported understanding, will leave call back info at PA line to ensure good follow up.

## 2024-07-29 NOTE — ED PROVIDER NOTE - PATIENT PORTAL LINK FT
You can access the FollowMyHealth Patient Portal offered by [facility] by registering at the following website: [email or web address]. By joining [employer]’s FollowMyHealth portal, you will also be able to view your health information using other applications (apps) compatible with our system.

## 2024-07-29 NOTE — ED ADULT TRIAGE NOTE - PATIENT'S PREFERRED PRONOUN
Chief Complaint   Patient presents with    Vomiting     The pt arrives to the ED due to vomiting after he ate supper. He states his defibulator went off 2x's tonight about 5 min about. No CP or SOB he states he only feels weak. He is A&Ox4 with stable vital signs out in triage.     There were no vitals filed for this visit.    Past Medical History:   Diagnosis Date    CAD (coronary artery disease)     with 5 stents    Cataract     Chronic anemia     Chronic kidney disease     [facility]    Diabetes mellitus (CMD)     Essential (primary) hypertension     GI bleed     Gout     Hand fracture, right 07/21/2014    3rd and 4th     Ischemic cardiomyopathy     Malignant neoplasm (CMD)     PROSTATE    Other and unspecified hyperlipidemia     Right ankle pain     Sleep apnea        
Him/He

## 2024-07-29 NOTE — ED PROVIDER NOTE - CARE PLAN
1 Principal Discharge DX:	UTI (urinary tract infection)  Secondary Diagnosis:	Lawson catheter problem

## 2024-07-30 VITALS
RESPIRATION RATE: 16 BRPM | SYSTOLIC BLOOD PRESSURE: 180 MMHG | OXYGEN SATURATION: 100 % | TEMPERATURE: 98 F | DIASTOLIC BLOOD PRESSURE: 80 MMHG | HEART RATE: 72 BPM

## 2024-07-30 LAB
APPEARANCE UR: ABNORMAL
BACTERIA # UR AUTO: ABNORMAL /HPF
BILIRUB UR-MCNC: ABNORMAL
CAST: 1 /LPF — SIGNIFICANT CHANGE UP (ref 0–4)
COLOR SPEC: ABNORMAL
DIFF PNL FLD: ABNORMAL
GLUCOSE UR QL: NEGATIVE MG/DL — SIGNIFICANT CHANGE UP
KETONES UR-MCNC: NEGATIVE MG/DL — SIGNIFICANT CHANGE UP
LEUKOCYTE ESTERASE UR-ACNC: ABNORMAL
NITRITE UR-MCNC: POSITIVE
PH UR: 5.5 — SIGNIFICANT CHANGE UP (ref 5–8)
PROT UR-MCNC: 300 MG/DL
RBC CASTS # UR COMP ASSIST: >50 /HPF — SIGNIFICANT CHANGE UP (ref 0–4)
SP GR SPEC: 1.01 — SIGNIFICANT CHANGE UP (ref 1–1.03)
SQUAMOUS # UR AUTO: 0 /HPF — SIGNIFICANT CHANGE UP (ref 0–5)
UROBILINOGEN FLD QL: 0.2 MG/DL — SIGNIFICANT CHANGE UP (ref 0.2–1)
WBC UR QL: >50 /HPF — SIGNIFICANT CHANGE UP (ref 0–5)

## 2024-07-30 RX ORDER — CEFPODOXIME PROXETIL 50 MG/5 ML
200 SUSPENSION, RECONSTITUTED, ORAL (ML) ORAL ONCE
Refills: 0 | Status: DISCONTINUED | OUTPATIENT
Start: 2024-07-30 | End: 2024-07-30

## 2024-07-30 RX ORDER — CEFDINIR 125 MG/5ML
1 POWDER, FOR SUSPENSION ORAL
Qty: 20 | Refills: 0
Start: 2024-07-30 | End: 2024-08-08

## 2024-07-30 RX ORDER — SULFAMETHOXAZOLE AND TRIMETHOPRIM 800; 160 MG/1; MG/1
1 TABLET ORAL ONCE
Refills: 0 | Status: COMPLETED | OUTPATIENT
Start: 2024-07-30 | End: 2024-07-30

## 2024-07-30 RX ORDER — SULFAMETHOXAZOLE AND TRIMETHOPRIM 800; 160 MG/1; MG/1
1 TABLET ORAL
Qty: 20 | Refills: 0
Start: 2024-07-30 | End: 2024-08-08

## 2024-07-30 RX ADMIN — SULFAMETHOXAZOLE AND TRIMETHOPRIM 1 TABLET(S): 800; 160 TABLET ORAL at 02:50

## 2024-07-30 NOTE — ED ADULT NURSE NOTE - NSFALLHARMRISKINTERV_ED_ALL_ED

## 2024-07-30 NOTE — ED ADULT NURSE REASSESSMENT NOTE - NS ED NURSE REASSESS COMMENT FT1
VS as charted, MD Stinson aware. Leg bag in place, pending SW for discharge. Pt resting comfortably in stretcher, breathing even and unlabored. Offers no complaints at this time.

## 2024-07-30 NOTE — PROVIDER CONTACT NOTE (OTHER) - ASSESSMENT
Per provider, pt's family is unable to pick him up.  Pt has dementia and needs supervision when traveling, SW confirmed with pt's daughter that she will be home to let him in when he arrives home.

## 2024-07-30 NOTE — ED ADULT NURSE NOTE - OBJECTIVE STATEMENT
Break coverage RN: Pt received in rm 27. C/o urinary retention and penile discomfort, pt appears uncomfortable. Pt arrived with Lawson Catheter with urine in bag. Per collateral collected by provider catheter was last changed 1 month ago. Hx HTN, HIV, DM2. Pt A&Ox3, breathing even and unlabored. Per MD Stinson instructions, old Lawson catheter removed and replaced with new one, 20Fr coude in place, +urine initially then blood-tinged. Pt appeared uncomfortable upon removal of previous Lawson and placement of new Lawson. States he thought the catheter was coming out for good. MD Stinson made aware. Pending bladder scan. Pt abd soft, nondistended. Denies nausea or vomiting. Pending urine collection

## 2024-07-31 NOTE — ED POST DISCHARGE NOTE - REASON FOR FOLLOW-UP
Telephone follow up  request : Ensure patient has proper follow up with Urology. Called discharge loung No answer on any of the #'s . S/W patient's daughter who is blind. Patient doing better today walking with Physical therapist presently. Daughter to ensure patient has Urology follow up also gave daughter discharge loung # so they canhelp get an appt with Urology ( daughter memorized # and said back to me). Strict return precautions given to patient's daughter. Other

## 2024-08-01 LAB
-  AMIKACIN: SIGNIFICANT CHANGE UP
-  AMOXICILLIN/CLAVULANIC ACID: SIGNIFICANT CHANGE UP
-  AMPICILLIN/SULBACTAM: SIGNIFICANT CHANGE UP
-  AMPICILLIN: SIGNIFICANT CHANGE UP
-  AZTREONAM: SIGNIFICANT CHANGE UP
-  AZTREONAM: SIGNIFICANT CHANGE UP
-  CEFAZOLIN: SIGNIFICANT CHANGE UP
-  CEFEPIME: SIGNIFICANT CHANGE UP
-  CEFEPIME: SIGNIFICANT CHANGE UP
-  CEFOXITIN: SIGNIFICANT CHANGE UP
-  CEFTAZIDIME: SIGNIFICANT CHANGE UP
-  CEFTRIAXONE: SIGNIFICANT CHANGE UP
-  CIPROFLOXACIN: SIGNIFICANT CHANGE UP
-  CIPROFLOXACIN: SIGNIFICANT CHANGE UP
-  ERTAPENEM: SIGNIFICANT CHANGE UP
-  GENTAMICIN: SIGNIFICANT CHANGE UP
-  IMIPENEM: SIGNIFICANT CHANGE UP
-  IMIPENEM: SIGNIFICANT CHANGE UP
-  LEVOFLOXACIN: SIGNIFICANT CHANGE UP
-  LEVOFLOXACIN: SIGNIFICANT CHANGE UP
-  MEROPENEM: SIGNIFICANT CHANGE UP
-  MEROPENEM: SIGNIFICANT CHANGE UP
-  NITROFURANTOIN: SIGNIFICANT CHANGE UP
-  PIPERACILLIN/TAZOBACTAM: SIGNIFICANT CHANGE UP
-  PIPERACILLIN/TAZOBACTAM: SIGNIFICANT CHANGE UP
-  TOBRAMYCIN: SIGNIFICANT CHANGE UP
-  TRIMETHOPRIM/SULFAMETHOXAZOLE: SIGNIFICANT CHANGE UP
CULTURE RESULTS: ABNORMAL
METHOD TYPE: SIGNIFICANT CHANGE UP
METHOD TYPE: SIGNIFICANT CHANGE UP
ORGANISM # SPEC MICROSCOPIC CNT: ABNORMAL
SPECIMEN SOURCE: SIGNIFICANT CHANGE UP

## 2024-08-02 PROBLEM — Z00.00 ENCOUNTER FOR PREVENTIVE HEALTH EXAMINATION: Status: ACTIVE | Noted: 2024-08-02

## 2024-08-04 ENCOUNTER — INPATIENT (INPATIENT)
Facility: HOSPITAL | Age: 84
LOS: 2 days | Discharge: HOME HEALTH | End: 2024-08-07
Attending: STUDENT IN AN ORGANIZED HEALTH CARE EDUCATION/TRAINING PROGRAM | Admitting: STUDENT IN AN ORGANIZED HEALTH CARE EDUCATION/TRAINING PROGRAM
Payer: MEDICAID

## 2024-08-04 VITALS — TEMPERATURE: 101 F

## 2024-08-04 LAB
ALBUMIN SERPL ELPH-MCNC: 3.8 G/DL — SIGNIFICANT CHANGE UP (ref 3.3–5)
ALP SERPL-CCNC: 94 U/L — SIGNIFICANT CHANGE UP (ref 40–120)
ALT FLD-CCNC: 9 U/L — SIGNIFICANT CHANGE UP (ref 4–41)
ANION GAP SERPL CALC-SCNC: 13 MMOL/L — SIGNIFICANT CHANGE UP (ref 7–14)
APTT BLD: 29.7 SEC — SIGNIFICANT CHANGE UP (ref 24.5–35.6)
AST SERPL-CCNC: 10 U/L — SIGNIFICANT CHANGE UP (ref 4–40)
BASE EXCESS BLDV CALC-SCNC: -2.5 MMOL/L — LOW (ref -2–3)
BASOPHILS # BLD AUTO: 0.06 K/UL — SIGNIFICANT CHANGE UP (ref 0–0.2)
BASOPHILS NFR BLD AUTO: 0.4 % — SIGNIFICANT CHANGE UP (ref 0–2)
BILIRUB SERPL-MCNC: 0.7 MG/DL — SIGNIFICANT CHANGE UP (ref 0.2–1.2)
BLOOD GAS VENOUS COMPREHENSIVE RESULT: SIGNIFICANT CHANGE UP
BUN SERPL-MCNC: 29 MG/DL — HIGH (ref 7–23)
CALCIUM SERPL-MCNC: 9.7 MG/DL — SIGNIFICANT CHANGE UP (ref 8.4–10.5)
CHLORIDE BLDV-SCNC: 105 MMOL/L — SIGNIFICANT CHANGE UP (ref 96–108)
CHLORIDE SERPL-SCNC: 102 MMOL/L — SIGNIFICANT CHANGE UP (ref 98–107)
CO2 BLDV-SCNC: 23.7 MMOL/L — SIGNIFICANT CHANGE UP (ref 22–26)
CO2 SERPL-SCNC: 21 MMOL/L — LOW (ref 22–31)
CREAT SERPL-MCNC: 1.5 MG/DL — HIGH (ref 0.5–1.3)
EGFR: 45 ML/MIN/1.73M2 — LOW
EOSINOPHIL # BLD AUTO: 0.09 K/UL — SIGNIFICANT CHANGE UP (ref 0–0.5)
EOSINOPHIL NFR BLD AUTO: 0.5 % — SIGNIFICANT CHANGE UP (ref 0–6)
FLUAV AG NPH QL: SIGNIFICANT CHANGE UP
FLUBV AG NPH QL: SIGNIFICANT CHANGE UP
GAS PNL BLDV: 135 MMOL/L — LOW (ref 136–145)
GAS PNL BLDV: SIGNIFICANT CHANGE UP
GLUCOSE BLDV-MCNC: 358 MG/DL — HIGH (ref 70–99)
GLUCOSE SERPL-MCNC: 350 MG/DL — HIGH (ref 70–99)
HCO3 BLDV-SCNC: 22 MMOL/L — SIGNIFICANT CHANGE UP (ref 22–29)
HCT VFR BLD CALC: 34.2 % — LOW (ref 39–50)
HCT VFR BLDA CALC: 34 % — LOW (ref 39–51)
HGB BLD CALC-MCNC: 11.4 G/DL — LOW (ref 12.6–17.4)
HGB BLD-MCNC: 11.1 G/DL — LOW (ref 13–17)
IANC: 12.18 K/UL — HIGH (ref 1.8–7.4)
IMM GRANULOCYTES NFR BLD AUTO: 2 % — HIGH (ref 0–0.9)
INR BLD: 1.16 RATIO — SIGNIFICANT CHANGE UP (ref 0.85–1.18)
LACTATE BLDV-MCNC: 2.3 MMOL/L — HIGH (ref 0.5–2)
LYMPHOCYTES # BLD AUTO: 14.2 % — SIGNIFICANT CHANGE UP (ref 13–44)
LYMPHOCYTES # BLD AUTO: 2.33 K/UL — SIGNIFICANT CHANGE UP (ref 1–3.3)
MCHC RBC-ENTMCNC: 28.3 PG — SIGNIFICANT CHANGE UP (ref 27–34)
MCHC RBC-ENTMCNC: 32.5 GM/DL — SIGNIFICANT CHANGE UP (ref 32–36)
MCV RBC AUTO: 87.2 FL — SIGNIFICANT CHANGE UP (ref 80–100)
MONOCYTES # BLD AUTO: 1.4 K/UL — HIGH (ref 0–0.9)
MONOCYTES NFR BLD AUTO: 8.5 % — SIGNIFICANT CHANGE UP (ref 2–14)
NEUTROPHILS # BLD AUTO: 12.18 K/UL — HIGH (ref 1.8–7.4)
NEUTROPHILS NFR BLD AUTO: 74.4 % — SIGNIFICANT CHANGE UP (ref 43–77)
NRBC # BLD: 0 /100 WBCS — SIGNIFICANT CHANGE UP (ref 0–0)
NRBC # FLD: 0 K/UL — SIGNIFICANT CHANGE UP (ref 0–0)
PCO2 BLDV: 39 MMHG — LOW (ref 42–55)
PH BLDV: 7.37 — SIGNIFICANT CHANGE UP (ref 7.32–7.43)
PLATELET # BLD AUTO: 233 K/UL — SIGNIFICANT CHANGE UP (ref 150–400)
PO2 BLDV: 36 MMHG — SIGNIFICANT CHANGE UP (ref 25–45)
POTASSIUM BLDV-SCNC: 5.1 MMOL/L — SIGNIFICANT CHANGE UP (ref 3.5–5.1)
POTASSIUM SERPL-MCNC: 4.8 MMOL/L — SIGNIFICANT CHANGE UP (ref 3.5–5.3)
POTASSIUM SERPL-SCNC: 4.8 MMOL/L — SIGNIFICANT CHANGE UP (ref 3.5–5.3)
PROT SERPL-MCNC: 7.9 G/DL — SIGNIFICANT CHANGE UP (ref 6–8.3)
PROTHROM AB SERPL-ACNC: 13.1 SEC — HIGH (ref 9.5–13)
RBC # BLD: 3.92 M/UL — LOW (ref 4.2–5.8)
RBC # FLD: 12.9 % — SIGNIFICANT CHANGE UP (ref 10.3–14.5)
RSV RNA NPH QL NAA+NON-PROBE: SIGNIFICANT CHANGE UP
SAO2 % BLDV: 59.4 % — LOW (ref 67–88)
SARS-COV-2 RNA SPEC QL NAA+PROBE: SIGNIFICANT CHANGE UP
SODIUM SERPL-SCNC: 136 MMOL/L — SIGNIFICANT CHANGE UP (ref 135–145)
WBC # BLD: 16.39 K/UL — HIGH (ref 3.8–10.5)
WBC # FLD AUTO: 16.39 K/UL — HIGH (ref 3.8–10.5)

## 2024-08-04 PROCEDURE — 71046 X-RAY EXAM CHEST 2 VIEWS: CPT | Mod: 26

## 2024-08-04 PROCEDURE — 74177 CT ABD & PELVIS W/CONTRAST: CPT | Mod: 26,MC

## 2024-08-04 PROCEDURE — 99285 EMERGENCY DEPT VISIT HI MDM: CPT

## 2024-08-04 RX ORDER — MEROPENEM 1 G/20ML
1000 INJECTION, POWDER, FOR SOLUTION INTRAVENOUS ONCE
Refills: 0 | Status: COMPLETED | OUTPATIENT
Start: 2024-08-04 | End: 2024-08-04

## 2024-08-04 RX ORDER — DEXTROSE MONOHYDRATE, SODIUM CHLORIDE, SODIUM LACTATE, CALCIUM CHLORIDE, MAGNESIUM CHLORIDE 1.5; 538; 448; 18.4; 5.08 G/100ML; MG/100ML; MG/100ML; MG/100ML; MG/100ML
1000 SOLUTION INTRAPERITONEAL ONCE
Refills: 0 | Status: COMPLETED | OUTPATIENT
Start: 2024-08-04 | End: 2024-08-04

## 2024-08-04 RX ADMIN — DEXTROSE MONOHYDRATE, SODIUM CHLORIDE, SODIUM LACTATE, CALCIUM CHLORIDE, MAGNESIUM CHLORIDE 1000 MILLILITER(S): 1.5; 538; 448; 18.4; 5.08 SOLUTION INTRAPERITONEAL at 23:01

## 2024-08-04 RX ADMIN — MEROPENEM 100 MILLIGRAM(S): 1 INJECTION, POWDER, FOR SOLUTION INTRAVENOUS at 23:01

## 2024-08-04 NOTE — ED PROVIDER NOTE - PROGRESS NOTE DETAILS
Workup shows leukocytosis of 16, DONELL to 1.5 from baseline creatinine of 1.1, lactate of 2.3, infected urine similar to prior on 7/30/2024.  CT AP shows bladder thickening consistent with obstructive uropathy but no hydronephrosis.  Patient meets SIRS criteria and likely has urosepsis from ongoing UTI.  Has received fluids and is on broad-spectrum antibiotics.  Will admit to medicine for further care.

## 2024-08-04 NOTE — ED PROVIDER NOTE - NS ED ATTENDING STATEMENT MOD
Spiritual Plan of Care    Pt Name: Maci Mancini  Pt : 1959  Date: 2019    Referral source: Other (Comment)(Routine Visit )  Reason for visit: Routine Visit  Visited with: Patient  Patient Assessment: Spiritual no Adventism, Relies on mickey  Patient  Intervention: Empathic Listening, Emotional Support, Affirmation, Reassurance, Prayer  Spiritual Plan of Care: No plan for follow up at this time  Time Spent: 15 mins.  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  Needs/Barriers: The patient identified multiple stressors including having to navigate some steps at home when she is discharged.  Hopes/Goals:   The patient identified steps towards goals which include be able to walk again unencumbered and her  [employer] and her establishing a [employer]orship [employer].  Support System: The patient identified patient's support system as strong.  Beliefs/Values: The patient reported mickey, prayer and scripture serves patient well at difficult times.  Resources:  The patient identified coping strategies/skills as listening to Yaz[employer] country music and reading the Bible or hearing it read.  Outcome:  Coping techniques strengthened.   Recommendations:   1. Contact Spiritual Care as needed.  2. Additional  services available prn.       Attending with

## 2024-08-04 NOTE — ED PROVIDER NOTE - CLINICAL SUMMARY MEDICAL DECISION MAKING FREE TEXT BOX
84-year-old male with history of DM2, CKD 3, HIV (viral load undetectable, CD4 count 900), HTN, BPH with chronic Lawson in place, recent admission for urinary retention complicated by renal failure secondary to obstructive uropathy likely related to his BPH, found to have infected urine on 7/30 with cultures growing Enterobacter and Pseudomonas.  Bactrim was sent to the pharmacy but is unclear if he has been taking this.  He returns today for several days of increasing suprapubic abdominal pain as well as burning in his Lawson site.  He is hemodynamically stable with no signs of hypotension or tachycardia, however rectal temperature was 100.7. Urine smells malodorous but there is no abnormal discharge.  Quick bedside ultrasound shows a collapsed bladder with Lawson balloon, no evidence of hydronephrosis bilaterally.  Feel that he is most likely uroseptic from an untreated urinary tract infection.  Will start broad-spectrum antibiotics, give fluids, send sepsis labs, and obtain CT abdomen pelvis as he also is experiencing left lower quadrant abdominal pain.  He will be admitted for further evaluation and management.

## 2024-08-04 NOTE — ED ADULT TRIAGE NOTE - CHIEF COMPLAINT QUOTE
Patient brought in by EMS from home for abdominal pain and pain around Lawson catheter site starting today. Denies fever, nausea, vomiting at home. Alert and oriented x 1, at baseline per daughter. Patient speaks an  dialect, daughter translating. Phx DM2, BPH with chronic Lawson, CKD 3, HIV, HTN

## 2024-08-04 NOTE — ED PROVIDER NOTE - IN ACCORDANCE WITH [place] STATE LAW, WE OFFER EVERY PATIENT A HEPATITIS C TEST. WOULD YOU LIKE TO BE TESTED TODAY?
wheezes. She has no rales. Abdominal: Soft. Bowel sounds are normal. She exhibits no distension and no mass. There is no tenderness. Musculoskeletal: Normal range of motion. She exhibits no tenderness. Left  Shoulder  Some pain and  slight  Decrease  rom   Lymphadenopathy:     She has no cervical adenopathy. Neurological: She is alert and oriented to person, place, and time. She has normal reflexes. No cranial nerve deficit. Skin: Skin is warm and dry. No rash noted. Psychiatric: She has a normal mood and affect. Nursing note and vitals reviewed. Assessment:       Diagnosis Orders   1. Essential hypertension     2. Coronary artery disease involving autologous vein coronary bypass graft without angina pectoris     3. Hypothyroidism due to acquired atrophy of thyroid     4. NSTEMI (non-ST elevated myocardial infarction) ([facility])     5. Episodic tension-type headache, not intractable     6. Pure hypercholesterolemia     7. Breast screening  [facility] DIGITAL SCREEN W CAD BILATERAL         Plan:      Current Outpatient Medications   Medication Sig Dispense Refill    levothyroxine (SYNTHROID) 88 MCG tablet Take 1 tablet by mouth daily 90 tablet 1    clopidogrel (PLAVIX) 75 MG tablet Take 1 tablet by mouth daily 90 tablet 1    metoprolol tartrate (LOPRESSOR) 50 MG tablet TAKE ONE TABLET BY MOUTH ONCE DAILY 90 tablet 1    pantoprazole sodium (PROTONIX) 40 MG PACK packet Take 1 packet by mouth daily 90 each 1    meclizine (ANTIVERT) 25 MG tablet Take 1 tablet by mouth 4 times daily as needed for Dizziness or Nausea 30 tablet 0    tiZANidine (ZANAFLEX) 4 MG tablet One tablet at night and one tablet every 8 hors as needed 20 tablet 0    atorvastatin (LIPITOR) 40 MG tablet Take 40 mg by mouth every evening       aspirin 81 MG EC tablet Take 81 mg by mouth daily. No current facility-administered medications for this visit.           Orders Placed This Encounter   Procedures    [facility] DIGITAL SCREEN W CAD
Opt out

## 2024-08-04 NOTE — ED PROVIDER NOTE - OBJECTIVE STATEMENT
84-year-old male with history of DM2, CKD 3, HIV (viral load undetectable, CD4 count 900), HTN, BPH with chronic Lawson in place, recent admission for urinary retention complicated by renal failure secondary to obstructive uropathy likely related to his BPH, found to have infected urine on 7/30 with cultures growing Enterobacter and Pseudomonas.  Bactrim was sent to their pharmacy as both organisms were sensitive to this, however family is unsure if he is taking them.  Patient presents today with 3 days of increasing lower abdominal pain and burning near Lawson insertion site.  Family states Lawson has been draining although yesterday it seemed beltran than usual.  Normally he is active and takes care of the Lawson himself.  Patient has felt warm but no documented fevers.  Endorses shortness of breath.  Denies chest pain, nausea, vomiting, hematuria, abnormal discharge into the Lawson bag.  They are wondering if they would be able to remove the Lawson.  He has follow-up scheduled with urology on 8/4/2024    Patient speaks Ashley his daughter is translating.

## 2024-08-04 NOTE — ED PROVIDER NOTE - PHYSICAL EXAMINATION
GENERAL: Awake, alert, NAD  HEAD: NC/AT, neck supple, moist mucous membranes  EYES: PERRL, EOM grossly intact, sclera anicteric  LUNGS: normal WOB on RA, CTAB, no wheezes or crackles   CARDIAC: RRR, no m/r/g  ABDOMEN: Soft, voluntary guarding, suprapubic and LLQ TTP.   : Lawson in place, draining dark yellow urine, no blood. No discharge.   EXT: No edema, no calf tenderness, no deformities.  NEURO: A&Ox3. Moving all extremities.  SKIN: Warm, slightly clammy.  PSYCH: Normal affect.

## 2024-08-04 NOTE — ED PROVIDER NOTE - ATTENDING CONTRIBUTION TO CARE
Attending note:   After face to face evaluation of this patient, I concur with above noted hx, pe, and care plan for this patient.  Mckeon: 84-year-old male with BPH HIV with undetectable viral load hypertension and type 2 diabetes.  Patient presents ED for pain at the catheter site and lower abdomen.  Patient denies fevers chills or nausea vomiting at home.  Patient recently had Lawson catheter placed 1 months ago for retention/BPH and changed 1 week ago when he was in the ED.  Patient was sent home with antibiotics but family states they are unsure patient has been compliant.  There has been urine output but patient has lower abdominal pain and feels that he is in retention again.  Patient is a rectal temp of 100.7 and feels warm to touch.  Patient is tender throughout the lower belly however abdomen is soft and nondistended.  Patient's blood pressure and heart rate are otherwise normal.  No significant pitting edema is noted.  Patient is awake alert and answering family's questions.  Review of previous culture from 5 days ago shows Enterobacter greater than 100,000 and Pseudomonas as well.  Although Enterobacter is sensitive to Bactrim which patient was sent home on is unsure if patient is taking it and Pseudomonas was not tested and is Bactrim.  Given patient is febrile and tender in lower abdomen patient likely has urosepsis or other underlying GI pathology such as diverticulitis.  A POCUS ultrasound shows that patient is not in retention at this time.  Will check labs including cultures and treat for infection after reviewing sensitivities.  Will order CT scan.  Patient require admission.

## 2024-08-05 DIAGNOSIS — R63.8 OTHER SYMPTOMS AND SIGNS CONCERNING FOOD AND FLUID INTAKE: ICD-10-CM

## 2024-08-05 DIAGNOSIS — B20 HUMAN IMMUNODEFICIENCY VIRUS [HIV] DISEASE: ICD-10-CM

## 2024-08-05 DIAGNOSIS — N18.30 CHRONIC KIDNEY DISEASE, STAGE 3 UNSPECIFIED: ICD-10-CM

## 2024-08-05 DIAGNOSIS — A41.9 SEPSIS, UNSPECIFIED ORGANISM: ICD-10-CM

## 2024-08-05 DIAGNOSIS — E11.9 TYPE 2 DIABETES MELLITUS WITHOUT COMPLICATIONS: ICD-10-CM

## 2024-08-05 DIAGNOSIS — I10 ESSENTIAL (PRIMARY) HYPERTENSION: ICD-10-CM

## 2024-08-05 DIAGNOSIS — N39.0 URINARY TRACT INFECTION, SITE NOT SPECIFIED: ICD-10-CM

## 2024-08-05 LAB
ALBUMIN SERPL ELPH-MCNC: 3.6 G/DL — SIGNIFICANT CHANGE UP (ref 3.3–5)
ALP SERPL-CCNC: 99 U/L — SIGNIFICANT CHANGE UP (ref 40–120)
ALT FLD-CCNC: 5 U/L — SIGNIFICANT CHANGE UP (ref 4–41)
ANION GAP SERPL CALC-SCNC: 13 MMOL/L — SIGNIFICANT CHANGE UP (ref 7–14)
APPEARANCE UR: ABNORMAL
APPEARANCE UR: ABNORMAL
AST SERPL-CCNC: 10 U/L — SIGNIFICANT CHANGE UP (ref 4–40)
BACTERIA # UR AUTO: ABNORMAL /HPF
BACTERIA # UR AUTO: NEGATIVE /HPF — SIGNIFICANT CHANGE UP
BASOPHILS # BLD AUTO: 0.06 K/UL — SIGNIFICANT CHANGE UP (ref 0–0.2)
BASOPHILS NFR BLD AUTO: 0.4 % — SIGNIFICANT CHANGE UP (ref 0–2)
BILIRUB SERPL-MCNC: 0.6 MG/DL — SIGNIFICANT CHANGE UP (ref 0.2–1.2)
BILIRUB UR-MCNC: NEGATIVE — SIGNIFICANT CHANGE UP
BILIRUB UR-MCNC: NEGATIVE — SIGNIFICANT CHANGE UP
BUN SERPL-MCNC: 22 MG/DL — SIGNIFICANT CHANGE UP (ref 7–23)
CALCIUM SERPL-MCNC: 9.7 MG/DL — SIGNIFICANT CHANGE UP (ref 8.4–10.5)
CAST: 17 /LPF — HIGH (ref 0–4)
CAST: 2 /LPF — SIGNIFICANT CHANGE UP (ref 0–4)
CHLORIDE SERPL-SCNC: 103 MMOL/L — SIGNIFICANT CHANGE UP (ref 98–107)
CO2 SERPL-SCNC: 21 MMOL/L — LOW (ref 22–31)
COLOR SPEC: ABNORMAL
COLOR SPEC: YELLOW — SIGNIFICANT CHANGE UP
CREAT SERPL-MCNC: 1.28 MG/DL — SIGNIFICANT CHANGE UP (ref 0.5–1.3)
DIFF PNL FLD: ABNORMAL
DIFF PNL FLD: ABNORMAL
EGFR: 55 ML/MIN/1.73M2 — LOW
EOSINOPHIL # BLD AUTO: 0.04 K/UL — SIGNIFICANT CHANGE UP (ref 0–0.5)
EOSINOPHIL NFR BLD AUTO: 0.2 % — SIGNIFICANT CHANGE UP (ref 0–6)
GLUCOSE BLDC GLUCOMTR-MCNC: 156 MG/DL — HIGH (ref 70–99)
GLUCOSE BLDC GLUCOMTR-MCNC: 188 MG/DL — HIGH (ref 70–99)
GLUCOSE BLDC GLUCOMTR-MCNC: 194 MG/DL — HIGH (ref 70–99)
GLUCOSE BLDC GLUCOMTR-MCNC: 202 MG/DL — HIGH (ref 70–99)
GLUCOSE BLDC GLUCOMTR-MCNC: 232 MG/DL — HIGH (ref 70–99)
GLUCOSE BLDC GLUCOMTR-MCNC: 242 MG/DL — HIGH (ref 70–99)
GLUCOSE SERPL-MCNC: 239 MG/DL — HIGH (ref 70–99)
GLUCOSE UR QL: 500 MG/DL
GLUCOSE UR QL: NEGATIVE MG/DL — SIGNIFICANT CHANGE UP
HCT VFR BLD CALC: 34.3 % — LOW (ref 39–50)
HGB BLD-MCNC: 11 G/DL — LOW (ref 13–17)
IANC: 12.83 K/UL — HIGH (ref 1.8–7.4)
IMM GRANULOCYTES NFR BLD AUTO: 2 % — HIGH (ref 0–0.9)
KETONES UR-MCNC: ABNORMAL MG/DL
KETONES UR-MCNC: NEGATIVE MG/DL — SIGNIFICANT CHANGE UP
LEUKOCYTE ESTERASE UR-ACNC: ABNORMAL
LEUKOCYTE ESTERASE UR-ACNC: ABNORMAL
LYMPHOCYTES # BLD AUTO: 14.2 % — SIGNIFICANT CHANGE UP (ref 13–44)
LYMPHOCYTES # BLD AUTO: 2.38 K/UL — SIGNIFICANT CHANGE UP (ref 1–3.3)
MAGNESIUM SERPL-MCNC: 2 MG/DL — SIGNIFICANT CHANGE UP (ref 1.6–2.6)
MCHC RBC-ENTMCNC: 27.9 PG — SIGNIFICANT CHANGE UP (ref 27–34)
MCHC RBC-ENTMCNC: 32.1 GM/DL — SIGNIFICANT CHANGE UP (ref 32–36)
MCV RBC AUTO: 87.1 FL — SIGNIFICANT CHANGE UP (ref 80–100)
MONOCYTES # BLD AUTO: 1.15 K/UL — HIGH (ref 0–0.9)
MONOCYTES NFR BLD AUTO: 6.8 % — SIGNIFICANT CHANGE UP (ref 2–14)
MUCOUS THREADS # UR AUTO: PRESENT
NEUTROPHILS # BLD AUTO: 12.83 K/UL — HIGH (ref 1.8–7.4)
NEUTROPHILS NFR BLD AUTO: 76.4 % — SIGNIFICANT CHANGE UP (ref 43–77)
NITRITE UR-MCNC: NEGATIVE — SIGNIFICANT CHANGE UP
NITRITE UR-MCNC: POSITIVE
NRBC # BLD: 0 /100 WBCS — SIGNIFICANT CHANGE UP (ref 0–0)
NRBC # FLD: 0 K/UL — SIGNIFICANT CHANGE UP (ref 0–0)
PH UR: 5 — SIGNIFICANT CHANGE UP (ref 5–8)
PH UR: 6 — SIGNIFICANT CHANGE UP (ref 5–8)
PHOSPHATE SERPL-MCNC: 2 MG/DL — LOW (ref 2.5–4.5)
PLATELET # BLD AUTO: 222 K/UL — SIGNIFICANT CHANGE UP (ref 150–400)
POTASSIUM SERPL-MCNC: 4.4 MMOL/L — SIGNIFICANT CHANGE UP (ref 3.5–5.3)
POTASSIUM SERPL-SCNC: 4.4 MMOL/L — SIGNIFICANT CHANGE UP (ref 3.5–5.3)
PROT SERPL-MCNC: 8 G/DL — SIGNIFICANT CHANGE UP (ref 6–8.3)
PROT UR-MCNC: 100 MG/DL
PROT UR-MCNC: 30 MG/DL
RBC # BLD: 3.94 M/UL — LOW (ref 4.2–5.8)
RBC # FLD: 12.7 % — SIGNIFICANT CHANGE UP (ref 10.3–14.5)
RBC CASTS # UR COMP ASSIST: 10 /HPF — HIGH (ref 0–4)
RBC CASTS # UR COMP ASSIST: 1660 /HPF — HIGH (ref 0–4)
REVIEW: SIGNIFICANT CHANGE UP
REVIEW: SIGNIFICANT CHANGE UP
SODIUM SERPL-SCNC: 137 MMOL/L — SIGNIFICANT CHANGE UP (ref 135–145)
SP GR SPEC: 1.02 — SIGNIFICANT CHANGE UP (ref 1–1.03)
SP GR SPEC: 1.03 — HIGH (ref 1–1.03)
SQUAMOUS # UR AUTO: 0 /HPF — SIGNIFICANT CHANGE UP (ref 0–5)
SQUAMOUS # UR AUTO: 0 /HPF — SIGNIFICANT CHANGE UP (ref 0–5)
UROBILINOGEN FLD QL: 0.2 MG/DL — SIGNIFICANT CHANGE UP (ref 0.2–1)
UROBILINOGEN FLD QL: 1 MG/DL — SIGNIFICANT CHANGE UP (ref 0.2–1)
WBC # BLD: 16.79 K/UL — HIGH (ref 3.8–10.5)
WBC # FLD AUTO: 16.79 K/UL — HIGH (ref 3.8–10.5)
WBC CLUMPS # UR AUTO: PRESENT
WBC UR QL: 261 /HPF — HIGH (ref 0–5)
WBC UR QL: 48 /HPF — HIGH (ref 0–5)

## 2024-08-05 PROCEDURE — 99223 1ST HOSP IP/OBS HIGH 75: CPT

## 2024-08-05 RX ORDER — DEXTROSE MONOHYDRATE, SODIUM CHLORIDE, SODIUM LACTATE, CALCIUM CHLORIDE, MAGNESIUM CHLORIDE 1.5; 538; 448; 18.4; 5.08 G/100ML; MG/100ML; MG/100ML; MG/100ML; MG/100ML
1000 SOLUTION INTRAPERITONEAL
Refills: 0 | Status: DISCONTINUED | OUTPATIENT
Start: 2024-08-05 | End: 2024-08-05

## 2024-08-05 RX ORDER — INSULIN GLARGINE-YFGN 100 [IU]/ML
8 INJECTION, SOLUTION SUBCUTANEOUS ONCE
Refills: 0 | Status: COMPLETED | OUTPATIENT
Start: 2024-08-05 | End: 2024-08-05

## 2024-08-05 RX ORDER — ATORVASTATIN CALCIUM 40 MG/1
10 TABLET, FILM COATED ORAL AT BEDTIME
Refills: 0 | Status: DISCONTINUED | OUTPATIENT
Start: 2024-08-05 | End: 2024-08-07

## 2024-08-05 RX ORDER — DEXTROSE 4 G
25 TABLET,CHEWABLE ORAL ONCE
Refills: 0 | Status: DISCONTINUED | OUTPATIENT
Start: 2024-08-05 | End: 2024-08-07

## 2024-08-05 RX ORDER — DEXTROSE 4 G
12.5 TABLET,CHEWABLE ORAL ONCE
Refills: 0 | Status: DISCONTINUED | OUTPATIENT
Start: 2024-08-05 | End: 2024-08-07

## 2024-08-05 RX ORDER — DEXTROSE MONOHYDRATE, SODIUM CHLORIDE, SODIUM LACTATE, CALCIUM CHLORIDE, MAGNESIUM CHLORIDE 1.5; 538; 448; 18.4; 5.08 G/100ML; MG/100ML; MG/100ML; MG/100ML; MG/100ML
1000 SOLUTION INTRAPERITONEAL
Refills: 0 | Status: DISCONTINUED | OUTPATIENT
Start: 2024-08-05 | End: 2024-08-07

## 2024-08-05 RX ORDER — INSULIN GLARGINE-YFGN 100 [IU]/ML
8 INJECTION, SOLUTION SUBCUTANEOUS AT BEDTIME
Refills: 0 | Status: DISCONTINUED | OUTPATIENT
Start: 2024-08-05 | End: 2024-08-05

## 2024-08-05 RX ORDER — DEXTROSE 4 G
15 TABLET,CHEWABLE ORAL ONCE
Refills: 0 | Status: DISCONTINUED | OUTPATIENT
Start: 2024-08-05 | End: 2024-08-07

## 2024-08-05 RX ORDER — INSULIN LISPRO 100/ML
VIAL (ML) SUBCUTANEOUS AT BEDTIME
Refills: 0 | Status: DISCONTINUED | OUTPATIENT
Start: 2024-08-05 | End: 2024-08-07

## 2024-08-05 RX ORDER — HEPARIN SODIUM 1000 [USP'U]/ML
5000 INJECTION, SOLUTION INTRAVENOUS; SUBCUTANEOUS EVERY 12 HOURS
Refills: 0 | Status: DISCONTINUED | OUTPATIENT
Start: 2024-08-05 | End: 2024-08-05

## 2024-08-05 RX ORDER — PRAMIPEXOLE DIHYDROCHLORIDE 0.5 MG/1
1 TABLET ORAL ONCE
Refills: 0 | Status: DISCONTINUED | OUTPATIENT
Start: 2024-08-05 | End: 2024-08-05

## 2024-08-05 RX ORDER — METOPROLOL TARTRATE 100 MG
25 TABLET ORAL DAILY
Refills: 0 | Status: DISCONTINUED | OUTPATIENT
Start: 2024-08-05 | End: 2024-08-06

## 2024-08-05 RX ORDER — ACETAMINOPHEN 500 MG
650 TABLET ORAL EVERY 6 HOURS
Refills: 0 | Status: DISCONTINUED | OUTPATIENT
Start: 2024-08-05 | End: 2024-08-07

## 2024-08-05 RX ORDER — GLUCAGON INJECTION, SOLUTION 0.5 MG/.1ML
1 INJECTION, SOLUTION SUBCUTANEOUS ONCE
Refills: 0 | Status: DISCONTINUED | OUTPATIENT
Start: 2024-08-05 | End: 2024-08-07

## 2024-08-05 RX ORDER — DARUNAVIR, COBICISTAT, EMTRICITABINE, AND TENOFOVIR ALAFENAMIDE 800; 150; 200; 10 MG/1; MG/1; MG/1; MG/1
1 TABLET, FILM COATED ORAL DAILY
Refills: 0 | Status: DISCONTINUED | OUTPATIENT
Start: 2024-08-05 | End: 2024-08-07

## 2024-08-05 RX ORDER — INSULIN GLARGINE-YFGN 100 [IU]/ML
10 INJECTION, SOLUTION SUBCUTANEOUS AT BEDTIME
Refills: 0 | Status: DISCONTINUED | OUTPATIENT
Start: 2024-08-05 | End: 2024-08-07

## 2024-08-05 RX ORDER — TAMSULOSIN HCL 0.4 MG
0.4 CAPSULE ORAL AT BEDTIME
Refills: 0 | Status: DISCONTINUED | OUTPATIENT
Start: 2024-08-05 | End: 2024-08-07

## 2024-08-05 RX ORDER — INSULIN LISPRO 100/ML
VIAL (ML) SUBCUTANEOUS
Refills: 0 | Status: DISCONTINUED | OUTPATIENT
Start: 2024-08-05 | End: 2024-08-07

## 2024-08-05 RX ORDER — SODIUM PHOSPHATE, MONOBASIC, MONOHYDRATE 276; 142 MG/ML; MG/ML
30 INJECTION, SOLUTION INTRAVENOUS ONCE
Refills: 0 | Status: COMPLETED | OUTPATIENT
Start: 2024-08-05 | End: 2024-08-05

## 2024-08-05 RX ORDER — PIPERACILLIN SODIUM, TAZOBACTAM SODIUM 3; .375 G/15ML; G/15ML
3.38 INJECTION, POWDER, LYOPHILIZED, FOR SOLUTION INTRAVENOUS EVERY 12 HOURS
Refills: 0 | Status: DISCONTINUED | OUTPATIENT
Start: 2024-08-05 | End: 2024-08-07

## 2024-08-05 RX ORDER — PRAMIPEXOLE DIHYDROCHLORIDE 0.5 MG/1
5 TABLET ORAL ONCE
Refills: 0 | Status: COMPLETED | OUTPATIENT
Start: 2024-08-05 | End: 2024-08-05

## 2024-08-05 RX ORDER — CHLORHEXIDINE GLUCONATE 500 MG/1
1 CLOTH TOPICAL
Refills: 0 | Status: DISCONTINUED | OUTPATIENT
Start: 2024-08-05 | End: 2024-08-07

## 2024-08-05 RX ADMIN — INSULIN GLARGINE-YFGN 10 UNIT(S): 100 INJECTION, SOLUTION SUBCUTANEOUS at 22:21

## 2024-08-05 RX ADMIN — ATORVASTATIN CALCIUM 10 MILLIGRAM(S): 40 TABLET, FILM COATED ORAL at 21:46

## 2024-08-05 RX ADMIN — PRAMIPEXOLE DIHYDROCHLORIDE 5 MILLIGRAM(S): 0.5 TABLET ORAL at 09:56

## 2024-08-05 RX ADMIN — DEXTROSE MONOHYDRATE, SODIUM CHLORIDE, SODIUM LACTATE, CALCIUM CHLORIDE, MAGNESIUM CHLORIDE 75 MILLILITER(S): 1.5; 538; 448; 18.4; 5.08 SOLUTION INTRAPERITONEAL at 10:45

## 2024-08-05 RX ADMIN — PIPERACILLIN SODIUM, TAZOBACTAM SODIUM 25 GRAM(S): 3; .375 INJECTION, POWDER, LYOPHILIZED, FOR SOLUTION INTRAVENOUS at 09:47

## 2024-08-05 RX ADMIN — Medication 2: at 09:48

## 2024-08-05 RX ADMIN — Medication 0.4 MILLIGRAM(S): at 21:46

## 2024-08-05 RX ADMIN — Medication 25 MILLIGRAM(S): at 06:14

## 2024-08-05 RX ADMIN — Medication 4: at 13:12

## 2024-08-05 RX ADMIN — DARUNAVIR, COBICISTAT, EMTRICITABINE, AND TENOFOVIR ALAFENAMIDE 1 TABLET(S): 800; 150; 200; 10 TABLET, FILM COATED ORAL at 13:04

## 2024-08-05 RX ADMIN — PIPERACILLIN SODIUM, TAZOBACTAM SODIUM 25 GRAM(S): 3; .375 INJECTION, POWDER, LYOPHILIZED, FOR SOLUTION INTRAVENOUS at 21:46

## 2024-08-05 RX ADMIN — HEPARIN SODIUM 5000 UNIT(S): 1000 INJECTION, SOLUTION INTRAVENOUS; SUBCUTANEOUS at 06:14

## 2024-08-05 RX ADMIN — SODIUM PHOSPHATE, MONOBASIC, MONOHYDRATE 85 MILLIMOLE(S): 276; 142 INJECTION, SOLUTION INTRAVENOUS at 13:03

## 2024-08-05 RX ADMIN — INSULIN GLARGINE-YFGN 8 UNIT(S): 100 INJECTION, SOLUTION SUBCUTANEOUS at 06:03

## 2024-08-05 RX ADMIN — DEXTROSE MONOHYDRATE, SODIUM CHLORIDE, SODIUM LACTATE, CALCIUM CHLORIDE, MAGNESIUM CHLORIDE 90 MILLILITER(S): 1.5; 538; 448; 18.4; 5.08 SOLUTION INTRAPERITONEAL at 03:33

## 2024-08-05 RX ADMIN — Medication 2: at 18:15

## 2024-08-05 NOTE — PATIENT PROFILE ADULT - FALL HARM RISK - FACTORS NURSING JUDGEMENT
Occupational Therapy Evaluation    Visit Count: 1   Plan of Care: 7/12/2019 Through: 7/12/2019  Insurance Information:   Therapy Benefits  Payor: [employer]  Authorization Needed: No  Maximum Visit Limit Per Year: Unlimited visits of occupational therapy per calendar year.  CoPay: $0    Referred by: Vladislav Silva DO; Next provider visit (if known/scheduled): 7/25 with NP  Medical Diagnosis (from order):   Diagnosis Information      Diagnosis    815.03 (ICD-9-CM) - S62.326A (ICD-10-CM) - Closed displaced fracture of shaft of fifth metacarpal bone of right hand, initial encounter              Treatment Diagnosis: Finger symptoms with increased pain/symptoms, impaired strength, impaired range of motion, impaired coordination    Date of Surgery: 7/11/19; Surgery performed: Closed reduction right small finger; Physician Guidelines yes  Diagnosis Precautions: MP's to small and ring flexed 70 degrees, IP's free.  OK to remove splint when showering; encourage PIP flexion and extension in splint  Chart reviewed at time of initial evaluation (relevant co-morbidities, allergies, tests and medications listed): depression    SUBJECTIVE   Description of Problem and/or Mechanism of Injury: Pt sustained a metacarpal fracture to right SF after punching a wall in anger on 7/6/19. Pt underwent a closed reduction pinning on 7/11 by Dr. Silva. He presents today for splint.     Pain: Intensity: 2-3/10  Pain Frequency: Intermittent  Hand Dominance: right  Function:  Limitations and exacerbating factors: pain, difficulty, increased time to complete with all activities/tasks with involved extremity  Prior level: independent with all activities of daily living and instrumental activities of daily living, pain free, driving, able to work  Personal Occupations Profile Affected: bathing/showering, toileting/toilet hygiene, upper body dressing, lower body dressing, feeding, personal hygiene/grooming, driving, meal preparation/cleanup,  job performance    Patient Goal: to obtain an orthosis    Prior Treatment: no therapies in the past year for current condition. Hospitalization, home health services or skilled nursing facility in the last 30 days: No, per patient.  Home Environment/Social Support: Patient lives with parent(s)/guardian; assistance as needed from family/friends available.    Safety:  Do you feel safe at home, work and/or school? yes, per patient  Patient denies 2 or more falls or an unexplained fall with injury in the last year, further testing not required     OBJECTIVE   Hand Dominance: right  Extremity Affected/Involved: right    Observation:   Pt presents with post-op splint and bulky bandage     Wound:   Exposed fixation - pins    Type of Orthosis/Cast Issued:   Custom ulnar gutter splint with MPs to 70 degrees flexion, IPs free    Initial Treatment   Initial evaluation completed.    Therapeutic Exercise:   1. PIP and DIP flexion and extension to right RF and SF    Therapeutic Activity:  Patient educated on use and care for splint including don/doff of splint, precautions, care of splint, and wear schedule.  patient and patient's parent(s) Verbalizes understanding and Demonstrates understanding of information and education provided.      Home Program:  * above=instructed home program    Exercise: Date issued Date DC Comments   Right RF, SF flexion/extension 7/12                                 Writer verbally educated the patient and received verbal consent from the patient on hand placement, positioning of patient, and techniques to be performed today including splint care/wear, HEP and how they are pertinent to the patient's plan of care.     ASSESSMENT   19 year old male patient has signs and symptoms consistent with right small finger MC fracture s/p closed reduction pinning that has reported functional limitations listed above. Patient has met goals for one time visit.      Patient will benefit from skilled therapy and  rehab potential is good based on assessment above   Clinical decision making: Low - Patient has few limitations (1-3), comorbidities and/or complexities, as noted in problem focused assessment noted above, that impact their occupational profile.  Resulting in few treatment options and no task modification consistent with low clinical decision making complexity.    PLAN   Goals: To be obtained in a single visit:  1. Fabricate/fit patient with ordered orthosis. ( MET )  2. Patient and/or caregiver will verbalize understanding of precautions and wearing of orthosis.  ( MET )  3. Demonstrates ability to don/doff orthosis independently.  ( MET )    The following skilled interventions to be implemented to achieve above:  Therapeutic Activity (57334)  Therapeutic Exercise (49095)  Orthotics Training (13882)  Splinting/Orthotics     Frequency/Duration: patient seen one time for splint fabrication    The plan of care and goals were established with the patient and patient's parent(s) who concurs.  Patient has been given attendance policy at time of initial evaluation.    Patient Education:  Who will be receiving education: patient and patient's parent(s)  Are they ready to learn: yes  Preferred learning style: written, verbal, video, demonstration  Barriers to learning: no barriers apparent at this time   Result of initial outlined education: Verbalizes understanding and Demonstrates understanding    THERAPY DAILY BILLING   Insurance: 1. [employer] 2. N/A    Evaluation Procedures:  Occupational Therapy Evaluation: Low Complexity    Timed Procedures:  Therapeutic Activity, 5 minutes  Therapeutic Exercise, 10 minutes    Untimed Procedures:   Fracture-hand based; Hand Finger Orthosis (HFO) without joints custom    Total Treatment Time: 56 minutes    Electronically sent for referring provider signature   Yes

## 2024-08-05 NOTE — PATIENT PROFILE ADULT - FALL HARM RISK - HARM RISK INTERVENTIONS

## 2024-08-05 NOTE — PROGRESS NOTE ADULT - PROBLEM SELECTOR PLAN 5
F-LR 90 cc/hr  E-replete prn  N-pureed diet  heparin subQ 8 Lantus + MISS 10 units of basiglar subQ at bedtime at home    Plan:  - 10 Lantus + MISS premeal and QHS

## 2024-08-05 NOTE — PROGRESS NOTE ADULT - SUBJECTIVE AND OBJECTIVE BOX
INTERVAL HPI/OVERNIGHT EVENTS:  Pt seen and examined at bedside. No acute overnight events or complaints.    VITAL SIGNS:  T(F): 98.9 (24 @ 06:00)  HR: 66 (24 @ 06:00)  BP: 142/60 (24 @ 06:00)  RR: 18 (24 @ 06:00)  SpO2: 100% (24 @ 06:00)  Wt(kg): --    PHYSICAL EXAM:    Constitutional: WDWN, NAD  HEENT: PERRL, EOMI, sclera non-icteric, neck supple, trachea midline, no masses, no JVD, MMM, good dentition  Respiratory: CTA b/l, good air entry b/l, no wheezing, no rhonchi, no rales, without accessory muscle use and no intercostal retractions  Cardiovascular: RRR, normal S1S2, no M/R/G  Gastrointestinal: soft, NTND, no masses palpable, BS normal  Extremities: Warm, well perfused, pulses equal bilateral upper and lower extremities, no edema, no clubbing. Capillary refill <2 sec  Neurological: AAOx3, CN Grossly intact  Skin: Normal temperature, warm, dry    MEDICATIONS  (STANDING):  atorvastatin 10 milliGRAM(s) Oral at bedtime  darunavir 800 mG/cobicistat 150 mG/emtricitabine 200 mG/tenofovir alafenamide 10 mG (SYMTUZA) 1 Tablet(s) Oral daily  dextrose 5%. 1000 milliLiter(s) (50 mL/Hr) IV Continuous <Continuous>  dextrose 5%. 1000 milliLiter(s) (100 mL/Hr) IV Continuous <Continuous>  dextrose 50% Injectable 25 Gram(s) IV Push once  dextrose 50% Injectable 12.5 Gram(s) IV Push once  dextrose 50% Injectable 25 Gram(s) IV Push once  glucagon  Injectable 1 milliGRAM(s) IntraMuscular once  haloperidol    Injectable 1 milliGRAM(s) IV Push once  heparin   Injectable 5000 Unit(s) SubCutaneous every 12 hours  insulin glargine Injectable (LANTUS) 8 Unit(s) SubCutaneous at bedtime  insulin lispro (ADMELOG) corrective regimen sliding scale   SubCutaneous three times a day before meals  insulin lispro (ADMELOG) corrective regimen sliding scale   SubCutaneous at bedtime  lactated ringers. 1000 milliLiter(s) (90 mL/Hr) IV Continuous <Continuous>  metoprolol succinate ER 25 milliGRAM(s) Oral daily  piperacillin/tazobactam IVPB.. 3.375 Gram(s) IV Intermittent every 12 hours  tamsulosin 0.4 milliGRAM(s) Oral at bedtime    MEDICATIONS  (PRN):  acetaminophen     Tablet .. 650 milliGRAM(s) Oral every 6 hours PRN Temp greater or equal to 38C (100.4F), Mild Pain (1 - 3)  dextrose Oral Gel 15 Gram(s) Oral once PRN Blood Glucose LESS THAN 70 milliGRAM(s)/deciliter      Allergies    No Known Allergies    Intolerances        LABS:                        11.1   16.39 )-----------( 233      ( 04 Aug 2024 21:58 )             34.2     08-04    136  |  102  |  29<H>  ----------------------------<  350<H>  4.8   |  21<L>  |  1.50<H>    Ca    9.7      04 Aug 2024 21:58    TPro  7.9  /  Alb  3.8  /  TBili  0.7  /  DBili  x   /  AST  10  /  ALT  9   /  AlkPhos  94  08-04    PT/INR - ( 04 Aug 2024 21:58 )   PT: 13.1 sec;   INR: 1.16 ratio         PTT - ( 04 Aug 2024 21:58 )  PTT:29.7 sec  Urinalysis Basic - ( 05 Aug 2024 00:23 )    Color: Yellow / Appearance: Cloudy / S.016 / pH: x  Gluc: x / Ketone: Negative mg/dL  / Bili: Negative / Urobili: 0.2 mg/dL   Blood: x / Protein: 30 mg/dL / Nitrite: Positive   Leuk Esterase: Large / RBC: 10 /HPF /  /HPF   Sq Epi: x / Non Sq Epi: 0 /HPF / Bacteria: Moderate /HPF        RADIOLOGY & ADDITIONAL TESTS:  Reviewed      ******************  Authored By: Jonathan Ladd ([id number])  MS Teams Preferred  ******************   INTERVAL HPI/OVERNIGHT EVENTS:  Pt seen and examined at bedside. No acute overnight events or complaints.    VITAL SIGNS:  T(F): 98.9 (24 @ 06:00)  HR: 66 (24 @ 06:00)  BP: 142/60 (24 @ 06:00)  RR: 18 (24 @ 06:00)  SpO2: 100% (24 @ 06:00)  Wt(kg): --    PHYSICAL EXAM:    Constitutional: WDWN, NAD  HEENT: PERRL, EOMI, sclera non-icteric, neck supple, trachea midline, no masses, no JVD, MMM, good dentition  Respiratory: CTA b/l, good air entry b/l, no wheezing, no rhonchi, no rales, without accessory muscle use and no intercostal retractions  Cardiovascular: RRR, normal S1S2, no M/R/G  Gastrointestinal: soft, NTND, no masses palpable, BS normal  Extremities: Warm, well perfused, pulses equal bilateral upper and lower extremities, no edema, no clubbing. Capillary refill <2 sec  Neurological: AAOx3, CN Grossly intact  Skin: Normal temperature, warm, dry    MEDICATIONS  (STANDING):  atorvastatin 10 milliGRAM(s) Oral at bedtime  darunavir 800 mG/cobicistat 150 mG/emtricitabine 200 mG/tenofovir alafenamide 10 mG (SYMTUZA) 1 Tablet(s) Oral daily  dextrose 5%. 1000 milliLiter(s) (50 mL/Hr) IV Continuous <Continuous>  dextrose 5%. 1000 milliLiter(s) (100 mL/Hr) IV Continuous <Continuous>  dextrose 50% Injectable 25 Gram(s) IV Push once  dextrose 50% Injectable 12.5 Gram(s) IV Push once  dextrose 50% Injectable 25 Gram(s) IV Push once  glucagon  Injectable 1 milliGRAM(s) IntraMuscular once  haloperidol    Injectable 1 milliGRAM(s) IV Push once  heparin   Injectable 5000 Unit(s) SubCutaneous every 12 hours  insulin glargine Injectable (LANTUS) 8 Unit(s) SubCutaneous at bedtime  insulin lispro (ADMELOG) corrective regimen sliding scale   SubCutaneous three times a day before meals  insulin lispro (ADMELOG) corrective regimen sliding scale   SubCutaneous at bedtime  lactated ringers. 1000 milliLiter(s) (90 mL/Hr) IV Continuous <Continuous>  metoprolol succinate ER 25 milliGRAM(s) Oral daily  piperacillin/tazobactam IVPB.. 3.375 Gram(s) IV Intermittent every 12 hours  tamsulosin 0.4 milliGRAM(s) Oral at bedtime    MEDICATIONS  (PRN):  acetaminophen     Tablet .. 650 milliGRAM(s) Oral every 6 hours PRN Temp greater or equal to 38C (100.4F), Mild Pain (1 - 3)  dextrose Oral Gel 15 Gram(s) Oral once PRN Blood Glucose LESS THAN 70 milliGRAM(s)/deciliter      Allergies    No Known Allergies    Intolerances        LABS:                        11.1   16.39 )-----------( 233      ( 04 Aug 2024 21:58 )             34.2     08-04    136  |  102  |  29<H>  ----------------------------<  350<H>  4.8   |  21<L>  |  1.50<H>    Ca    9.7      04 Aug 2024 21:58    TPro  7.9  /  Alb  3.8  /  TBili  0.7  /  DBili  x   /  AST  10  /  ALT  9   /  AlkPhos  94  08-04    PT/INR - ( 04 Aug 2024 21:58 )   PT: 13.1 sec;   INR: 1.16 ratio         PTT - ( 04 Aug 2024 21:58 )  PTT:29.7 sec  Urinalysis Basic - ( 05 Aug 2024 00:23 )    Color: Yellow / Appearance: Cloudy / S.016 / pH: 5.0  Gluc: x / Ketone: Negative mg/dL  / Bili: Negative / Urobili: 0.2 mg/dL   Blood: x / Protein: 30 mg/dL / Nitrite: Positive   Leuk Esterase: Large / RBC: 10 /HPF /  /HPF   Sq Epi: x / Non Sq Epi: 0 /HPF / Bacteria: Moderate /HPF        RADIOLOGY & ADDITIONAL TESTS:  Reviewed    CT Abd/Pelvis w/ IV Contrast ()  IMPRESSION:    Continued circumferential urinary bladder wall thickening despite   underdistention with perivesicular stranding. This may be related to   chronic bladder outlet obstruction from enlarged prostate. Correlate with   urinalysis and lab values to exclude cystitis and/or ascending urinary   tract infection. Consider nonemergent cystoscopy.    Markedly enlarged heterogenous prostate, cause mass effect and protrusion   at the bladder base. Correlate with PSA and consider nonemergent   prostatic workup.    CXR ()  IMPRESSION:  Clear lungs.      ******************  Authored By: Jonathan Ladd ([id number])  MS Teams Preferred  ******************   INTERVAL HPI/OVERNIGHT EVENTS:  Pt seen and examined at bedside. No acute overnight events or complaints. States feeling better since s/p 1L bolus yesterday. Patient still waiting for forrester to be placed, states is having urinary incontenence    VITAL SIGNS:  T(F): 98.9 (24 @ 06:00)  HR: 66 (24 @ 06:00)  BP: 142/60 (24 @ 06:00)  RR: 18 (24 @ 06:00)  SpO2: 100% (24 @ 06:00)  Wt(kg): --    PHYSICAL EXAM:    Constitutional: WDWN, NAD  HEENT: PERRL, EOMI, sclera non-icteric,  Respiratory: CTA b/l, good air entry b/l, no wheezing, no rhonchi, no rales, without accessory muscle use and no intercostal retractions  Cardiovascular: RRR, normal S1S2, no M/R/G  Gastrointestinal: soft, NTND  Extremities: Warm, well perfused, no edema  Neurological: AAOx4, CN Grossly intact  Skin: Normal temperature, warm, dry    MEDICATIONS  (STANDING):  atorvastatin 10 milliGRAM(s) Oral at bedtime  darunavir 800 mG/cobicistat 150 mG/emtricitabine 200 mG/tenofovir alafenamide 10 mG (SYMTUZA) 1 Tablet(s) Oral daily  dextrose 5%. 1000 milliLiter(s) (50 mL/Hr) IV Continuous <Continuous>  dextrose 5%. 1000 milliLiter(s) (100 mL/Hr) IV Continuous <Continuous>  dextrose 50% Injectable 25 Gram(s) IV Push once  dextrose 50% Injectable 12.5 Gram(s) IV Push once  dextrose 50% Injectable 25 Gram(s) IV Push once  glucagon  Injectable 1 milliGRAM(s) IntraMuscular once  haloperidol    Injectable 1 milliGRAM(s) IV Push once  heparin   Injectable 5000 Unit(s) SubCutaneous every 12 hours  insulin glargine Injectable (LANTUS) 8 Unit(s) SubCutaneous at bedtime  insulin lispro (ADMELOG) corrective regimen sliding scale   SubCutaneous three times a day before meals  insulin lispro (ADMELOG) corrective regimen sliding scale   SubCutaneous at bedtime  lactated ringers. 1000 milliLiter(s) (90 mL/Hr) IV Continuous <Continuous>  metoprolol succinate ER 25 milliGRAM(s) Oral daily  piperacillin/tazobactam IVPB.. 3.375 Gram(s) IV Intermittent every 12 hours  tamsulosin 0.4 milliGRAM(s) Oral at bedtime    MEDICATIONS  (PRN):  acetaminophen     Tablet .. 650 milliGRAM(s) Oral every 6 hours PRN Temp greater or equal to 38C (100.4F), Mild Pain (1 - 3)  dextrose Oral Gel 15 Gram(s) Oral once PRN Blood Glucose LESS THAN 70 milliGRAM(s)/deciliter      Allergies    No Known Allergies    Intolerances        LABS:                        11.1   16.39 )-----------( 233      ( 04 Aug 2024 21:58 )             34.2     08-04    136  |  102  |  29<H>  ----------------------------<  350<H>  4.8   |  21<L>  |  1.50<H>    Ca    9.7      04 Aug 2024 21:58    TPro  7.9  /  Alb  3.8  /  TBili  0.7  /  DBili  x   /  AST  10  /  ALT  9   /  AlkPhos  94  08-04    PT/INR - ( 04 Aug 2024 21:58 )   PT: 13.1 sec;   INR: 1.16 ratio         PTT - ( 04 Aug 2024 21:58 )  PTT:29.7 sec  Urinalysis Basic - ( 05 Aug 2024 00:23 )    Color: Yellow / Appearance: Cloudy / S.016 / pH: 5.0  Gluc: x / Ketone: Negative mg/dL  / Bili: Negative / Urobili: 0.2 mg/dL   Blood: x / Protein: 30 mg/dL / Nitrite: Positive   Leuk Esterase: Large / RBC: 10 /HPF /  /HPF   Sq Epi: x / Non Sq Epi: 0 /HPF / Bacteria: Moderate /HPF        RADIOLOGY & ADDITIONAL TESTS:  Reviewed    CT Abd/Pelvis w/ IV Contrast ()  IMPRESSION:    Continued circumferential urinary bladder wall thickening despite   underdistention with perivesicular stranding. This may be related to   chronic bladder outlet obstruction from enlarged prostate. Correlate with   urinalysis and lab values to exclude cystitis and/or ascending urinary   tract infection. Consider nonemergent cystoscopy.    Markedly enlarged heterogenous prostate, cause mass effect and protrusion   at the bladder base. Correlate with PSA and consider nonemergent   prostatic workup.    CXR ()  IMPRESSION:  Clear lungs.      ******************  Authored By: Jonathan Ladd ([id number])  MS Teams Preferred  ******************   INTERVAL HPI/OVERNIGHT EVENTS:  Pt seen and examined at bedside. No acute overnight events or complaints. States feeling better since s/p 1L bolus and zosyn yesterday.     VITAL SIGNS:  T(F): 98.9 (24 @ 06:00)  HR: 66 (24 @ 06:00)  BP: 142/60 (24 @ 06:00)  RR: 18 (24 @ 06:00)  SpO2: 100% (24 @ 06:00)  Wt(kg): --    PHYSICAL EXAM:    Constitutional: WDWN, NAD  HEENT: PERRL, EOMI, sclera non-icteric,  Respiratory: CTA b/l, good air entry b/l, no wheezing, no rhonchi, no rales, without accessory muscle use and no intercostal retractions  Cardiovascular: RRR, normal S1S2, no M/R/G  Gastrointestinal: soft, NTND  Extremities: Warm, well perfused, no edema  Neurological: AAOx4, CN Grossly intact  Skin: Normal temperature, warm, dry    MEDICATIONS  (STANDING):  atorvastatin 10 milliGRAM(s) Oral at bedtime  darunavir 800 mG/cobicistat 150 mG/emtricitabine 200 mG/tenofovir alafenamide 10 mG (SYMTUZA) 1 Tablet(s) Oral daily  dextrose 5%. 1000 milliLiter(s) (50 mL/Hr) IV Continuous <Continuous>  dextrose 5%. 1000 milliLiter(s) (100 mL/Hr) IV Continuous <Continuous>  dextrose 50% Injectable 25 Gram(s) IV Push once  dextrose 50% Injectable 12.5 Gram(s) IV Push once  dextrose 50% Injectable 25 Gram(s) IV Push once  glucagon  Injectable 1 milliGRAM(s) IntraMuscular once  haloperidol    Injectable 1 milliGRAM(s) IV Push once  heparin   Injectable 5000 Unit(s) SubCutaneous every 12 hours  insulin glargine Injectable (LANTUS) 8 Unit(s) SubCutaneous at bedtime  insulin lispro (ADMELOG) corrective regimen sliding scale   SubCutaneous three times a day before meals  insulin lispro (ADMELOG) corrective regimen sliding scale   SubCutaneous at bedtime  lactated ringers. 1000 milliLiter(s) (90 mL/Hr) IV Continuous <Continuous>  metoprolol succinate ER 25 milliGRAM(s) Oral daily  piperacillin/tazobactam IVPB.. 3.375 Gram(s) IV Intermittent every 12 hours  tamsulosin 0.4 milliGRAM(s) Oral at bedtime    MEDICATIONS  (PRN):  acetaminophen     Tablet .. 650 milliGRAM(s) Oral every 6 hours PRN Temp greater or equal to 38C (100.4F), Mild Pain (1 - 3)  dextrose Oral Gel 15 Gram(s) Oral once PRN Blood Glucose LESS THAN 70 milliGRAM(s)/deciliter      Allergies    No Known Allergies    Intolerances        LABS:                                   11.0   16.79 )-----------( 222      ( 05 Aug 2024 10:40 )             34.3     08-05    137  |  103  |  22  ----------------------------<  239<H>  4.4   |  21<L>  |  1.28    Ca    9.7      05 Aug 2024 10:40  Phos  2.0     08-05  Mg     2.00     08-05    TPro  8.0  /  Alb  3.6  /  TBili  0.6  /  DBili  x   /  AST  10  /  ALT  5   /  AlkPhos  99  08-05      PT/INR - ( 04 Aug 2024 21:58 )   PT: 13.1 sec;   INR: 1.16 ratio         PTT - ( 04 Aug 2024 21:58 )  PTT:29.7 sec  Urinalysis Basic - ( 05 Aug 2024 00:23 )    Color: Yellow / Appearance: Cloudy / S.016 / pH: 5.0  Gluc: x / Ketone: Negative mg/dL  / Bili: Negative / Urobili: 0.2 mg/dL   Blood: x / Protein: 30 mg/dL / Nitrite: Positive   Leuk Esterase: Large / RBC: 10 /HPF /  /HPF   Sq Epi: x / Non Sq Epi: 0 /HPF / Bacteria: Moderate /HPF        RADIOLOGY & ADDITIONAL TESTS:  Reviewed    CT Abd/Pelvis w/ IV Contrast ()  IMPRESSION:    Continued circumferential urinary bladder wall thickening despite   underdistention with perivesicular stranding. This may be related to   chronic bladder outlet obstruction from enlarged prostate. Correlate with   urinalysis and lab values to exclude cystitis and/or ascending urinary   tract infection. Consider nonemergent cystoscopy.    Markedly enlarged heterogenous prostate, cause mass effect and protrusion   at the bladder base. Correlate with PSA and consider nonemergent   prostatic workup.    CXR ()  IMPRESSION:  Clear lungs.      ******************  Authored By: Jonathan Ladd ([id number])  MS Teams Preferred  ******************

## 2024-08-05 NOTE — PATIENT PROFILE ADULT - FUNCTIONAL ASSESSMENT - BASIC MOBILITY 6.
3-calculated by average/Not able to assess (calculate score using [facility] averaging method)

## 2024-08-05 NOTE — H&P ADULT - NSHPPHYSICALEXAM_GEN_ALL_CORE
PHYSICAL EXAM:  GENERAL: NAD, well-developed  HEAD:  Atraumatic, Normocephalic  EYES: EOMI, PERRLA, conjunctiva and sclera clear  NECK: Supple, No JVD  CHEST/LUNG: Clear to auscultation bilaterally; No wheeze  HEART: Regular rate and rhythm; No murmurs, rubs, or gallops  ABDOMEN: Soft, Nontender, Nondistended; Bowel sounds present  EXTREMITIES:  2+ Peripheral Pulses, No clubbing, cyanosis, or edema  PSYCH: AAOx2   NEUROLOGY: non-focal  SKIN: No rashes or lesions  : forrester in place

## 2024-08-05 NOTE — ED ADULT NURSE REASSESSMENT NOTE - NS ED NURSE REASSESS COMMENT FT1
Break RN: Pt received from Jacki HEATH. Pt laying in bed, NAD, respirations even and unlabored, VS in flow sheet. Jenna MACEDO at bedside, forrester placement attempted and unsuccessful, sterility maintained, urology made aware by Jenna MACEDO.
Pt received from night RN, E.O. Pt awake, A&Ox2 (self and place). Pt is denying chest pain, SOB, headache, blurry vision, abd pain, or n/v/d. Pt abdomen is soft, non-tender, non-distended. Respirations even and unlabored. Pt pending forrester placement by urology. Comfort measures provided, safety maintained.

## 2024-08-05 NOTE — PROCEDURE NOTE - ADDITIONAL PROCEDURE DETAILS
Forrester was initially attempted with ED staff and urology and pt was too tense to insert forrester. ED gave haldol and forrester was able to be placed without resistance. Forrester bag with very small amounts of blood most likely due to urethral trauma from initial forrester attempts.

## 2024-08-05 NOTE — H&P ADULT - NSHPLABSRESULTS_GEN_ALL_CORE
LABS:                         11.1   16.39 )-----------( 233      ( 04 Aug 2024 21:58 )             34.2         136  |  102  |  29<H>  ----------------------------<  350<H>  4.8   |  21<L>  |  1.50<H>    Ca    9.7      04 Aug 2024 21:58    TPro  7.9  /  Alb  3.8  /  TBili  0.7  /  DBili  x   /  AST  10  /  ALT  9   /  AlkPhos  94      PT/INR - ( 04 Aug 2024 21:58 )   PT: 13.1 sec;   INR: 1.16 ratio         PTT - ( 04 Aug 2024 21:58 )  PTT:29.7 sec  Urinalysis Basic - ( 05 Aug 2024 00:23 )    Color: Yellow / Appearance: Cloudy / S.016 / pH: x  Gluc: x / Ketone: Negative mg/dL  / Bili: Negative / Urobili: 0.2 mg/dL   Blood: x / Protein: 30 mg/dL / Nitrite: Positive   Leuk Esterase: Large / RBC: 10 /HPF /  /HPF   Sq Epi: x / Non Sq Epi: 0 /HPF / Bacteria: Moderate /HPF      Lactate, Blood: 1.8 mmol/L ( @ 00:04)      RADIOLOGY, EKG & ADDITIONAL TESTS: Reviewed.

## 2024-08-05 NOTE — PROGRESS NOTE ADULT - ASSESSMENT
84-year-old male with history of DM2, CKD 3, HIV (viral load undetectable, CD4 count 900), HTN, BPH with chronic Lawson in place, recent admission for urinary retention here w/ fever and suprapubic tenderness here w/ UTI 84-year-old male with history of DM2, CKD 3, HIV (viral load undetectable, CD4 count 900), HTN, BPH with chronic Lawson in place, recent admission for urinary retention here w/ fever and suprapubic tenderness here w/ UTI, feeling better s/p 1L bolus LR 84-year-old male with history of DM2, CKD 3, HIV (viral load undetectable, CD4 count 900), HTN, BPH with chronic Lawson in place, recent admission for urinary retention here w/ fever and suprapubic tenderness here w/ UTI, feeling better s/p 1L bolus LR, zosyn, catheter exchange

## 2024-08-05 NOTE — PROGRESS NOTE ADULT - PROBLEM SELECTOR PLAN 6
F-LR 90 cc/hr  E-replete prn  N-pureed diet  heparin subQ Electrolytes: replete prn  Diet: pureed diet  DVT: heparin subQ  Dispo: Home pending clinical course

## 2024-08-05 NOTE — PROGRESS NOTE ADULT - PROBLEM SELECTOR PLAN 1
-2/2 UTI   -forrester replaced in ED  -zosyn given e. cloacae and pseudomonas hx  -If blood cx negative, can d/c on cipro  -has outpt f/u w/ urology  -c/w flomax for BPH - Leukocytosis, and febrile, and tachycardic on admission   - Pt complaining of suprapubic tenderness and has crhonic forrester in place; previous UTIs, on Bactrim at home for enterobater and pseudomonas UTI  - CXR unremarkable  - forrester replaced in ED  - s/p Meropenem in ED  - zosyn given e. cloacae and pseudomonas hx    Plan:  - f/u BCx, UCx  - C/w Zosyn given e. cloacae and pseudomonas UTI hx  - If blood cx negative, can d/c on cipro  - c/w flomax for BPH  - Tylenol for pain/fever  - Has outpt f/u w/ Urology - SIRS criteria met on admission; Leukocytosis, and febrile, and tachycardic  - Pt complaining of suprapubic tenderness and has chronic forrester in place; previous UTIs, on Bactrim at home for enterobater and pseudomonas UTI but family unsure if he actually was taking it  - CXR unremarkable  - s/p Meropenem in ED  - zosyn given e. cloacae and pseudomonas hx  - Chronic forrester removed in ED, unable to replace, pending urology consult    Plan:  - f/u BCx, UCx  - C/w Zosyn given e. cloacae and pseudomonas UTI hx  - f/u urology for forrester placement  - If blood cx negative, can d/c on cipro  - c/w flomax for BPH  - Tylenol for pain/fever  - Has outpt f/u w/ Urology - SIRS criteria met on admission; Leukocytosis, and febrile, and tachycardic  - Pt complaining of suprapubic tenderness and has chronic forrester in place; previous UTIs, on Bactrim at home for enterobater and pseudomonas UTI but family unsure if he actually was taking it  - CXR unremarkable  - s/p Meropenem in ED  - zosyn (8/5 - ?) given e. cloacae and pseudomonas hx  - Chronic forrester removed in ED, replaced by urology 8/5    Plan:  - f/u BCx, UCx  - C/w Zosyn given e. cloacae and pseudomonas UTI hx; Likely will require 7-10 day course of zosyn on dc  - LR 75ml/hr  - Monitor Urine output  - c/w flomax for BPH  - Tylenol for pain/fever  - Has outpt f/u w/ Urology

## 2024-08-05 NOTE — H&P ADULT - ASSESSMENT
84-year-old male with history of DM2, CKD 3, HIV (viral load undetectable, CD4 count 900), HTN, BPH with chronic Lawson in place, recent admission for urinary retention here w/ fever and suprapubic tenderness here w/ UTI

## 2024-08-05 NOTE — PROGRESS NOTE ADULT - PROBLEM SELECTOR PLAN 3
-mildly elevated creatinine from baseline. Given fluids, monitor in AM - mildly elevated creatinine from baseline of 1.1. Given fluids, monitor in AM mildly elevated creatinine 1.5 from baseline of 1.1.   - S/p IVF, downtrending to 1.28 8/5, continue to monitor

## 2024-08-05 NOTE — H&P ADULT - HISTORY OF PRESENT ILLNESS
INCOMPLETE 84-year-old male with history of DM2, CKD 3, HIV (viral load undetectable, CD4 count 900), HTN, BPH with chronic Forrester in place, recent admission for urinary retention here w/ fever and suprapubic tenderness. He was on bactrim for pseudomonal uti?? He is a/o times 1-2 at baseline, hx obtained via daughter over the phone. ROS otherwise negative. ED course: forrester replaced, u/a positive. Leukocytosis 16k noted. Given meropenem.     Vital Signs Last 24 Hrs  T(C): 37.5 (05 Aug 2024 03:13), Max: 38.2 (04 Aug 2024 19:55)  T(F): 99.5 (05 Aug 2024 03:13), Max: 100.7 (04 Aug 2024 19:55)  HR: 71 (05 Aug 2024 03:13) (71 - 93)  BP: 145/56 (05 Aug 2024 03:13) (143/74 - 145/83)  BP(mean): --  RR: 18 (04 Aug 2024 20:45) (18 - 18)  SpO2: 100% (04 Aug 2024 20:45) (98% - 100%)    Parameters below as of 04 Aug 2024 19:58  Patient On (Oxygen Delivery Method): room air

## 2024-08-05 NOTE — H&P ADULT - PROBLEM SELECTOR PLAN 1
-2/2 UTI   -forrester replaced in ED  -zosyn given e. cloacae and pseudomonas hx  -If blood cx negative, can d/c on cipro  -has outpt f/u w/ urology  -c/w flomax for BPH

## 2024-08-06 LAB
ALBUMIN SERPL ELPH-MCNC: 3.2 G/DL — LOW (ref 3.3–5)
ALP SERPL-CCNC: 74 U/L — SIGNIFICANT CHANGE UP (ref 40–120)
ALT FLD-CCNC: 10 U/L — SIGNIFICANT CHANGE UP (ref 4–41)
ANION GAP SERPL CALC-SCNC: 12 MMOL/L — SIGNIFICANT CHANGE UP (ref 7–14)
AST SERPL-CCNC: 15 U/L — SIGNIFICANT CHANGE UP (ref 4–40)
BASOPHILS # BLD AUTO: 0.04 K/UL — SIGNIFICANT CHANGE UP (ref 0–0.2)
BASOPHILS NFR BLD AUTO: 0.4 % — SIGNIFICANT CHANGE UP (ref 0–2)
BILIRUB SERPL-MCNC: 0.6 MG/DL — SIGNIFICANT CHANGE UP (ref 0.2–1.2)
BUN SERPL-MCNC: 16 MG/DL — SIGNIFICANT CHANGE UP (ref 7–23)
CALCIUM SERPL-MCNC: 9 MG/DL — SIGNIFICANT CHANGE UP (ref 8.4–10.5)
CHLORIDE SERPL-SCNC: 105 MMOL/L — SIGNIFICANT CHANGE UP (ref 98–107)
CO2 SERPL-SCNC: 22 MMOL/L — SIGNIFICANT CHANGE UP (ref 22–31)
CREAT SERPL-MCNC: 1.19 MG/DL — SIGNIFICANT CHANGE UP (ref 0.5–1.3)
CULTURE RESULTS: SIGNIFICANT CHANGE UP
CULTURE RESULTS: SIGNIFICANT CHANGE UP
EGFR: 60 ML/MIN/1.73M2 — SIGNIFICANT CHANGE UP
EOSINOPHIL # BLD AUTO: 0.08 K/UL — SIGNIFICANT CHANGE UP (ref 0–0.5)
EOSINOPHIL NFR BLD AUTO: 0.7 % — SIGNIFICANT CHANGE UP (ref 0–6)
GLUCOSE BLDC GLUCOMTR-MCNC: 120 MG/DL — HIGH (ref 70–99)
GLUCOSE BLDC GLUCOMTR-MCNC: 131 MG/DL — HIGH (ref 70–99)
GLUCOSE BLDC GLUCOMTR-MCNC: 169 MG/DL — HIGH (ref 70–99)
GLUCOSE BLDC GLUCOMTR-MCNC: 196 MG/DL — HIGH (ref 70–99)
GLUCOSE SERPL-MCNC: 99 MG/DL — SIGNIFICANT CHANGE UP (ref 70–99)
HCT VFR BLD CALC: 30.9 % — LOW (ref 39–50)
HGB BLD-MCNC: 10 G/DL — LOW (ref 13–17)
IANC: 8.02 K/UL — HIGH (ref 1.8–7.4)
IMM GRANULOCYTES NFR BLD AUTO: 0.4 % — SIGNIFICANT CHANGE UP (ref 0–0.9)
LYMPHOCYTES # BLD AUTO: 2.52 K/UL — SIGNIFICANT CHANGE UP (ref 1–3.3)
LYMPHOCYTES # BLD AUTO: 22.1 % — SIGNIFICANT CHANGE UP (ref 13–44)
MAGNESIUM SERPL-MCNC: 1.9 MG/DL — SIGNIFICANT CHANGE UP (ref 1.6–2.6)
MCHC RBC-ENTMCNC: 27.5 PG — SIGNIFICANT CHANGE UP (ref 27–34)
MCHC RBC-ENTMCNC: 32.4 GM/DL — SIGNIFICANT CHANGE UP (ref 32–36)
MCV RBC AUTO: 85.1 FL — SIGNIFICANT CHANGE UP (ref 80–100)
MONOCYTES # BLD AUTO: 0.71 K/UL — SIGNIFICANT CHANGE UP (ref 0–0.9)
MONOCYTES NFR BLD AUTO: 6.2 % — SIGNIFICANT CHANGE UP (ref 2–14)
MRSA PCR RESULT.: SIGNIFICANT CHANGE UP
NEUTROPHILS # BLD AUTO: 8.02 K/UL — HIGH (ref 1.8–7.4)
NEUTROPHILS NFR BLD AUTO: 70.2 % — SIGNIFICANT CHANGE UP (ref 43–77)
NRBC # BLD: 0 /100 WBCS — SIGNIFICANT CHANGE UP (ref 0–0)
NRBC # FLD: 0 K/UL — SIGNIFICANT CHANGE UP (ref 0–0)
PHOSPHATE SERPL-MCNC: 2.7 MG/DL — SIGNIFICANT CHANGE UP (ref 2.5–4.5)
PLATELET # BLD AUTO: 216 K/UL — SIGNIFICANT CHANGE UP (ref 150–400)
POTASSIUM SERPL-MCNC: 3.8 MMOL/L — SIGNIFICANT CHANGE UP (ref 3.5–5.3)
POTASSIUM SERPL-SCNC: 3.8 MMOL/L — SIGNIFICANT CHANGE UP (ref 3.5–5.3)
PROT SERPL-MCNC: 6.8 G/DL — SIGNIFICANT CHANGE UP (ref 6–8.3)
RBC # BLD: 3.63 M/UL — LOW (ref 4.2–5.8)
RBC # FLD: 13.1 % — SIGNIFICANT CHANGE UP (ref 10.3–14.5)
S AUREUS DNA NOSE QL NAA+PROBE: SIGNIFICANT CHANGE UP
SODIUM SERPL-SCNC: 139 MMOL/L — SIGNIFICANT CHANGE UP (ref 135–145)
SPECIMEN SOURCE: SIGNIFICANT CHANGE UP
SPECIMEN SOURCE: SIGNIFICANT CHANGE UP
WBC # BLD: 11.42 K/UL — HIGH (ref 3.8–10.5)
WBC # FLD AUTO: 11.42 K/UL — HIGH (ref 3.8–10.5)

## 2024-08-06 PROCEDURE — 99233 SBSQ HOSP IP/OBS HIGH 50: CPT | Mod: GC

## 2024-08-06 RX ORDER — METOPROLOL TARTRATE 100 MG
25 TABLET ORAL DAILY
Refills: 0 | Status: DISCONTINUED | OUTPATIENT
Start: 2024-08-06 | End: 2024-08-07

## 2024-08-06 RX ADMIN — ATORVASTATIN CALCIUM 10 MILLIGRAM(S): 40 TABLET, FILM COATED ORAL at 22:05

## 2024-08-06 RX ADMIN — DARUNAVIR, COBICISTAT, EMTRICITABINE, AND TENOFOVIR ALAFENAMIDE 1 TABLET(S): 800; 150; 200; 10 TABLET, FILM COATED ORAL at 11:07

## 2024-08-06 RX ADMIN — PIPERACILLIN SODIUM, TAZOBACTAM SODIUM 25 GRAM(S): 3; .375 INJECTION, POWDER, LYOPHILIZED, FOR SOLUTION INTRAVENOUS at 11:07

## 2024-08-06 RX ADMIN — CHLORHEXIDINE GLUCONATE 1 APPLICATION(S): 500 CLOTH TOPICAL at 05:54

## 2024-08-06 RX ADMIN — INSULIN GLARGINE-YFGN 10 UNIT(S): 100 INJECTION, SOLUTION SUBCUTANEOUS at 22:42

## 2024-08-06 RX ADMIN — Medication 2: at 18:30

## 2024-08-06 RX ADMIN — DEXTROSE MONOHYDRATE, SODIUM CHLORIDE, SODIUM LACTATE, CALCIUM CHLORIDE, MAGNESIUM CHLORIDE 75 MILLILITER(S): 1.5; 538; 448; 18.4; 5.08 SOLUTION INTRAPERITONEAL at 06:55

## 2024-08-06 RX ADMIN — PIPERACILLIN SODIUM, TAZOBACTAM SODIUM 25 GRAM(S): 3; .375 INJECTION, POWDER, LYOPHILIZED, FOR SOLUTION INTRAVENOUS at 22:07

## 2024-08-06 RX ADMIN — Medication 0.4 MILLIGRAM(S): at 22:05

## 2024-08-06 NOTE — PROGRESS NOTE ADULT - PROBLEM SELECTOR PLAN 1
- SIRS criteria met on admission; Leukocytosis, and febrile, and tachycardic  - Pt complaining of suprapubic tenderness and has chronic forrester in place; previous UTIs, on Bactrim at home for enterobater and pseudomonas UTI but family unsure if he actually was taking it  - CXR unremarkable  - s/p Meropenem in ED  - zosyn (8/5 - ?) given e. cloacae and pseudomonas hx  - Chronic forrester removed in ED, replaced by urology 8/5  - BCx NGTD    Plan:  - f/u BCx, UCx  - C/w Zosyn given e. cloacae and pseudomonas UTI hx; Likely will require 7-10 day course of zosyn on dc  - LR 75ml/hr  - Monitor Urine output  - c/w flomax for BPH  - Tylenol for pain/fever  - Has outpt f/u w/ Urology - SIRS criteria met on admission; Leukocytosis, and febrile, and tachycardic  - Pt complaining of suprapubic tenderness and has chronic forrester in place; previous UTIs, on Bactrim at home for enterobater and pseudomonas UTI but family unsure if he actually was taking it  - CXR unremarkable  - s/p Meropenem in ED  - zosyn (8/5 - ?) given e. cloacae and pseudomonas hx  - Chronic forrester removed in ED, replaced by urology 8/5  - BCx NGTD  - WBC downtrending 11.42 from 16.79    Plan:  - f/u BCx, UCx  - C/w Zosyn given e. cloacae and pseudomonas UTI hx; Likely will require 7-10 day course of zosyn on dc  - LR 75ml/hr  - Monitor Urine output  - c/w flomax for BPH  - Tylenol for pain/fever  - Has outpt f/u w/ Urology - SIRS criteria met on admission; Leukocytosis, and febrile, and tachycardic  - Pt complaining of suprapubic tenderness and has chronic forrester in place; previous UTIs, on Bactrim at home for enterobater and pseudomonas UTI but family unsure if he actually was taking it  - CXR unremarkable  - s/p Meropenem in ED  - zosyn (8/5 - ?) given e. cloacae and pseudomonas hx  - Chronic forrester removed in ED, replaced by urology 8/5  - BCx NGTD  - WBC downtrending 11.42 from 16.79    Plan:  - f/u BCx, UCx  - C/w Zosyn given e. cloacae and pseudomonas UTI hx; Likely will require 7-10 day course of zosyn on dc  - LR 75ml/hr  - Monitor Urine output  - c/w flomax for BPH  - Tylenol for pain/fever  - Has outpt f/u w/ Urology 8/9

## 2024-08-06 NOTE — PROGRESS NOTE ADULT - PROBLEM SELECTOR PLAN 2
- c/w toprol 25 mg XL QD (held overnight)  - c/w atorvastatin 10 mg QD - c/w toprol 25 mg XL QD  - c/w atorvastatin 10 mg QD

## 2024-08-06 NOTE — PROGRESS NOTE ADULT - ASSESSMENT
84-year-old male with history of DM2, CKD 3, HIV (viral load undetectable, CD4 count 900), HTN, BPH with chronic Lawson in place, recent admission for urinary retention here w/ fever and suprapubic tenderness here w/ UTI, feeling better s/p 1L bolus LR, zosyn, catheter exchange

## 2024-08-06 NOTE — PROGRESS NOTE ADULT - PROBLEM SELECTOR PLAN 6
Electrolytes: replete prn  Diet: pureed diet  DVT: heparin subQ  Dispo: Home pending clinical course Electrolytes: replete prn  Diet: pureed diet  DVT: was holding heparin subQ s/p forrester placement, can restart today  Dispo: Home pending clinical course

## 2024-08-06 NOTE — PROGRESS NOTE ADULT - PROBLEM SELECTOR PLAN 3
mildly elevated creatinine 1.5 from baseline of 1.1.   - S/p IVF, downtrending to 1.28 8/5, continue to monitor mildly elevated creatinine 1.5 from baseline of 1.1.   - S/p IVF, downtrending to 1.19 8/6, continue to monitor

## 2024-08-07 ENCOUNTER — TRANSCRIPTION ENCOUNTER (OUTPATIENT)
Age: 84
End: 2024-08-07

## 2024-08-07 VITALS
SYSTOLIC BLOOD PRESSURE: 167 MMHG | DIASTOLIC BLOOD PRESSURE: 82 MMHG | TEMPERATURE: 98 F | RESPIRATION RATE: 18 BRPM | HEART RATE: 63 BPM | OXYGEN SATURATION: 100 %

## 2024-08-07 DIAGNOSIS — I10 ESSENTIAL (PRIMARY) HYPERTENSION: ICD-10-CM

## 2024-08-07 LAB
ALBUMIN SERPL ELPH-MCNC: 3.4 G/DL — SIGNIFICANT CHANGE UP (ref 3.3–5)
ALP SERPL-CCNC: 83 U/L — SIGNIFICANT CHANGE UP (ref 40–120)
ALT FLD-CCNC: 11 U/L — SIGNIFICANT CHANGE UP (ref 4–41)
ANION GAP SERPL CALC-SCNC: 14 MMOL/L — SIGNIFICANT CHANGE UP (ref 7–14)
AST SERPL-CCNC: 16 U/L — SIGNIFICANT CHANGE UP (ref 4–40)
BASOPHILS # BLD AUTO: 0.05 K/UL — SIGNIFICANT CHANGE UP (ref 0–0.2)
BASOPHILS NFR BLD AUTO: 0.7 % — SIGNIFICANT CHANGE UP (ref 0–2)
BILIRUB SERPL-MCNC: 0.3 MG/DL — SIGNIFICANT CHANGE UP (ref 0.2–1.2)
BUN SERPL-MCNC: 13 MG/DL — SIGNIFICANT CHANGE UP (ref 7–23)
CALCIUM SERPL-MCNC: 9.1 MG/DL — SIGNIFICANT CHANGE UP (ref 8.4–10.5)
CHLORIDE SERPL-SCNC: 102 MMOL/L — SIGNIFICANT CHANGE UP (ref 98–107)
CO2 SERPL-SCNC: 22 MMOL/L — SIGNIFICANT CHANGE UP (ref 22–31)
CREAT SERPL-MCNC: 1.11 MG/DL — SIGNIFICANT CHANGE UP (ref 0.5–1.3)
EGFR: 65 ML/MIN/1.73M2 — SIGNIFICANT CHANGE UP
EOSINOPHIL # BLD AUTO: 0.09 K/UL — SIGNIFICANT CHANGE UP (ref 0–0.5)
EOSINOPHIL NFR BLD AUTO: 1.3 % — SIGNIFICANT CHANGE UP (ref 0–6)
GLUCOSE BLDC GLUCOMTR-MCNC: 107 MG/DL — HIGH (ref 70–99)
GLUCOSE BLDC GLUCOMTR-MCNC: 130 MG/DL — HIGH (ref 70–99)
GLUCOSE SERPL-MCNC: 132 MG/DL — HIGH (ref 70–99)
HCT VFR BLD CALC: 31.7 % — LOW (ref 39–50)
HGB BLD-MCNC: 10.3 G/DL — LOW (ref 13–17)
IANC: 3.93 K/UL — SIGNIFICANT CHANGE UP (ref 1.8–7.4)
IMM GRANULOCYTES NFR BLD AUTO: 0.4 % — SIGNIFICANT CHANGE UP (ref 0–0.9)
LYMPHOCYTES # BLD AUTO: 2.11 K/UL — SIGNIFICANT CHANGE UP (ref 1–3.3)
LYMPHOCYTES # BLD AUTO: 31.6 % — SIGNIFICANT CHANGE UP (ref 13–44)
MAGNESIUM SERPL-MCNC: 1.8 MG/DL — SIGNIFICANT CHANGE UP (ref 1.6–2.6)
MCHC RBC-ENTMCNC: 27.3 PG — SIGNIFICANT CHANGE UP (ref 27–34)
MCHC RBC-ENTMCNC: 32.5 GM/DL — SIGNIFICANT CHANGE UP (ref 32–36)
MCV RBC AUTO: 84.1 FL — SIGNIFICANT CHANGE UP (ref 80–100)
MONOCYTES # BLD AUTO: 0.46 K/UL — SIGNIFICANT CHANGE UP (ref 0–0.9)
MONOCYTES NFR BLD AUTO: 6.9 % — SIGNIFICANT CHANGE UP (ref 2–14)
NEUTROPHILS # BLD AUTO: 3.93 K/UL — SIGNIFICANT CHANGE UP (ref 1.8–7.4)
NEUTROPHILS NFR BLD AUTO: 59.1 % — SIGNIFICANT CHANGE UP (ref 43–77)
NRBC # BLD: 0 /100 WBCS — SIGNIFICANT CHANGE UP (ref 0–0)
NRBC # FLD: 0 K/UL — SIGNIFICANT CHANGE UP (ref 0–0)
PHOSPHATE SERPL-MCNC: 2.6 MG/DL — SIGNIFICANT CHANGE UP (ref 2.5–4.5)
PLATELET # BLD AUTO: 250 K/UL — SIGNIFICANT CHANGE UP (ref 150–400)
POTASSIUM SERPL-MCNC: 3.5 MMOL/L — SIGNIFICANT CHANGE UP (ref 3.5–5.3)
POTASSIUM SERPL-SCNC: 3.5 MMOL/L — SIGNIFICANT CHANGE UP (ref 3.5–5.3)
PROT SERPL-MCNC: 7.7 G/DL — SIGNIFICANT CHANGE UP (ref 6–8.3)
RBC # BLD: 3.77 M/UL — LOW (ref 4.2–5.8)
RBC # FLD: 12.9 % — SIGNIFICANT CHANGE UP (ref 10.3–14.5)
SODIUM SERPL-SCNC: 138 MMOL/L — SIGNIFICANT CHANGE UP (ref 135–145)
WBC # BLD: 6.67 K/UL — SIGNIFICANT CHANGE UP (ref 3.8–10.5)
WBC # FLD AUTO: 6.67 K/UL — SIGNIFICANT CHANGE UP (ref 3.8–10.5)

## 2024-08-07 PROCEDURE — 99239 HOSP IP/OBS DSCHRG MGMT >30: CPT | Mod: GC

## 2024-08-07 RX ORDER — AMLODIPINE BESYLATE 2.5 MG/1
5 TABLET ORAL DAILY
Refills: 0 | Status: DISCONTINUED | OUTPATIENT
Start: 2024-08-07 | End: 2024-08-07

## 2024-08-07 RX ORDER — CIPROFLOXACIN 500 MG/1
1 TABLET, FILM COATED ORAL
Qty: 20 | Refills: 0
Start: 2024-08-07 | End: 2024-08-16

## 2024-08-07 RX ORDER — AMLODIPINE BESYLATE 2.5 MG/1
1 TABLET ORAL
Qty: 0 | Refills: 0 | DISCHARGE
Start: 2024-08-07

## 2024-08-07 RX ORDER — LIDOCAINE 5% 5 G/100G
1 CREAM TOPICAL
Qty: 1 | Refills: 0
Start: 2024-08-07 | End: 2024-08-11

## 2024-08-07 RX ORDER — HEPARIN SODIUM 1000 [USP'U]/ML
5000 INJECTION, SOLUTION INTRAVENOUS; SUBCUTANEOUS EVERY 8 HOURS
Refills: 0 | Status: DISCONTINUED | OUTPATIENT
Start: 2024-08-07 | End: 2024-08-07

## 2024-08-07 RX ADMIN — CHLORHEXIDINE GLUCONATE 1 APPLICATION(S): 500 CLOTH TOPICAL at 06:31

## 2024-08-07 RX ADMIN — PIPERACILLIN SODIUM, TAZOBACTAM SODIUM 25 GRAM(S): 3; .375 INJECTION, POWDER, LYOPHILIZED, FOR SOLUTION INTRAVENOUS at 09:10

## 2024-08-07 RX ADMIN — DARUNAVIR, COBICISTAT, EMTRICITABINE, AND TENOFOVIR ALAFENAMIDE 1 TABLET(S): 800; 150; 200; 10 TABLET, FILM COATED ORAL at 13:41

## 2024-08-07 RX ADMIN — HEPARIN SODIUM 5000 UNIT(S): 1000 INJECTION, SOLUTION INTRAVENOUS; SUBCUTANEOUS at 13:41

## 2024-08-07 RX ADMIN — AMLODIPINE BESYLATE 5 MILLIGRAM(S): 2.5 TABLET ORAL at 09:09

## 2024-08-07 NOTE — PROGRESS NOTE ADULT - ASSESSMENT
84-year-old male with history of DM2, CKD 3, HIV (viral load undetectable, CD4 count 900), HTN, BPH with chronic Lawson in place, recent admission for urinary retention here w/ fever and suprapubic tenderness here w/ UTI, feeling better s/p 1L bolus LR, zosyn, catheter exchange, pending sensitivities

## 2024-08-07 NOTE — DISCHARGE NOTE PROVIDER - NSDCQMSTAIRS_GEN_ALL_CORE
Prior authorization already approved in encounter dated 02/11/19. Pharmacy informed.    Haider Gastelum, PRAFUL on 2/15/2019 at 2:00 PM       Yes

## 2024-08-07 NOTE — DISCHARGE NOTE PROVIDER - CARE PROVIDER_API CALL
REZA OROZCO  [address] [place] [id number]  Phone: [phone]  Fax: [phone]  Established Patient  Follow Up Time: 2 weeks

## 2024-08-07 NOTE — DISCHARGE NOTE PROVIDER - HOSPITAL COURSE
HPI:  84-year-old male with history of DM2, CKD 3, HIV (viral load undetectable, CD4 count 900), HTN, BPH with chronic Forrester in place, recent admission for urinary retention here w/ fever suprapubic tenderness, and altered mental status. He was on bactrim for pseudomonal and enterobacter UTI, but family is unsure if he ever picked up the rx. Hx obtained via daughter over the phone. ROS otherwise negative.     ED course: forrester replaced, u/a positive. Leukocytosis 16k noted. Given meropenem.    SIRS criteria met on admission; Leukocytosis, and febrile, and tachycardic. Patient was started on zosyn 8/5 and given a 1L bolus of IV fluids. Patient began feeling better the next day. Mental status and WBC count improved, patient was afebrile. The original urine culture was contaminated, so the decision was made to discharge on Ciprofloxacin given previous cultures showed enterobacter and pseudomonas susceptible to cipro. The patient was also found to have an DONELL on admission with a creatinine of 1.5 (baseline 1.1), which improved to 1.1 with IVF.        Vital Signs Last 24 Hrs  T(C): 37.5 (05 Aug 2024 03:13), Max: 38.2 (04 Aug 2024 19:55)  T(F): 99.5 (05 Aug 2024 03:13), Max: 100.7 (04 Aug 2024 19:55)  HR: 71 (05 Aug 2024 03:13) (71 - 93)  BP: 145/56 (05 Aug 2024 03:13) (143/74 - 145/83)  BP(mean): --  RR: 18 (04 Aug 2024 20:45) (18 - 18)  SpO2: 100% (04 Aug 2024 20:45) (98% - 100%)    Parameters below as of 04 Aug 2024 19:58  Patient On (Oxygen Delivery Method): room air        	 (05 Aug 2024 01:56)    Hospital Course:      Important Medication Changes and Reason:    Active or Pending Issues Requiring Follow-up:    Advanced Directives:   [ ] Full code  [ ] DNR  [ ] Hospice    Discharge Diagnoses:         HPI:  84-year-old male with history of DM2, CKD 3, HIV (viral load undetectable, CD4 count 900), HTN, BPH with chronic Forrester in place, recent admission for urinary retention here w/ fever suprapubic tenderness, and altered mental status. He was on bactrim for pseudomonal and enterobacter UTI, but family is unsure if he ever picked up the rx. Hx obtained via daughter over the phone. ROS otherwise negative.     ED course: forrester replaced, u/a positive. Leukocytosis 16k noted. Given meropenem.    SIRS criteria met on admission; Leukocytosis, and febrile, and tachycardic. Patient was started on zosyn 8/5 and given a 1L bolus of IV fluids. Patient began feeling better the next day. Mental status and WBC count improved, patient was afebrile. The original urine culture was contaminated, so the decision was made to discharge on Ciprofloxacin given previous cultures showed enterobacter and pseudomonas susceptible to cipro. The patient was also found to have an DONELL on admission with a creatinine of 1.5 (baseline 1.1), which improved to 1.1 with IVF.        Vital Signs Last 24 Hrs  T(C): 37.5 (05 Aug 2024 03:13), Max: 38.2 (04 Aug 2024 19:55)  T(F): 99.5 (05 Aug 2024 03:13), Max: 100.7 (04 Aug 2024 19:55)  HR: 71 (05 Aug 2024 03:13) (71 - 93)  BP: 145/56 (05 Aug 2024 03:13) (143/74 - 145/83)  BP(mean): --  RR: 18 (04 Aug 2024 20:45) (18 - 18)  SpO2: 100% (04 Aug 2024 20:45) (98% - 100%)    Parameters below as of 04 Aug 2024 19:58  Patient On (Oxygen Delivery Method): room air        	 (05 Aug 2024 01:56)    Hospital Course: Pt was admitted for a UTI likely similar to his last admission. Was unable to get antibiotics as outpatient and most likely returned with the same bacteria. Urine was positive for bacteria and was treated empirically with Zosyn. Pt hemodynamically improved with fluids and antibiotics. Forrester was replaced during this visit and will have it removed as an outpatient.       Important Medication Changes and Reason: Ciprofloxacin 500mg BID x10 days as outpatient    Active or Pending Issues Requiring Follow-up: See Urology at your appointment on Friday, 08/09/2024.    Advanced Directives:   [x] Full code  [ ] DNR  [ ] Hospice    Discharge Diagnoses: Urinary tract infection         HPI:  84-year-old male with history of DM2, CKD 3, HIV (viral load undetectable, CD4 count 900), HTN, BPH with chronic Forrester in place, recent admission for urinary retention here w/ fever suprapubic tenderness, and altered mental status. He was on bactrim for pseudomonal and enterobacter UTI, but family is unsure if he ever picked up the rx. Hx obtained via daughter over the phone. ROS otherwise negative.     ED course: forrester replaced, u/a positive. Leukocytosis 16k noted. Given meropenem.    SIRS criteria met on admission; Leukocytosis, and febrile, and tachycardic. Patient was started on zosyn 8/5 and given a 1L bolus of IV fluids. Patient began feeling better the next day. Mental status and WBC count improved, patient was afebrile. The original urine culture was contaminated, so the decision was made to discharge on Ciprofloxacin given previous cultures showed enterobacter and pseudomonas susceptible to cipro. The patient was also found to have an DONELL on admission with a creatinine of 1.5 (baseline 1.1), which improved to 1.1 with IVF.        Vital Signs Last 24 Hrs  T(C): 37.5 (05 Aug 2024 03:13), Max: 38.2 (04 Aug 2024 19:55)  T(F): 99.5 (05 Aug 2024 03:13), Max: 100.7 (04 Aug 2024 19:55)  HR: 71 (05 Aug 2024 03:13) (71 - 93)  BP: 145/56 (05 Aug 2024 03:13) (143/74 - 145/83)  BP(mean): --  RR: 18 (04 Aug 2024 20:45) (18 - 18)  SpO2: 100% (04 Aug 2024 20:45) (98% - 100%)    Parameters below as of 04 Aug 2024 19:58  Patient On (Oxygen Delivery Method): room air    	 (05 Aug 2024 01:56)    Hospital Course: Pt was admitted for a UTI likely untreated from his last admission. Was unable to get antibiotics as outpatient and likely returned with the same bacteria. Urine was positive for bacteria including nitrites and was treated empirically with Zosyn. Pt hemodynamically improved with fluids and antibiotics, and his white count downtrended from 16 to 8 over his course. Forrester was replaced during this visit and will have it removed as an outpatient with his urologist on Friday.       Important Medication Changes and Reason: Ciprofloxacin 500mg BID x10 days as outpatient    Active or Pending Issues Requiring Follow-up: See Urology at your appointment on Friday, 08/09/2024.    Advanced Directives:   [x] Full code  [ ] DNR  [ ] Hospice    Discharge Diagnoses: Urinary tract infection         HPI:  84-year-old male with history of DM2, CKD 3, HIV (viral load undetectable, CD4 count 900), HTN, BPH with chronic Forrester in place, recent admission for urinary retention here w/ fever suprapubic tenderness, and altered mental status. He was on bactrim for pseudomonal and enterobacter UTI, but family is unsure if he ever picked up the rx. Hx obtained via daughter over the phone. ROS otherwise negative.     ED course: forrester replaced, u/a positive. Leukocytosis 16k noted. Given meropenem.    SIRS criteria met on admission; Leukocytosis, and febrile, and tachycardic. Patient was started on zosyn 8/5 and given a 1L bolus of IV fluids. Patient began feeling better the next day. Mental status and WBC count improved, patient was afebrile. The original urine culture was contaminated, so the decision was made to discharge on Ciprofloxacin given previous cultures showed enterobacter and pseudomonas susceptible to cipro. The patient was also found to have an DONELL on admission with a creatinine of 1.5 (baseline 1.1), which improved to 1.1 with IVF.        Vital Signs Last 24 Hrs  T(C): 37.5 (05 Aug 2024 03:13), Max: 38.2 (04 Aug 2024 19:55)  T(F): 99.5 (05 Aug 2024 03:13), Max: 100.7 (04 Aug 2024 19:55)  HR: 71 (05 Aug 2024 03:13) (71 - 93)  BP: 145/56 (05 Aug 2024 03:13) (143/74 - 145/83)  BP(mean): --  RR: 18 (04 Aug 2024 20:45) (18 - 18)  SpO2: 100% (04 Aug 2024 20:45) (98% - 100%)    Parameters below as of 04 Aug 2024 19:58  Patient On (Oxygen Delivery Method): room air    	 (05 Aug 2024 01:56)    Hospital Course: Pt was admitted for a UTI likely untreated from his last admission. Was unable to get antibiotics as outpatient and likely returned with the same bacteria. Urine was positive for bacteria including nitrites and was treated empirically with Zosyn. Pt hemodynamically improved with fluids and antibiotics, and his white count downtrended from 16 to 8 over his course. Forrester was replaced by Urology during this visit and will have it removed as an outpatient with his urologist on Friday. Urine cultures obtained, however, contaminated specimen. Given improvement during admission will discharge oral Cipro. Patient in agreement with plan. Patient medically cleared for discharge       Important Medication Changes and Reason: Ciprofloxacin 500mg BID x10 days as outpatient    Active or Pending Issues Requiring Follow-up: See Urology at your appointment on Friday, 08/09/2024.    Advanced Directives:   [x] Full code  [ ] DNR  [ ] Hospice    Discharge Diagnoses: Urinary tract infection

## 2024-08-07 NOTE — PROGRESS NOTE ADULT - PROBLEM SELECTOR PLAN 2
systolic today  - Previous admission noted to be on Norvasc 5 mg, metoprolol 100mg XL daily  - c/w toprol 25 mg XL QD  - c/w atorvastatin 10 mg QD  systolic today  - Previous admission noted to be on Norvasc 5 mg, metoprolol 100mg XL daily  - c/w toprol 25 mg XL QD  - c/w atorvastatin 10 mg QD  - Start norvasc 5mg QD

## 2024-08-07 NOTE — DISCHARGE NOTE NURSING/CASE MANAGEMENT/SOCIAL WORK - NSDCPEFALRISK_GEN_ALL_CORE
For information on Fall & Injury Prevention, visit: [email or web address] OR  [email or web address] OR  https://www.cdc.gov/steadi/patient.html

## 2024-08-07 NOTE — PROGRESS NOTE ADULT - PROBLEM SELECTOR PLAN 3
mildly elevated creatinine 1.5 from baseline of 1.1.   - S/p IVF, downtrending to 1.19 8/6, continue to monitor

## 2024-08-07 NOTE — PROGRESS NOTE ADULT - SUBJECTIVE AND OBJECTIVE BOX
INTERVAL HPI/OVERNIGHT EVENTS:  Pt seen and examined at bedside. No acute overnight events or complaints.    VITAL SIGNS:  T(F): 97.8 (08-07-24 @ 06:15)  HR: 52 (08-07-24 @ 06:15)  BP: 183/79 (08-07-24 @ 06:15)  RR: 18 (08-07-24 @ 06:15)  SpO2: 100% (08-07-24 @ 06:15)  Wt(kg): --    PHYSICAL EXAM:    Constitutional: WDWN, NAD  HEENT: PERRL, EOMI, sclera non-icteric, neck supple, trachea midline, no masses, no JVD, MMM, good dentition  Respiratory: CTA b/l, good air entry b/l, no wheezing, no rhonchi, no rales, without accessory muscle use and no intercostal retractions  Cardiovascular: RRR, normal S1S2, no M/R/G  Gastrointestinal: soft, NTND, no masses palpable, BS normal  Extremities: Warm, well perfused, pulses equal bilateral upper and lower extremities, no edema, no clubbing. Capillary refill <2 sec  Neurological: AAOx3, CN Grossly intact  Skin: Normal temperature, warm, dry    MEDICATIONS  (STANDING):  atorvastatin 10 milliGRAM(s) Oral at bedtime  chlorhexidine 2% Cloths 1 Application(s) Topical <User Schedule>  darunavir 800 mG/cobicistat 150 mG/emtricitabine 200 mG/tenofovir alafenamide 10 mG (SYMTUZA) 1 Tablet(s) Oral daily  dextrose 5%. 1000 milliLiter(s) (50 mL/Hr) IV Continuous <Continuous>  dextrose 5%. 1000 milliLiter(s) (100 mL/Hr) IV Continuous <Continuous>  dextrose 50% Injectable 25 Gram(s) IV Push once  dextrose 50% Injectable 25 Gram(s) IV Push once  dextrose 50% Injectable 12.5 Gram(s) IV Push once  glucagon  Injectable 1 milliGRAM(s) IntraMuscular once  insulin glargine Injectable (LANTUS) 10 Unit(s) SubCutaneous at bedtime  insulin lispro (ADMELOG) corrective regimen sliding scale   SubCutaneous three times a day before meals  insulin lispro (ADMELOG) corrective regimen sliding scale   SubCutaneous at bedtime  lactated ringers. 1000 milliLiter(s) (75 mL/Hr) IV Continuous <Continuous>  metoprolol succinate ER 25 milliGRAM(s) Oral daily  piperacillin/tazobactam IVPB.. 3.375 Gram(s) IV Intermittent every 12 hours  tamsulosin 0.4 milliGRAM(s) Oral at bedtime    MEDICATIONS  (PRN):  acetaminophen     Tablet .. 650 milliGRAM(s) Oral every 6 hours PRN Temp greater or equal to 38C (100.4F), Mild Pain (1 - 3)  dextrose Oral Gel 15 Gram(s) Oral once PRN Blood Glucose LESS THAN 70 milliGRAM(s)/deciliter      Allergies    No Known Allergies    Intolerances        LABS:                        10.0   11.42 )-----------( 216      ( 06 Aug 2024 06:16 )             30.9     08-06    139  |  105  |  16  ----------------------------<  99  3.8   |  22  |  1.19    Ca    9.0      06 Aug 2024 06:16  Phos  2.7     08-06  Mg     1.90     08-06    TPro  6.8  /  Alb  3.2<L>  /  TBili  0.6  /  DBili  x   /  AST  15  /  ALT  10  /  AlkPhos  74  08-06      Urinalysis Basic - ( 06 Aug 2024 06:16 )    Color: x / Appearance: x / SG: x / pH: x  Gluc: 99 mg/dL / Ketone: x  / Bili: x / Urobili: x   Blood: x / Protein: x / Nitrite: x   Leuk Esterase: x / RBC: x / WBC x   Sq Epi: x / Non Sq Epi: x / Bacteria: x        RADIOLOGY & ADDITIONAL TESTS:  Reviewed      ******************  Authored By: Jonathan Ladd (MS3)  MS Teams Preferred  ******************   INTERVAL HPI/OVERNIGHT EVENTS:  Pt seen and examined at bedside. Hypertensive to 180 this AM, restarted on Norvasc 5mg QD. Otherwise, feeling well. No abdominal pain, fever, chills, dysuria.     VITAL SIGNS:  T(F): 97.8 (08-07-24 @ 06:15)  HR: 52 (08-07-24 @ 06:15)  BP: 183/79 (08-07-24 @ 06:15)  RR: 18 (08-07-24 @ 06:15)  SpO2: 100% (08-07-24 @ 06:15)  Wt(kg): --    PHYSICAL EXAM:    Constitutional: WDWN, NAD  HEENT: EOMI, sclera non-icteric  Respiratory: CTA b/l, good air entry b/l, no wheezing, no rhonchi, no rales, without accessory muscle use and no intercostal retractions  Cardiovascular: RRR, normal S1S2, no M/R/G  Gastrointestinal: soft, NTND  Extremities: Warm, well perfused, no edema  Neurological: AAOx3, CN Grossly intact  Skin: Normal temperature, warm, dry    MEDICATIONS  (STANDING):  atorvastatin 10 milliGRAM(s) Oral at bedtime  chlorhexidine 2% Cloths 1 Application(s) Topical <User Schedule>  darunavir 800 mG/cobicistat 150 mG/emtricitabine 200 mG/tenofovir alafenamide 10 mG (SYMTUZA) 1 Tablet(s) Oral daily  dextrose 5%. 1000 milliLiter(s) (50 mL/Hr) IV Continuous <Continuous>  dextrose 5%. 1000 milliLiter(s) (100 mL/Hr) IV Continuous <Continuous>  dextrose 50% Injectable 25 Gram(s) IV Push once  dextrose 50% Injectable 25 Gram(s) IV Push once  dextrose 50% Injectable 12.5 Gram(s) IV Push once  glucagon  Injectable 1 milliGRAM(s) IntraMuscular once  insulin glargine Injectable (LANTUS) 10 Unit(s) SubCutaneous at bedtime  insulin lispro (ADMELOG) corrective regimen sliding scale   SubCutaneous three times a day before meals  insulin lispro (ADMELOG) corrective regimen sliding scale   SubCutaneous at bedtime  lactated ringers. 1000 milliLiter(s) (75 mL/Hr) IV Continuous <Continuous>  metoprolol succinate ER 25 milliGRAM(s) Oral daily  piperacillin/tazobactam IVPB.. 3.375 Gram(s) IV Intermittent every 12 hours  tamsulosin 0.4 milliGRAM(s) Oral at bedtime    MEDICATIONS  (PRN):  acetaminophen     Tablet .. 650 milliGRAM(s) Oral every 6 hours PRN Temp greater or equal to 38C (100.4F), Mild Pain (1 - 3)  dextrose Oral Gel 15 Gram(s) Oral once PRN Blood Glucose LESS THAN 70 milliGRAM(s)/deciliter      Allergies    No Known Allergies    Intolerances        LABS:                                   10.3   6.67  )-----------( 250      ( 07 Aug 2024 05:46 )             31.7     08-06    139  |  105  |  16  ----------------------------<  99  3.8   |  22  |  1.19    Ca    9.0      06 Aug 2024 06:16  Phos  2.7     08-06  Mg     1.90     08-06    TPro  6.8  /  Alb  3.2<L>  /  TBili  0.6  /  DBili  x   /  AST  15  /  ALT  10  /  AlkPhos  74  08-06      RADIOLOGY & ADDITIONAL TESTS:  Reviewed      ******************  Authored By: Jonathan Ladd (MS3)  MS Teams Preferred  ******************   INTERVAL HPI/OVERNIGHT EVENTS:  Pt seen and examined at bedside. Hypertensive to 180 this AM, restarted on Norvasc 5mg QD. Otherwise, feeling well. No abdominal pain, fever, chills, dysuria.     VITAL SIGNS:  T(F): 97.8 (08-07-24 @ 06:15)  HR: 52 (08-07-24 @ 06:15)  BP: 183/79 (08-07-24 @ 06:15)  RR: 18 (08-07-24 @ 06:15)  SpO2: 100% (08-07-24 @ 06:15)  Wt(kg): --    PHYSICAL EXAM:    Constitutional: WDWN, NAD  HEENT: EOMI, sclera non-icteric  Respiratory: CTA b/l, good air entry b/l, no wheezing, no rhonchi, no rales, without accessory muscle use and no intercostal retractions  Cardiovascular: RRR, normal S1S2, no M/R/G  Gastrointestinal: soft, NTND  Extremities: Warm, well perfused, no edema  Neurological: AAOx3, CN Grossly intact  Skin: Normal temperature, warm, dry    MEDICATIONS  (STANDING):  atorvastatin 10 milliGRAM(s) Oral at bedtime  chlorhexidine 2% Cloths 1 Application(s) Topical <User Schedule>  darunavir 800 mG/cobicistat 150 mG/emtricitabine 200 mG/tenofovir alafenamide 10 mG (SYMTUZA) 1 Tablet(s) Oral daily  dextrose 5%. 1000 milliLiter(s) (50 mL/Hr) IV Continuous <Continuous>  dextrose 5%. 1000 milliLiter(s) (100 mL/Hr) IV Continuous <Continuous>  dextrose 50% Injectable 25 Gram(s) IV Push once  dextrose 50% Injectable 25 Gram(s) IV Push once  dextrose 50% Injectable 12.5 Gram(s) IV Push once  glucagon  Injectable 1 milliGRAM(s) IntraMuscular once  insulin glargine Injectable (LANTUS) 10 Unit(s) SubCutaneous at bedtime  insulin lispro (ADMELOG) corrective regimen sliding scale   SubCutaneous three times a day before meals  insulin lispro (ADMELOG) corrective regimen sliding scale   SubCutaneous at bedtime  lactated ringers. 1000 milliLiter(s) (75 mL/Hr) IV Continuous <Continuous>  metoprolol succinate ER 25 milliGRAM(s) Oral daily  piperacillin/tazobactam IVPB.. 3.375 Gram(s) IV Intermittent every 12 hours  tamsulosin 0.4 milliGRAM(s) Oral at bedtime    MEDICATIONS  (PRN):  acetaminophen     Tablet .. 650 milliGRAM(s) Oral every 6 hours PRN Temp greater or equal to 38C (100.4F), Mild Pain (1 - 3)  dextrose Oral Gel 15 Gram(s) Oral once PRN Blood Glucose LESS THAN 70 milliGRAM(s)/deciliter      Allergies    No Known Allergies    Intolerances        LABS:                                   10.3   6.67  )-----------( 250      ( 07 Aug 2024 05:46 )             31.7     08-07    138  |  102  |  13  ----------------------------<  132<H>  3.5   |  22  |  1.11    Ca    9.1      07 Aug 2024 05:46  Phos  2.6     08-07  Mg     1.80     08-07    TPro  7.7  /  Alb  3.4  /  TBili  0.3  /  DBili  x   /  AST  16  /  ALT  11  /  AlkPhos  83  08-07        RADIOLOGY & ADDITIONAL TESTS:  Reviewed      ******************  Authored By: Jonathan Ladd (MS3)  MS Teams Preferred  ******************

## 2024-08-07 NOTE — PROGRESS NOTE ADULT - TIME BILLING
Reviewed lab data, radiology results, consultants' recommendations, documentation in [place], discussed with family, ACP, interdisciplinary staff and/or intervention were performed.
Reviewed lab data, radiology results, consultants' recommendations, documentation in [place], discussed with family, ACP, interdisciplinary staff and/or intervention were performed.
Reviewed lab data, radiology results, consultants' recommendations, documentation in [place], discussed with family, ACP, interdisciplinary staff and/or intervention were performed.

## 2024-08-07 NOTE — DISCHARGE NOTE NURSING/CASE MANAGEMENT/SOCIAL WORK - NSDCFUADDAPPT_GEN_ALL_CORE_FT
APPTS ARE READY TO BE MADE: [ X] YES    Best Family or Patient Contact (if needed):    Additional Information about above appointments (if needed):    1: Urology  2: PCP  3:     Other comments or requests:

## 2024-08-07 NOTE — DISCHARGE NOTE PROVIDER - NSDCMRMEDTOKEN_GEN_ALL_CORE_FT
atorvastatin 10 mg oral tablet: 1 tab(s) orally once a day (at bedtime)  Basaglar KwikPen 100 units/mL subcutaneous solution: 10 unit(s) subcutaneous once a day (at bedtime)  Flomax 0.4 mg oral capsule: 1 cap(s) orally once a day  metoprolol succinate 25 mg oral tablet, extended release: 1 tab(s) orally once a day  sulfamethoxazole-trimethoprim 800 mg-160 mg oral tablet: 1 tab(s) orally 2 times a day  Symtuza oral tablet: 1 tab(s) orally once a day   amLODIPine 5 mg oral tablet: 1 tab(s) orally once a day  atorvastatin 10 mg oral tablet: 1 tab(s) orally once a day (at bedtime)  Basaglar KwikPen 100 units/mL subcutaneous solution: 10 unit(s) subcutaneous once a day (at bedtime)  Cipro 500 mg oral tablet: 1 tab(s) orally 2 times a day Meds to bed 965 A. Discharge today by 3pm 08/07/2024. [facility]   Flomax 0.4 mg oral capsule: 1 cap(s) orally once a day  metoprolol succinate 25 mg oral tablet, extended release: 1 tab(s) orally once a day  sulfamethoxazole-trimethoprim 800 mg-160 mg oral tablet: 1 tab(s) orally 2 times a day  Symtuza oral tablet: 1 tab(s) orally once a day   amLODIPine 5 mg oral tablet: 1 tab(s) orally once a day  atorvastatin 10 mg oral tablet: 1 tab(s) orally once a day (at bedtime)  Basaglar KwikPen 100 units/mL subcutaneous solution: 10 unit(s) subcutaneous once a day (at bedtime)  Cipro 500 mg oral tablet: 1 tab(s) orally 2 times a day Meds to bed 965 A. Discharge today by 3pm 08/07/2024. [facility]   Flomax 0.4 mg oral capsule: 1 cap(s) orally once a day  lidocaine 3% topical gel: Apply topically to affected area 2 times a day  metoprolol succinate 25 mg oral tablet, extended release: 1 tab(s) orally once a day  Symtuza oral tablet: 1 tab(s) orally once a day   amLODIPine 5 mg oral tablet: 1 tab(s) orally once a day  atorvastatin 10 mg oral tablet: 1 tab(s) orally once a day (at bedtime)  Basaglar KwikPen 100 units/mL subcutaneous solution: 10 unit(s) subcutaneous once a day (at bedtime)  Cipro 500 mg oral tablet: 1 tab(s) orally 2 times a day Meds to bed 965 A. Discharge today by 3pm 08/07/2024. [facility]   Flomax 0.4 mg oral capsule: 1 cap(s) orally once a day  metoprolol succinate 25 mg oral tablet, extended release: 1 tab(s) orally once a day  Symtuza oral tablet: 1 tab(s) orally once a day

## 2024-08-07 NOTE — DISCHARGE NOTE PROVIDER - NSDCCPTREATMENT_GEN_ALL_CORE_FT
PRINCIPAL PROCEDURE  Procedure: CT abdomen and pelvis with contrast  Findings and Treatment: In the hospital you recieved a CT scan of your abdomen and pelvis. The findings are below.  IMPRESSION:  Continued circumferential urinary bladder wall thickening despite   underdistention with perivesicular stranding. This may be related to   chronic bladder outlet obstruction from enlarged prostate. Correlate with   urinalysis and lab values to exclude cystitis and/or ascending urinary   tract infection. Consider nonemergent cystoscopy.  Markedly enlarged heterogenous prostate, cause mass effect and protrusion   at the bladder base. Correlate with PSA and consider nonemergent   prostatic workup.      SECONDARY PROCEDURE  Procedure: Chest xray, PA & lateral  Findings and Treatment: In the hospital, you had a chest x-ray done. The findings are below  FINDINGS:  The heart is normal in size.  The lungs are clear.  There is no pneumothorax or pleural effusion.  IMPRESSION:  Clear lungs.

## 2024-08-07 NOTE — DISCHARGE NOTE PROVIDER - NSDCCPCAREPLAN_GEN_ALL_CORE_FT
PRINCIPAL DISCHARGE DIAGNOSIS  Diagnosis: Urinary tract infection  Assessment and Plan of Treatment: In the hospital, you were diagnosed with a urinary tract infection. You were started on the antibiotic Zosyn, and your forrester catheter was changed. After this, you started to feel better. The urine culture that was collected to determine the cause of your UTI was contaminated, so the decision was made to treat you based on the results from your prior ED visit. You are being discharged on ______ antibiotic. You should take this for _____ days. You are also being discharged with a forrester catheter. You should followup with your outpatient urologist regarding the forrester catheter.   If you start to feel unwell you should return to the hospital     PRINCIPAL DISCHARGE DIAGNOSIS  Diagnosis: Urinary tract infection  Assessment and Plan of Treatment: In the hospital, you were diagnosed with a urinary tract infection. You were started on the antibiotic Zosyn, and your forrester catheter was changed. After this, you started to feel better. The urine culture that was collected to determine the cause of your UTI was contaminated, so the decision was made to treat you based on the results from your prior ED visit. You are being discharged on ______ antibiotic. You should take this for _____ days. You are also being discharged with a forrester catheter. You should followup with your outpatient urologist regarding the forrester catheter.   If you start to feel unwell you should return to the hospital      SECONDARY DISCHARGE DIAGNOSES  Diagnosis: Stage 3 chronic kidney disease  Assessment and Plan of Treatment:     Diagnosis: HIV infection  Assessment and Plan of Treatment: Continue your home medications    Diagnosis: Type 2 diabetes mellitus  Assessment and Plan of Treatment:     Diagnosis: Hypertension  Assessment and Plan of Treatment: In the hospital your blood pressures were elevated. You were restarted on your home blood pressure medications. Continue your blood pressure regimen as directed. Monitor for any visual changes, headaches, or dizziness. Followup with your PCP for further management for high blood pressure    Diagnosis: Acute kidney injury superimposed on chronic kidney disease  Assessment and Plan of Treatment: In the hospital, you were found to have an acute kidney injury on top of your chronic kidney disease likely due to sepsis from your urinary tract infection. You were given IV fluids and your kidney function improved. Please continue to stay well hydrated     PRINCIPAL DISCHARGE DIAGNOSIS  Diagnosis: Urinary tract infection  Assessment and Plan of Treatment: In the hospital, you were diagnosed with a urinary tract infection. You were started on the antibiotic Zosyn, and your forrester catheter was changed. After this, you started to feel better. The urine culture that was collected to determine the cause of your UTI was contaminated, so the decision was made to treat you based on the results from your prior ED visit. You are being discharged on Ciprofloxacin antibiotic. You should take this for 10 days. You are also being discharged with a forrester catheter. You should followup with your outpatient urologist regarding the forrester catheter.   If you start to feel unwell you should return to the hospital.      SECONDARY DISCHARGE DIAGNOSES  Diagnosis: Hypertension  Assessment and Plan of Treatment: In the hospital your blood pressures were elevated. You were restarted on your home blood pressure medications including Amlodipine 5mg. Continue your blood pressure regimen as directed. Monitor for any visual changes, headaches, or dizziness. Followup with your PCP for further management for high blood pressure    Diagnosis: Stage 3 chronic kidney disease  Assessment and Plan of Treatment:     Diagnosis: HIV infection  Assessment and Plan of Treatment: Continue your home medications    Diagnosis: Type 2 diabetes mellitus  Assessment and Plan of Treatment:     Diagnosis: Acute kidney injury superimposed on chronic kidney disease  Assessment and Plan of Treatment: In the hospital, you were found to have an acute kidney injury on top of your chronic kidney disease likely due to sepsis from your urinary tract infection. You were given IV fluids and your kidney function improved. Please continue to stay well hydrated     PRINCIPAL DISCHARGE DIAGNOSIS  Diagnosis: Urinary tract infection  Assessment and Plan of Treatment: In the hospital, you were diagnosed with a urinary tract infection. You were started on the antibiotic Zosyn, and your forrester catheter was changed. After this, you started to feel better. The urine culture that was collected to determine the cause of your UTI was contaminated, so the decision was made to treat you based on the results from your prior ED visit. You are being discharged on Ciprofloxacin 500 mg twice a day. You should take this for 10 days. You are also being discharged with a forrester catheter. You should followup with your outpatient urologist regarding the forrester catheter.   If you begin to feel fevers/chills, pain when you pee, increased fatigue, dizziness, feeling like you will pass out, increased headache, or any other changes in your overall wellbeing, please return to the hospital.      SECONDARY DISCHARGE DIAGNOSES  Diagnosis: Hypertension  Assessment and Plan of Treatment: In the hospital your blood pressures were elevated. You were restarted on your home blood pressure medications including Amlodipine 5mg once a day. Continue your blood pressure regimen as directed. Monitor for any visual changes, headaches, or dizziness. Follow-up with your PCP for further management for high blood pressure

## 2024-08-07 NOTE — PROGRESS NOTE ADULT - ATTENDING COMMENTS
84-year-old male w/ Hx HTN, DM2, CKD 3, HIV (viral load undetectable, CD4 count 900), BPH c/w urine retention with chronic Lawson in place, recent admission for urinary retention c/b obstructive uropathy found on urine Cx to have Enterobacter and Pseudomonas and placed on bactrim but now presenting with fever and suprapubic tenderness. patient admitted for sepsis due to UTI and started on Zosyn. Blood Cx NTD Urine Cx contaminated. Lawson exchanged in ED. Ok to discharge on 10 days of cipro. F/U with Urology on friday 8/9
84-year-old male w/ Hx HTN, DM2, CKD 3, HIV (viral load undetectable, CD4 count 900), BPH c/w urine retention with chronic Lawson in place, recent admission for urinary retention c/b obstructive uropathy found on urine Cx to have Enterobacter and Pseudomonas and placed on bactrim but now presenting with fever and suprapubic tenderness. patient admitted for sepsis due to UTI and started on Zosyn. F/U Blood Cx/Urine Cx. Lawson exchanged in ED.
84-year-old male w/ Hx HTN, DM2, CKD 3, HIV (viral load undetectable, CD4 count 900), BPH c/w urine retention with chronic Lawson in place, recent admission for urinary retention c/b obstructive uropathy found on urine Cx to have Enterobacter and Pseudomonas and placed on bactrim but now presenting with fever and suprapubic tenderness. patient admitted for sepsis due to UTI and started on Zosyn. F/U Blood Cx/Urine Cx. Lawson exchanged in ED.

## 2024-08-07 NOTE — PROGRESS NOTE ADULT - PROBLEM SELECTOR PLAN 1
- SIRS criteria met on admission; Leukocytosis, and febrile, and tachycardic  - Pt complaining of suprapubic tenderness and has chronic forrester in place; previous UTIs, on Bactrim at home for enterobater and pseudomonas UTI but family unsure if he actually was taking it  - CXR unremarkable  - s/p Meropenem in ED  - zosyn (8/5 - ?) given e. cloacae and pseudomonas hx  - Chronic forrester removed in ED, replaced by urology 8/5  - UCx > 3 organisms, likely contaminated  - BCx NGTD  - WBC downtrending 11.42 from 16.79    Plan:  - f/u BCx  - C/w Zosyn given e. cloacae and pseudomonas UTI hx; Likely will require 7-10 day course of zosyn on dc  - Previous UCx grew enterobacter and pseudomonas, susceptible to ciprofloxacin, dc with 5 day course of Cirprofloxacin 1000mg XR  - LR 75ml/hr  - Monitor Urine output  - c/w flomax for BPH  - Tylenol for pain/fever  - Has outpt f/u w/ Urology 8/9 - SIRS criteria met on admission; Leukocytosis, and febrile, and tachycardic  - Pt complaining of suprapubic tenderness and has chronic forrester in place; previous UTIs, on Bactrim at home for enterobater and pseudomonas UTI but family unsure if he actually was taking it  - CXR unremarkable  - s/p Meropenem in ED  - zosyn (8/5 - ?) given e. cloacae and pseudomonas hx  - Chronic forrester removed in ED, replaced by urology 8/5  - UCx > 3 organisms, likely contaminated  - BCx NGTD  - WBC downtrending 11.42 from 16.79    Plan:  - f/u BCx  - C/w Zosyn given e. cloacae and pseudomonas UTI hx  - Previous UCx grew enterobacter and pseudomonas, susceptible to ciprofloxacin, dc with 10 day course of Cirprofloxacin 500 mg BID  - LR 75ml/hr  - Monitor Urine output  - c/w flomax for BPH  - Tylenol for pain/fever  - Has outpt f/u w/ Urology 8/9 - SIRS criteria met on admission; Leukocytosis, and febrile, and tachycardic  - Pt complaining of suprapubic tenderness and has chronic forrester in place; previous UTIs, on Bactrim at home for enterobater and pseudomonas UTI but family unsure if he actually was taking it  - CXR unremarkable  - s/p Meropenem in ED  - zosyn (8/5 - ?) given e. cloacae and pseudomonas hx  - Chronic forrester removed in ED, replaced by urology 8/5  - UCx > 3 organisms, likely contaminated  - BCx NGTD  - WBC downtrending 6.67 from 16.79    Plan:  - f/u BCx  - C/w Zosyn given e. cloacae and pseudomonas UTI hx  - Previous UCx grew enterobacter and pseudomonas, susceptible to ciprofloxacin, dc with 10 day course of Cirprofloxacin 500 mg BID  - LR 75ml/hr  - Monitor Urine output  - c/w flomax for BPH  - Tylenol for pain/fever  - Has outpt f/u w/ Urology 8/9

## 2024-08-07 NOTE — DISCHARGE NOTE PROVIDER - NSDCFUADDAPPT_GEN_ALL_CORE_FT
APPTS ARE READY TO BE MADE: [ X] YES    Best Family or Patient Contact (if needed):    Additional Information about above appointments (if needed):    1: Urology  2: PCP  3:     Other comments or requests:    APPTS ARE READY TO BE MADE: [ X] YES    Best Family or Patient Contact (if needed):    Additional Information about above appointments (if needed):    1: Urology  2: PCP  3:     Other comments or requests:   Provided patient with provider referral information, however patient prefers to schedule the appointments on their own.

## 2024-08-07 NOTE — PROGRESS NOTE ADULT - PROBLEM SELECTOR PLAN 6
Electrolytes: replete prn  Diet: pureed diet  DVT: was holding heparin subQ s/p forrester placement, can restart today  Dispo: Home pending clinical course Electrolytes: replete prn  Diet: pureed diet  DVT: heparin 5000U subQ q8h  Dispo: Home pending clinical course Electrolytes: replete prn  Diet: pureed diet  DVT: heparin 5000U subQ q8h  Dispo: Home pending home health aid

## 2024-08-07 NOTE — DISCHARGE NOTE NURSING/CASE MANAGEMENT/SOCIAL WORK - PATIENT PORTAL LINK FT
You can access the FollowMyHealth Patient Portal offered by [facility] by registering at the following website: [email or web address]. By joining [employer]’s FollowMyHealth portal, you will also be able to view your health information using other applications (apps) compatible with our system.

## 2024-08-07 NOTE — DISCHARGE NOTE PROVIDER - NSDCFUSCHEDAPPT_GEN_ALL_CORE_FT
Central PA team  [phone]  Pool: HE PA MED ([id number])          PA has been initiated.       PA form completed and faxed insurance via Cover My Meds     Key:  [id number] - PA Case ID: [id number]     Medication:  Omeprazole 20MG dr capsules    Insurance:  [facility]         Response will be received via fax and may take up to 5-10 business days depending on plan       Conchita Mann  [facility] Physician [facility]  UROLOGY [address] [place] [address]  Scheduled Appointment: 08/09/2024     Conchita Mann  [facility] Physician [facility]  UROLOGY [address] [place] [address]  Scheduled Appointment: 09/06/2024

## 2024-08-09 ENCOUNTER — APPOINTMENT (OUTPATIENT)
Dept: UROLOGY | Facility: CLINIC | Age: 84
End: 2024-08-09

## 2024-08-09 PROBLEM — R97.20 ELEVATED PSA: Status: ACTIVE | Noted: 2024-08-09

## 2024-08-09 PROBLEM — Z86.79 HISTORY OF HYPERTENSION: Status: RESOLVED | Noted: 2024-08-09 | Resolved: 2024-08-09

## 2024-08-09 PROBLEM — Z21 HIV (HUMAN IMMUNODEFICIENCY VIRUS INFECTION): Status: RESOLVED | Noted: 2024-08-09 | Resolved: 2024-08-09

## 2024-08-09 PROBLEM — Z87.898 HISTORY OF MEMORY LOSS: Status: RESOLVED | Noted: 2024-08-09 | Resolved: 2024-08-09

## 2024-08-09 PROBLEM — R33.8 ACUTE URINARY RETENTION: Status: ACTIVE | Noted: 2024-08-09

## 2024-08-09 PROBLEM — Z86.39 HISTORY OF HYPERLIPIDEMIA: Status: RESOLVED | Noted: 2024-08-09 | Resolved: 2024-08-09

## 2024-08-09 PROBLEM — Z86.39 HISTORY OF DIABETES MELLITUS: Status: RESOLVED | Noted: 2024-08-09 | Resolved: 2024-08-09

## 2024-08-09 PROBLEM — N40.1 BPH WITH OBSTRUCTION/LOWER URINARY TRACT SYMPTOMS: Status: ACTIVE | Noted: 2024-08-09

## 2024-08-09 PROCEDURE — 99214 OFFICE O/P EST MOD 30 MIN: CPT

## 2024-08-09 NOTE — ASSESSMENT
[FreeTextEntry1] : 83 yo M with BPH, urinary retention, elevated PSA  - Reviewed records from recent hospitalization and confirmed HPI as stated above - Reviewed CT imaging from hospitalization and confirmed findings as stated above  All BPH treatment options were reviewed. This included surveillance, all medical therapeutic options, all outlet procedures including office based, TURP, bipolar TURP, button vaporization, thulium/holmium, suprapubic/retropubic simple (open, robotic) prostatectomy. - Continue indwelling catheter - COntinue tamsulosin and start finasteride. Rx transmitted - Continue watchful waiting for PSA - FU in 1 month for possible TOV and/or catheter exchange

## 2024-08-09 NOTE — HISTORY OF PRESENT ILLNESS
[FreeTextEntry1] : 83 yo M here today with his daughter who is an NP Presents with longstanding history of BPH - Has been on tamsulosin for several years Also with history of elevated PSA up to 30 - pt and family decided to do watchful waiting for this and never got biopsy Presented to the ER with several month history of worsening LUTS and finally urinary retention Indwelling catheter in place  Re-presented with fever and chills - catheter replaced and DONELL resolved Currently on tamsulosin 0.8mg CT 8/4/24 - bladder wall thickening, 75gm prostate

## 2024-08-09 NOTE — PHYSICAL EXAM
[Normal Appearance] : normal appearance [General Appearance - In No Acute Distress] : no acute distress [Edema] : no peripheral edema [Respiration, Rhythm And Depth] : normal respiratory rhythm and effort [Exaggerated Use Of Accessory Muscles For Inspiration] : no accessory muscle use [Abdomen Soft] : soft [Abdomen Tenderness] : non-tender [Costovertebral Angle Tenderness] : no ~M costovertebral angle tenderness [Urinary Bladder Findings] : the bladder was normal on palpation [Urethral Meatus] : meatus normal [Scrotum] : the scrotum was normal [Testes Tenderness] : no tenderness of the testes [] : no rash [Affect] : the affect was normal [Mood] : the mood was normal [de-identified] : Cameron in place draining yellow urine

## 2024-08-10 LAB
CULTURE RESULTS: SIGNIFICANT CHANGE UP
CULTURE RESULTS: SIGNIFICANT CHANGE UP
SPECIMEN SOURCE: SIGNIFICANT CHANGE UP
SPECIMEN SOURCE: SIGNIFICANT CHANGE UP

## 2024-08-22 ENCOUNTER — EMERGENCY (EMERGENCY)
Facility: HOSPITAL | Age: 84
LOS: 1 days | Discharge: HOME | End: 2024-08-22
Attending: STUDENT IN AN ORGANIZED HEALTH CARE EDUCATION/TRAINING PROGRAM | Admitting: STUDENT IN AN ORGANIZED HEALTH CARE EDUCATION/TRAINING PROGRAM
Payer: COMMERCIAL

## 2024-08-22 VITALS
HEART RATE: 91 BPM | OXYGEN SATURATION: 98 % | HEIGHT: 63 IN | DIASTOLIC BLOOD PRESSURE: 76 MMHG | TEMPERATURE: 98 F | RESPIRATION RATE: 16 BRPM | SYSTOLIC BLOOD PRESSURE: 156 MMHG | WEIGHT: 160.06 LBS

## 2024-08-22 VITALS
HEART RATE: 88 BPM | DIASTOLIC BLOOD PRESSURE: 84 MMHG | SYSTOLIC BLOOD PRESSURE: 165 MMHG | OXYGEN SATURATION: 100 % | RESPIRATION RATE: 12 BRPM

## 2024-08-22 LAB
ALBUMIN SERPL ELPH-MCNC: 3.9 G/DL — SIGNIFICANT CHANGE UP (ref 3.3–5)
ALP SERPL-CCNC: 107 U/L — SIGNIFICANT CHANGE UP (ref 40–120)
ALT FLD-CCNC: 11 U/L — SIGNIFICANT CHANGE UP (ref 4–41)
ANION GAP SERPL CALC-SCNC: 14 MMOL/L — SIGNIFICANT CHANGE UP (ref 7–14)
APPEARANCE UR: ABNORMAL
AST SERPL-CCNC: 13 U/L — SIGNIFICANT CHANGE UP (ref 4–40)
BACTERIA # UR AUTO: NEGATIVE /HPF — SIGNIFICANT CHANGE UP
BASOPHILS # BLD AUTO: 0.06 K/UL — SIGNIFICANT CHANGE UP (ref 0–0.2)
BASOPHILS NFR BLD AUTO: 0.7 % — SIGNIFICANT CHANGE UP (ref 0–2)
BILIRUB SERPL-MCNC: 0.3 MG/DL — SIGNIFICANT CHANGE UP (ref 0.2–1.2)
BILIRUB UR-MCNC: NEGATIVE — SIGNIFICANT CHANGE UP
BLOOD GAS VENOUS COMPREHENSIVE RESULT: SIGNIFICANT CHANGE UP
BUN SERPL-MCNC: 36 MG/DL — HIGH (ref 7–23)
CALCIUM SERPL-MCNC: 9.9 MG/DL — SIGNIFICANT CHANGE UP (ref 8.4–10.5)
CHLORIDE SERPL-SCNC: 105 MMOL/L — SIGNIFICANT CHANGE UP (ref 98–107)
CO2 SERPL-SCNC: 19 MMOL/L — LOW (ref 22–31)
COLOR SPEC: ABNORMAL
CREAT SERPL-MCNC: 2.08 MG/DL — HIGH (ref 0.5–1.3)
DIFF PNL FLD: ABNORMAL
EGFR: 31 ML/MIN/1.73M2 — LOW
EGFR: 31 ML/MIN/1.73M2 — LOW
EOSINOPHIL # BLD AUTO: 0.05 K/UL — SIGNIFICANT CHANGE UP (ref 0–0.5)
EOSINOPHIL NFR BLD AUTO: 0.6 % — SIGNIFICANT CHANGE UP (ref 0–6)
GLUCOSE SERPL-MCNC: 167 MG/DL — HIGH (ref 70–99)
GLUCOSE UR QL: 100 MG/DL
HCT VFR BLD CALC: 31.9 % — LOW (ref 39–50)
HGB BLD-MCNC: 10.4 G/DL — LOW (ref 13–17)
IANC: 5.55 K/UL — SIGNIFICANT CHANGE UP (ref 1.8–7.4)
IMM GRANULOCYTES NFR BLD AUTO: 0.4 % — SIGNIFICANT CHANGE UP (ref 0–0.9)
KETONES UR-MCNC: NEGATIVE MG/DL — SIGNIFICANT CHANGE UP
LEUKOCYTE ESTERASE UR-ACNC: ABNORMAL
LYMPHOCYTES # BLD AUTO: 1.86 K/UL — SIGNIFICANT CHANGE UP (ref 1–3.3)
LYMPHOCYTES # BLD AUTO: 22.9 % — SIGNIFICANT CHANGE UP (ref 13–44)
MCHC RBC-ENTMCNC: 27.2 PG — SIGNIFICANT CHANGE UP (ref 27–34)
MCHC RBC-ENTMCNC: 32.6 GM/DL — SIGNIFICANT CHANGE UP (ref 32–36)
MCV RBC AUTO: 83.5 FL — SIGNIFICANT CHANGE UP (ref 80–100)
MONOCYTES # BLD AUTO: 0.57 K/UL — SIGNIFICANT CHANGE UP (ref 0–0.9)
MONOCYTES NFR BLD AUTO: 7 % — SIGNIFICANT CHANGE UP (ref 2–14)
NEUTROPHILS # BLD AUTO: 5.55 K/UL — SIGNIFICANT CHANGE UP (ref 1.8–7.4)
NEUTROPHILS NFR BLD AUTO: 68.4 % — SIGNIFICANT CHANGE UP (ref 43–77)
NITRITE UR-MCNC: NEGATIVE — SIGNIFICANT CHANGE UP
NRBC # BLD AUTO: 0 K/UL — SIGNIFICANT CHANGE UP (ref 0–0)
NRBC # BLD: 0 /100 WBCS — SIGNIFICANT CHANGE UP (ref 0–0)
NRBC # FLD: 0 K/UL — SIGNIFICANT CHANGE UP (ref 0–0)
NRBC BLD-RTO: 0 /100 WBCS — SIGNIFICANT CHANGE UP (ref 0–0)
PH UR: 5.5 — SIGNIFICANT CHANGE UP (ref 5–8)
PLATELET # BLD AUTO: 281 K/UL — SIGNIFICANT CHANGE UP (ref 150–400)
POTASSIUM SERPL-MCNC: 4.1 MMOL/L — SIGNIFICANT CHANGE UP (ref 3.5–5.3)
POTASSIUM SERPL-SCNC: 4.1 MMOL/L — SIGNIFICANT CHANGE UP (ref 3.5–5.3)
PROT SERPL-MCNC: 8.3 G/DL — SIGNIFICANT CHANGE UP (ref 6–8.3)
PROT UR-MCNC: 100 MG/DL
RBC # BLD: 3.82 M/UL — LOW (ref 4.2–5.8)
RBC # FLD: 13.2 % — SIGNIFICANT CHANGE UP (ref 10.3–14.5)
RBC CASTS # UR COMP ASSIST: >50 /HPF — SIGNIFICANT CHANGE UP (ref 0–4)
REVIEW: SIGNIFICANT CHANGE UP
SODIUM SERPL-SCNC: 138 MMOL/L — SIGNIFICANT CHANGE UP (ref 135–145)
SP GR SPEC: 1.01 — SIGNIFICANT CHANGE UP (ref 1–1.03)
SQUAMOUS # UR AUTO: 0 /HPF — SIGNIFICANT CHANGE UP (ref 0–5)
UROBILINOGEN FLD QL: 0.2 MG/DL — SIGNIFICANT CHANGE UP (ref 0.2–1)
WBC # BLD: 8.12 K/UL — SIGNIFICANT CHANGE UP (ref 3.8–10.5)
WBC # FLD AUTO: 8.12 K/UL — SIGNIFICANT CHANGE UP (ref 3.8–10.5)
WBC UR QL: 55 /HPF — HIGH (ref 0–5)
YEAST-LIKE CELLS: PRESENT

## 2024-08-22 PROCEDURE — 99053 MED SERV 10PM-8AM 24 HR FAC: CPT

## 2024-08-22 PROCEDURE — 99285 EMERGENCY DEPT VISIT HI MDM: CPT

## 2024-08-22 PROCEDURE — 74177 CT ABD & PELVIS W/CONTRAST: CPT | Mod: 26,MC

## 2024-08-22 RX ORDER — ACETAMINOPHEN 500 MG/5ML
1000 LIQUID (ML) ORAL ONCE
Refills: 0 | Status: COMPLETED | OUTPATIENT
Start: 2024-08-22 | End: 2024-08-22

## 2024-08-22 RX ADMIN — Medication 1000 MILLIGRAM(S): at 08:12

## 2024-08-22 RX ADMIN — Medication 400 MILLIGRAM(S): at 07:43

## 2024-08-24 LAB
CULTURE RESULTS: ABNORMAL
SPECIMEN SOURCE: SIGNIFICANT CHANGE UP

## 2024-08-30 ENCOUNTER — INPATIENT (INPATIENT)
Facility: HOSPITAL | Age: 84
LOS: 6 days | Discharge: HOME HEALTH | End: 2024-09-06
Attending: STUDENT IN AN ORGANIZED HEALTH CARE EDUCATION/TRAINING PROGRAM | Admitting: STUDENT IN AN ORGANIZED HEALTH CARE EDUCATION/TRAINING PROGRAM
Payer: COMMERCIAL

## 2024-08-30 VITALS
SYSTOLIC BLOOD PRESSURE: 137 MMHG | HEIGHT: 63 IN | WEIGHT: 126.99 LBS | RESPIRATION RATE: 17 BRPM | TEMPERATURE: 98 F | HEART RATE: 82 BPM | OXYGEN SATURATION: 100 % | DIASTOLIC BLOOD PRESSURE: 79 MMHG

## 2024-08-30 DIAGNOSIS — R31.9 HEMATURIA, UNSPECIFIED: ICD-10-CM

## 2024-08-30 LAB
ALBUMIN SERPL ELPH-MCNC: 3.8 G/DL — SIGNIFICANT CHANGE UP (ref 3.3–5)
ALP SERPL-CCNC: 103 U/L — SIGNIFICANT CHANGE UP (ref 40–120)
ALT FLD-CCNC: 9 U/L — SIGNIFICANT CHANGE UP (ref 4–41)
ANION GAP SERPL CALC-SCNC: 12 MMOL/L — SIGNIFICANT CHANGE UP (ref 7–14)
APPEARANCE UR: ABNORMAL
APTT BLD: 32 SEC — SIGNIFICANT CHANGE UP (ref 24.5–35.6)
AST SERPL-CCNC: 13 U/L — SIGNIFICANT CHANGE UP (ref 4–40)
BACTERIA # UR AUTO: ABNORMAL /HPF
BASOPHILS # BLD AUTO: 0.05 K/UL — SIGNIFICANT CHANGE UP (ref 0–0.2)
BASOPHILS NFR BLD AUTO: 0.7 % — SIGNIFICANT CHANGE UP (ref 0–2)
BILIRUB SERPL-MCNC: 0.3 MG/DL — SIGNIFICANT CHANGE UP (ref 0.2–1.2)
BILIRUB UR-MCNC: NEGATIVE — SIGNIFICANT CHANGE UP
BLOOD GAS VENOUS COMPREHENSIVE RESULT: SIGNIFICANT CHANGE UP
BUN SERPL-MCNC: 25 MG/DL — HIGH (ref 7–23)
CALCIUM SERPL-MCNC: 9.9 MG/DL — SIGNIFICANT CHANGE UP (ref 8.4–10.5)
CAST: 10 /LPF — HIGH (ref 0–4)
CHLORIDE SERPL-SCNC: 103 MMOL/L — SIGNIFICANT CHANGE UP (ref 98–107)
CO2 SERPL-SCNC: 19 MMOL/L — LOW (ref 22–31)
COLOR SPEC: YELLOW — SIGNIFICANT CHANGE UP
CREAT SERPL-MCNC: 1.24 MG/DL — SIGNIFICANT CHANGE UP (ref 0.5–1.3)
DIFF PNL FLD: ABNORMAL
EGFR: 57 ML/MIN/1.73M2 — LOW
EOSINOPHIL # BLD AUTO: 0.05 K/UL — SIGNIFICANT CHANGE UP (ref 0–0.5)
EOSINOPHIL NFR BLD AUTO: 0.7 % — SIGNIFICANT CHANGE UP (ref 0–6)
GLUCOSE SERPL-MCNC: 203 MG/DL — HIGH (ref 70–99)
GLUCOSE UR QL: NEGATIVE MG/DL — SIGNIFICANT CHANGE UP
HCT VFR BLD CALC: 36.4 % — LOW (ref 39–50)
HGB BLD-MCNC: 11.6 G/DL — LOW (ref 13–17)
IANC: 3.52 K/UL — SIGNIFICANT CHANGE UP (ref 1.8–7.4)
IMM GRANULOCYTES NFR BLD AUTO: 0.7 % — SIGNIFICANT CHANGE UP (ref 0–0.9)
INR BLD: 1.03 RATIO — SIGNIFICANT CHANGE UP (ref 0.85–1.18)
KETONES UR-MCNC: NEGATIVE MG/DL — SIGNIFICANT CHANGE UP
LEUKOCYTE ESTERASE UR-ACNC: ABNORMAL
LYMPHOCYTES # BLD AUTO: 2.71 K/UL — SIGNIFICANT CHANGE UP (ref 1–3.3)
LYMPHOCYTES # BLD AUTO: 38.1 % — SIGNIFICANT CHANGE UP (ref 13–44)
MCHC RBC-ENTMCNC: 27 PG — SIGNIFICANT CHANGE UP (ref 27–34)
MCHC RBC-ENTMCNC: 31.9 GM/DL — LOW (ref 32–36)
MCV RBC AUTO: 84.7 FL — SIGNIFICANT CHANGE UP (ref 80–100)
MONOCYTES # BLD AUTO: 0.73 K/UL — SIGNIFICANT CHANGE UP (ref 0–0.9)
MONOCYTES NFR BLD AUTO: 10.3 % — SIGNIFICANT CHANGE UP (ref 2–14)
NEUTROPHILS # BLD AUTO: 3.52 K/UL — SIGNIFICANT CHANGE UP (ref 1.8–7.4)
NEUTROPHILS NFR BLD AUTO: 49.5 % — SIGNIFICANT CHANGE UP (ref 43–77)
NITRITE UR-MCNC: NEGATIVE — SIGNIFICANT CHANGE UP
NRBC # BLD: 0 /100 WBCS — SIGNIFICANT CHANGE UP (ref 0–0)
NRBC # FLD: 0 K/UL — SIGNIFICANT CHANGE UP (ref 0–0)
PH UR: 6 — SIGNIFICANT CHANGE UP (ref 5–8)
PLATELET # BLD AUTO: 308 K/UL — SIGNIFICANT CHANGE UP (ref 150–400)
POTASSIUM SERPL-MCNC: 4.7 MMOL/L — SIGNIFICANT CHANGE UP (ref 3.5–5.3)
POTASSIUM SERPL-SCNC: 4.7 MMOL/L — SIGNIFICANT CHANGE UP (ref 3.5–5.3)
PROT SERPL-MCNC: 8.8 G/DL — HIGH (ref 6–8.3)
PROT UR-MCNC: 100 MG/DL
PROTHROM AB SERPL-ACNC: 11.5 SEC — SIGNIFICANT CHANGE UP (ref 9.5–13)
RBC # BLD: 4.3 M/UL — SIGNIFICANT CHANGE UP (ref 4.2–5.8)
RBC # FLD: 12.9 % — SIGNIFICANT CHANGE UP (ref 10.3–14.5)
RBC CASTS # UR COMP ASSIST: 24 /HPF — HIGH (ref 0–4)
REVIEW: SIGNIFICANT CHANGE UP
SODIUM SERPL-SCNC: 134 MMOL/L — LOW (ref 135–145)
SP GR SPEC: 1.01 — SIGNIFICANT CHANGE UP (ref 1–1.03)
SQUAMOUS # UR AUTO: 8 /HPF — HIGH (ref 0–5)
UROBILINOGEN FLD QL: 0.2 MG/DL — SIGNIFICANT CHANGE UP (ref 0.2–1)
WBC # BLD: 7.11 K/UL — SIGNIFICANT CHANGE UP (ref 3.8–10.5)
WBC # FLD AUTO: 7.11 K/UL — SIGNIFICANT CHANGE UP (ref 3.8–10.5)
WBC UR QL: 2942 /HPF — HIGH (ref 0–5)

## 2024-08-30 PROCEDURE — 99285 EMERGENCY DEPT VISIT HI MDM: CPT

## 2024-08-30 RX ORDER — SODIUM CHLORIDE 9 MG/ML
1000 INJECTION INTRAMUSCULAR; INTRAVENOUS; SUBCUTANEOUS ONCE
Refills: 0 | Status: COMPLETED | OUTPATIENT
Start: 2024-08-30 | End: 2024-08-30

## 2024-08-30 RX ORDER — FLUCONAZOLE 150 MG/1
150 TABLET ORAL ONCE
Refills: 0 | Status: COMPLETED | OUTPATIENT
Start: 2024-08-30 | End: 2024-08-31

## 2024-08-30 RX ADMIN — SODIUM CHLORIDE 1000 MILLILITER(S): 9 INJECTION INTRAMUSCULAR; INTRAVENOUS; SUBCUTANEOUS at 18:46

## 2024-08-30 RX ADMIN — Medication 100 MILLIGRAM(S): at 22:18

## 2024-08-30 NOTE — ED PROVIDER NOTE - OBJECTIVE STATEMENT
84-year-old male past medical history chronic Forrester secondary to worsening BPH, hypertension, hyperlipidemia, diabetes, spinal stenosis causing difficulty with ambulation, presents to the ED from [facility] for concern for hematuria.  Patient noticed hematuria yesterday, had visiting nurse evaluate and was sent to ED for further evaluation.  According to patient visiting nurse removed the Forrester, patient has not voided for the last 24 hours and now has associated suprapubic discomfort. Forrester last replaced 8/5. Visited ED for urinary symptoms 8/22 and had forrester readjusted. Denies fevers chills nausea vomiting diarrhea chest pain shortness of breath. Pt states he has uro apt in 6 days.

## 2024-08-30 NOTE — ED ADULT NURSE NOTE - OBJECTIVE STATEMENT
Pt received to rm  8 , awake and alert, A&OX3, ambulatory with 2 assist . C/o humateria. pt has chronic Lawson. pt denies any pain or fever.   Respirations even and unlabored. Denies CP, SOB, N/V, HA, dizziness, palpitations, blurry vision. 20G IV placed to left AC    . Bed in lowest position, call bell within reach. Safety maintained.

## 2024-08-30 NOTE — PROCEDURE NOTE - ADDITIONAL PROCEDURE DETAILS
Called by ER for assistance with placement of difficult forrester.  Patient is forrester dependent, and presented with a catheter that was not draining, and appeared to be displaced.  This catheter was removed and replacement was attempted that was unsuccessful with reported return of blood.  Amount in bladder not quantified by ER, but report bladder is distended compared to "20 week" gestation.      On exam, patient is with minimal distention.  Denies pain or inability to void.  Yellow urine in urinal at bedside ~150cc.    20F coude placed without difficulty with return of purulent appearing urine, yellow/cloudy.  No blood present.    -Maintain current forrester with q monthly changes.  -Please send UA and culture.  -Urology, p [phone] Called by ER for assistance with placement of difficult forrester.  Patient is forrester dependent, and presented with a catheter that was not draining, and appeared to be displaced.  This catheter was removed and replacement was attempted that was unsuccessful with reported return of blood.  Amount in bladder not quantified by ER, but report bladder is distended compared to "20 week" gestation.      On exam, patient is with minimal distention.  Denies pain or inability to void.  Yellow urine in urinal at bedside ~150cc.    20F coude placed without difficulty with return of purulent appearing urine, yellow/cloudy.  No blood present. 250cc drained.     -Maintain current forrester with q monthly changes.  -Please send UA and culture.  -Urology, p [id number]

## 2024-08-30 NOTE — ED PROVIDER NOTE - PHYSICAL EXAMINATION
Gen: AAOx3, non-toxic  Head: NCAT  HEENT: EOMI, oral mucosa moist, normal conjunctiva  Lung: CTAB, no respiratory distress, no wheezes/rhonchi/rales B/L,   CV: RRR, no murmurs, rubs or gallops  Abd: soft, NTND, no guarding, no CVA tenderness  MSK: no visible deformities  Neuro: No focal sensory or motor deficits  Skin: Warm, well perfused, no rash  Psych: normal affect.  : forrester not in place, disconnected from urethra, no urethral discharge/lesions, Forrester bag w scant blood tinged urine, .

## 2024-08-30 NOTE — ED PROVIDER NOTE - CLINICAL SUMMARY MEDICAL DECISION MAKING FREE TEXT BOX
84-year-old male past medical history chronic Forrester secondary to worsening BPH, hypertension, hyperlipidemia, diabetes, spinal stenosis causing difficulty with ambulation, presents to the ED from [facility] for concern for hematuria.  Patient noticed hematuria yesterday, had visiting nurse evaluate and was sent to ED for further evaluation.  According to patient visiting nurse removed the Forrester, patient has not voided for the last 24 hours and now has associated suprapubic discomfort. Forrester last replaced 8/5. Visited ED for urinary symptoms 8/22 and had forrester readjusted. Denies fevers chills nausea vomiting diarrhea chest pain shortness of breath. Pt states he has uro apt in 6 days.     VS. Clinically stable. PE, well appearing, no acute distress, AAOx3. NCAT, EOMI, normal conjunctiva, mucous membranes moist, LCTAB no w/r/c, no MRG, RRR, abd mild suprapubic discomfort, no rebound tenderness or guarding, no CVA ttp, no focal neuro deficits, neurovascularly intact, dp 2+, no bruising, rashes, or erythema, forrester not in place, disconnected from urethra, no urethral discharge/lesions, Forrester bag w scant blood tinged urine, . Suspicion for forrester malfunction vs uti. will assess w labs, ua, replace forrester, flush, dispo pending Jeffrey after replacing forrester and flushing, likely f/u w uro in 6 days. 84-year-old male past medical history chronic Forrester secondary to worsening BPH, hypertension, hyperlipidemia, diabetes, spinal stenosis causing difficulty with ambulation, presents to the ED from [facility], [place] for concern for hematuria.  Patient noticed hematuria yesterday, had visiting nurse evaluate and was sent to ED for further evaluation.  According to patient visiting nurse removed the Forrester, patient has not voided for the last 24 hours and now has associated suprapubic discomfort. Forrester last replaced 8/5. Visited ED for urinary symptoms 8/22 and had forrester readjusted. Denies fevers chills nausea vomiting diarrhea chest pain shortness of breath. Pt states he has uro apt in 6 days.     VS. Clinically stable. PE, well appearing, no acute distress, AAOx3. NCAT, EOMI, normal conjunctiva, mucous membranes moist, LCTAB no w/r/c, no MRG, RRR, abd mild suprapubic discomfort, no rebound tenderness or guarding, no CVA ttp, no focal neuro deficits, neurovascularly intact, dp 2+, no bruising, rashes, or erythema, forrester not in place, disconnected from urethra, no urethral discharge/lesions, Forrester bag w scant blood tinged urine, . Suspicion for forrester malfunction vs uti. will assess w labs, ua, replace forrester, flush, dispo pending Jeffrey after replacing forrester and flushing, likely f/u w uro in 6 days.    Kayleigh Mcadams MD attending physician this is an 84-year-old gentleman has a Forrester in place and he reports hematuria.  The Forrester was placed secondary to worsening BPH.  He also has hypertension hyperlipidemia diabetes spinal stenosis has difficulty with ambulation.  Comes from [facility], [place] for hematuria.  He noticed that yesterday and was sent to the ED for further evaluation.    According the patient visiting nurse removed the Forrester but the patient did not void for the last 24 hours and now has suprapubic discomfort denies fever chills nausea vomiting.    In the bag patient clearly has blood but has not been putting urine out in the interim.  Vital signs include blood pressure 149/80 respiratory rate 17 heart rate 73 temp 97 O2 sat is 100.    On exam patient is awake alert able to communicate although is a bit confused of note his abdomen has a suprapubic mass at about a 20-week pregnancy size.  With the resident exam and the penis he found that the Forrester was not actually present in the penis.  Nurse tried to place a Forrester but found blood clots and obstruction and so stopped.    Urology being called in order to place Forrester.  I wonder if in fact there is a false tract secondary to the nurse placing it as an outpatient.

## 2024-08-30 NOTE — ED ADULT NURSE REASSESSMENT NOTE - NS ED NURSE REASSESS COMMENT FT1
Lawson attempted as per MD order. Lawson unable to be placed. MD CHOWDHURY made aware. pt tolerated well. Pt awake and alert, A&OX4, resp even and unlabored. Denies CP, SOB, HA, dizziness, palpitations, blurry vision. Resting comfortably.

## 2024-08-30 NOTE — ED PROVIDER NOTE - ATTENDING CONTRIBUTION TO CARE
Kayleigh Mcadams MD attending physician this is an 84-year-old gentleman has a Lawson in place and he reports hematuria.  The Lawson was placed secondary to worsening BPH.  He also has hypertension hyperlipidemia diabetes spinal stenosis has difficulty with ambulation.  Comes from [facility] for hematuria.  He noticed that yesterday and was sent to the ED for further evaluation.    According the patient visiting nurse removed the Lawson but the patient did not void for the last 24 hours and now has suprapubic discomfort denies fever chills nausea vomiting.    In the bag patient clearly has blood but has not been putting urine out in the interim.  Vital signs include blood pressure 149/80 respiratory rate 17 heart rate 73 temp 97 O2 sat is 100.    On exam patient is awake alert able to communicate although is a bit confused of note his abdomen has a suprapubic mass at about a 20-week pregnancy size.  With the resident exam and the penis he found that the Lawson was not actually present in the penis.  Nurse tried to place a Lawson but found blood clots and obstruction and so stopped.    Urology being called in order to place Lawson.  I wonder if in fact there is a false tract secondary to the nurse placing it as an outpatient.    I performed a history and physical exam of the patient and discussed their management with the resident and /or advanced care provider. I personally made/approved the management plan and take responsibility for the patient management. I reviewed the resident and /or ACP's note and agree with the documented findings and plan of care. My medical decison making and observations are found above.

## 2024-08-30 NOTE — ED ADULT NURSE NOTE - NSFALLRISKINTERV_ED_ALL_ED
Assistance OOB with selected safe patient handling equipment if applicable/Assistance with ambulation/Communicate fall risk and risk factors to all staff, patient, and family/Monitor gait and stability/Provide visual cue: yellow wristband, yellow gown, etc/Reinforce activity limits and safety measures with patient and family/Call bell, personal items and telephone in reach/Instruct patient to call for assistance before getting out of bed/chair/stretcher/Non-slip footwear applied when patient is off stretcher/[place] to call system/Physically safe environment - no spills, clutter or unnecessary equipment/Purposeful Proactive Rounding/Room/bathroom lighting operational, light cord in reach

## 2024-08-30 NOTE — ED ADULT NURSE REASSESSMENT NOTE - NS ED NURSE REASSESS COMMENT FT1
Assume care of patient from KUMAR Hidalgo. Patient AAOx3. Family member at bedside. No distress noted. Chest expansion symmetric. Patient awaiting urology for catheter placement. Urine noted in urinal at bedside, patients family member confirmed that the patient urinated in the urinal with no issues.20G IV in place to left inner forearm, nothing infusing at this time. Patient denies painful urination. V/s done and charted.

## 2024-08-30 NOTE — ED ADULT TRIAGE NOTE - CHIEF COMPLAINT QUOTE
Pt coming to ER c/o hematuria. Pt has a chronic forrester catheter that is used for treatment of retention r/t BPH. Pt states he noted blood in the urine starting last night. Denies use of blood thinners at this time. Hx BPH, CKD and HIV, T2DM finger stick 220

## 2024-08-31 DIAGNOSIS — E11.9 TYPE 2 DIABETES MELLITUS WITHOUT COMPLICATIONS: ICD-10-CM

## 2024-08-31 DIAGNOSIS — Z29.9 ENCOUNTER FOR PROPHYLACTIC MEASURES, UNSPECIFIED: ICD-10-CM

## 2024-08-31 DIAGNOSIS — B20 HUMAN IMMUNODEFICIENCY VIRUS [HIV] DISEASE: ICD-10-CM

## 2024-08-31 DIAGNOSIS — N40.0 BENIGN PROSTATIC HYPERPLASIA WITHOUT LOWER URINARY TRACT SYMPTOMS: ICD-10-CM

## 2024-08-31 DIAGNOSIS — I10 ESSENTIAL (PRIMARY) HYPERTENSION: ICD-10-CM

## 2024-08-31 DIAGNOSIS — Z79.899 OTHER LONG TERM (CURRENT) DRUG THERAPY: ICD-10-CM

## 2024-08-31 DIAGNOSIS — N39.0 URINARY TRACT INFECTION, SITE NOT SPECIFIED: ICD-10-CM

## 2024-08-31 LAB
ANION GAP SERPL CALC-SCNC: 14 MMOL/L — SIGNIFICANT CHANGE UP (ref 7–14)
BUN SERPL-MCNC: 23 MG/DL — SIGNIFICANT CHANGE UP (ref 7–23)
CALCIUM SERPL-MCNC: 9.4 MG/DL — SIGNIFICANT CHANGE UP (ref 8.4–10.5)
CHLORIDE SERPL-SCNC: 104 MMOL/L — SIGNIFICANT CHANGE UP (ref 98–107)
CO2 SERPL-SCNC: 18 MMOL/L — LOW (ref 22–31)
CREAT SERPL-MCNC: 1.13 MG/DL — SIGNIFICANT CHANGE UP (ref 0.5–1.3)
EGFR: 64 ML/MIN/1.73M2 — SIGNIFICANT CHANGE UP
GLUCOSE BLDC GLUCOMTR-MCNC: 119 MG/DL — HIGH (ref 70–99)
GLUCOSE BLDC GLUCOMTR-MCNC: 189 MG/DL — HIGH (ref 70–99)
GLUCOSE BLDC GLUCOMTR-MCNC: 218 MG/DL — HIGH (ref 70–99)
GLUCOSE BLDC GLUCOMTR-MCNC: 222 MG/DL — HIGH (ref 70–99)
GLUCOSE BLDC GLUCOMTR-MCNC: 244 MG/DL — HIGH (ref 70–99)
GLUCOSE SERPL-MCNC: 222 MG/DL — HIGH (ref 70–99)
MAGNESIUM SERPL-MCNC: 1.8 MG/DL — SIGNIFICANT CHANGE UP (ref 1.6–2.6)
PHOSPHATE SERPL-MCNC: 2 MG/DL — LOW (ref 2.5–4.5)
POTASSIUM SERPL-MCNC: 4.3 MMOL/L — SIGNIFICANT CHANGE UP (ref 3.5–5.3)
POTASSIUM SERPL-SCNC: 4.3 MMOL/L — SIGNIFICANT CHANGE UP (ref 3.5–5.3)
SODIUM SERPL-SCNC: 136 MMOL/L — SIGNIFICANT CHANGE UP (ref 135–145)

## 2024-08-31 PROCEDURE — 99223 1ST HOSP IP/OBS HIGH 75: CPT

## 2024-08-31 PROCEDURE — 99232 SBSQ HOSP IP/OBS MODERATE 35: CPT

## 2024-08-31 RX ORDER — ACETAMINOPHEN 325 MG/1
650 TABLET ORAL EVERY 6 HOURS
Refills: 0 | Status: DISCONTINUED | OUTPATIENT
Start: 2024-08-31 | End: 2024-09-06

## 2024-08-31 RX ORDER — OXYBUTYNIN CHLORIDE 5 MG/1
5 TABLET ORAL EVERY 12 HOURS
Refills: 0 | Status: DISCONTINUED | OUTPATIENT
Start: 2024-08-31 | End: 2024-09-06

## 2024-08-31 RX ORDER — FINASTERIDE 1 MG/1
1 TABLET, FILM COATED ORAL
Refills: 0 | DISCHARGE

## 2024-08-31 RX ORDER — INSULIN GLARGINE 100 [IU]/ML
12 INJECTION, SOLUTION SUBCUTANEOUS
Refills: 0 | DISCHARGE

## 2024-08-31 RX ORDER — INSULIN GLARGINE 100 [IU]/ML
8 INJECTION, SOLUTION SUBCUTANEOUS EVERY MORNING
Refills: 0 | Status: DISCONTINUED | OUTPATIENT
Start: 2024-08-31 | End: 2024-09-06

## 2024-08-31 RX ORDER — METOPROLOL TARTRATE 100 MG/1
25 TABLET ORAL DAILY
Refills: 0 | Status: DISCONTINUED | OUTPATIENT
Start: 2024-08-31 | End: 2024-09-06

## 2024-08-31 RX ORDER — OXYBUTYNIN CHLORIDE 5 MG/1
1 TABLET ORAL
Refills: 0 | DISCHARGE

## 2024-08-31 RX ORDER — HEPARIN SODIUM,BOVINE 1000/ML
5000 VIAL (ML) INJECTION EVERY 12 HOURS
Refills: 0 | Status: DISCONTINUED | OUTPATIENT
Start: 2024-08-31 | End: 2024-09-06

## 2024-08-31 RX ORDER — FINASTERIDE 1 MG/1
5 TABLET, FILM COATED ORAL DAILY
Refills: 0 | Status: DISCONTINUED | OUTPATIENT
Start: 2024-08-31 | End: 2024-09-06

## 2024-08-31 RX ORDER — GLUCAGON INJECTION, SOLUTION 1 MG/.2ML
1 INJECTION, SOLUTION SUBCUTANEOUS ONCE
Refills: 0 | Status: DISCONTINUED | OUTPATIENT
Start: 2024-08-31 | End: 2024-09-06

## 2024-08-31 RX ORDER — CHLORHEXIDINE GLUCONATE 40 MG/ML
1 SOLUTION TOPICAL DAILY
Refills: 0 | Status: DISCONTINUED | OUTPATIENT
Start: 2024-08-31 | End: 2024-09-06

## 2024-08-31 RX ORDER — DEXTROSE 15 G/33 G
15 GEL IN PACKET (GRAM) ORAL ONCE
Refills: 0 | Status: DISCONTINUED | OUTPATIENT
Start: 2024-08-31 | End: 2024-09-06

## 2024-08-31 RX ORDER — DEXTROSE 15 G/33 G
25 GEL IN PACKET (GRAM) ORAL ONCE
Refills: 0 | Status: DISCONTINUED | OUTPATIENT
Start: 2024-08-31 | End: 2024-09-06

## 2024-08-31 RX ORDER — DEXTROSE 15 G/33 G
12.5 GEL IN PACKET (GRAM) ORAL ONCE
Refills: 0 | Status: DISCONTINUED | OUTPATIENT
Start: 2024-08-31 | End: 2024-09-06

## 2024-08-31 RX ORDER — DARUNAVIR, COBICISTAT, EMTRICITABINE, AND TENOFOVIR ALAFENAMIDE 800; 150; 200; 10 MG/1; MG/1; MG/1; MG/1
1 TABLET, FILM COATED ORAL DAILY
Refills: 0 | Status: DISCONTINUED | OUTPATIENT
Start: 2024-08-31 | End: 2024-09-06

## 2024-08-31 RX ORDER — TAMSULOSIN HYDROCHLORIDE 0.4 MG/1
0.4 CAPSULE ORAL AT BEDTIME
Refills: 0 | Status: DISCONTINUED | OUTPATIENT
Start: 2024-08-31 | End: 2024-09-06

## 2024-08-31 RX ORDER — AMLODIPINE BESYLATE 10 MG/1
5 TABLET ORAL EVERY 24 HOURS
Refills: 0 | Status: DISCONTINUED | OUTPATIENT
Start: 2024-08-31 | End: 2024-09-06

## 2024-08-31 RX ADMIN — TAMSULOSIN HYDROCHLORIDE 0.4 MILLIGRAM(S): 0.4 CAPSULE ORAL at 21:14

## 2024-08-31 RX ADMIN — CHLORHEXIDINE GLUCONATE 1 APPLICATION(S): 40 SOLUTION TOPICAL at 12:51

## 2024-08-31 RX ADMIN — Medication 5000 UNIT(S): at 18:02

## 2024-08-31 RX ADMIN — FINASTERIDE 5 MILLIGRAM(S): 1 TABLET, FILM COATED ORAL at 12:49

## 2024-08-31 RX ADMIN — Medication 5000 UNIT(S): at 06:19

## 2024-08-31 RX ADMIN — Medication 2: at 08:59

## 2024-08-31 RX ADMIN — METOPROLOL TARTRATE 25 MILLIGRAM(S): 100 TABLET ORAL at 06:18

## 2024-08-31 RX ADMIN — FLUCONAZOLE 150 MILLIGRAM(S): 150 TABLET ORAL at 00:54

## 2024-08-31 RX ADMIN — AMLODIPINE BESYLATE 5 MILLIGRAM(S): 10 TABLET ORAL at 12:49

## 2024-08-31 RX ADMIN — Medication 10 MILLIGRAM(S): at 21:13

## 2024-08-31 RX ADMIN — Medication 100 MILLIGRAM(S): at 21:15

## 2024-08-31 RX ADMIN — OXYBUTYNIN CHLORIDE 5 MILLIGRAM(S): 5 TABLET ORAL at 18:02

## 2024-08-31 RX ADMIN — DARUNAVIR, COBICISTAT, EMTRICITABINE, AND TENOFOVIR ALAFENAMIDE 1 TABLET(S): 800; 150; 200; 10 TABLET, FILM COATED ORAL at 12:49

## 2024-08-31 RX ADMIN — Medication 2: at 18:02

## 2024-08-31 RX ADMIN — OXYBUTYNIN CHLORIDE 5 MILLIGRAM(S): 5 TABLET ORAL at 06:44

## 2024-08-31 RX ADMIN — INSULIN GLARGINE 8 UNIT(S): 100 INJECTION, SOLUTION SUBCUTANEOUS at 08:58

## 2024-08-31 NOTE — H&P ADULT - NSHPPHYSICALEXAM_GEN_ALL_CORE
PHYSICAL EXAM:  GENERAL: NAD, well-developed  HEAD:  Atraumatic, Normocephalic  EYES: EOMI, PERRLA, conjunctiva and sclera clear  NECK: Supple, No JVD  CHEST/LUNG: Clear to auscultation bilaterally; No wheeze  HEART: Regular rate and rhythm; No murmurs, rubs, or gallops  ABDOMEN: Soft, Nontender, Nondistended; Bowel sounds present  EXTREMITIES:  2+ Peripheral Pulses, No clubbing, cyanosis, or edema  PSYCH: AAOx2 person and place, not exact date  NEUROLOGY: non-focal  SKIN: No rashes or lesions

## 2024-08-31 NOTE — PROGRESS NOTE ADULT - PROBLEM SELECTOR PLAN 1
-ceftriaxone 1 gram daily for now  -prior cultures w/ candida, though low colony forming units and likely contaminant would hold off on further antifungals unless patient decompensates  -if BP low, high grade fevers noted, can give zosyn and consider a dose of antifungal pending cx data  -forrester replaced in ED.

## 2024-08-31 NOTE — PATIENT PROFILE ADULT - FALL HARM RISK - TYPE OF ASSESSMENT
Abhishek Post is a 61 year old male here for  Chief Complaint   Patient presents with   • Blood Disorder     Hemochromatosis, labs, MD muñoz      Denies latex allergy or sensitivity.    Medication verified and med list updated.  PCP and Pharmacy verified.    Social History     Tobacco Use   Smoking Status Never Smoker   Smokeless Tobacco Never Used   Tobacco Comment    HISTORY CIGAR ONLY     Advance Directives Filed: No    ECOG:   ECOG [07/16/21 0827]   ECOG Performance Status 0       Height: Yes, shoes on.  Ht Readings from Last 1 Encounters:   07/16/21 5' 10.5\" (1.791 m)     Weight:Yes, shoes on.  Wt Readings from Last 3 Encounters:   07/16/21 (!) 141.2 kg   05/14/21 (!) 140 kg   03/26/21 (!) 139.3 kg       BMI: Body mass index is 44.03 kg/m².    REVIEW OF SYSTEMS  GENERAL:  Patient denies headache, fevers, chills, night sweats, excessive fatigue, change in appetite, weight loss, dizziness  ALLERGIC/IMMUNOLOGIC: Verified allergies: Yes  EYES:  Patient denies significant visual difficulties, double vision, blurred vision  ENT/MOUTH: Patient denies problems with hearing, sore throat, sinus drainage, mouth sores  ENDOCRINE:  Patient denies diabetes, thyroid disease, hormone replacement, hot flashes  HEMATOLOGIC/LYMPHATIC: Patient denies easy bruising, bleeding, tender lymph nodes, swollen lymph nodes  BREASTS: Patient denies abnormal masses of breast, nipple discharge, pain  RESPIRATORY:  Patient denies lung pain with breathing, cough, coughing up blood, shortness of breath  CARDIOVASCULAR:  Patient denies anginal chest pain, palpitations, shortness of breath when lying flat, peripheral edema  GASTROINTESTINAL: Patient denies abdominal pain , nausea, vomiting, diarrhea, GI bleeding, constipation, change in bowel habits, heartburn, sensation of feeling full, difficulty swallowing  : Patient denies blood in the urine, burning with urination, frequency, urgency, hesitancy, incontinence  MUSCULOSKELETAL:  Patient denies  joint pain, bone pain, joint swelling, redness, decreased range of motion  SKIN:  Patient denies chronic rashes, inflammation, ulcerations, skin changes, itching  NEUROLOGIC:  Patient denies loss of balance, areas of focal weakness, abnormal gait, sensory problems, numbness, tingling  PSYCHIATRIC: Patient denies insomnia, depression, anxiety   Admission

## 2024-08-31 NOTE — PROGRESS NOTE ADULT - SUBJECTIVE AND OBJECTIVE BOX
MARIELLA WAGNERON  84y  Male    Complaints:  Subjective:     No acute o/n events. Patient reports some suprapubic pain this morning, but it has since gone away. Denies fever, chills, chest pain, SOB, cough, N/V/D/C, abdominal pain.     MEDICATIONS  (STANDING):  amLODIPine   Tablet 5 milliGRAM(s) Oral every 24 hours  atorvastatin 10 milliGRAM(s) Oral at bedtime  cefTRIAXone   IVPB 1000 milliGRAM(s) IV Intermittent every 24 hours  darunavir 800 mG/cobicistat 150 mG/emtricitabine 200 mG/tenofovir alafenamide 10 mG (SYMTUZA) 1 Tablet(s) Oral daily  dextrose 5%. 1000 milliLiter(s) (100 mL/Hr) IV Continuous <Continuous>  dextrose 5%. 1000 milliLiter(s) (50 mL/Hr) IV Continuous <Continuous>  dextrose 50% Injectable 12.5 Gram(s) IV Push once  dextrose 50% Injectable 25 Gram(s) IV Push once  dextrose 50% Injectable 25 Gram(s) IV Push once  finasteride 5 milliGRAM(s) Oral daily  glucagon  Injectable 1 milliGRAM(s) IntraMuscular once  heparin   Injectable 5000 Unit(s) SubCutaneous every 12 hours  insulin glargine Injectable (LANTUS) 8 Unit(s) SubCutaneous every morning  insulin lispro (ADMELOG) corrective regimen sliding scale   SubCutaneous at bedtime  insulin lispro (ADMELOG) corrective regimen sliding scale   SubCutaneous three times a day before meals  metoprolol succinate ER 25 milliGRAM(s) Oral daily  oxybutynin 5 milliGRAM(s) Oral every 12 hours  tamsulosin 0.4 milliGRAM(s) Oral at bedtime    MEDICATIONS  (PRN):  acetaminophen     Tablet .. 650 milliGRAM(s) Oral every 6 hours PRN Temp greater or equal to 38C (100.4F), Mild Pain (1 - 3)  dextrose Oral Gel 15 Gram(s) Oral once PRN Blood Glucose LESS THAN 70 milliGRAM(s)/deciliter    Vital Signs Last 24 Hrs  T(C): 36.3 (31 Aug 2024 06:12), Max: 36.9 (30 Aug 2024 20:06)  T(F): 97.4 (31 Aug 2024 06:12), Max: 98.4 (30 Aug 2024 20:06)  HR: 86 (31 Aug 2024 06:12) (73 - 86)  BP: 169/72 (31 Aug 2024 06:12) (137/79 - 169/72)  BP(mean): 94 (30 Aug 2024 20:06) (94 - 94)  RR: 16 (31 Aug 2024 06:12) (16 - 18)  SpO2: 100% (31 Aug 2024 06:12) (100% - 100%)    Parameters below as of 31 Aug 2024 06:12  Patient On (Oxygen Delivery Method): room air    PHYSICAL EXAM:  GENERAL: Laying comfortably, NAD  HEENT: NCAT, PERRLA, EOMI, no scleral icterus, no LAD  LUNG: CTABL; No wheezes, crackles, or rhonchi  HEART: RRR; normal S1/S2; No murmurs, rubs, or gallops  ABDOMEN: Mild suprapubic tenderness, +BS, soft, nondistended, no HSM; No rebound, guarding, or rigidity  EXTREMITIES:  No LE edema b/l, 2+ Peripheral Pulses, No clubbing or cyanosis  NEUROLOGY: AOx3, non-focal, strength 5/5 in all extremities, sensation intact  PSYCH: calm and cooperative  SKIN: extensive psoriatic rash (plaques with scales) covering arms, legs, scalp, and part of abdomen - improving    Consultant(s) Notes Reviewed:  [x ] YES  [ ] NO  Care Discussed with Consultants/Other Providers [ x] YES  [ ] NO    LABS:                        7.4    5.95  )-----------( 228      ( 29 Aug 2024 06:23 )             22.7       08-29    136  |  105  |  72<H>  ----------------------------<  87  4.2   |  17<L>  |  2.78<H>    Ca    8.5      29 Aug 2024 06:23  Phos  3.1     08-29  Mg     1.30     08-29                Urinalysis Basic - ( 29 Aug 2024 06:23 )    Color: x / Appearance: x / SG: x / pH: x  Gluc: 87 mg/dL / Ketone: x  / Bili: x / Urobili: x   Blood: x / Protein: x / Nitrite: x   Leuk Esterase: x / RBC: x / WBC x   Sq Epi: x / Non Sq Epi: x / Bacteria: x    HEALTH ISSUES - PROBLEM Dx:  Hyperkalemia    Metabolic acidosis    DONELL (acute kidney injury)    High anion gap metabolic acidosis    Pruritic rash    Alcohol use    Chronic systolic congestive heart failure    Chronic obstructive pulmonary disease    Prophylactic measure     JAMES WAGNER  84y  MRN: [id number]  8/30/2024 (1d)    84-year-old male past medical history chronic Forrester secondary to worsening BPH, hypertension, hyperlipidemia, DM2, spinal stenosis, HIV VLUD CD4 900s, here w/ suprapubic distention and discomfort. In the ED, forrester was replaced w/ purulent material coming out. (31-Aug-2024 00:56)    SUBJECTIVE / OVERNIGHT EVENTS:  No acute o/n events. Patient reports some suprapubic pain this morning, but it has since gone away. Denies fever, chills, chest pain, SOB, cough, N/V/D/C, abdominal pain.     12 Point ROS negative with the exception of the above    MEDICATIONS  (STANDING):  amLODIPine   Tablet 5 milliGRAM(s) Oral every 24 hours  atorvastatin 10 milliGRAM(s) Oral at bedtime  cefTRIAXone   IVPB 1000 milliGRAM(s) IV Intermittent every 24 hours  darunavir 800 mG/cobicistat 150 mG/emtricitabine 200 mG/tenofovir alafenamide 10 mG (SYMTUZA) 1 Tablet(s) Oral daily  dextrose 5%. 1000 milliLiter(s) (100 mL/Hr) IV Continuous <Continuous>  dextrose 5%. 1000 milliLiter(s) (50 mL/Hr) IV Continuous <Continuous>  dextrose 50% Injectable 12.5 Gram(s) IV Push once  dextrose 50% Injectable 25 Gram(s) IV Push once  dextrose 50% Injectable 25 Gram(s) IV Push once  finasteride 5 milliGRAM(s) Oral daily  glucagon  Injectable 1 milliGRAM(s) IntraMuscular once  heparin   Injectable 5000 Unit(s) SubCutaneous every 12 hours  insulin glargine Injectable (LANTUS) 8 Unit(s) SubCutaneous every morning  insulin lispro (ADMELOG) corrective regimen sliding scale   SubCutaneous at bedtime  insulin lispro (ADMELOG) corrective regimen sliding scale   SubCutaneous three times a day before meals  metoprolol succinate ER 25 milliGRAM(s) Oral daily  oxybutynin 5 milliGRAM(s) Oral every 12 hours  tamsulosin 0.4 milliGRAM(s) Oral at bedtime    MEDICATIONS  (PRN):  acetaminophen     Tablet .. 650 milliGRAM(s) Oral every 6 hours PRN Temp greater or equal to 38C (100.4F), Mild Pain (1 - 3)  dextrose Oral Gel 15 Gram(s) Oral once PRN Blood Glucose LESS THAN 70 milliGRAM(s)/deciliter    OBJECTIVE:  Vital Signs Last 24 Hrs  T(C): 36.3 (31 Aug 2024 06:12), Max: 36.9 (30 Aug 2024 20:06)  T(F): 97.4 (31 Aug 2024 06:12), Max: 98.4 (30 Aug 2024 20:06)  HR: 86 (31 Aug 2024 06:12) (73 - 86)  BP: 169/72 (31 Aug 2024 06:12) (137/79 - 169/72)  BP(mean): 94 (30 Aug 2024 20:06) (94 - 94)  RR: 16 (31 Aug 2024 06:12) (16 - 18)  SpO2: 100% (31 Aug 2024 06:12) (100% - 100%)    Parameters below as of 31 Aug 2024 06:12  Patient On (Oxygen Delivery Method): room air    I&O's Summary    30 Aug 2024 07:01  -  31 Aug 2024 07:00  --------------------------------------------------------  IN: 0 mL / OUT: 1100 mL / NET: -1100 mL    PHYSICAL EXAM:  GENERAL: Laying comfortably, NAD  HEENT: NCAT, PERRLA, EOMI, no scleral icterus, no LAD  LUNG: CTABL; No wheezes, crackles, or rhonchi  HEART: RRR; normal S1/S2; No murmurs, rubs, or gallops  ABDOMEN: Mild suprapubic tenderness, +BS, soft, nondistended, no HSM; No rebound, guarding, or rigidity  EXTREMITIES:  No LE edema b/l, 2+ Peripheral Pulses, No clubbing or cyanosis  NEUROLOGY: AOx3, non-focal, strength 5/5 in all extremities, sensation intact  PSYCH: calm and cooperative  SKIN: No rashes or lesions    LABS:                         11.6   7.11  )-----------( 308      ( 30 Aug 2024 18:15 )             36.4     08-31    136  |  104  |  23  ----------------------------<  222<H>  4.3   |  18<L>  |  1.13    Ca    9.4      31 Aug 2024 00:53  Phos  2.0     08-31  Mg     1.80     08-31    TPro  8.8<H>  /  Alb  3.8  /  TBili  0.3  /  DBili  x   /  AST  13  /  ALT  9   /  AlkPhos  103  08-30    PT/INR - ( 30 Aug 2024 18:15 )   PT: 11.5 sec;   INR: 1.03 ratio         PTT - ( 30 Aug 2024 18:15 )  PTT:32.0 sec  Urinalysis Basic - ( 31 Aug 2024 00:53 )    Color: x / Appearance: x / SG: x / pH: x  Gluc: 222 mg/dL / Ketone: x  / Bili: x / Urobili: x   Blood: x / Protein: x / Nitrite: x   Leuk Esterase: x / RBC: x / WBC x   Sq Epi: x / Non Sq Epi: x / Bacteria: x          RADIOLOGY, EKG & ADDITIONAL TESTS: Reviewed.

## 2024-08-31 NOTE — H&P ADULT - ASSESSMENT
84-year-old male past medical history chronic Forrester secondary to worsening BPH, hypertension, hyperlipidemia, DM2, spinal stenosis, HIV VLUD CD4 900s, here w/ suprapubic distention and discomfort. In the ED, forrester was replaced w/ purulent material coming out.

## 2024-08-31 NOTE — PATIENT PROFILE ADULT - FALL HARM RISK - CONCLUSION
----- Message from Kelsey Kiran Kiran   Fall with Harm Risk

## 2024-08-31 NOTE — H&P ADULT - PROBLEM SELECTOR PLAN 6
-toprol 25 mg for now (family states he was on 100 mg, though recent d/c of 25 mg)  -amlodipine 5 mg

## 2024-08-31 NOTE — ED ADULT NURSE REASSESSMENT NOTE - NS ED NURSE REASSESS COMMENT FT1
Break RN Note- Patient resting quietly in bed, breathing even and nonlabored. No acute distress. Lawson patent, flowing clear straw colored urine. Repeat labs sent. Patient medicated as ordered. Awaiting inpatient bed. Safety maintained. Granddaughter at bedside. Patient stable upon exiting the room.

## 2024-08-31 NOTE — PATIENT PROFILE ADULT - FALL HARM RISK - HARM RISK INTERVENTIONS

## 2024-08-31 NOTE — H&P ADULT - PROBLEM SELECTOR PLAN 1
-ceftriaxone 1 gram daily for now  -prior cultures w/ candida, though low colony forming units and likely contaminant would hold off on further antifungals unless patient decompensates  -if BP low, high grade fevers noted, can give zosyn and consider a dose of antifungal pending cx data  -forrester replaced in ED

## 2024-08-31 NOTE — PROGRESS NOTE ADULT - ATTENDING COMMENTS
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     UTI  Lawson changed in ED with reported purulent output  UA significantly positive - started on ceftriaxone  F/u UCx.       DM  Lantus + ISS    HIV - continue symtuza   BPH - tamsulosin, finasteride, oxybutynin  HTN - continue metoprolol, amlodipine  HLD - cotninue lipitor

## 2024-08-31 NOTE — H&P ADULT - HISTORY OF PRESENT ILLNESS
84-year-old male past medical history chronic Forrester secondary to worsening BPH, hypertension, hyperlipidemia, DM2, spinal stenosis, HIV VLUD CD4 900s, here w/ suprapubic distention and discomfort. In the ED, forrester was replaced w/ purulent material coming out. He denies constipation. ROS otherwise negative and obtained by family.     Vital Signs Last 24 Hrs  T(C): 36.9 (30 Aug 2024 20:06), Max: 36.9 (30 Aug 2024 20:06)  T(F): 98.4 (30 Aug 2024 20:06), Max: 98.4 (30 Aug 2024 20:06)  HR: 73 (30 Aug 2024 20:06) (73 - 82)  BP: 154/67 (30 Aug 2024 20:06) (137/79 - 154/67)  BP(mean): 94 (30 Aug 2024 20:06) (94 - 94)  RR: 18 (30 Aug 2024 20:06) (17 - 18)  SpO2: 100% (30 Aug 2024 20:06) (100% - 100%)    Parameters below as of 30 Aug 2024 20:06  Patient On (Oxygen Delivery Method): room air

## 2024-08-31 NOTE — PHYSICAL THERAPY INITIAL EVALUATION ADULT - GENERAL OBSERVATIONS, REHAB EVAL
Pt encountered in semi-supine position in NAD, all lines intact, a&ox2, SPO2 100%, +Lawson, and RN Dnai aware.

## 2024-08-31 NOTE — PHYSICAL THERAPY INITIAL EVALUATION ADULT - ADDITIONAL COMMENTS
admitted from nursing home, where he required assistance with ADLs.     Pt left semisupine in bed in NAD, all lines intact, call bell in reach, SPO2 100%, bed alarm on, +Lawson, and RN Dani aware.

## 2024-08-31 NOTE — H&P ADULT - NSHPLABSRESULTS_GEN_ALL_CORE
.  LABS:                         11.6   7.11  )-----------( 308      ( 30 Aug 2024 18:15 )             36.4         134<L>  |  103  |  25<H>  ----------------------------<  203<H>  4.7   |  19<L>  |  1.24    Ca    9.9      30 Aug 2024 18:15    TPro  8.8<H>  /  Alb  3.8  /  TBili  0.3  /  DBili  x   /  AST  13  /  ALT  9   /  AlkPhos  103      PT/INR - ( 30 Aug 2024 18:15 )   PT: 11.5 sec;   INR: 1.03 ratio         PTT - ( 30 Aug 2024 18:15 )  PTT:32.0 sec  Urinalysis Basic - ( 30 Aug 2024 22:22 )    Color: Yellow / Appearance: Turbid / S.011 / pH: x  Gluc: x / Ketone: Negative mg/dL  / Bili: Negative / Urobili: 0.2 mg/dL   Blood: x / Protein: 100 mg/dL / Nitrite: Negative   Leuk Esterase: Large / RBC: 24 /HPF / WBC 2942 /HPF   Sq Epi: x / Non Sq Epi: 8 /HPF / Bacteria: Many /HPF                RADIOLOGY, EKG & ADDITIONAL TESTS: Reviewed.

## 2024-08-31 NOTE — PHYSICAL THERAPY INITIAL EVALUATION ADULT - ACTIVE RANGE OF MOTION EXAMINATION, REHAB EVAL
karl. upper extremity Active ROM was WNL (within normal limits)/bilateral lower extremity Active ROM was WNL (within normal limits)

## 2024-08-31 NOTE — PHYSICAL THERAPY INITIAL EVALUATION ADULT - ASSISTIVE DEVICE FOR TRANSFER: GAIT, REHAB EVAL
POAG, OU : S/P SLT OU. IOP WITHIN ACCEPTABLE LIMITS. INCREASED CUP/DISC RATIO. POSSIBLE ANGLE RECESSION OD ALTHOUGH NO HISTORY OF KNOWN TRAUMA. DISCUSSED VF WITH PATIENT. NO FAMILY HISTORY. hand held assist

## 2024-09-01 LAB
ALBUMIN SERPL ELPH-MCNC: 3.6 G/DL — SIGNIFICANT CHANGE UP (ref 3.3–5)
ALP SERPL-CCNC: 101 U/L — SIGNIFICANT CHANGE UP (ref 40–120)
ALT FLD-CCNC: 7 U/L — SIGNIFICANT CHANGE UP (ref 4–41)
ANION GAP SERPL CALC-SCNC: 17 MMOL/L — HIGH (ref 7–14)
AST SERPL-CCNC: 12 U/L — SIGNIFICANT CHANGE UP (ref 4–40)
BASOPHILS # BLD AUTO: 0.04 K/UL — SIGNIFICANT CHANGE UP (ref 0–0.2)
BASOPHILS NFR BLD AUTO: 0.6 % — SIGNIFICANT CHANGE UP (ref 0–2)
BILIRUB SERPL-MCNC: 0.2 MG/DL — SIGNIFICANT CHANGE UP (ref 0.2–1.2)
BUN SERPL-MCNC: 19 MG/DL — SIGNIFICANT CHANGE UP (ref 7–23)
CALCIUM SERPL-MCNC: 9.6 MG/DL — SIGNIFICANT CHANGE UP (ref 8.4–10.5)
CHLORIDE SERPL-SCNC: 101 MMOL/L — SIGNIFICANT CHANGE UP (ref 98–107)
CO2 SERPL-SCNC: 19 MMOL/L — LOW (ref 22–31)
CREAT SERPL-MCNC: 1.08 MG/DL — SIGNIFICANT CHANGE UP (ref 0.5–1.3)
EGFR: 67 ML/MIN/1.73M2 — SIGNIFICANT CHANGE UP
EOSINOPHIL # BLD AUTO: 0.05 K/UL — SIGNIFICANT CHANGE UP (ref 0–0.5)
EOSINOPHIL NFR BLD AUTO: 0.7 % — SIGNIFICANT CHANGE UP (ref 0–6)
GLUCOSE BLDC GLUCOMTR-MCNC: 129 MG/DL — HIGH (ref 70–99)
GLUCOSE BLDC GLUCOMTR-MCNC: 145 MG/DL — HIGH (ref 70–99)
GLUCOSE BLDC GLUCOMTR-MCNC: 197 MG/DL — HIGH (ref 70–99)
GLUCOSE BLDC GLUCOMTR-MCNC: 278 MG/DL — HIGH (ref 70–99)
GLUCOSE SERPL-MCNC: 150 MG/DL — HIGH (ref 70–99)
HCT VFR BLD CALC: 34.9 % — LOW (ref 39–50)
HGB BLD-MCNC: 11.4 G/DL — LOW (ref 13–17)
IANC: 3.26 K/UL — SIGNIFICANT CHANGE UP (ref 1.8–7.4)
IMM GRANULOCYTES NFR BLD AUTO: 0.6 % — SIGNIFICANT CHANGE UP (ref 0–0.9)
LYMPHOCYTES # BLD AUTO: 2.71 K/UL — SIGNIFICANT CHANGE UP (ref 1–3.3)
LYMPHOCYTES # BLD AUTO: 39.7 % — SIGNIFICANT CHANGE UP (ref 13–44)
MAGNESIUM SERPL-MCNC: 1.9 MG/DL — SIGNIFICANT CHANGE UP (ref 1.6–2.6)
MCHC RBC-ENTMCNC: 27.2 PG — SIGNIFICANT CHANGE UP (ref 27–34)
MCHC RBC-ENTMCNC: 32.7 GM/DL — SIGNIFICANT CHANGE UP (ref 32–36)
MCV RBC AUTO: 83.3 FL — SIGNIFICANT CHANGE UP (ref 80–100)
MONOCYTES # BLD AUTO: 0.73 K/UL — SIGNIFICANT CHANGE UP (ref 0–0.9)
MONOCYTES NFR BLD AUTO: 10.7 % — SIGNIFICANT CHANGE UP (ref 2–14)
MRSA PCR RESULT.: SIGNIFICANT CHANGE UP
NEUTROPHILS # BLD AUTO: 3.26 K/UL — SIGNIFICANT CHANGE UP (ref 1.8–7.4)
NEUTROPHILS NFR BLD AUTO: 47.7 % — SIGNIFICANT CHANGE UP (ref 43–77)
NRBC # BLD: 0 /100 WBCS — SIGNIFICANT CHANGE UP (ref 0–0)
NRBC # FLD: 0 K/UL — SIGNIFICANT CHANGE UP (ref 0–0)
PHOSPHATE SERPL-MCNC: 2.4 MG/DL — LOW (ref 2.5–4.5)
PLATELET # BLD AUTO: 347 K/UL — SIGNIFICANT CHANGE UP (ref 150–400)
POTASSIUM SERPL-MCNC: 4.4 MMOL/L — SIGNIFICANT CHANGE UP (ref 3.5–5.3)
POTASSIUM SERPL-SCNC: 4.4 MMOL/L — SIGNIFICANT CHANGE UP (ref 3.5–5.3)
PROT SERPL-MCNC: 8.8 G/DL — HIGH (ref 6–8.3)
RBC # BLD: 4.19 M/UL — LOW (ref 4.2–5.8)
RBC # FLD: 13.2 % — SIGNIFICANT CHANGE UP (ref 10.3–14.5)
S AUREUS DNA NOSE QL NAA+PROBE: SIGNIFICANT CHANGE UP
SODIUM SERPL-SCNC: 137 MMOL/L — SIGNIFICANT CHANGE UP (ref 135–145)
WBC # BLD: 6.83 K/UL — SIGNIFICANT CHANGE UP (ref 3.8–10.5)
WBC # FLD AUTO: 6.83 K/UL — SIGNIFICANT CHANGE UP (ref 3.8–10.5)

## 2024-09-01 PROCEDURE — 99232 SBSQ HOSP IP/OBS MODERATE 35: CPT

## 2024-09-01 RX ORDER — SODIUM PHOSPHATE, MONOBASIC, MONOHYDRATE AND SODIUM PHOSPHATE, DIBASIC ANHYDROUS 276; 142 MG/ML; MG/ML
15 INJECTION, SOLUTION INTRAVENOUS ONCE
Refills: 0 | Status: COMPLETED | OUTPATIENT
Start: 2024-09-01 | End: 2024-09-01

## 2024-09-01 RX ADMIN — DARUNAVIR, COBICISTAT, EMTRICITABINE, AND TENOFOVIR ALAFENAMIDE 1 TABLET(S): 800; 150; 200; 10 TABLET, FILM COATED ORAL at 12:34

## 2024-09-01 RX ADMIN — Medication 3: at 12:34

## 2024-09-01 RX ADMIN — OXYBUTYNIN CHLORIDE 5 MILLIGRAM(S): 5 TABLET ORAL at 05:20

## 2024-09-01 RX ADMIN — SODIUM PHOSPHATE, MONOBASIC, MONOHYDRATE AND SODIUM PHOSPHATE, DIBASIC ANHYDROUS 63.75 MILLIMOLE(S): 276; 142 INJECTION, SOLUTION INTRAVENOUS at 09:08

## 2024-09-01 RX ADMIN — INSULIN GLARGINE 8 UNIT(S): 100 INJECTION, SOLUTION SUBCUTANEOUS at 09:07

## 2024-09-01 RX ADMIN — TAMSULOSIN HYDROCHLORIDE 0.4 MILLIGRAM(S): 0.4 CAPSULE ORAL at 22:46

## 2024-09-01 RX ADMIN — OXYBUTYNIN CHLORIDE 5 MILLIGRAM(S): 5 TABLET ORAL at 18:21

## 2024-09-01 RX ADMIN — METOPROLOL TARTRATE 25 MILLIGRAM(S): 100 TABLET ORAL at 05:20

## 2024-09-01 RX ADMIN — CHLORHEXIDINE GLUCONATE 1 APPLICATION(S): 40 SOLUTION TOPICAL at 12:33

## 2024-09-01 RX ADMIN — Medication 5000 UNIT(S): at 05:20

## 2024-09-01 RX ADMIN — Medication 10 MILLIGRAM(S): at 22:46

## 2024-09-01 RX ADMIN — Medication 100 MILLIGRAM(S): at 22:46

## 2024-09-01 RX ADMIN — FINASTERIDE 5 MILLIGRAM(S): 1 TABLET, FILM COATED ORAL at 12:34

## 2024-09-01 RX ADMIN — AMLODIPINE BESYLATE 5 MILLIGRAM(S): 10 TABLET ORAL at 09:14

## 2024-09-01 RX ADMIN — Medication 5000 UNIT(S): at 18:21

## 2024-09-01 NOTE — DISCHARGE NOTE PROVIDER - NSDCFUSCHEDAPPT_GEN_ALL_CORE_FT
Conchita Mann  [facility] Physician [facility]  UROLOGY [address] [place] [address]  Scheduled Appointment: 09/06/2024     Conchita Mann  [facility] Physician [facility]  UROLOGY [address] [place] [address]  Scheduled Appointment: 09/18/2024

## 2024-09-01 NOTE — DISCHARGE NOTE PROVIDER - NSDCMRMEDTOKEN_GEN_ALL_CORE_FT
amLODIPine 5 mg oral tablet: 1 tab(s) orally once a day  atorvastatin 10 mg oral tablet: 1 tab(s) orally once a day (at bedtime)  finasteride 5 mg oral tablet: 1 tab(s) orally once a day  Flomax 0.4 mg oral capsule: 1 cap(s) orally once a day  Insulin Glargine: 12 unit(s) once a day in the morning  metoprolol succinate 25 mg oral tablet, extended release: 1 tab(s) orally once a day  oxyBUTYnin 10 mg/24 hr oral tablet, extended release: 1 tab(s) orally once a day  Symtuza oral tablet: 1 tab(s) orally once a day   amLODIPine 5 mg oral tablet: 1 tab(s) orally once a day  atorvastatin 10 mg oral tablet: 1 tab(s) orally once a day (at bedtime)  ciprofloxacin 500 mg oral tablet: 1 tab(s) orally 2 times a day  finasteride 5 mg oral tablet: 1 tab(s) orally once a day  Flomax 0.4 mg oral capsule: 1 cap(s) orally once a day  Insulin Glargine: 12 unit(s) once a day in the morning  metoprolol succinate 25 mg oral tablet, extended release: 1 tab(s) orally once a day  oxyBUTYnin 10 mg/24 hr oral tablet, extended release: 1 tab(s) orally once a day  Symtuza oral tablet: 1 tab(s) orally once a day

## 2024-09-01 NOTE — PROGRESS NOTE ADULT - ATTENDING COMMENTS
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     UTI  Lawson changed in ED with reported purulent output  UA significantly positive - started on ceftriaxone  F/u UCx.       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     UTI  Lawson changed in ED with reported purulent output  UA significantly positive - started on ceftriaxone  F/u UCx.       Dementia - sporadic episodes of agitation.  Continue to monitor    DM - Lantus + ISS  HIV - continue symtuza   BPH - tamsulosin, finasteride, oxybutynin  HTN - continue metoprolol, amlodipine  HLD - cotninue lipitor.

## 2024-09-01 NOTE — DISCHARGE NOTE PROVIDER - HOSPITAL COURSE
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 In the ED, forrester was replaced w/ purulent material coming out. He denies constipation. ROS otherwise negative and obtained by family.     Vital Signs Last 24 Hrs  T(C): 36.9 (30 Aug 2024 20:06), Max: 36.9 (30 Aug 2024 20:06)  T(F): 98.4 (30 Aug 2024 20:06), Max: 98.4 (30 Aug 2024 20:06)  HR: 73 (30 Aug 2024 20:06) (73 - 82)  BP: 154/67 (30 Aug 2024 20:06) (137/79 - 154/67)  BP(mean): 94 (30 Aug 2024 20:06) (94 - 94)  RR: 18 (30 Aug 2024 20:06) (17 - 18)  SpO2: 100% (30 Aug 2024 20:06) (100% - 100%)    Parameters below as of 30 Aug 2024 20:06  Patient On (Oxygen Delivery Method): room air     (31 Aug 2024 00:56)  .     Hospital Course:   The patient's hospital course will be summarized in a problem based format.    # ***    # ***    On day of discharge, patient is clinically stable with no new exam findings or acute symptoms compared to prior. The patient was seen by the attending physician on the date of discharge and deemed stable and acceptable for discharge.     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 In the ED, forrester was replaced w/ purulent material coming out. He denies constipation. ROS otherwise negative and obtained by family.     Vital Signs Last 24 Hrs  T(C): 36.9 (30 Aug 2024 20:06), Max: 36.9 (30 Aug 2024 20:06)  T(F): 98.4 (30 Aug 2024 20:06), Max: 98.4 (30 Aug 2024 20:06)  HR: 73 (30 Aug 2024 20:06) (73 - 82)  BP: 154/67 (30 Aug 2024 20:06) (137/79 - 154/67)  BP(mean): 94 (30 Aug 2024 20:06) (94 - 94)  RR: 18 (30 Aug 2024 20:06) (17 - 18)  SpO2: 100% (30 Aug 2024 20:06) (100% - 100%)    Parameters below as of 30 Aug 2024 20:06  Patient On (Oxygen Delivery Method): room air     (31 Aug 2024 00:56)  .     Hospital Course:     The patient arrived in the ED on 8/30 with hematuria and suprapubic discomfort. VS were stable. Labs showed elevated glucose and UA c/w UTI. It was discovered that the Forrester was displaced and the ED nurse attempted to put a new one in, but was unsuccessful. Urology was called and successfully placed a new Forrester in. Purulent urine was drained. No blood was drained in the urine. The patient was admitted to the floor on 8/31, and was started on IV ceftriaxone 1g QD for 7 days and put on lantus sliding scale. Home meds for HIV, HTN, HLD, and BPH were continued. Urine cultures were ordered. On 9/1, the patient kept getting out of bed to go to the bathroom despite having a Forrester, and was informed by nursing and his daughter that he could urinate without having to get up.    On day of discharge, patient is clinically stable with no new exam findings or acute symptoms compared to prior. The patient was seen by the attending physician on the date of discharge and deemed stable and acceptable for discharge.     Discharge follow up action items:   1. 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 In the ED, forrester was replaced w/ purulent material coming out. He denies constipation. ROS otherwise negative and obtained by family.     Hospital Course:   The patient arrived in the ED on 8/30 with hematuria and suprapubic discomfort. VS were stable. Labs showed elevated glucose and UA c/w UTI. It was discovered that the Forrester was displaced and the ED nurse attempted to put a new one in, but was unsuccessful. Urology was called and successfully placed a new Forrester. Purulent urine was drained. No blood was drained in the urine. The patient was admitted to the floor on 8/31, and was started on IV ceftriaxone 1g QD for 7 days and put on lantus sliding scale. Home meds for HIV, HTN, HLD, and BPH were continued. Urine cultures were obtained.     On day of discharge, patient is clinically stable with no new exam findings or acute symptoms compared to prior. The patient was seen by the attending physician on the date of discharge and deemed stable and acceptable for discharge.     Discharge follow up action items:   1. 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 In the ED, forrester was replaced w/ purulent material coming out. He denies constipation. ROS otherwise negative and obtained by family.     Hospital Course:   The patient arrived in the ED on 8/30 with hematuria and suprapubic discomfort. VS were stable. Labs showed elevated glucose and UA c/w UTI. It was discovered that the Forrester was displaced and the ED nurse attempted to put a new one in, but was unsuccessful. Urology was called and successfully placed a new Forrester. Purulent urine was drained. No blood was drained in the urine. The patient was admitted to the floor on 8/31, and was started on IV ceftriaxone 1g QD for 7 days and put on lantus sliding scale. Home meds for HIV, HTN, HLD, and BPH were continued. Urine cultures were obtained which grew gram-negative rods.     On day of discharge, patient is clinically stable with no new exam findings or acute symptoms compared to prior. The patient was seen by the attending physician on the date of discharge and deemed stable and acceptable for discharge.     Discharge follow up action items:   1. 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 In the ED, forrester was replaced w/ purulent material coming out. He denies constipation. ROS otherwise negative and obtained by family.     Hospital Course:   The patient arrived in the ED on 8/30 with hematuria and suprapubic discomfort. VS were stable. Labs showed elevated glucose and UA c/w UTI. It was discovered that the Forrester was displaced and the ED nurse attempted to put a new one in, but was unsuccessful. Urology was called and successfully placed a new Forrester. Purulent urine was drained. No blood was drained in the urine. The patient was admitted to the floor on 8/31, and was started on IV ceftriaxone 1g QD for 7 days and put on lantus sliding scale. Home meds for HIV, HTN, HLD, and BPH were continued. Urine cultures were obtained which grew Klebsiella pneumoniae ESBL. On 9/4, ceftriaxone was discontinued and IV ertapenem 1g QD for 3 days was started.    On day of discharge, patient is clinically stable with no new exam findings or acute symptoms compared to prior. The patient was seen by the attending physician on the date of discharge and deemed stable and acceptable for discharge.     Discharge follow up action items:   1. Follow up with urology outpatient    Medication Changes:  1. Switched from IV ceftriaxone to IV ertapenem.  2. 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 In the ED, forrester was replaced w/ purulent material coming out. He denies constipation. ROS otherwise negative and obtained by family.     Hospital Course:   The patient arrived in the ED on 8/30 with hematuria and suprapubic discomfort. VS were stable. Labs showed elevated glucose and UA c/w UTI. It was discovered that the Forrester was displaced and the ED nurse attempted to put a new one in, but was unsuccessful. Urology was called and successfully placed a new Forrester. Purulent urine was drained. No blood was drained in the urine. The patient was admitted to the floor on 8/31, and was started on IV ceftriaxone 1g QD for 7 days and put on lantus sliding scale. Home meds for HIV, HTN, HLD, and BPH were continued. Urine cultures were obtained which grew Klebsiella pneumoniae ESBL. On 9/4, ceftriaxone was discontinued and IV ertapenem 1g QD for 3 days was started.    On day of discharge, patient is clinically stable with no new exam findings or acute symptoms compared to prior. The patient was seen by the attending physician on the date of discharge and deemed stable and acceptable for discharge.     Discharge follow up action items:   1. Follow up with urology outpatient  2. Forrester care: maintain current forrester with monthly changes, and     Medication Changes:  1. Switched from IV ceftriaxone to IV ertapenem.  2. 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 In the ED, forrester was replaced w/ purulent material coming out. He denies constipation. ROS otherwise negative and obtained by family.     Hospital Course:   The patient arrived in the ED on 8/30 with hematuria and suprapubic discomfort. VS were stable. Labs showed elevated glucose and UA c/w UTI. It was discovered that the Forrester was displaced and the ED nurse attempted to put a new one in, but was unsuccessful. Urology was called and successfully placed a new Forrester. Purulent urine was drained. No blood was drained in the urine. The patient was admitted to the floor on 8/31, and was started on IV ceftriaxone 1g QD for 7 days and put on lantus sliding scale. Home meds for HIV, HTN, HLD, and BPH were continued. Urine cultures were obtained which grew Klebsiella pneumoniae ESBL. On 9/4, ceftriaxone was discontinued and IV ertapenem 1g QD for 3 days was started.    On day of discharge, patient is clinically stable with no new exam findings or acute symptoms compared to prior. The patient was seen by the attending physician on the date of discharge and deemed stable and acceptable for discharge.     Discharge follow up action items:   1. Follow up with urology outpatient  2. Forrester care: maintain current forrester with monthly changes.    Medication Changes:  1. Switched from IV ceftriaxone to IV ertapenem.  2. Started on oral ciprofloxacin: 500 mg BID x 4 days

## 2024-09-01 NOTE — DISCHARGE NOTE PROVIDER - NSDCCPCAREPLAN_GEN_ALL_CORE_FT
PRINCIPAL DISCHARGE DIAGNOSIS  Diagnosis: Acute UTI  Assessment and Plan of Treatment: You were diagnosed with an infection in your urine. You were started on an antibiotic to treat the bacteria causing your infection.     PRINCIPAL DISCHARGE DIAGNOSIS  Diagnosis: Acute UTI  Assessment and Plan of Treatment: You presented to the hospital with lower abdominal pain and discomfort as well as cloudy urine in your catheter. You were diagnosed with an infection in your urine. You were started on an antibiotic to treat the bacteria causing your infection. You were observed in the hospital until the culture from your urine resulted.     PRINCIPAL DISCHARGE DIAGNOSIS  Diagnosis: Acute UTI  Assessment and Plan of Treatment: You presented to the hospital with lower abdominal pain and discomfort as well as cloudy urine in your catheter. You were diagnosed with an infection in your urine. You were started on an antibiotic to treat the bacteria causing your infection. You were observed in the hospital until the culture from your urine resulted. You were discharged on an oral antibiotic to complete the course. Please continue to take all the medications prescribed to you.     PRINCIPAL DISCHARGE DIAGNOSIS  Diagnosis: Acute UTI  Assessment and Plan of Treatment: You presented to the hospital with lower abdominal pain and discomfort as well as cloudy urine in your catheter. You were diagnosed with an infection in your urine. You were started on an antibiotic to treat the bacteria causing your infection. You were observed in the hospital until the culture from your urine resulted. You were discharged on an oral antibiotic to complete the course. Please continue to take all the medications prescribed to you.      SECONDARY DISCHARGE DIAGNOSES  Diagnosis: BPH (benign prostatic hyperplasia)  Assessment and Plan of Treatment: You must maintain the forrester in place. please follow up with urologist for further management. you will need the forrester exchanged by urologist monthly.    Diagnosis: HIV disease  Assessment and Plan of Treatment: continue your HIV medications.    Diagnosis: DM2 (diabetes mellitus, type 2)  Assessment and Plan of Treatment: Continue consistent carbohydrate diet.  Monitor blood glucose levels throughout the day before meals and at bedtime.  Record blood sugars and bring to outpatient providers appointment in order to be reviewed by your doctor for management modifications.  Be aware of diabetes management symptoms including feeling cool and clammy, which may be related to low glucose levels.  Feeling hot and dry may indicate high glucose levels.  If  you feel these symptoms, check your blood sugar.  Make regular podiatry appointments in order to have feet checked for wounds and toe nails cut by a doctor to prevent infections.

## 2024-09-01 NOTE — DISCHARGE NOTE PROVIDER - CARE PROVIDERS DIRECT ADDRESSES
,[email or web address]

## 2024-09-01 NOTE — DISCHARGE NOTE PROVIDER - CARE PROVIDER_API CALL
Conchita Mann [facility]  Urology  [address], [address] 2 [place] [address]  Phone: [phone]  Fax: [phone]  Follow Up Time:    [facility]  Urology  [address], [address] 2 [place] [address]  Phone: [phone]  Fax: [phone]  Follow Up Time:     REZA OROZCO  [address] [place] [id number]  Phone: [phone]  Fax: ([phone]  Established Patient  Follow Up Time: 1 week

## 2024-09-01 NOTE — DISCHARGE NOTE PROVIDER - ATTENDING DISCHARGE PHYSICAL EXAMINATION:
Physical Exam:  GENERAL: no apparent distress; on RA; calm  CHEST/LUNG: Clear to auscultation bilaterally; No wheezing; No crackles  HEART: no obvious audible murmurs; ext warm to touch; No edema  ABDOMEN: Soft, Nontender, Nondistended; Bowel sounds present; forrester with clear yellow urine.   MSK: no joint or back on palpation; no joint erythema or swelling.   NEUROLOGY: Awake and alert; CN 2-12 grossly intact, no obvious FND

## 2024-09-01 NOTE — DISCHARGE NOTE PROVIDER - NSDCFUADDAPPT_GEN_ALL_CORE_FT
APPTS ARE READY TO BE MADE: [ ] YES    Best Family or Patient Contact (if needed):    Additional Information about above appointments (if needed):    1:  2:  3:    Other comments or requests:   APPTS ARE READY TO BE MADE: [ ] YES    Best Family or Patient Contact (if needed):    Additional Information about above appointments (if needed):    1: Follow up with Dr. Conchita Mann (Urology) within 1 week of discharge  2:  3:    Other comments or requests:   APPTS ARE READY TO BE MADE: [x] YES    Best Family or Patient Contact (if needed):    Additional Information about above appointments (if needed):    1: Follow up with Dr. Conchita Mann (Urology) within 1 week of discharge  2:  3:    Other comments or requests:   APPTS ARE READY TO BE MADE: [x] YES    Best Family or Patient Contact (if needed):    Additional Information about above appointments (if needed):    1: Follow up with Dr. Conchita Mann (Urology) within 1 week of discharge  2:  3:    Other comments or requests:    Patient has not secured an appointment yet for Internal Medicine with Dr. Mustafa, however during the initial conversation the patient declined scheduling assistance.    Appointment was scheduled by our team on the patient's behalf through the provider's office for Urology with Dr. Mann for 9/18/2024 at 11:50am, [address] [place] [address].

## 2024-09-01 NOTE — DISCHARGE NOTE PROVIDER - PROVIDER TOKENS
PROVIDER:[TOKEN:[[id number]]] PROVIDER:[TOKEN:[[id number]]],PROVIDER:[TOKEN:[[id number]],FOLLOWUP:[1 week],ESTABLISHEDPATIENT:[T]]

## 2024-09-01 NOTE — PROGRESS NOTE ADULT - SUBJECTIVE AND OBJECTIVE BOX
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 In the ED, forrester was replaced w/ purulent material coming out. (31-Aug-2024 00:56)    SUBJECTIVE / OVERNIGHT EVENTS:  No acute o/n events. Denies fever, chills, chest pain, SOB, cough, N/V/D/C, abdominal pain.     12 Point ROS negative with the exception of the above    MEDICATIONS  (STANDING):  amLODIPine   Tablet 5 milliGRAM(s) Oral every 24 hours  atorvastatin 10 milliGRAM(s) Oral at bedtime  cefTRIAXone   IVPB 1000 milliGRAM(s) IV Intermittent every 24 hours  chlorhexidine 2% Cloths 1 Application(s) Topical daily  darunavir 800 mG/cobicistat 150 mG/emtricitabine 200 mG/tenofovir alafenamide 10 mG (SYMTUZA) 1 Tablet(s) Oral daily  dextrose 5%. 1000 milliLiter(s) (100 mL/Hr) IV Continuous <Continuous>  dextrose 5%. 1000 milliLiter(s) (50 mL/Hr) IV Continuous <Continuous>  dextrose 50% Injectable 12.5 Gram(s) IV Push once  dextrose 50% Injectable 25 Gram(s) IV Push once  dextrose 50% Injectable 25 Gram(s) IV Push once  finasteride 5 milliGRAM(s) Oral daily  glucagon  Injectable 1 milliGRAM(s) IntraMuscular once  heparin   Injectable 5000 Unit(s) SubCutaneous every 12 hours  insulin glargine Injectable (LANTUS) 8 Unit(s) SubCutaneous every morning  insulin lispro (ADMELOG) corrective regimen sliding scale   SubCutaneous at bedtime  insulin lispro (ADMELOG) corrective regimen sliding scale   SubCutaneous three times a day before meals  metoprolol succinate ER 25 milliGRAM(s) Oral daily  oxybutynin 5 milliGRAM(s) Oral every 12 hours  sodium phosphate 15 milliMole(s)/250 mL IVPB 15 milliMole(s) IV Intermittent once  tamsulosin 0.4 milliGRAM(s) Oral at bedtime    MEDICATIONS  (PRN):  acetaminophen     Tablet .. 650 milliGRAM(s) Oral every 6 hours PRN Temp greater or equal to 38C (100.4F), Mild Pain (1 - 3)  dextrose Oral Gel 15 Gram(s) Oral once PRN Blood Glucose LESS THAN 70 milliGRAM(s)/deciliter    OBJECTIVE:    Vital Signs Last 24 Hrs  T(C): 36.6 (01 Sep 2024 05:16), Max: 36.7 (31 Aug 2024 21:10)  T(F): 97.8 (01 Sep 2024 05:16), Max: 98.1 (31 Aug 2024 21:10)  HR: 64 (01 Sep 2024 05:16) (61 - 64)  BP: 149/77 (01 Sep 2024 05:16) (143/51 - 156/62)  BP(mean): --  RR: 16 (01 Sep 2024 05:16) (16 - 17)  SpO2: 100% (01 Sep 2024 05:16) (100% - 100%)    Parameters below as of 01 Sep 2024 05:16  Patient On (Oxygen Delivery Method): room air    I&O's Summary    31 Aug 2024 07:01  -  01 Sep 2024 07:00  --------------------------------------------------------  IN: 325 mL / OUT: 2310 mL / NET: -1985 mL    PHYSICAL EXAM:  GENERAL: Laying comfortably, NAD  HEENT: NCAT, PERRLA, EOMI, no scleral icterus, no LAD  LUNG: CTABL; No wheezes, crackles, or rhonchi  HEART: RRR; normal S1/S2; No murmurs, rubs, or gallops  ABDOMEN: +BS, soft, nondistended, no HSM; No rebound, guarding, or rigidity  EXTREMITIES:  No LE edema b/l, 2+ Peripheral Pulses, No clubbing or cyanosis  NEUROLOGY: AOx3, non-focal, strength 5/5 in all extremities, sensation intact  PSYCH: calm and cooperative  SKIN: No rashes or lesions    LABS:                         11.4   6.83  )-----------( 347      ( 01 Sep 2024 05:10 )             34.9     09-01    137  |  101  |  19  ----------------------------<  150<H>  4.4   |  19<L>  |  1.08    Ca    9.6      01 Sep 2024 05:10  Phos  2.4     09-01  Mg     1.90     09-01    TPro  8.8<H>  /  Alb  3.6  /  TBili  0.2  /  DBili  x   /  AST  12  /  ALT  7   /  AlkPhos  101  09-01    PT/INR - ( 30 Aug 2024 18:15 )   PT: 11.5 sec;   INR: 1.03 ratio         PTT - ( 30 Aug 2024 18:15 )  PTT:32.0 sec  Urinalysis Basic - ( 01 Sep 2024 05:10 )    Color: x / Appearance: x / SG: x / pH: x  Gluc: 150 mg/dL / Ketone: x  / Bili: x / Urobili: x   Blood: x / Protein: x / Nitrite: x   Leuk Esterase: x / RBC: x / WBC x   Sq Epi: x / Non Sq Epi: x / Bacteria: x            RADIOLOGY, EKG & ADDITIONAL TESTS: Reviewed.

## 2024-09-02 LAB
GLUCOSE BLDC GLUCOMTR-MCNC: 140 MG/DL — HIGH (ref 70–99)
GLUCOSE BLDC GLUCOMTR-MCNC: 202 MG/DL — HIGH (ref 70–99)
GLUCOSE BLDC GLUCOMTR-MCNC: 224 MG/DL — HIGH (ref 70–99)
GLUCOSE BLDC GLUCOMTR-MCNC: 290 MG/DL — HIGH (ref 70–99)

## 2024-09-02 PROCEDURE — 99232 SBSQ HOSP IP/OBS MODERATE 35: CPT

## 2024-09-02 RX ADMIN — Medication 100 MILLIGRAM(S): at 22:21

## 2024-09-02 RX ADMIN — CHLORHEXIDINE GLUCONATE 1 APPLICATION(S): 40 SOLUTION TOPICAL at 11:21

## 2024-09-02 RX ADMIN — Medication 5000 UNIT(S): at 18:19

## 2024-09-02 RX ADMIN — TAMSULOSIN HYDROCHLORIDE 0.4 MILLIGRAM(S): 0.4 CAPSULE ORAL at 22:21

## 2024-09-02 RX ADMIN — Medication 3: at 12:47

## 2024-09-02 RX ADMIN — AMLODIPINE BESYLATE 5 MILLIGRAM(S): 10 TABLET ORAL at 11:21

## 2024-09-02 RX ADMIN — FINASTERIDE 5 MILLIGRAM(S): 1 TABLET, FILM COATED ORAL at 11:20

## 2024-09-02 RX ADMIN — INSULIN GLARGINE 8 UNIT(S): 100 INJECTION, SOLUTION SUBCUTANEOUS at 08:39

## 2024-09-02 RX ADMIN — Medication 5000 UNIT(S): at 05:25

## 2024-09-02 RX ADMIN — OXYBUTYNIN CHLORIDE 5 MILLIGRAM(S): 5 TABLET ORAL at 05:25

## 2024-09-02 RX ADMIN — METOPROLOL TARTRATE 25 MILLIGRAM(S): 100 TABLET ORAL at 05:25

## 2024-09-02 RX ADMIN — Medication 10 MILLIGRAM(S): at 22:22

## 2024-09-02 RX ADMIN — OXYBUTYNIN CHLORIDE 5 MILLIGRAM(S): 5 TABLET ORAL at 18:19

## 2024-09-02 RX ADMIN — DARUNAVIR, COBICISTAT, EMTRICITABINE, AND TENOFOVIR ALAFENAMIDE 1 TABLET(S): 800; 150; 200; 10 TABLET, FILM COATED ORAL at 11:20

## 2024-09-02 RX ADMIN — Medication 2: at 18:19

## 2024-09-02 NOTE — PROGRESS NOTE ADULT - ATTENDING COMMENTS
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     UTI  Lawson changed in ED with reported purulent output  UA significantly positive - started on ceftriaxone  F/u full UCx - growin GNRs    Dementia - sporadic episodes of agitation.  Continue to monitor    DM - Lantus + ISS  HIV - continue symtuza   BPH - tamsulosin, finasteride, oxybutynin  HTN - continue metoprolol, amlodipine  HLD - cotninue lipitor.

## 2024-09-02 NOTE — PROGRESS NOTE ADULT - SUBJECTIVE AND OBJECTIVE BOX
JAMES WAGNER  84y  MRN: [id number]  8/30/2024 (3d)    84-year-old male past medical history chronic Forrester secondary to worsening BPH, hypertension, hyperlipidemia, DM2, spinal stenosis, HIV VLUD CD4 900s, here w/ suprapubic distention and discomfort. In the ED, forrester was replaced w/ purulent material coming out. (31-Aug-2024 00:56)    SUBJECTIVE / OVERNIGHT EVENTS:  No acute o/n events. Denies fever, chills, chest pain, SOB, cough, N/V/D/C, abdominal pain.     12 Point ROS negative with the exception of the above    MEDICATIONS  (STANDING):  amLODIPine   Tablet 5 milliGRAM(s) Oral every 24 hours  atorvastatin 10 milliGRAM(s) Oral at bedtime  cefTRIAXone   IVPB 1000 milliGRAM(s) IV Intermittent every 24 hours  chlorhexidine 2% Cloths 1 Application(s) Topical daily  darunavir 800 mG/cobicistat 150 mG/emtricitabine 200 mG/tenofovir alafenamide 10 mG (SYMTUZA) 1 Tablet(s) Oral daily  dextrose 5%. 1000 milliLiter(s) (100 mL/Hr) IV Continuous <Continuous>  dextrose 5%. 1000 milliLiter(s) (50 mL/Hr) IV Continuous <Continuous>  dextrose 50% Injectable 12.5 Gram(s) IV Push once  dextrose 50% Injectable 25 Gram(s) IV Push once  dextrose 50% Injectable 25 Gram(s) IV Push once  finasteride 5 milliGRAM(s) Oral daily  glucagon  Injectable 1 milliGRAM(s) IntraMuscular once  heparin   Injectable 5000 Unit(s) SubCutaneous every 12 hours  insulin glargine Injectable (LANTUS) 8 Unit(s) SubCutaneous every morning  insulin lispro (ADMELOG) corrective regimen sliding scale   SubCutaneous at bedtime  insulin lispro (ADMELOG) corrective regimen sliding scale   SubCutaneous three times a day before meals  metoprolol succinate ER 25 milliGRAM(s) Oral daily  oxybutynin 5 milliGRAM(s) Oral every 12 hours  tamsulosin 0.4 milliGRAM(s) Oral at bedtime    MEDICATIONS  (PRN):  acetaminophen     Tablet .. 650 milliGRAM(s) Oral every 6 hours PRN Temp greater or equal to 38C (100.4F), Mild Pain (1 - 3)  dextrose Oral Gel 15 Gram(s) Oral once PRN Blood Glucose LESS THAN 70 milliGRAM(s)/deciliter    OBJECTIVE:    Vital Signs Last 24 Hrs  T(C): 37.1 (02 Sep 2024 04:48), Max: 37.1 (02 Sep 2024 04:48)  T(F): 98.7 (02 Sep 2024 04:48), Max: 98.7 (02 Sep 2024 04:48)  HR: 75 (02 Sep 2024 04:48) (60 - 75)  BP: 137/63 (02 Sep 2024 04:48) (135/70 - 160/70)  BP(mean): --  RR: 18 (02 Sep 2024 04:48) (17 - 18)  SpO2: 100% (02 Sep 2024 04:48) (100% - 100%)    Parameters below as of 02 Sep 2024 04:48  Patient On (Oxygen Delivery Method): room air    I&O's Summary    31 Aug 2024 07:01  -  01 Sep 2024 07:00  --------------------------------------------------------  IN: 325 mL / OUT: 2310 mL / NET: -1985 mL    PHYSICAL EXAM:  GENERAL: Laying comfortably, NAD  HEENT: NCAT, PERRLA, EOMI, no scleral icterus, no LAD  LUNG: CTABL; No wheezes, crackles, or rhonchi  HEART: RRR; normal S1/S2; No murmurs, rubs, or gallops  ABDOMEN: +BS, soft, nondistended, no HSM; No rebound, guarding, or rigidity  EXTREMITIES:  No LE edema b/l, 2+ Peripheral Pulses, No clubbing or cyanosis  NEUROLOGY: AOx3, non-focal, strength 5/5 in all extremities, sensation intact  PSYCH: calm and cooperative  SKIN: No rashes or lesions    LABS:                         11.4   6.83  )-----------( 347      ( 01 Sep 2024 05:10 )             34.9     09-01    137  |  101  |  19  ----------------------------<  150<H>  4.4   |  19<L>  |  1.08    Ca    9.6      01 Sep 2024 05:10  Phos  2.4     09-01  Mg     1.90     09-01    TPro  8.8<H>  /  Alb  3.6  /  TBili  0.2  /  DBili  x   /  AST  12  /  ALT  7   /  AlkPhos  101  09-01      Urinalysis Basic - ( 01 Sep 2024 05:10 )    Color: x / Appearance: x / SG: x / pH: x  Gluc: 150 mg/dL / Ketone: x  / Bili: x / Urobili: x   Blood: x / Protein: x / Nitrite: x   Leuk Esterase: x / RBC: x / WBC x   Sq Epi: x / Non Sq Epi: x / Bacteria: x            RADIOLOGY, EKG & ADDITIONAL TESTS: Reviewed.

## 2024-09-03 LAB
ALBUMIN SERPL ELPH-MCNC: 3.2 G/DL — LOW (ref 3.3–5)
ALP SERPL-CCNC: 97 U/L — SIGNIFICANT CHANGE UP (ref 40–120)
ALT FLD-CCNC: 8 U/L — SIGNIFICANT CHANGE UP (ref 4–41)
ANION GAP SERPL CALC-SCNC: 14 MMOL/L — SIGNIFICANT CHANGE UP (ref 7–14)
AST SERPL-CCNC: 14 U/L — SIGNIFICANT CHANGE UP (ref 4–40)
BASOPHILS # BLD AUTO: 0.06 K/UL — SIGNIFICANT CHANGE UP (ref 0–0.2)
BASOPHILS NFR BLD AUTO: 0.3 % — SIGNIFICANT CHANGE UP (ref 0–2)
BILIRUB SERPL-MCNC: 0.4 MG/DL — SIGNIFICANT CHANGE UP (ref 0.2–1.2)
BUN SERPL-MCNC: 15 MG/DL — SIGNIFICANT CHANGE UP (ref 7–23)
CALCIUM SERPL-MCNC: 9.3 MG/DL — SIGNIFICANT CHANGE UP (ref 8.4–10.5)
CHLORIDE SERPL-SCNC: 99 MMOL/L — SIGNIFICANT CHANGE UP (ref 98–107)
CO2 SERPL-SCNC: 20 MMOL/L — LOW (ref 22–31)
CREAT SERPL-MCNC: 1.18 MG/DL — SIGNIFICANT CHANGE UP (ref 0.5–1.3)
EGFR: 60 ML/MIN/1.73M2 — SIGNIFICANT CHANGE UP
EOSINOPHIL # BLD AUTO: 0.02 K/UL — SIGNIFICANT CHANGE UP (ref 0–0.5)
EOSINOPHIL NFR BLD AUTO: 0.1 % — SIGNIFICANT CHANGE UP (ref 0–6)
GLUCOSE BLDC GLUCOMTR-MCNC: 175 MG/DL — HIGH (ref 70–99)
GLUCOSE BLDC GLUCOMTR-MCNC: 186 MG/DL — HIGH (ref 70–99)
GLUCOSE BLDC GLUCOMTR-MCNC: 194 MG/DL — HIGH (ref 70–99)
GLUCOSE BLDC GLUCOMTR-MCNC: 241 MG/DL — HIGH (ref 70–99)
GLUCOSE SERPL-MCNC: 173 MG/DL — HIGH (ref 70–99)
HCT VFR BLD CALC: 30.5 % — LOW (ref 39–50)
HCT VFR BLD CALC: 31.5 % — LOW (ref 39–50)
HGB BLD-MCNC: 10 G/DL — LOW (ref 13–17)
HGB BLD-MCNC: 10.3 G/DL — LOW (ref 13–17)
IANC: 13.36 K/UL — HIGH (ref 1.8–7.4)
IMM GRANULOCYTES NFR BLD AUTO: 0.6 % — SIGNIFICANT CHANGE UP (ref 0–0.9)
LYMPHOCYTES # BLD AUTO: 16.5 % — SIGNIFICANT CHANGE UP (ref 13–44)
LYMPHOCYTES # BLD AUTO: 2.94 K/UL — SIGNIFICANT CHANGE UP (ref 1–3.3)
MAGNESIUM SERPL-MCNC: 1.6 MG/DL — SIGNIFICANT CHANGE UP (ref 1.6–2.6)
MCHC RBC-ENTMCNC: 27 PG — SIGNIFICANT CHANGE UP (ref 27–34)
MCHC RBC-ENTMCNC: 27.2 PG — SIGNIFICANT CHANGE UP (ref 27–34)
MCHC RBC-ENTMCNC: 32.7 GM/DL — SIGNIFICANT CHANGE UP (ref 32–36)
MCHC RBC-ENTMCNC: 32.8 GM/DL — SIGNIFICANT CHANGE UP (ref 32–36)
MCV RBC AUTO: 82.5 FL — SIGNIFICANT CHANGE UP (ref 80–100)
MCV RBC AUTO: 82.9 FL — SIGNIFICANT CHANGE UP (ref 80–100)
MONOCYTES # BLD AUTO: 1.37 K/UL — HIGH (ref 0–0.9)
MONOCYTES NFR BLD AUTO: 7.7 % — SIGNIFICANT CHANGE UP (ref 2–14)
NEUTROPHILS # BLD AUTO: 13.36 K/UL — HIGH (ref 1.8–7.4)
NEUTROPHILS NFR BLD AUTO: 74.8 % — SIGNIFICANT CHANGE UP (ref 43–77)
NRBC # BLD: 0 /100 WBCS — SIGNIFICANT CHANGE UP (ref 0–0)
NRBC # BLD: 0 /100 WBCS — SIGNIFICANT CHANGE UP (ref 0–0)
NRBC # FLD: 0 K/UL — SIGNIFICANT CHANGE UP (ref 0–0)
NRBC # FLD: 0 K/UL — SIGNIFICANT CHANGE UP (ref 0–0)
PHOSPHATE SERPL-MCNC: 2.2 MG/DL — LOW (ref 2.5–4.5)
PLATELET # BLD AUTO: 288 K/UL — SIGNIFICANT CHANGE UP (ref 150–400)
PLATELET # BLD AUTO: 295 K/UL — SIGNIFICANT CHANGE UP (ref 150–400)
POTASSIUM SERPL-MCNC: 4.1 MMOL/L — SIGNIFICANT CHANGE UP (ref 3.5–5.3)
POTASSIUM SERPL-SCNC: 4.1 MMOL/L — SIGNIFICANT CHANGE UP (ref 3.5–5.3)
PROT SERPL-MCNC: 7.8 G/DL — SIGNIFICANT CHANGE UP (ref 6–8.3)
RBC # BLD: 3.68 M/UL — LOW (ref 4.2–5.8)
RBC # BLD: 3.82 M/UL — LOW (ref 4.2–5.8)
RBC # FLD: 12.8 % — SIGNIFICANT CHANGE UP (ref 10.3–14.5)
RBC # FLD: 13.3 % — SIGNIFICANT CHANGE UP (ref 10.3–14.5)
SODIUM SERPL-SCNC: 133 MMOL/L — LOW (ref 135–145)
WBC # BLD: 17.86 K/UL — HIGH (ref 3.8–10.5)
WBC # BLD: 19.48 K/UL — HIGH (ref 3.8–10.5)
WBC # FLD AUTO: 17.86 K/UL — HIGH (ref 3.8–10.5)
WBC # FLD AUTO: 19.48 K/UL — HIGH (ref 3.8–10.5)

## 2024-09-03 PROCEDURE — 99233 SBSQ HOSP IP/OBS HIGH 50: CPT | Mod: GC

## 2024-09-03 RX ADMIN — OXYBUTYNIN CHLORIDE 5 MILLIGRAM(S): 5 TABLET ORAL at 05:22

## 2024-09-03 RX ADMIN — Medication 2: at 12:23

## 2024-09-03 RX ADMIN — Medication 5000 UNIT(S): at 17:13

## 2024-09-03 RX ADMIN — METOPROLOL TARTRATE 25 MILLIGRAM(S): 100 TABLET ORAL at 05:22

## 2024-09-03 RX ADMIN — Medication 10 MILLIGRAM(S): at 21:22

## 2024-09-03 RX ADMIN — Medication 100 MILLIGRAM(S): at 21:25

## 2024-09-03 RX ADMIN — AMLODIPINE BESYLATE 5 MILLIGRAM(S): 10 TABLET ORAL at 09:20

## 2024-09-03 RX ADMIN — FINASTERIDE 5 MILLIGRAM(S): 1 TABLET, FILM COATED ORAL at 12:16

## 2024-09-03 RX ADMIN — Medication 5000 UNIT(S): at 05:22

## 2024-09-03 RX ADMIN — TAMSULOSIN HYDROCHLORIDE 0.4 MILLIGRAM(S): 0.4 CAPSULE ORAL at 21:22

## 2024-09-03 RX ADMIN — OXYBUTYNIN CHLORIDE 5 MILLIGRAM(S): 5 TABLET ORAL at 17:13

## 2024-09-03 RX ADMIN — DARUNAVIR, COBICISTAT, EMTRICITABINE, AND TENOFOVIR ALAFENAMIDE 1 TABLET(S): 800; 150; 200; 10 TABLET, FILM COATED ORAL at 12:16

## 2024-09-03 RX ADMIN — CHLORHEXIDINE GLUCONATE 1 APPLICATION(S): 40 SOLUTION TOPICAL at 12:18

## 2024-09-03 RX ADMIN — INSULIN GLARGINE 8 UNIT(S): 100 INJECTION, SOLUTION SUBCUTANEOUS at 08:53

## 2024-09-03 RX ADMIN — Medication 1: at 17:17

## 2024-09-03 RX ADMIN — Medication 1: at 08:52

## 2024-09-03 NOTE — PROGRESS NOTE ADULT - PROBLEM SELECTOR PLAN 1
-ceftriaxone 1 gram daily for now  -prior cultures w/ candida, though low colony forming units and likely contaminant would hold off on further antifungals unless patient decompensates  -if BP low, high grade fevers noted, can give zosyn and consider a dose of antifungal pending cx data  -forrester replaced in ED.  -UCx shows gram-negative rods, pending speciation

## 2024-09-03 NOTE — PROGRESS NOTE ADULT - SUBJECTIVE AND OBJECTIVE BOX
JAMES WAGNER  84y  MRN: [id number]  8/30/2024 (4d)    84-year-old male past medical history chronic Forrester secondary to worsening BPH, hypertension, hyperlipidemia, DM2, spinal stenosis, HIV VLUD CD4 900s, here w/ suprapubic distention and discomfort. In the ED, forrester was replaced w/ purulent material coming out. (31-Aug-2024 00:56)    SUBJECTIVE / OVERNIGHT EVENTS:  No acute o/n events. Denies fever, chills, chest pain, SOB, cough, N/V/D/C, abdominal pain.     12 Point ROS negative with the exception of the above    MEDICATIONS  (STANDING):  amLODIPine   Tablet 5 milliGRAM(s) Oral every 24 hours  atorvastatin 10 milliGRAM(s) Oral at bedtime  cefTRIAXone   IVPB 1000 milliGRAM(s) IV Intermittent every 24 hours  chlorhexidine 2% Cloths 1 Application(s) Topical daily  darunavir 800 mG/cobicistat 150 mG/emtricitabine 200 mG/tenofovir alafenamide 10 mG (SYMTUZA) 1 Tablet(s) Oral daily  dextrose 5%. 1000 milliLiter(s) (50 mL/Hr) IV Continuous <Continuous>  dextrose 5%. 1000 milliLiter(s) (100 mL/Hr) IV Continuous <Continuous>  dextrose 50% Injectable 12.5 Gram(s) IV Push once  dextrose 50% Injectable 25 Gram(s) IV Push once  dextrose 50% Injectable 25 Gram(s) IV Push once  finasteride 5 milliGRAM(s) Oral daily  glucagon  Injectable 1 milliGRAM(s) IntraMuscular once  heparin   Injectable 5000 Unit(s) SubCutaneous every 12 hours  insulin glargine Injectable (LANTUS) 8 Unit(s) SubCutaneous every morning  insulin lispro (ADMELOG) corrective regimen sliding scale   SubCutaneous at bedtime  insulin lispro (ADMELOG) corrective regimen sliding scale   SubCutaneous three times a day before meals  metoprolol succinate ER 25 milliGRAM(s) Oral daily  oxybutynin 5 milliGRAM(s) Oral every 12 hours  tamsulosin 0.4 milliGRAM(s) Oral at bedtime    MEDICATIONS  (PRN):  acetaminophen     Tablet .. 650 milliGRAM(s) Oral every 6 hours PRN Temp greater or equal to 38C (100.4F), Mild Pain (1 - 3)  dextrose Oral Gel 15 Gram(s) Oral once PRN Blood Glucose LESS THAN 70 milliGRAM(s)/deciliter    OBJECTIVE:    Vital Signs Last 24 Hrs  T(C): 37.4 (03 Sep 2024 05:13), Max: 37.6 (02 Sep 2024 22:45)  T(F): 99.4 (03 Sep 2024 05:13), Max: 99.6 (02 Sep 2024 22:45)  HR: 68 (03 Sep 2024 05:13) (68 - 85)  BP: 134/60 (03 Sep 2024 05:13) (134/60 - 146/67)  BP(mean): --  RR: 18 (03 Sep 2024 05:13) (17 - 18)  SpO2: 99% (03 Sep 2024 05:13) (99% - 100%)    Parameters below as of 03 Sep 2024 05:13  Patient On (Oxygen Delivery Method): room air    I&O's Summary    02 Sep 2024 07:01  -  03 Sep 2024 07:00  --------------------------------------------------------  IN: 0 mL / OUT: 800 mL / NET: -800 mL    PHYSICAL EXAM:  GENERAL: Laying comfortably, NAD  HEENT: NCAT, PERRLA, EOMI, no scleral icterus, no LAD  LUNG: CTABL; No wheezes, crackles, or rhonchi  HEART: RRR; normal S1/S2; No murmurs, rubs, or gallops  ABDOMEN: mild tenderness in lower abdomen bilaterally, +BS, soft, nondistended, no HSM; No rebound, guarding, or rigidity  EXTREMITIES:  No LE edema b/l, 2+ Peripheral Pulses, No clubbing or cyanosis  NEUROLOGY: AOx3, non-focal, strength 5/5 in all extremities, sensation intact  PSYCH: calm and cooperative  SKIN: No rashes or lesions    LABS:                         11.4   6.83  )-----------( 347      ( 01 Sep 2024 05:10 )             34.9     09-01    137  |  101  |  19  ----------------------------<  150<H>  4.4   |  19<L>  |  1.08    Ca    9.6      01 Sep 2024 05:10  Phos  2.4     09-01  Mg     1.90     09-01    TPro  8.8<H>  /  Alb  3.6  /  TBili  0.2  /  DBili  x   /  AST  12  /  ALT  7   /  AlkPhos  101  09-01      Urinalysis Basic - ( 01 Sep 2024 05:10 )    Color: x / Appearance: x / SG: x / pH: x  Gluc: 150 mg/dL / Ketone: x  / Bili: x / Urobili: x   Blood: x / Protein: x / Nitrite: x   Leuk Esterase: x / RBC: x / WBC x   Sq Epi: x / Non Sq Epi: x / Bacteria: x            RADIOLOGY, EKG & ADDITIONAL TESTS: Reviewed.

## 2024-09-03 NOTE — PROGRESS NOTE ADULT - ATTENDING COMMENTS
84M with chronic forrester presented with hematuria. admit for UTI. forrester exchanged in ED. Ucx +GNR, S&S pending. clinically improving, hematuria resolved, but patient had new Leukocytosis on AM lab, confirmed on repeat. on exam, patient has no new complaints, feels well. abd soft, nt/nd. forrester in place with CYU.   c/w abx, pending sensitivty, will send blood culture as well.   need further inpatient monitor.

## 2024-09-04 LAB
-  AMPICILLIN/SULBACTAM: SIGNIFICANT CHANGE UP
-  AMPICILLIN: SIGNIFICANT CHANGE UP
-  AZTREONAM: SIGNIFICANT CHANGE UP
-  CEFAZOLIN: SIGNIFICANT CHANGE UP
-  CEFEPIME: SIGNIFICANT CHANGE UP
-  CEFTRIAXONE: SIGNIFICANT CHANGE UP
-  CEFUROXIME: SIGNIFICANT CHANGE UP
-  CIPROFLOXACIN: SIGNIFICANT CHANGE UP
-  ERTAPENEM: SIGNIFICANT CHANGE UP
-  GENTAMICIN: SIGNIFICANT CHANGE UP
-  IMIPENEM: SIGNIFICANT CHANGE UP
-  LEVOFLOXACIN: SIGNIFICANT CHANGE UP
-  MEROPENEM: SIGNIFICANT CHANGE UP
-  NITROFURANTOIN: SIGNIFICANT CHANGE UP
-  PIPERACILLIN/TAZOBACTAM: SIGNIFICANT CHANGE UP
-  TOBRAMYCIN: SIGNIFICANT CHANGE UP
-  TRIMETHOPRIM/SULFAMETHOXAZOLE: SIGNIFICANT CHANGE UP
ALBUMIN SERPL ELPH-MCNC: 3.3 G/DL — SIGNIFICANT CHANGE UP (ref 3.3–5)
ALP SERPL-CCNC: 99 U/L — SIGNIFICANT CHANGE UP (ref 40–120)
ALT FLD-CCNC: 21 U/L — SIGNIFICANT CHANGE UP (ref 4–41)
ANION GAP SERPL CALC-SCNC: 13 MMOL/L — SIGNIFICANT CHANGE UP (ref 7–14)
AST SERPL-CCNC: 25 U/L — SIGNIFICANT CHANGE UP (ref 4–40)
BASOPHILS # BLD AUTO: 0.05 K/UL — SIGNIFICANT CHANGE UP (ref 0–0.2)
BASOPHILS NFR BLD AUTO: 0.5 % — SIGNIFICANT CHANGE UP (ref 0–2)
BILIRUB SERPL-MCNC: 0.3 MG/DL — SIGNIFICANT CHANGE UP (ref 0.2–1.2)
BUN SERPL-MCNC: 20 MG/DL — SIGNIFICANT CHANGE UP (ref 7–23)
CALCIUM SERPL-MCNC: 9.8 MG/DL — SIGNIFICANT CHANGE UP (ref 8.4–10.5)
CHLORIDE SERPL-SCNC: 101 MMOL/L — SIGNIFICANT CHANGE UP (ref 98–107)
CO2 SERPL-SCNC: 21 MMOL/L — LOW (ref 22–31)
CREAT SERPL-MCNC: 1.22 MG/DL — SIGNIFICANT CHANGE UP (ref 0.5–1.3)
CULTURE RESULTS: ABNORMAL
EGFR: 58 ML/MIN/1.73M2 — LOW
EOSINOPHIL # BLD AUTO: 0.09 K/UL — SIGNIFICANT CHANGE UP (ref 0–0.5)
EOSINOPHIL NFR BLD AUTO: 0.9 % — SIGNIFICANT CHANGE UP (ref 0–6)
GLUCOSE BLDC GLUCOMTR-MCNC: 131 MG/DL — HIGH (ref 70–99)
GLUCOSE BLDC GLUCOMTR-MCNC: 143 MG/DL — HIGH (ref 70–99)
GLUCOSE BLDC GLUCOMTR-MCNC: 152 MG/DL — HIGH (ref 70–99)
GLUCOSE BLDC GLUCOMTR-MCNC: 160 MG/DL — HIGH (ref 70–99)
GLUCOSE BLDC GLUCOMTR-MCNC: 168 MG/DL — HIGH (ref 70–99)
GLUCOSE SERPL-MCNC: 146 MG/DL — HIGH (ref 70–99)
HCT VFR BLD CALC: 31.1 % — LOW (ref 39–50)
HGB BLD-MCNC: 10.2 G/DL — LOW (ref 13–17)
IANC: 6.37 K/UL — SIGNIFICANT CHANGE UP (ref 1.8–7.4)
IMM GRANULOCYTES NFR BLD AUTO: 0.4 % — SIGNIFICANT CHANGE UP (ref 0–0.9)
LYMPHOCYTES # BLD AUTO: 2.68 K/UL — SIGNIFICANT CHANGE UP (ref 1–3.3)
LYMPHOCYTES # BLD AUTO: 26.9 % — SIGNIFICANT CHANGE UP (ref 13–44)
MAGNESIUM SERPL-MCNC: 1.9 MG/DL — SIGNIFICANT CHANGE UP (ref 1.6–2.6)
MCHC RBC-ENTMCNC: 27.1 PG — SIGNIFICANT CHANGE UP (ref 27–34)
MCHC RBC-ENTMCNC: 32.8 GM/DL — SIGNIFICANT CHANGE UP (ref 32–36)
MCV RBC AUTO: 82.5 FL — SIGNIFICANT CHANGE UP (ref 80–100)
METHOD TYPE: SIGNIFICANT CHANGE UP
MONOCYTES # BLD AUTO: 0.72 K/UL — SIGNIFICANT CHANGE UP (ref 0–0.9)
MONOCYTES NFR BLD AUTO: 7.2 % — SIGNIFICANT CHANGE UP (ref 2–14)
NEUTROPHILS # BLD AUTO: 6.37 K/UL — SIGNIFICANT CHANGE UP (ref 1.8–7.4)
NEUTROPHILS NFR BLD AUTO: 64.1 % — SIGNIFICANT CHANGE UP (ref 43–77)
NRBC # BLD: 0 /100 WBCS — SIGNIFICANT CHANGE UP (ref 0–0)
NRBC # FLD: 0 K/UL — SIGNIFICANT CHANGE UP (ref 0–0)
ORGANISM # SPEC MICROSCOPIC CNT: ABNORMAL
ORGANISM # SPEC MICROSCOPIC CNT: ABNORMAL
PHOSPHATE SERPL-MCNC: 2.5 MG/DL — SIGNIFICANT CHANGE UP (ref 2.5–4.5)
PLATELET # BLD AUTO: 306 K/UL — SIGNIFICANT CHANGE UP (ref 150–400)
POTASSIUM SERPL-MCNC: 4.4 MMOL/L — SIGNIFICANT CHANGE UP (ref 3.5–5.3)
POTASSIUM SERPL-SCNC: 4.4 MMOL/L — SIGNIFICANT CHANGE UP (ref 3.5–5.3)
PROT SERPL-MCNC: 8.2 G/DL — SIGNIFICANT CHANGE UP (ref 6–8.3)
RBC # BLD: 3.77 M/UL — LOW (ref 4.2–5.8)
RBC # FLD: 12.7 % — SIGNIFICANT CHANGE UP (ref 10.3–14.5)
SODIUM SERPL-SCNC: 135 MMOL/L — SIGNIFICANT CHANGE UP (ref 135–145)
SPECIMEN SOURCE: SIGNIFICANT CHANGE UP
WBC # BLD: 9.95 K/UL — SIGNIFICANT CHANGE UP (ref 3.8–10.5)
WBC # FLD AUTO: 9.95 K/UL — SIGNIFICANT CHANGE UP (ref 3.8–10.5)

## 2024-09-04 PROCEDURE — 99233 SBSQ HOSP IP/OBS HIGH 50: CPT | Mod: GC

## 2024-09-04 RX ORDER — ERTAPENEM SODIUM 1 G/1
1000 INJECTION, POWDER, LYOPHILIZED, FOR SOLUTION INTRAMUSCULAR; INTRAVENOUS EVERY 24 HOURS
Refills: 0 | Status: COMPLETED | OUTPATIENT
Start: 2024-09-04 | End: 2024-09-06

## 2024-09-04 RX ADMIN — OXYBUTYNIN CHLORIDE 5 MILLIGRAM(S): 5 TABLET ORAL at 18:20

## 2024-09-04 RX ADMIN — ERTAPENEM SODIUM 120 MILLIGRAM(S): 1 INJECTION, POWDER, LYOPHILIZED, FOR SOLUTION INTRAMUSCULAR; INTRAVENOUS at 13:12

## 2024-09-04 RX ADMIN — Medication 5000 UNIT(S): at 17:16

## 2024-09-04 RX ADMIN — Medication 1: at 13:11

## 2024-09-04 RX ADMIN — INSULIN GLARGINE 8 UNIT(S): 100 INJECTION, SOLUTION SUBCUTANEOUS at 08:57

## 2024-09-04 RX ADMIN — FINASTERIDE 5 MILLIGRAM(S): 1 TABLET, FILM COATED ORAL at 11:26

## 2024-09-04 RX ADMIN — Medication 5000 UNIT(S): at 05:30

## 2024-09-04 RX ADMIN — CHLORHEXIDINE GLUCONATE 1 APPLICATION(S): 40 SOLUTION TOPICAL at 11:27

## 2024-09-04 RX ADMIN — OXYBUTYNIN CHLORIDE 5 MILLIGRAM(S): 5 TABLET ORAL at 05:29

## 2024-09-04 RX ADMIN — AMLODIPINE BESYLATE 5 MILLIGRAM(S): 10 TABLET ORAL at 11:25

## 2024-09-04 RX ADMIN — TAMSULOSIN HYDROCHLORIDE 0.4 MILLIGRAM(S): 0.4 CAPSULE ORAL at 22:32

## 2024-09-04 RX ADMIN — Medication 10 MILLIGRAM(S): at 22:32

## 2024-09-04 RX ADMIN — DARUNAVIR, COBICISTAT, EMTRICITABINE, AND TENOFOVIR ALAFENAMIDE 1 TABLET(S): 800; 150; 200; 10 TABLET, FILM COATED ORAL at 11:26

## 2024-09-04 RX ADMIN — Medication 1: at 18:22

## 2024-09-04 NOTE — PROGRESS NOTE ADULT - ATTENDING COMMENTS
84M with chronic forrester presented with hematuria. admit for CAUTI. forrester exchanged in ED. Ucx +GNR, S&S returning ESBL Kleb. clinically improving, hematuria resolved, but patient had new Leukocytosis. repeat cbc today and wbc resolved. Bcx send on 9/3. start Ertapenem today. anticipating 5-7 days for CAUTI. pending Bcx.   need further inpatient monitor.

## 2024-09-04 NOTE — PROGRESS NOTE ADULT - SUBJECTIVE AND OBJECTIVE BOX
JAMES WAGNER  84y  MRN: [id number]  8/30/2024 (5d)    84-year-old male past medical history chronic Forrester secondary to worsening BPH, hypertension, hyperlipidemia, DM2, spinal stenosis, HIV VLUD CD4 900s, here w/ suprapubic distention and discomfort. In the ED, forrester was replaced w/ purulent material coming out. (31-Aug-2024 00:56)    SUBJECTIVE / OVERNIGHT EVENTS:  No acute o/n events. Denies fever, chills, chest pain, SOB, cough, N/V/D/C, abdominal pain.     12 Point ROS negative with the exception of the above    MEDICATIONS  (STANDING):  amLODIPine   Tablet 5 milliGRAM(s) Oral every 24 hours  atorvastatin 10 milliGRAM(s) Oral at bedtime  cefTRIAXone   IVPB 1000 milliGRAM(s) IV Intermittent every 24 hours  chlorhexidine 2% Cloths 1 Application(s) Topical daily  darunavir 800 mG/cobicistat 150 mG/emtricitabine 200 mG/tenofovir alafenamide 10 mG (SYMTUZA) 1 Tablet(s) Oral daily  dextrose 5%. 1000 milliLiter(s) (100 mL/Hr) IV Continuous <Continuous>  dextrose 5%. 1000 milliLiter(s) (50 mL/Hr) IV Continuous <Continuous>  dextrose 50% Injectable 12.5 Gram(s) IV Push once  dextrose 50% Injectable 25 Gram(s) IV Push once  dextrose 50% Injectable 25 Gram(s) IV Push once  finasteride 5 milliGRAM(s) Oral daily  glucagon  Injectable 1 milliGRAM(s) IntraMuscular once  heparin   Injectable 5000 Unit(s) SubCutaneous every 12 hours  insulin glargine Injectable (LANTUS) 8 Unit(s) SubCutaneous every morning  insulin lispro (ADMELOG) corrective regimen sliding scale   SubCutaneous at bedtime  insulin lispro (ADMELOG) corrective regimen sliding scale   SubCutaneous three times a day before meals  metoprolol succinate ER 25 milliGRAM(s) Oral daily  oxybutynin 5 milliGRAM(s) Oral every 12 hours  tamsulosin 0.4 milliGRAM(s) Oral at bedtime    MEDICATIONS  (PRN):  acetaminophen     Tablet .. 650 milliGRAM(s) Oral every 6 hours PRN Temp greater or equal to 38C (100.4F), Mild Pain (1 - 3)  dextrose Oral Gel 15 Gram(s) Oral once PRN Blood Glucose LESS THAN 70 milliGRAM(s)/deciliter    OBJECTIVE:    Vital Signs Last 24 Hrs  T(C): 36.6 (04 Sep 2024 04:38), Max: 37.4 (03 Sep 2024 11:32)  T(F): 97.9 (04 Sep 2024 04:38), Max: 99.4 (03 Sep 2024 11:32)  HR: 60 (04 Sep 2024 04:38) (60 - 62)  BP: 125/56 (04 Sep 2024 04:38) (121/52 - 131/61)  BP(mean): --  RR: 16 (04 Sep 2024 04:38) (16 - 18)  SpO2: 100% (04 Sep 2024 04:38) (98% - 100%)    Parameters below as of 04 Sep 2024 04:38  Patient On (Oxygen Delivery Method): room air    I&O's Summary    03 Sep 2024 07:01  -  04 Sep 2024 07:00  --------------------------------------------------------  IN: 320 mL / OUT: 700 mL / NET: -380 mL    PHYSICAL EXAM:  GENERAL: Laying comfortably, NAD  HEENT: NCAT, PERRLA, EOMI, no scleral icterus, no LAD  LUNG: CTABL; No wheezes, crackles, or rhonchi  HEART: RRR; normal S1/S2; No murmurs, rubs, or gallops  ABDOMEN: +BS, soft, nontender, nondistended, no HSM; No rebound, guarding, or rigidity  EXTREMITIES:  No LE edema b/l, 2+ Peripheral Pulses, No clubbing or cyanosis  NEUROLOGY: AOx3, non-focal, strength 5/5 in all extremities, sensation intact  PSYCH: calm and cooperative  SKIN: No rashes or lesions    LABS:                         10.2   9.95  )-----------( 306      ( 04 Sep 2024 05:38 )             31.1     09-04    135  |  101  |  20  ----------------------------<  146<H>  4.4   |  21<L>  |  1.22    Ca    9.8      04 Sep 2024 05:38  Phos  2.5     09-04  Mg     1.90     09-04    TPro  8.2  /  Alb  3.3  /  TBili  0.3  /  DBili  x   /  AST  25  /  ALT  21  /  AlkPhos  99  09-04      Urinalysis Basic - ( 04 Sep 2024 05:38 )    Color: x / Appearance: x / SG: x / pH: x  Gluc: 146 mg/dL / Ketone: x  / Bili: x / Urobili: x   Blood: x / Protein: x / Nitrite: x   Leuk Esterase: x / RBC: x / WBC x   Sq Epi: x / Non Sq Epi: x / Bacteria: x            RADIOLOGY, EKG & ADDITIONAL TESTS: Reviewed.

## 2024-09-04 NOTE — PROGRESS NOTE ADULT - PROBLEM SELECTOR PLAN 1
-ceftriaxone 1 gram daily for now  -prior cultures w/ candida, though low colony forming units and likely contaminant would hold off on further antifungals unless patient decompensates  -if BP low, high grade fevers noted, can give zosyn and consider a dose of antifungal pending cx data  -forrester replaced in ED.  -UCx shows gram-negative rods, pending speciation -ceftriaxone 1 gram daily for now  -prior cultures w/ candida, though low colony forming units and likely contaminant would hold off on further antifungals unless patient decompensates  -if BP low, high grade fevers noted, can give zosyn and consider a dose of antifungal pending cx data  -forrester replaced in ED.  -UCx shows Klebsiella pneumoniae ESBL  -started on IV ertapenem 1g QD for 3 days  -will start on ciprofloxacin upon discharge

## 2024-09-05 LAB
ALBUMIN SERPL ELPH-MCNC: 3.4 G/DL — SIGNIFICANT CHANGE UP (ref 3.3–5)
ALP SERPL-CCNC: 86 U/L — SIGNIFICANT CHANGE UP (ref 40–120)
ALT FLD-CCNC: 26 U/L — SIGNIFICANT CHANGE UP (ref 4–41)
ANION GAP SERPL CALC-SCNC: 13 MMOL/L — SIGNIFICANT CHANGE UP (ref 7–14)
AST SERPL-CCNC: 26 U/L — SIGNIFICANT CHANGE UP (ref 4–40)
BILIRUB SERPL-MCNC: 0.3 MG/DL — SIGNIFICANT CHANGE UP (ref 0.2–1.2)
BUN SERPL-MCNC: 20 MG/DL — SIGNIFICANT CHANGE UP (ref 7–23)
CALCIUM SERPL-MCNC: 9.9 MG/DL — SIGNIFICANT CHANGE UP (ref 8.4–10.5)
CHLORIDE SERPL-SCNC: 100 MMOL/L — SIGNIFICANT CHANGE UP (ref 98–107)
CO2 SERPL-SCNC: 21 MMOL/L — LOW (ref 22–31)
CREAT SERPL-MCNC: 1.11 MG/DL — SIGNIFICANT CHANGE UP (ref 0.5–1.3)
EGFR: 65 ML/MIN/1.73M2 — SIGNIFICANT CHANGE UP
GLUCOSE BLDC GLUCOMTR-MCNC: 131 MG/DL — HIGH (ref 70–99)
GLUCOSE BLDC GLUCOMTR-MCNC: 140 MG/DL — HIGH (ref 70–99)
GLUCOSE BLDC GLUCOMTR-MCNC: 150 MG/DL — HIGH (ref 70–99)
GLUCOSE BLDC GLUCOMTR-MCNC: 278 MG/DL — HIGH (ref 70–99)
GLUCOSE SERPL-MCNC: 121 MG/DL — HIGH (ref 70–99)
HCT VFR BLD CALC: 30.8 % — LOW (ref 39–50)
HGB BLD-MCNC: 9.9 G/DL — LOW (ref 13–17)
MAGNESIUM SERPL-MCNC: 1.9 MG/DL — SIGNIFICANT CHANGE UP (ref 1.6–2.6)
MCHC RBC-ENTMCNC: 26.7 PG — LOW (ref 27–34)
MCHC RBC-ENTMCNC: 32.1 GM/DL — SIGNIFICANT CHANGE UP (ref 32–36)
MCV RBC AUTO: 83 FL — SIGNIFICANT CHANGE UP (ref 80–100)
NRBC # BLD: 0 /100 WBCS — SIGNIFICANT CHANGE UP (ref 0–0)
NRBC # FLD: 0 K/UL — SIGNIFICANT CHANGE UP (ref 0–0)
PHOSPHATE SERPL-MCNC: 2.6 MG/DL — SIGNIFICANT CHANGE UP (ref 2.5–4.5)
PLATELET # BLD AUTO: 333 K/UL — SIGNIFICANT CHANGE UP (ref 150–400)
POTASSIUM SERPL-MCNC: 4.3 MMOL/L — SIGNIFICANT CHANGE UP (ref 3.5–5.3)
POTASSIUM SERPL-SCNC: 4.3 MMOL/L — SIGNIFICANT CHANGE UP (ref 3.5–5.3)
PROT SERPL-MCNC: 7.9 G/DL — SIGNIFICANT CHANGE UP (ref 6–8.3)
RBC # BLD: 3.71 M/UL — LOW (ref 4.2–5.8)
RBC # FLD: 13.2 % — SIGNIFICANT CHANGE UP (ref 10.3–14.5)
SODIUM SERPL-SCNC: 134 MMOL/L — LOW (ref 135–145)
WBC # BLD: 5.72 K/UL — SIGNIFICANT CHANGE UP (ref 3.8–10.5)
WBC # FLD AUTO: 5.72 K/UL — SIGNIFICANT CHANGE UP (ref 3.8–10.5)

## 2024-09-05 PROCEDURE — 99232 SBSQ HOSP IP/OBS MODERATE 35: CPT | Mod: GC

## 2024-09-05 RX ADMIN — Medication 5000 UNIT(S): at 06:55

## 2024-09-05 RX ADMIN — METOPROLOL TARTRATE 25 MILLIGRAM(S): 100 TABLET ORAL at 06:55

## 2024-09-05 RX ADMIN — TAMSULOSIN HYDROCHLORIDE 0.4 MILLIGRAM(S): 0.4 CAPSULE ORAL at 21:44

## 2024-09-05 RX ADMIN — OXYBUTYNIN CHLORIDE 5 MILLIGRAM(S): 5 TABLET ORAL at 06:55

## 2024-09-05 RX ADMIN — INSULIN GLARGINE 8 UNIT(S): 100 INJECTION, SOLUTION SUBCUTANEOUS at 10:02

## 2024-09-05 RX ADMIN — Medication 5000 UNIT(S): at 18:15

## 2024-09-05 RX ADMIN — DARUNAVIR, COBICISTAT, EMTRICITABINE, AND TENOFOVIR ALAFENAMIDE 1 TABLET(S): 800; 150; 200; 10 TABLET, FILM COATED ORAL at 10:07

## 2024-09-05 RX ADMIN — FINASTERIDE 5 MILLIGRAM(S): 1 TABLET, FILM COATED ORAL at 10:07

## 2024-09-05 RX ADMIN — CHLORHEXIDINE GLUCONATE 1 APPLICATION(S): 40 SOLUTION TOPICAL at 10:07

## 2024-09-05 RX ADMIN — OXYBUTYNIN CHLORIDE 5 MILLIGRAM(S): 5 TABLET ORAL at 18:16

## 2024-09-05 RX ADMIN — Medication 10 MILLIGRAM(S): at 21:44

## 2024-09-05 RX ADMIN — AMLODIPINE BESYLATE 5 MILLIGRAM(S): 10 TABLET ORAL at 10:03

## 2024-09-05 NOTE — PROGRESS NOTE ADULT - SUBJECTIVE AND OBJECTIVE BOX
JAMES WGANER  84y  MRN: [id number]  8/30/2024 (5d)    84-year-old male past medical history chronic Forrester secondary to worsening BPH, hypertension, hyperlipidemia, DM2, spinal stenosis, HIV VLUD CD4 900s, here w/ suprapubic distention and discomfort. In the ED, forrester was replaced w/ purulent material coming out. (31-Aug-2024 00:56)    SUBJECTIVE / OVERNIGHT EVENTS:  No acute o/n events. Denies fever, chills, chest pain, SOB, cough, N/V/D/C, abdominal pain.     12 Point ROS negative with the exception of the above      OBJECTIVE:    Allergies:  No Known Allergies    Medications:  acetaminophen     Tablet .. 650 milliGRAM(s) Oral every 6 hours PRN  amLODIPine   Tablet 5 milliGRAM(s) Oral every 24 hours  atorvastatin 10 milliGRAM(s) Oral at bedtime  chlorhexidine 2% Cloths 1 Application(s) Topical daily  darunavir 800 mG/cobicistat 150 mG/emtricitabine 200 mG/tenofovir alafenamide 10 mG (SYMTUZA) 1 Tablet(s) Oral daily  dextrose 5%. 1000 milliLiter(s) IV Continuous <Continuous>  dextrose 5%. 1000 milliLiter(s) IV Continuous <Continuous>  dextrose 50% Injectable 25 Gram(s) IV Push once  dextrose 50% Injectable 25 Gram(s) IV Push once  dextrose 50% Injectable 12.5 Gram(s) IV Push once  dextrose Oral Gel 15 Gram(s) Oral once PRN  ertapenem  IVPB 1000 milliGRAM(s) IV Intermittent every 24 hours  finasteride 5 milliGRAM(s) Oral daily  glucagon  Injectable 1 milliGRAM(s) IntraMuscular once  heparin   Injectable 5000 Unit(s) SubCutaneous every 12 hours  insulin glargine Injectable (LANTUS) 8 Unit(s) SubCutaneous every morning  insulin lispro (ADMELOG) corrective regimen sliding scale   SubCutaneous at bedtime  insulin lispro (ADMELOG) corrective regimen sliding scale   SubCutaneous three times a day before meals  metoprolol succinate ER 25 milliGRAM(s) Oral daily  oxybutynin 5 milliGRAM(s) Oral every 12 hours  tamsulosin 0.4 milliGRAM(s) Oral at bedtime    Vitals:  T(C): 36.6 (09-05-24 @ 10:26), Max: 36.9 (09-04-24 @ 21:15)  HR: 60 (09-05-24 @ 10:26) (60 - 94)  BP: 129/63 (09-05-24 @ 10:26) (129/63 - 144/80)  RR: 17 (09-05-24 @ 10:26) (17 - 18)  SpO2: 100% (09-05-24 @ 10:26) (98% - 100%)    I/Os:    09-04-24 @ 07:01  -  09-05-24 @ 07:00  --------------------------------------------------------  IN: 0 mL / OUT: 800 mL / NET: -800 mL          PHYSICAL EXAM:  GENERAL: sitting comfortably, NAD  HEENT: NCAT, EOMI, no scleral icterus, no LAD  LUNG: CTABL; No wheezes, crackles, or rhonchi  HEART: RRR; normal S1/S2; No murmurs, rubs, or gallops  ABDOMEN: +BS, soft, nontender, nondistended, no HSM; No rebound, guarding, or rigidity  EXTREMITIES:  No LE edema b/l, No clubbing or cyanosis  NEUROLOGY: AOx3, non-focal  PSYCH: calm and cooperative  SKIN: No rashes or lesions      Labs:                        9.9    5.72  )-----------( 333      ( 05 Sep 2024 05:12 )             30.8     09-05    134<L>  |  100  |  20  ----------------------------<  121<H>  4.3   |  21<L>  |  1.11    Ca    9.9      05 Sep 2024 05:12  Phos  2.6     09-05  Mg     1.90     09-05    TPro  7.9  /  Alb  3.4  /  TBili  0.3  /  DBili  x   /  AST  26  /  ALT  26  /  AlkPhos  86  09-05        RADIOLOGY, EKG & ADDITIONAL TESTS: Reviewed.

## 2024-09-05 NOTE — PROGRESS NOTE ADULT - PROBLEM SELECTOR PLAN 1
Prior cultures w/ candida, though low colony forming units and likely contaminant. UCx shows Klebsiella pneumoniae ESBL. Lawson replaced in ED  -On IV ertapenem 1g QD for 3 days (09/04 - )  -will start on ciprofloxacin upon discharge  - would hold off on further antifungals unless patient decompensates.   -if BP low, high grade fevers noted, can give zosyn and consider a dose of antifungal pending cx data

## 2024-09-05 NOTE — PROGRESS NOTE ADULT - ATTENDING COMMENTS
84M with CAUTI due to ESBL Kleb.     clinically improved.   on exam. no abd pain. forrester clear yellow urine.   on Ertapenem. anticipating 7 days course. will transition to Cipro on d/c.     f/u SW to assist with DC plan.

## 2024-09-06 ENCOUNTER — TRANSCRIPTION ENCOUNTER (OUTPATIENT)
Age: 84
End: 2024-09-06

## 2024-09-06 ENCOUNTER — APPOINTMENT (OUTPATIENT)
Age: 84
End: 2024-09-06

## 2024-09-06 VITALS — WEIGHT: 126.99 LBS

## 2024-09-06 LAB
ALBUMIN SERPL ELPH-MCNC: 3.4 G/DL — SIGNIFICANT CHANGE UP (ref 3.3–5)
ALP SERPL-CCNC: 87 U/L — SIGNIFICANT CHANGE UP (ref 40–120)
ALT FLD-CCNC: 21 U/L — SIGNIFICANT CHANGE UP (ref 4–41)
ANION GAP SERPL CALC-SCNC: 10 MMOL/L — SIGNIFICANT CHANGE UP (ref 7–14)
AST SERPL-CCNC: 17 U/L — SIGNIFICANT CHANGE UP (ref 4–40)
BILIRUB SERPL-MCNC: 0.3 MG/DL — SIGNIFICANT CHANGE UP (ref 0.2–1.2)
BUN SERPL-MCNC: 15 MG/DL — SIGNIFICANT CHANGE UP (ref 7–23)
CALCIUM SERPL-MCNC: 10.1 MG/DL — SIGNIFICANT CHANGE UP (ref 8.4–10.5)
CHLORIDE SERPL-SCNC: 104 MMOL/L — SIGNIFICANT CHANGE UP (ref 98–107)
CO2 SERPL-SCNC: 22 MMOL/L — SIGNIFICANT CHANGE UP (ref 22–31)
CREAT SERPL-MCNC: 1.04 MG/DL — SIGNIFICANT CHANGE UP (ref 0.5–1.3)
EGFR: 70 ML/MIN/1.73M2 — SIGNIFICANT CHANGE UP
GLUCOSE BLDC GLUCOMTR-MCNC: 126 MG/DL — HIGH (ref 70–99)
GLUCOSE BLDC GLUCOMTR-MCNC: 244 MG/DL — HIGH (ref 70–99)
GLUCOSE SERPL-MCNC: 101 MG/DL — HIGH (ref 70–99)
HCT VFR BLD CALC: 31.4 % — LOW (ref 39–50)
HGB BLD-MCNC: 10.3 G/DL — LOW (ref 13–17)
MAGNESIUM SERPL-MCNC: 2 MG/DL — SIGNIFICANT CHANGE UP (ref 1.6–2.6)
MCHC RBC-ENTMCNC: 27.2 PG — SIGNIFICANT CHANGE UP (ref 27–34)
MCHC RBC-ENTMCNC: 32.8 GM/DL — SIGNIFICANT CHANGE UP (ref 32–36)
MCV RBC AUTO: 82.8 FL — SIGNIFICANT CHANGE UP (ref 80–100)
NRBC # BLD: 0 /100 WBCS — SIGNIFICANT CHANGE UP (ref 0–0)
NRBC # FLD: 0 K/UL — SIGNIFICANT CHANGE UP (ref 0–0)
PHOSPHATE SERPL-MCNC: 2.7 MG/DL — SIGNIFICANT CHANGE UP (ref 2.5–4.5)
PLATELET # BLD AUTO: 343 K/UL — SIGNIFICANT CHANGE UP (ref 150–400)
POTASSIUM SERPL-MCNC: 4.5 MMOL/L — SIGNIFICANT CHANGE UP (ref 3.5–5.3)
POTASSIUM SERPL-SCNC: 4.5 MMOL/L — SIGNIFICANT CHANGE UP (ref 3.5–5.3)
PROT SERPL-MCNC: 8.1 G/DL — SIGNIFICANT CHANGE UP (ref 6–8.3)
RBC # BLD: 3.79 M/UL — LOW (ref 4.2–5.8)
RBC # FLD: 12.8 % — SIGNIFICANT CHANGE UP (ref 10.3–14.5)
SODIUM SERPL-SCNC: 136 MMOL/L — SIGNIFICANT CHANGE UP (ref 135–145)
WBC # BLD: 5.4 K/UL — SIGNIFICANT CHANGE UP (ref 3.8–10.5)
WBC # FLD AUTO: 5.4 K/UL — SIGNIFICANT CHANGE UP (ref 3.8–10.5)

## 2024-09-06 PROCEDURE — 99239 HOSP IP/OBS DSCHRG MGMT >30: CPT | Mod: GC

## 2024-09-06 RX ADMIN — FINASTERIDE 5 MILLIGRAM(S): 1 TABLET, FILM COATED ORAL at 11:21

## 2024-09-06 RX ADMIN — ERTAPENEM SODIUM 120 MILLIGRAM(S): 1 INJECTION, POWDER, LYOPHILIZED, FOR SOLUTION INTRAMUSCULAR; INTRAVENOUS at 11:39

## 2024-09-06 RX ADMIN — DARUNAVIR, COBICISTAT, EMTRICITABINE, AND TENOFOVIR ALAFENAMIDE 1 TABLET(S): 800; 150; 200; 10 TABLET, FILM COATED ORAL at 11:20

## 2024-09-06 RX ADMIN — Medication 5000 UNIT(S): at 05:39

## 2024-09-06 RX ADMIN — INSULIN GLARGINE 8 UNIT(S): 100 INJECTION, SOLUTION SUBCUTANEOUS at 09:08

## 2024-09-06 RX ADMIN — CHLORHEXIDINE GLUCONATE 1 APPLICATION(S): 40 SOLUTION TOPICAL at 11:22

## 2024-09-06 RX ADMIN — AMLODIPINE BESYLATE 5 MILLIGRAM(S): 10 TABLET ORAL at 09:46

## 2024-09-06 RX ADMIN — METOPROLOL TARTRATE 25 MILLIGRAM(S): 100 TABLET ORAL at 05:41

## 2024-09-06 RX ADMIN — OXYBUTYNIN CHLORIDE 5 MILLIGRAM(S): 5 TABLET ORAL at 05:40

## 2024-09-06 RX ADMIN — Medication 2: at 12:46

## 2024-09-06 NOTE — PROGRESS NOTE ADULT - PROBLEM SELECTOR PLAN 6
-toprol 25 mg for now (family states he was on 100 mg, though recent d/c of 25 mg)  -amlodipine 5 mg.

## 2024-09-06 NOTE — PROGRESS NOTE ADULT - ATTENDING COMMENTS
84M with CAUTI.     doing well. has no complaints.   exam unremarakble. urine clear.   BCX NGTD.     - plan for d/c home today. complete rest of treatment for CAUTI with Cipro PO.     refer to d/c summary    35 minutes spent coordinating discharge

## 2024-09-06 NOTE — PROGRESS NOTE ADULT - PROBLEM SELECTOR PLAN 5
-email sent to pharmacy to verify medication dosages.

## 2024-09-06 NOTE — PROGRESS NOTE ADULT - TIME BILLING
- Ordering, reviewing, and interpreting labs, testing, and imaging.  - Independently obtaining a review of systems and performing a physical exam  - Reviewing prior hospitalization and where necessary, outpatient records.  - Reviewing consultant recommendations/communicating with consultants  - Counselling and educating patient and family regarding interpretation of aforementioned items and plan of care.

## 2024-09-06 NOTE — PROGRESS NOTE ADULT - PROBLEM SELECTOR PLAN 1
Prior cultures w/ candida, though low colony forming units and likely contaminant. UCx shows Klebsiella pneumoniae ESBL. Lawson replaced in ED  -On IV ertapenem 1g QD for 3 days (09/04 - 09/06)  -will start on ciprofloxacin upon discharge  - would hold off on further antifungals unless patient decompensates.   -if BP low, high grade fevers noted, can give zosyn and consider a dose of antifungal pending cx data

## 2024-09-06 NOTE — PROGRESS NOTE ADULT - SUBJECTIVE AND OBJECTIVE BOX
JAMES WAGNER  84y  MRN: [id number]  8/30/2024 (6d)    84-year-old male past medical history chronic Forrester secondary to worsening BPH, hypertension, hyperlipidemia, DM2, spinal stenosis, HIV VLUD CD4 900s, here w/ suprapubic distention and discomfort. In the ED, forrester was replaced w/ purulent material coming out. (31-Aug-2024 00:56)    SUBJECTIVE / OVERNIGHT EVENTS:  No acute o/n events. Denies fever, chills, chest pain, SOB, cough, N/V/D/C, abdominal pain.     12 Point ROS negative with the exception of the above      OBJECTIVE:    Allergies:  No Known Allergies    MEDICATIONS  (STANDING):  amLODIPine   Tablet 5 milliGRAM(s) Oral every 24 hours  atorvastatin 10 milliGRAM(s) Oral at bedtime  chlorhexidine 2% Cloths 1 Application(s) Topical daily  darunavir 800 mG/cobicistat 150 mG/emtricitabine 200 mG/tenofovir alafenamide 10 mG (SYMTUZA) 1 Tablet(s) Oral daily  dextrose 5%. 1000 milliLiter(s) (100 mL/Hr) IV Continuous <Continuous>  dextrose 5%. 1000 milliLiter(s) (50 mL/Hr) IV Continuous <Continuous>  dextrose 50% Injectable 25 Gram(s) IV Push once  dextrose 50% Injectable 25 Gram(s) IV Push once  dextrose 50% Injectable 12.5 Gram(s) IV Push once  ertapenem  IVPB 1000 milliGRAM(s) IV Intermittent every 24 hours  finasteride 5 milliGRAM(s) Oral daily  glucagon  Injectable 1 milliGRAM(s) IntraMuscular once  heparin   Injectable 5000 Unit(s) SubCutaneous every 12 hours  insulin glargine Injectable (LANTUS) 8 Unit(s) SubCutaneous every morning  insulin lispro (ADMELOG) corrective regimen sliding scale   SubCutaneous at bedtime  insulin lispro (ADMELOG) corrective regimen sliding scale   SubCutaneous three times a day before meals  metoprolol succinate ER 25 milliGRAM(s) Oral daily  oxybutynin 5 milliGRAM(s) Oral every 12 hours  tamsulosin 0.4 milliGRAM(s) Oral at bedtime    MEDICATIONS  (PRN):  acetaminophen     Tablet .. 650 milliGRAM(s) Oral every 6 hours PRN Temp greater or equal to 38C (100.4F), Mild Pain (1 - 3)  dextrose Oral Gel 15 Gram(s) Oral once PRN Blood Glucose LESS THAN 70 milliGRAM(s)/deciliter    Vital Signs Last 24 Hrs  T(C): 36.8 (06 Sep 2024 05:34), Max: 36.8 (06 Sep 2024 05:34)  T(F): 98.2 (06 Sep 2024 05:34), Max: 98.2 (06 Sep 2024 05:34)  HR: 61 (06 Sep 2024 05:34) (56 - 95)  BP: 145/73 (06 Sep 2024 05:34) (129/63 - 145/73)  BP(mean): --  RR: 18 (06 Sep 2024 05:34) (17 - 18)  SpO2: 100% (06 Sep 2024 05:34) (99% - 100%)    Parameters below as of 06 Sep 2024 05:34  Patient On (Oxygen Delivery Method): room air    I&O's Summary    05 Sep 2024 07:01  -  06 Sep 2024 07:00  --------------------------------------------------------  IN: 100 mL / OUT: 1225 mL / NET: -1125 mL    PHYSICAL EXAM:  GENERAL: sitting comfortably, NAD  HEENT: NCAT, EOMI, no scleral icterus, no LAD  LUNG: CTABL; No wheezes, crackles, or rhonchi  HEART: RRR; normal S1/S2; No murmurs, rubs, or gallops  ABDOMEN: +BS, soft, nontender, nondistended, no HSM; No rebound, guarding, or rigidity  EXTREMITIES:  No LE edema b/l, No clubbing or cyanosis  NEUROLOGY: AOx3, non-focal  PSYCH: calm and cooperative  SKIN: No rashes or lesions    LABS:                         10.3   5.40  )-----------( 343      ( 06 Sep 2024 05:44 )             31.4     09-06    136  |  104  |  15  ----------------------------<  101<H>  4.5   |  22  |  1.04    Ca    10.1      06 Sep 2024 05:44  Phos  2.7     09-06  Mg     2.00     09-06    TPro  8.1  /  Alb  3.4  /  TBili  0.3  /  DBili  x   /  AST  17  /  ALT  21  /  AlkPhos  87  09-06      Urinalysis Basic - ( 06 Sep 2024 05:44 )    Color: x / Appearance: x / SG: x / pH: x  Gluc: 101 mg/dL / Ketone: x  / Bili: x / Urobili: x   Blood: x / Protein: x / Nitrite: x   Leuk Esterase: x / RBC: x / WBC x   Sq Epi: x / Non Sq Epi: x / Bacteria: x            RADIOLOGY, EKG & ADDITIONAL TESTS: Reviewed.

## 2024-09-06 NOTE — DIETITIAN INITIAL EVALUATION ADULT - ORAL INTAKE PTA/DIET HISTORY
Patient seen for assessment. Limited information obtained due to patient cognitive status. Other information obtained from chart review.

## 2024-09-06 NOTE — DIETITIAN INITIAL EVALUATION ADULT - PERTINENT MEDS FT
MEDICATIONS  (STANDING):  amLODIPine   Tablet 5 milliGRAM(s) Oral every 24 hours  atorvastatin 10 milliGRAM(s) Oral at bedtime  chlorhexidine 2% Cloths 1 Application(s) Topical daily  darunavir 800 mG/cobicistat 150 mG/emtricitabine 200 mG/tenofovir alafenamide 10 mG (SYMTUZA) 1 Tablet(s) Oral daily  dextrose 5%. 1000 milliLiter(s) (100 mL/Hr) IV Continuous <Continuous>  dextrose 5%. 1000 milliLiter(s) (50 mL/Hr) IV Continuous <Continuous>  dextrose 50% Injectable 12.5 Gram(s) IV Push once  dextrose 50% Injectable 25 Gram(s) IV Push once  dextrose 50% Injectable 25 Gram(s) IV Push once  ertapenem  IVPB 1000 milliGRAM(s) IV Intermittent every 24 hours  finasteride 5 milliGRAM(s) Oral daily  glucagon  Injectable 1 milliGRAM(s) IntraMuscular once  heparin   Injectable 5000 Unit(s) SubCutaneous every 12 hours  insulin glargine Injectable (LANTUS) 8 Unit(s) SubCutaneous every morning  insulin lispro (ADMELOG) corrective regimen sliding scale   SubCutaneous at bedtime  insulin lispro (ADMELOG) corrective regimen sliding scale   SubCutaneous three times a day before meals  metoprolol succinate ER 25 milliGRAM(s) Oral daily  oxybutynin 5 milliGRAM(s) Oral every 12 hours  tamsulosin 0.4 milliGRAM(s) Oral at bedtime    MEDICATIONS  (PRN):  acetaminophen     Tablet .. 650 milliGRAM(s) Oral every 6 hours PRN Temp greater or equal to 38C (100.4F), Mild Pain (1 - 3)  dextrose Oral Gel 15 Gram(s) Oral once PRN Blood Glucose LESS THAN 70 milliGRAM(s)/deciliter

## 2024-09-06 NOTE — DISCHARGE NOTE NURSING/CASE MANAGEMENT/SOCIAL WORK - NSDCPEFALRISK_GEN_ALL_CORE
For information on Fall & Injury Prevention, visit: [email or web address] OR  [email or web address] OR  https://www.cdc.gov/steadi/patient.html

## 2024-09-06 NOTE — DIETITIAN INITIAL EVALUATION ADULT - OTHER INFO
84 year old male a PMH of chronic Lawsno secondary to worsening BPH, hypertension, hyperlipidemia, DM2, spinal stenosis, HIV here w/ suprapubic distention and discomfort per chart.    Patient seen during breakfast and consumed 50% of meal. Noted w/ some intakes 51-75% per RN flow sheet. No GI distress noted. Has no food allergies. Unable to obtain UBW from patient. HIE weight history questionable per review. ABW is 57.6 kg (8/30) per chart. No edema or pressure injuries noted per RN flow sheet.

## 2024-09-06 NOTE — DIETITIAN INITIAL EVALUATION ADULT - PERTINENT LABORATORY DATA
09-06    136  |  104  |  15  ----------------------------<  101<H>  4.5   |  22  |  1.04    Ca    10.1      06 Sep 2024 05:44  Phos  2.7     09-06  Mg     2.00     09-06    TPro  8.1  /  Alb  3.4  /  TBili  0.3  /  DBili  x   /  AST  17  /  ALT  21  /  AlkPhos  87  09-06  POCT Blood Glucose.: 126 mg/dL (09-06-24 @ 08:56)  A1C with Estimated Average Glucose Result: 11.9 % (07-03-24 @ 10:26)  A1C with Estimated Average Glucose Result: 13.3 % (06-21-24 @ 09:30)

## 2024-09-06 NOTE — DISCHARGE NOTE NURSING/CASE MANAGEMENT/SOCIAL WORK - NSDCFUADDAPPT_GEN_ALL_CORE_FT
APPTS ARE READY TO BE MADE: [x] YES    Best Family or Patient Contact (if needed):    Additional Information about above appointments (if needed):    1: Follow up with Dr. Conchita Mann (Urology) within 1 week of discharge  2:  3:    Other comments or requests:

## 2024-09-06 NOTE — PROGRESS NOTE ADULT - PROBLEM SELECTOR PLAN 7
DVT PPx: Heparin 5000 subq Q12h   Diet: Regular  Code: Full  Dispo: Pending Hospital Course

## 2024-09-06 NOTE — PROGRESS NOTE ADULT - PROBLEM SELECTOR PLAN 2
-lantus 8 units in AM  -sliding scale.

## 2024-09-06 NOTE — DISCHARGE NOTE NURSING/CASE MANAGEMENT/SOCIAL WORK - PATIENT PORTAL LINK FT
You can access the FollowMyHealth Patient Portal offered by [facility] by registering at the following website: [email or web address]. By joining [facility]’s FollowMyHealth portal, you will also be able to view your health information using other applications (apps) compatible with our system.

## 2024-09-06 NOTE — PROGRESS NOTE ADULT - PROBLEM SELECTOR PLAN 4
-c/w flomax/finasteride.

## 2024-09-06 NOTE — DIETITIAN INITIAL EVALUATION ADULT - WEIGHT FOR BMI (KG)
57.6 Detail Level: Simple Additional Notes: She was advised to DC all her current things she is doing and will use a cool wet compress BID for 10 minutes and then apply the TAC cream .025% followed by CeraVe cream and after 7 days to DC the TAC not the CeraVe to prevent flares.   No blisters present today Additional Notes: We talked about the edema in the lower legs and possible support socks.  She will use the TAC cream .1% BID with CeraVe cream over the top for 10 days and then to DC the TAC not the CeraVe cream BID to prevent flares

## 2024-09-06 NOTE — PROGRESS NOTE ADULT - PROBLEM SELECTOR PLAN 3
moon/w symtuza.

## 2024-09-18 ENCOUNTER — APPOINTMENT (OUTPATIENT)
Dept: UROLOGY | Facility: CLINIC | Age: 84
End: 2024-09-18
Payer: COMMERCIAL

## 2024-09-18 VITALS — SYSTOLIC BLOOD PRESSURE: 120 MMHG | OXYGEN SATURATION: 99 % | HEART RATE: 61 BPM | DIASTOLIC BLOOD PRESSURE: 58 MMHG

## 2024-09-18 DIAGNOSIS — R33.8 OTHER RETENTION OF URINE: ICD-10-CM

## 2024-09-18 PROCEDURE — 51702 INSERT TEMP BLADDER CATH: CPT

## 2024-09-23 NOTE — ED PROVIDER NOTE - IV ALTEPASE ADMIN HIDDEN
[FreeTextEntry1] : Mr. Vidal is a 31-year-old male with a hx of migraines and reported TBI? in 2018 while he was in the  presents today for a follow up visit after presenting to the ED for evaluation of headache. Patient endorses his migraines have been controlled on no medications since 2018. He recently was diagnosed with Covid on 9/5/24 which triggered hypertension, anxiety, and migraines. He endorses he is getting migraines every other day. Migraines are located in the front of his head, sometimes can vary from side to side. Endorses sharp, pressure-like pain 7/10 in intensity associated with nausea, lightheadedness and visual disturbances such as black dots. Patient endorses he has had chronic migraine secondary to concussion that he sustained when in the  in 2018 which has been well controlled without daily medication. He trailed one round of Botox in the  for migraines which did not provide him relief. Patient denies head trauma, numbness, tingling, weakness, abdominal pain, shortness of breath, diarrhea, fevers, chills.    
show

## 2024-10-14 ENCOUNTER — NON-APPOINTMENT (OUTPATIENT)
Age: 84
End: 2024-10-14

## 2024-10-18 ENCOUNTER — APPOINTMENT (OUTPATIENT)
Dept: UROLOGY | Facility: CLINIC | Age: 84
End: 2024-10-18

## 2024-10-18 VITALS
SYSTOLIC BLOOD PRESSURE: 157 MMHG | HEART RATE: 80 BPM | TEMPERATURE: 98.1 F | DIASTOLIC BLOOD PRESSURE: 77 MMHG | OXYGEN SATURATION: 98 %

## 2024-10-18 DIAGNOSIS — R33.8 OTHER RETENTION OF URINE: ICD-10-CM

## 2024-10-18 PROCEDURE — 51702 INSERT TEMP BLADDER CATH: CPT

## 2024-11-11 NOTE — ED ADULT TRIAGE NOTE - GLASGOW COMA SCALE: EYE OPENING, MLM
[FreeTextEntry1] : hyperlipidemia continue atorvastatin  thumb pain refer to ortho   (E4) spontaneous

## 2024-11-15 ENCOUNTER — APPOINTMENT (OUTPATIENT)
Age: 84
End: 2024-11-15

## 2024-11-15 VITALS
DIASTOLIC BLOOD PRESSURE: 66 MMHG | OXYGEN SATURATION: 100 % | TEMPERATURE: 97.6 F | SYSTOLIC BLOOD PRESSURE: 110 MMHG | HEART RATE: 50 BPM

## 2024-11-15 PROCEDURE — 99215 OFFICE O/P EST HI 40 MIN: CPT

## 2024-11-15 PROCEDURE — G2211 COMPLEX E/M VISIT ADD ON: CPT | Mod: NC

## 2024-11-18 ENCOUNTER — APPOINTMENT (OUTPATIENT)
Age: 84
End: 2024-11-18

## 2024-11-18 VITALS
TEMPERATURE: 97 F | OXYGEN SATURATION: 98 % | HEART RATE: 58 BPM | DIASTOLIC BLOOD PRESSURE: 70 MMHG | HEIGHT: 65 IN | SYSTOLIC BLOOD PRESSURE: 138 MMHG | WEIGHT: 130 LBS | BODY MASS INDEX: 21.66 KG/M2

## 2024-11-18 DIAGNOSIS — R33.8 OTHER RETENTION OF URINE: ICD-10-CM

## 2024-11-18 DIAGNOSIS — N40.1 BENIGN PROSTATIC HYPERPLASIA WITH LOWER URINARY TRACT SYMPMS: ICD-10-CM

## 2024-11-18 DIAGNOSIS — N13.8 BENIGN PROSTATIC HYPERPLASIA WITH LOWER URINARY TRACT SYMPMS: ICD-10-CM

## 2024-11-18 PROCEDURE — 99213 OFFICE O/P EST LOW 20 MIN: CPT

## 2024-11-18 PROCEDURE — G2211 COMPLEX E/M VISIT ADD ON: CPT | Mod: NC

## 2024-11-30 ENCOUNTER — INPATIENT (INPATIENT)
Facility: HOSPITAL | Age: 84
LOS: 5 days | Discharge: HOME HEALTH | End: 2024-12-06
Attending: STUDENT IN AN ORGANIZED HEALTH CARE EDUCATION/TRAINING PROGRAM | Admitting: STUDENT IN AN ORGANIZED HEALTH CARE EDUCATION/TRAINING PROGRAM
Payer: COMMERCIAL

## 2024-11-30 VITALS
TEMPERATURE: 98 F | OXYGEN SATURATION: 100 % | DIASTOLIC BLOOD PRESSURE: 80 MMHG | RESPIRATION RATE: 19 BRPM | WEIGHT: 139.99 LBS | SYSTOLIC BLOOD PRESSURE: 134 MMHG | HEART RATE: 111 BPM

## 2024-11-30 LAB
A1C WITH ESTIMATED AVERAGE GLUCOSE RESULT: 11.7 % — HIGH (ref 4–5.6)
ADD ON TEST-SPECIMEN IN LAB: SIGNIFICANT CHANGE UP
ADD ON TEST-SPECIMEN IN LAB: SIGNIFICANT CHANGE UP
ALBUMIN SERPL ELPH-MCNC: 3.6 G/DL — SIGNIFICANT CHANGE UP (ref 3.3–5)
ALP SERPL-CCNC: 120 U/L — SIGNIFICANT CHANGE UP (ref 40–120)
ALT FLD-CCNC: 6 U/L — SIGNIFICANT CHANGE UP (ref 4–41)
ANION GAP SERPL CALC-SCNC: 19 MMOL/L — HIGH (ref 7–14)
AST SERPL-CCNC: 8 U/L — SIGNIFICANT CHANGE UP (ref 4–40)
B-OH-BUTYR SERPL-SCNC: 2.4 MMOL/L — HIGH (ref 0–0.4)
BASOPHILS # BLD AUTO: 0.02 K/UL — SIGNIFICANT CHANGE UP (ref 0–0.2)
BASOPHILS NFR BLD AUTO: 0.2 % — SIGNIFICANT CHANGE UP (ref 0–2)
BILIRUB SERPL-MCNC: 0.8 MG/DL — SIGNIFICANT CHANGE UP (ref 0.2–1.2)
BLOOD GAS VENOUS COMPREHENSIVE RESULT: SIGNIFICANT CHANGE UP
BLOOD GAS VENOUS COMPREHENSIVE RESULT: SIGNIFICANT CHANGE UP
BUN SERPL-MCNC: 21 MG/DL — SIGNIFICANT CHANGE UP (ref 7–23)
CALCIUM SERPL-MCNC: 9.6 MG/DL — SIGNIFICANT CHANGE UP (ref 8.4–10.5)
CHLORIDE SERPL-SCNC: 95 MMOL/L — LOW (ref 98–107)
CO2 SERPL-SCNC: 19 MMOL/L — LOW (ref 22–31)
CREAT SERPL-MCNC: 1.34 MG/DL — HIGH (ref 0.5–1.3)
EGFR: 52 ML/MIN/1.73M2 — LOW
EOSINOPHIL # BLD AUTO: 0 K/UL — SIGNIFICANT CHANGE UP (ref 0–0.5)
EOSINOPHIL NFR BLD AUTO: 0 % — SIGNIFICANT CHANGE UP (ref 0–6)
ESTIMATED AVERAGE GLUCOSE: 289 — SIGNIFICANT CHANGE UP
FLUAV AG NPH QL: SIGNIFICANT CHANGE UP
FLUBV AG NPH QL: SIGNIFICANT CHANGE UP
GLUCOSE SERPL-MCNC: 561 MG/DL — CRITICAL HIGH (ref 70–99)
HCT VFR BLD CALC: 34.5 % — LOW (ref 39–50)
HGB BLD-MCNC: 11.6 G/DL — LOW (ref 13–17)
IANC: 9.04 K/UL — HIGH (ref 1.8–7.4)
IMM GRANULOCYTES NFR BLD AUTO: 0.8 % — SIGNIFICANT CHANGE UP (ref 0–0.9)
LYMPHOCYTES # BLD AUTO: 0.92 K/UL — LOW (ref 1–3.3)
LYMPHOCYTES # BLD AUTO: 8.5 % — LOW (ref 13–44)
MAGNESIUM SERPL-MCNC: 1.6 MG/DL — SIGNIFICANT CHANGE UP (ref 1.6–2.6)
MCHC RBC-ENTMCNC: 27.7 PG — SIGNIFICANT CHANGE UP (ref 27–34)
MCHC RBC-ENTMCNC: 33.6 G/DL — SIGNIFICANT CHANGE UP (ref 32–36)
MCV RBC AUTO: 82.3 FL — SIGNIFICANT CHANGE UP (ref 80–100)
MONOCYTES # BLD AUTO: 0.77 K/UL — SIGNIFICANT CHANGE UP (ref 0–0.9)
MONOCYTES NFR BLD AUTO: 7.1 % — SIGNIFICANT CHANGE UP (ref 2–14)
NEUTROPHILS # BLD AUTO: 9.04 K/UL — HIGH (ref 1.8–7.4)
NEUTROPHILS NFR BLD AUTO: 83.4 % — HIGH (ref 43–77)
NRBC # BLD: 0 /100 WBCS — SIGNIFICANT CHANGE UP (ref 0–0)
NRBC # FLD: 0 K/UL — SIGNIFICANT CHANGE UP (ref 0–0)
PHOSPHATE SERPL-MCNC: 2.6 MG/DL — SIGNIFICANT CHANGE UP (ref 2.5–4.5)
PLATELET # BLD AUTO: 365 K/UL — SIGNIFICANT CHANGE UP (ref 150–400)
POTASSIUM SERPL-MCNC: 4.6 MMOL/L — SIGNIFICANT CHANGE UP (ref 3.5–5.3)
POTASSIUM SERPL-SCNC: 4.6 MMOL/L — SIGNIFICANT CHANGE UP (ref 3.5–5.3)
PROT SERPL-MCNC: 9 G/DL — HIGH (ref 6–8.3)
RBC # BLD: 4.19 M/UL — LOW (ref 4.2–5.8)
RBC # FLD: 13.2 % — SIGNIFICANT CHANGE UP (ref 10.3–14.5)
RSV RNA NPH QL NAA+NON-PROBE: SIGNIFICANT CHANGE UP
SARS-COV-2 RNA SPEC QL NAA+PROBE: SIGNIFICANT CHANGE UP
SODIUM SERPL-SCNC: 133 MMOL/L — LOW (ref 135–145)
TROPONIN T, HIGH SENSITIVITY RESULT: 24 NG/L — SIGNIFICANT CHANGE UP
TSH SERPL-MCNC: 0.35 UIU/ML — SIGNIFICANT CHANGE UP (ref 0.27–4.2)
WBC # BLD: 10.84 K/UL — HIGH (ref 3.8–10.5)
WBC # FLD AUTO: 10.84 K/UL — HIGH (ref 3.8–10.5)

## 2024-11-30 PROCEDURE — 71045 X-RAY EXAM CHEST 1 VIEW: CPT | Mod: 26

## 2024-11-30 PROCEDURE — 99285 EMERGENCY DEPT VISIT HI MDM: CPT

## 2024-11-30 RX ORDER — POTASSIUM CHLORIDE 600 MG/1
40 TABLET, EXTENDED RELEASE ORAL ONCE
Refills: 0 | Status: COMPLETED | OUTPATIENT
Start: 2024-11-30 | End: 2024-11-30

## 2024-11-30 RX ORDER — 0.9 % SODIUM CHLORIDE 0.9 %
2000 INTRAVENOUS SOLUTION INTRAVENOUS ONCE
Refills: 0 | Status: COMPLETED | OUTPATIENT
Start: 2024-11-30 | End: 2024-11-30

## 2024-11-30 RX ORDER — ACETAMINOPHEN 500MG 500 MG/1
1000 TABLET, COATED ORAL ONCE
Refills: 0 | Status: COMPLETED | OUTPATIENT
Start: 2024-11-30 | End: 2024-11-30

## 2024-11-30 RX ORDER — PIPERACILLIN SODIUM AND TAZOBACTAM SODIUM 4; .5 G/20ML; G/20ML
3.38 INJECTION, POWDER, LYOPHILIZED, FOR SOLUTION INTRAVENOUS ONCE
Refills: 0 | Status: COMPLETED | OUTPATIENT
Start: 2024-11-30 | End: 2024-11-30

## 2024-11-30 RX ORDER — VANCOMYCIN HCL 900 MCG/MG
1000 POWDER (GRAM) MISCELLANEOUS ONCE
Refills: 0 | Status: COMPLETED | OUTPATIENT
Start: 2024-11-30 | End: 2024-11-30

## 2024-11-30 RX ORDER — 0.9 % SODIUM CHLORIDE 0.9 %
1000 INTRAVENOUS SOLUTION INTRAVENOUS
Refills: 0 | Status: DISCONTINUED | OUTPATIENT
Start: 2024-11-30 | End: 2024-12-01

## 2024-11-30 RX ADMIN — PIPERACILLIN SODIUM AND TAZOBACTAM SODIUM 200 GRAM(S): 4; .5 INJECTION, POWDER, LYOPHILIZED, FOR SOLUTION INTRAVENOUS at 21:03

## 2024-11-30 RX ADMIN — ACETAMINOPHEN 500MG 400 MILLIGRAM(S): 500 TABLET, COATED ORAL at 20:39

## 2024-11-30 RX ADMIN — Medication 8 UNIT(S): at 21:45

## 2024-11-30 RX ADMIN — POTASSIUM CHLORIDE 40 MILLIEQUIVALENT(S): 600 TABLET, EXTENDED RELEASE ORAL at 21:43

## 2024-11-30 RX ADMIN — Medication 2000 MILLILITER(S): at 20:02

## 2024-11-30 RX ADMIN — Medication 250 MILLIGRAM(S): at 21:50

## 2024-11-30 NOTE — ED ADULT NURSE NOTE - OBJECTIVE STATEMENT
Pt received to room [facility]. Pt is a 84 year old M with Hx of DM,HIV. Pt presented to ED c/o chest pain and generalized weakness, patient more comfortable with daughter translating. patient daughter states he has not been taking his DM medication x 4 days,. patient is a poor historian and daughter does not know much history either. denies, SOB, headache, dizziness, abdominal pain, n/v/d, urinary symptoms, fevers/chills, numbness/tingling.   Pt is A&Ox 1, . airway patent, speaking clearly but is confused. breathing is even and unlabored. abdomen is soft, nontender, nondistended. no edema noted. skin is intact. spontaneous movement of all extremities. patient has 18G L forearm from EMS. 20G R AC IV placed, +blood return, flushes without difficulty. labs collected and sent. comfort measures provided. stretcher set in lowest position, call bell within reach, safety maintained.      Pt arrives via EMS from home c/o chest pain and general malaise. Pt hasn't taken his meds x4 days. BGL >600 with EMS. NS infusing via L 18g IV. RR even/unlabored. PHx: DM, HIV. Prefers for daughter to translate. PHx: HIV, stg 3 CKD, BPH, DM, HTN.

## 2024-11-30 NOTE — ED ADULT TRIAGE NOTE - CHIEF COMPLAINT QUOTE
Pt arrives via EMS from home c/o chest pain and general malaise. Pt hasn't taken his meds x4 days. BGL >600 with EMS. NS infusing via L 18g IV. RR even/unlabored. PHx: DM, HIV. Prefers for daughter to translate. PHx: HIV, stg 3 CKD, BPH, DM, HTN.

## 2024-11-30 NOTE — ED PROVIDER NOTE - ATTENDING CONTRIBUTION TO CARE
I have personally performed a face to face medical and diagnostic evaluation of the patient. I have discussed with and reviewed the Resident's note and agree with the History, ROS, Physical Exam and MDM unless otherwise indicated. A brief summary of my personal evaluation and impression can be found below.    Shantell MACEDO: 84-year-old male history of diabetes on insulin unclear what type of insulin how often, HIV on antiretrovirals, CKD hypertension BPH, presents with a chief complaint of altered mental status over the last 5 days family is also concerned that he has not been taking his diabetes medicine is acting more confused, patient was also complaining of abdominal pain but no chest pain no trouble breathing no objective fevers at this point no urinary or bowel complaints.  Patient is initially a limited historian secondary to his altered mental status he is from [facility] and speaks a specific dialect of a [place] language, his daughter is interpreting at bedside for rest.  \  All other ROS negative, except as above and as per HPI and ROS section.      VITALS: Initial triage and subsequent vitals have been reviewed by me.  GEN APPEARANCE: Alert, non-toxic, well-appearing, NAD.  HEAD: Atraumatic.  EYES: PERRLa, EOMI, vision grossly intact.   NECK: Supple  CV: RRR, S1S2, no c/r/m/g. Cap refill <2 seconds. No bruits.   LUNGS: CTAB. No abnormal breath sounds.  ABDOMEN: Soft, NTND. No guarding or rebound.   MSK/EXT: No spinal or extremity point tenderness. No CVA ttp. Pelvis stable. No peripheral edema.  NEURO: Alert, follows commands.. Speech normal. Sensation and motor normal x4 extremities.   SKIN: Warm, dry and intact. No rash. warm to touch   PSYCH: Appropriate    Plan/MDM:  Exam patient arrives tachycardic found to be rectally febrile with physical exam as above DDx concern for infectious metabolic etiology provoking constellation of symptoms, noticed screening triage blood sugar, concern for underlying infection provoking DKA also in the setting of not taking meds, meningitis seems less likely at this time given patient is not obtunded no obvious rash, not complaining of headache, however would be on differential if other etiologies infectious workup are nonrevealing, concern also for DKA, will get ED sepsis order set get CT head abdomen pelvis chest x-ray urine, start antibiotics fluids and meds, reassess, consider ICU consult as indicated, patient will require admission.

## 2024-11-30 NOTE — ED ADULT TRIAGE NOTE - AS TEMP SITE
well developed, well nourished , in no acute distress , ambulating without difficulty , normal communication ability oral

## 2024-11-30 NOTE — ED ADULT NURSE NOTE - NSFALLHARMRISKINTERV_ED_ALL_ED
Assistance OOB with selected safe patient handling equipment if applicable/Assistance with ambulation/Communicate risk of Fall with Harm to all staff, patient, and family/Monitor gait and stability/Monitor for mental status changes and reorient to person, place, and time, as needed/Provide visual cue: red socks, yellow wristband, yellow gown, etc/Reinforce activity limits and safety measures with patient and family/Toileting schedule using arm’s reach rule for commode and bathroom/Use of alarms - bed, stretcher, chair and/or video monitoring/Bed in lowest position, wheels locked, appropriate side rails in place/Call bell, personal items and telephone in reach/Instruct patient to call for assistance before getting out of bed/chair/stretcher/Non-slip footwear applied when patient is off stretcher/[place] to call system/Physically safe environment - no spills, clutter or unnecessary equipment/Purposeful Proactive Rounding/Room/bathroom lighting operational, light cord in reach

## 2024-11-30 NOTE — ED PROVIDER NOTE - PROGRESS NOTE DETAILS
Ramirez Ramsay, PGY3 - L perinephric abscess vs renal absecess. uro consulted. recommend abx. no acute intervention needed such as drainage at this time.

## 2024-11-30 NOTE — ED ADULT TRIAGE NOTE - LOCATION:
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 He is alert, cooperative and pleasant. Dysarthria noted but pt reports speech at baseline. He remains on mechanical soft textures (IDDSI 5) with thin liquids. He consumed phan doone cookie with mild L oral residue but able to clear independently with liquid wash and lingual sweep. Mild L labial residue noted with poor sensation.     He performed sustained attention task >3 minutes with 98% accuracy independently and with self corrections x 3 noted. He followed 3 step body part commands with 60% accuracy; 100% Accuray given repetitions.    Has the patient been evaluated by SLP for swallowing?   Yes  Keep patient NPO? No   Current Respiratory Status: room air        Assessment:     Buddy Park is a 66 y.o. male with an SLP diagnosis of Cognitive-Linguistic Impairment.  He presents with good effort. Continue POC.    Goals:   Multidisciplinary Problems     SLP Goals     Not on file          Multidisciplinary Problems (Resolved)        Problem: SLP Goal    Goal Priority Disciplines Outcome   SLP Goal   (Resolved)     SLP Met   Description:    1) Participate in cognitive-communication evaluation--MET; new orders received 2/10/2020  2) Recall 5 of 5 unrelated words, using learned memory strategies, with MOD I  3) Sustained attention tasks x3 minutes with 90% " accuracy and no more than 1 redirection  4) Functional problem solving tasks with MOD I  5) Follow 3 step verbal commands with 100% accuracy                    Plan:     · Patient to be seen:  5 x/week   · Plan of Care expires:  02/18/20  · Plan of Care reviewed with:  patient   · SLP Follow-Up:  Yes       Discharge recommendations:  rehabilitation facility     Time Tracking:     SLP Treatment Date:   02/12/20  Speech Start Time:  1335  Speech Stop Time:  1352     Speech Total Time (min):  17 min    Billable Minutes: Speech Therapy Individual 17 and Total Time 17    Judy Carroll CCC-SLP  02/12/2020   Left arm;

## 2024-11-30 NOTE — ED PROVIDER NOTE - CLINICAL SUMMARY MEDICAL DECISION MAKING FREE TEXT BOX
Ramirez Ramsay, PGY3 - This is a 84-year-old male with past medical history of diabetes, hypertension, HIV, BPH presenting today for chest pain nausea vomiting and abdominal pain for 4 days.  Has been having this symptom in setting of not being able to take his insulin and medications.  Accompanied by daughter who also translate for him in the language that is spoken in [facility].  They live on separate floors.  The daughter checked on him today upstairs and he was having the symptoms therefore called EMS and was brought in.  Fingerstick is 522 on triage.  Vital signs show heart rate of 110s otherwise within normal limits.  Face patient feels warm to touch.  Will check rectal temp.  Physical exam shows patient in no acute distress.  Slightly confused.  ANO x 1 at this time.  This is not his baseline per daughter.  Moving all extremities.  No crackles or wheezing.  Abdomen soft and nontender.  Patient appears slightly dry in terms of mucosa.  Will workup for rule out DKA.  Will also obtain CT head for the altered mental status.  Most likely hyperglycemia and possible DKA causing this altered mental status and nausea vomiting and abdominal pain.  Do not think CT abdomen and pelvis with IV contrast is not needed at this time as patient has no focal shoulder tenderness.  If rectal temp is positive however we will workup with CT imaging.  Disposition most likely admission at this time.

## 2024-12-01 DIAGNOSIS — A41.9 SEPSIS, UNSPECIFIED ORGANISM: ICD-10-CM

## 2024-12-01 DIAGNOSIS — I10 ESSENTIAL (PRIMARY) HYPERTENSION: ICD-10-CM

## 2024-12-01 DIAGNOSIS — Z29.9 ENCOUNTER FOR PROPHYLACTIC MEASURES, UNSPECIFIED: ICD-10-CM

## 2024-12-01 DIAGNOSIS — Z21 ASYMPTOMATIC HUMAN IMMUNODEFICIENCY VIRUS [HIV] INFECTION STATUS: ICD-10-CM

## 2024-12-01 DIAGNOSIS — E11.65 TYPE 2 DIABETES MELLITUS WITH HYPERGLYCEMIA: ICD-10-CM

## 2024-12-01 DIAGNOSIS — R07.9 CHEST PAIN, UNSPECIFIED: ICD-10-CM

## 2024-12-01 DIAGNOSIS — E78.5 HYPERLIPIDEMIA, UNSPECIFIED: ICD-10-CM

## 2024-12-01 DIAGNOSIS — Z87.438 PERSONAL HISTORY OF OTHER DISEASES OF MALE GENITAL ORGANS: ICD-10-CM

## 2024-12-01 LAB
ADD ON TEST-SPECIMEN IN LAB: SIGNIFICANT CHANGE UP
ANION GAP SERPL CALC-SCNC: 12 MMOL/L — SIGNIFICANT CHANGE UP (ref 7–14)
ANION GAP SERPL CALC-SCNC: 17 MMOL/L — HIGH (ref 7–14)
APPEARANCE UR: ABNORMAL
B-OH-BUTYR SERPL-SCNC: 2.1 MMOL/L — HIGH (ref 0–0.4)
B-OH-BUTYR SERPL-SCNC: <0 MMOL/L — SIGNIFICANT CHANGE UP (ref 0–0.4)
BACTERIA # UR AUTO: ABNORMAL /HPF
BILIRUB UR-MCNC: NEGATIVE — SIGNIFICANT CHANGE UP
BUN SERPL-MCNC: 19 MG/DL — SIGNIFICANT CHANGE UP (ref 7–23)
BUN SERPL-MCNC: 21 MG/DL — SIGNIFICANT CHANGE UP (ref 7–23)
CALCIUM SERPL-MCNC: 8.8 MG/DL — SIGNIFICANT CHANGE UP (ref 8.4–10.5)
CALCIUM SERPL-MCNC: 9.4 MG/DL — SIGNIFICANT CHANGE UP (ref 8.4–10.5)
CAST: 1 /LPF — SIGNIFICANT CHANGE UP (ref 0–4)
CHLORIDE SERPL-SCNC: 102 MMOL/L — SIGNIFICANT CHANGE UP (ref 98–107)
CHLORIDE SERPL-SCNC: 96 MMOL/L — LOW (ref 98–107)
CO2 SERPL-SCNC: 20 MMOL/L — LOW (ref 22–31)
CO2 SERPL-SCNC: 22 MMOL/L — SIGNIFICANT CHANGE UP (ref 22–31)
COLOR SPEC: YELLOW — SIGNIFICANT CHANGE UP
CREAT SERPL-MCNC: 1.17 MG/DL — SIGNIFICANT CHANGE UP (ref 0.5–1.3)
CREAT SERPL-MCNC: 1.19 MG/DL — SIGNIFICANT CHANGE UP (ref 0.5–1.3)
DIFF PNL FLD: ABNORMAL
EGFR: 60 ML/MIN/1.73M2 — SIGNIFICANT CHANGE UP
EGFR: 61 ML/MIN/1.73M2 — SIGNIFICANT CHANGE UP
GAS PNL BLDV: SIGNIFICANT CHANGE UP
GLUCOSE SERPL-MCNC: 205 MG/DL — HIGH (ref 70–99)
GLUCOSE SERPL-MCNC: 538 MG/DL — CRITICAL HIGH (ref 70–99)
GLUCOSE UR QL: >=1000 MG/DL
KETONES UR-MCNC: ABNORMAL MG/DL
LACTATE SERPL-SCNC: 2 MMOL/L — SIGNIFICANT CHANGE UP (ref 0.5–2)
LEUKOCYTE ESTERASE UR-ACNC: ABNORMAL
MAGNESIUM SERPL-MCNC: 1.6 MG/DL — SIGNIFICANT CHANGE UP (ref 1.6–2.6)
NITRITE UR-MCNC: NEGATIVE — SIGNIFICANT CHANGE UP
PH UR: 6 — SIGNIFICANT CHANGE UP (ref 5–8)
PHOSPHATE SERPL-MCNC: 1.6 MG/DL — LOW (ref 2.5–4.5)
POTASSIUM SERPL-MCNC: 3.9 MMOL/L — SIGNIFICANT CHANGE UP (ref 3.5–5.3)
POTASSIUM SERPL-MCNC: 4.4 MMOL/L — SIGNIFICANT CHANGE UP (ref 3.5–5.3)
POTASSIUM SERPL-SCNC: 3.9 MMOL/L — SIGNIFICANT CHANGE UP (ref 3.5–5.3)
POTASSIUM SERPL-SCNC: 4.4 MMOL/L — SIGNIFICANT CHANGE UP (ref 3.5–5.3)
PROT UR-MCNC: 30 MG/DL
RBC CASTS # UR COMP ASSIST: 0 /HPF — SIGNIFICANT CHANGE UP (ref 0–4)
REVIEW: SIGNIFICANT CHANGE UP
SODIUM SERPL-SCNC: 133 MMOL/L — LOW (ref 135–145)
SODIUM SERPL-SCNC: 136 MMOL/L — SIGNIFICANT CHANGE UP (ref 135–145)
SP GR SPEC: 1.03 — SIGNIFICANT CHANGE UP (ref 1–1.03)
SQUAMOUS # UR AUTO: 1 /HPF — SIGNIFICANT CHANGE UP (ref 0–5)
UROBILINOGEN FLD QL: 0.2 MG/DL — SIGNIFICANT CHANGE UP (ref 0.2–1)
VANCOMYCIN TROUGH SERPL-MCNC: 4.5 UG/ML — LOW (ref 10–20)
WBC UR QL: 87 /HPF — HIGH (ref 0–5)

## 2024-12-01 PROCEDURE — 99233 SBSQ HOSP IP/OBS HIGH 50: CPT

## 2024-12-01 PROCEDURE — 99221 1ST HOSP IP/OBS SF/LOW 40: CPT

## 2024-12-01 PROCEDURE — 99223 1ST HOSP IP/OBS HIGH 75: CPT

## 2024-12-01 PROCEDURE — 70450 CT HEAD/BRAIN W/O DYE: CPT | Mod: 26,MC

## 2024-12-01 PROCEDURE — 74177 CT ABD & PELVIS W/CONTRAST: CPT | Mod: 26,MC

## 2024-12-01 RX ORDER — SODIUM CHLORIDE 9 MG/ML
1000 INJECTION, SOLUTION INTRAMUSCULAR; INTRAVENOUS; SUBCUTANEOUS
Refills: 0 | Status: DISCONTINUED | OUTPATIENT
Start: 2024-12-01 | End: 2024-12-01

## 2024-12-01 RX ORDER — GLUCAGON INJECTION, SOLUTION 0.5 MG/.1ML
1 INJECTION, SOLUTION SUBCUTANEOUS ONCE
Refills: 0 | Status: DISCONTINUED | OUTPATIENT
Start: 2024-12-01 | End: 2024-12-06

## 2024-12-01 RX ORDER — HEPARIN SODIUM,PORCINE 1000/ML
5000 VIAL (ML) INJECTION EVERY 8 HOURS
Refills: 0 | Status: DISCONTINUED | OUTPATIENT
Start: 2024-12-01 | End: 2024-12-06

## 2024-12-01 RX ORDER — LIDOCAINE 40 MG/G
1 CREAM TOPICAL EVERY 24 HOURS
Refills: 0 | Status: DISCONTINUED | OUTPATIENT
Start: 2024-12-01 | End: 2024-12-06

## 2024-12-01 RX ORDER — INSULIN GLARGINE 100 [IU]/ML
15 INJECTION, SOLUTION SUBCUTANEOUS
Refills: 0 | Status: DISCONTINUED | OUTPATIENT
Start: 2024-12-02 | End: 2024-12-02

## 2024-12-01 RX ORDER — OXYBUTYNIN CHLORIDE 5 MG
5 TABLET ORAL DAILY
Refills: 0 | Status: DISCONTINUED | OUTPATIENT
Start: 2024-12-01 | End: 2024-12-01

## 2024-12-01 RX ORDER — VANCOMYCIN HCL 900 MCG/MG
1000 POWDER (GRAM) MISCELLANEOUS
Refills: 0 | Status: DISCONTINUED | OUTPATIENT
Start: 2024-12-01 | End: 2024-12-02

## 2024-12-01 RX ORDER — MEROPENEM 500 MG/1
1000 INJECTION, POWDER, FOR SOLUTION INTRAVENOUS ONCE
Refills: 0 | Status: COMPLETED | OUTPATIENT
Start: 2024-12-01 | End: 2024-12-01

## 2024-12-01 RX ORDER — ACETAMINOPHEN 500MG 500 MG/1
650 TABLET, COATED ORAL EVERY 6 HOURS
Refills: 0 | Status: DISCONTINUED | OUTPATIENT
Start: 2024-12-01 | End: 2024-12-06

## 2024-12-01 RX ORDER — INFLUENZA VIRUS VACCINE 15; 15; 15; 15 UG/.5ML; UG/.5ML; UG/.5ML; UG/.5ML
0.5 SUSPENSION INTRAMUSCULAR ONCE
Refills: 0 | Status: DISCONTINUED | OUTPATIENT
Start: 2024-12-01 | End: 2024-12-06

## 2024-12-01 RX ORDER — 0.9 % SODIUM CHLORIDE 0.9 %
1000 INTRAVENOUS SOLUTION INTRAVENOUS
Refills: 0 | Status: DISCONTINUED | OUTPATIENT
Start: 2024-12-01 | End: 2024-12-06

## 2024-12-01 RX ORDER — TAMSULOSIN HYDROCHLORIDE 0.4 MG/1
0.4 CAPSULE ORAL AT BEDTIME
Refills: 0 | Status: DISCONTINUED | OUTPATIENT
Start: 2024-12-01 | End: 2024-12-06

## 2024-12-01 RX ORDER — SODIUM,POTASSIUM PHOSPHATES 278-250MG
2 POWDER IN PACKET (EA) ORAL ONCE
Refills: 0 | Status: COMPLETED | OUTPATIENT
Start: 2024-12-01 | End: 2024-12-01

## 2024-12-01 RX ORDER — ELECTROLYTE-M SOLUTION/D5W 5 %
1000 INTRAVENOUS SOLUTION INTRAVENOUS
Refills: 0 | Status: DISCONTINUED | OUTPATIENT
Start: 2024-12-01 | End: 2024-12-02

## 2024-12-01 RX ORDER — PIPERACILLIN SODIUM AND TAZOBACTAM SODIUM 4; .5 G/20ML; G/20ML
3.38 INJECTION, POWDER, LYOPHILIZED, FOR SOLUTION INTRAVENOUS EVERY 8 HOURS
Refills: 0 | Status: DISCONTINUED | OUTPATIENT
Start: 2024-12-01 | End: 2024-12-02

## 2024-12-01 RX ORDER — POTASSIUM CHLORIDE 600 MG/1
40 TABLET, EXTENDED RELEASE ORAL ONCE
Refills: 0 | Status: COMPLETED | OUTPATIENT
Start: 2024-12-01 | End: 2024-12-01

## 2024-12-01 RX ORDER — OXYBUTYNIN CHLORIDE 5 MG
5 TABLET ORAL DAILY
Refills: 0 | Status: DISCONTINUED | OUTPATIENT
Start: 2024-12-01 | End: 2024-12-06

## 2024-12-01 RX ORDER — ACETAMINOPHEN 500MG 500 MG/1
1000 TABLET, COATED ORAL ONCE
Refills: 0 | Status: COMPLETED | OUTPATIENT
Start: 2024-12-01 | End: 2024-12-01

## 2024-12-01 RX ORDER — INSULIN GLARGINE 100 [IU]/ML
10 INJECTION, SOLUTION SUBCUTANEOUS ONCE
Refills: 0 | Status: COMPLETED | OUTPATIENT
Start: 2024-12-01 | End: 2024-12-01

## 2024-12-01 RX ADMIN — Medication 2: at 13:07

## 2024-12-01 RX ADMIN — Medication 5000 UNIT(S): at 21:44

## 2024-12-01 RX ADMIN — PIPERACILLIN SODIUM AND TAZOBACTAM SODIUM 25 GRAM(S): 4; .5 INJECTION, POWDER, LYOPHILIZED, FOR SOLUTION INTRAVENOUS at 18:29

## 2024-12-01 RX ADMIN — Medication 250 MILLIGRAM(S): at 21:44

## 2024-12-01 RX ADMIN — MEROPENEM 100 MILLIGRAM(S): 500 INJECTION, POWDER, FOR SOLUTION INTRAVENOUS at 04:30

## 2024-12-01 RX ADMIN — Medication 150 MILLILITER(S): at 11:21

## 2024-12-01 RX ADMIN — Medication 5000 UNIT(S): at 06:49

## 2024-12-01 RX ADMIN — Medication 5 UNIT(S): at 17:55

## 2024-12-01 RX ADMIN — Medication 150 MILLILITER(S): at 19:19

## 2024-12-01 RX ADMIN — Medication 5 MILLIGRAM(S): at 14:09

## 2024-12-01 RX ADMIN — ACETAMINOPHEN 500MG 400 MILLIGRAM(S): 500 TABLET, COATED ORAL at 11:22

## 2024-12-01 RX ADMIN — ACETAMINOPHEN 500MG 1000 MILLIGRAM(S): 500 TABLET, COATED ORAL at 11:52

## 2024-12-01 RX ADMIN — Medication 10 MILLIGRAM(S): at 21:43

## 2024-12-01 RX ADMIN — Medication 5000 UNIT(S): at 14:10

## 2024-12-01 RX ADMIN — POTASSIUM CHLORIDE 40 MILLIEQUIVALENT(S): 600 TABLET, EXTENDED RELEASE ORAL at 04:30

## 2024-12-01 RX ADMIN — Medication 2: at 17:56

## 2024-12-01 RX ADMIN — Medication 8 UNIT(S): at 01:24

## 2024-12-01 RX ADMIN — Medication 100 MILLILITER(S): at 01:16

## 2024-12-01 RX ADMIN — TAMSULOSIN HYDROCHLORIDE 0.4 MILLIGRAM(S): 0.4 CAPSULE ORAL at 21:43

## 2024-12-01 RX ADMIN — Medication 2 PACKET(S): at 09:44

## 2024-12-01 RX ADMIN — INSULIN GLARGINE 10 UNIT(S): 100 INJECTION, SOLUTION SUBCUTANEOUS at 05:44

## 2024-12-01 RX ADMIN — Medication 1: at 06:54

## 2024-12-01 NOTE — PROGRESS NOTE ADULT - SUBJECTIVE AND OBJECTIVE BOX
Internal Medicine Progress Note  Michael Mercer, PGY-1        OVERNIGHT EVENTS/INTERVAL HPI:    OBJECTIVE:  Vital Signs Last 24 Hrs  T(C): 36.4 (01 Dec 2024 06:31), Max: 38.8 (30 Nov 2024 20:11)  T(F): 97.6 (01 Dec 2024 06:31), Max: 101.9 (30 Nov 2024 20:11)  HR: 95 (01 Dec 2024 06:31) (80 - 111)  BP: 156/72 (01 Dec 2024 06:31) (134/80 - 157/68)  BP(mean): --  RR: 17 (01 Dec 2024 06:31) (17 - 19)  SpO2: 100% (01 Dec 2024 06:31) (100% - 100%)    Parameters below as of 01 Dec 2024 06:31  Patient On (Oxygen Delivery Method): room air      I&O's Detail    Physical Exam:  GENERAL: Awake, alert and interactive, no acute distress, appears comfortable  NEURO: A&Ox4, no focal deficits, 5/5 strength in all ext, reflexes 2+ throughout, CN 2-12 intact  HEENT: Normocephalic, atraumatic, no conjunctivitis or scleral icterus, oral mucosa moist, no oral lesions noted  NECK: Supple, no LAD, no JVD, thyroid not palpable  CARDIAC: Regular rate and rhythm, +S1/S2, no murmurs/rubs/gallops  PULM: Breathing comfortably on RA, clear to auscultation bilaterally, no wheezes/rales/rhonchi  ABDOMEN: Soft, nontender, nondistended, +bs, no hepatosplenomegaly, no rebound tenderness or fluid wave, no CVA tenderness  : Deferred  MSK: Range of motion grossly intact, no back tenderness  SKIN: Warm and dry, no rashes, lesions  VASC: Cap refil < 2 sec, 2+ peripheral pulses, no edema, no LE tenderness  Psych: Appropriate affect    Medications:  MEDICATIONS  (STANDING):  atorvastatin 10 milliGRAM(s) Oral at bedtime  dextrose 5% + sodium chloride 0.45% with potassium chloride 20 mEq/L 1000 milliLiter(s) (150 mL/Hr) IV Continuous <Continuous>  dextrose 5%. 1000 milliLiter(s) (50 mL/Hr) IV Continuous <Continuous>  dextrose 5%. 1000 milliLiter(s) (100 mL/Hr) IV Continuous <Continuous>  dextrose 50% Injectable 25 Gram(s) IV Push once  finasteride 5 milliGRAM(s) Oral daily  glucagon  Injectable 1 milliGRAM(s) IntraMuscular once  heparin   Injectable 5000 Unit(s) SubCutaneous every 8 hours  insulin lispro (ADMELOG) corrective regimen sliding scale   SubCutaneous every 4 hours  oxybutynin XL 5 milliGRAM(s) Oral daily  piperacillin/tazobactam IVPB.. 3.375 Gram(s) IV Intermittent every 8 hours  tamsulosin 0.4 milliGRAM(s) Oral at bedtime  vancomycin  IVPB 1000 milliGRAM(s) IV Intermittent <User Schedule>    MEDICATIONS  (PRN):  acetaminophen     Tablet .. 650 milliGRAM(s) Oral every 6 hours PRN Temp greater or equal to 38C (100.4F), Mild Pain (1 - 3)  dextrose Oral Gel 15 Gram(s) Oral once PRN Blood Glucose LESS THAN 70 milliGRAM(s)/deciliter      Labs:                        11.6   10.84 )-----------( 365      ( 30 Nov 2024 19:47 )             34.5     12-01    136  |  102  |  19  ----------------------------<  205[H]  4.4   |  22  |  1.17    Ca    9.4      01 Dec 2024 05:27  Phos  1.6     12-01  Mg     1.60     12-01    TPro  9.0[H]  /  Alb  3.6  /  TBili  0.8  /  DBili  x   /  AST  8   /  ALT  6   /  AlkPhos  120  11-30        Urinalysis Basic - ( 01 Dec 2024 05:27 )    Color: x / Appearance: x / SG: x / pH: x  Gluc: 205 mg/dL / Ketone: x  / Bili: x / Urobili: x   Blood: x / Protein: x / Nitrite: x   Leuk Esterase: x / RBC: x / WBC x   Sq Epi: x / Non Sq Epi: x / Bacteria: x      SARS-CoV-2: NotDetec (30 Nov 2024 19:28)      Radiology: Reviewed Internal Medicine Progress Note  Michael Mercer, PGY-1        OVERNIGHT EVENTS/INTERVAL HPI:  N acute events. Patient complained of chest pain.     OBJECTIVE:  Vital Signs Last 24 Hrs  T(C): 36.4 (01 Dec 2024 06:31), Max: 38.8 (30 Nov 2024 20:11)  T(F): 97.6 (01 Dec 2024 06:31), Max: 101.9 (30 Nov 2024 20:11)  HR: 95 (01 Dec 2024 06:31) (80 - 111)  BP: 156/72 (01 Dec 2024 06:31) (134/80 - 157/68)  BP(mean): --  RR: 17 (01 Dec 2024 06:31) (17 - 19)  SpO2: 100% (01 Dec 2024 06:31) (100% - 100%)    Parameters below as of 01 Dec 2024 06:31  Patient On (Oxygen Delivery Method): room air      I&O's Detail    Physical Exam:  GENERAL: Awake, alert and interactive, no acute distress, appears comfortable  NEURO: A&Ox2, no focal deficits, 5/5 strength in all ext, reflexes 2+ throughout, CN 2-12 intact  HEENT: Normocephalic, atraumatic, no conjunctivitis or scleral icterus, oral mucosa moist, no oral lesions noted  NECK: Supple, no LAD, no JVD, thyroid not palpable  CARDIAC: Regular rate and rhythm, +S1/S2, no murmurs/rubs/gallops  PULM: Breathing comfortably on RA, clear to auscultation bilaterally, no wheezes/rales/rhonchi  ABDOMEN: Soft, nontender, nondistended, +bs, no hepatosplenomegaly, no rebound tenderness or fluid wave, no CVA tenderness  SKIN: Warm and dry, no rashes, lesions  VASC: Cap refil < 2 sec, 2+ peripheral pulses, no edema, no LE tenderness  Psych: Appropriate affect    Medications:  MEDICATIONS  (STANDING):  atorvastatin 10 milliGRAM(s) Oral at bedtime  dextrose 5% + sodium chloride 0.45% with potassium chloride 20 mEq/L 1000 milliLiter(s) (150 mL/Hr) IV Continuous <Continuous>  dextrose 5%. 1000 milliLiter(s) (50 mL/Hr) IV Continuous <Continuous>  dextrose 5%. 1000 milliLiter(s) (100 mL/Hr) IV Continuous <Continuous>  dextrose 50% Injectable 25 Gram(s) IV Push once  finasteride 5 milliGRAM(s) Oral daily  glucagon  Injectable 1 milliGRAM(s) IntraMuscular once  heparin   Injectable 5000 Unit(s) SubCutaneous every 8 hours  insulin lispro (ADMELOG) corrective regimen sliding scale   SubCutaneous every 4 hours  oxybutynin XL 5 milliGRAM(s) Oral daily  piperacillin/tazobactam IVPB.. 3.375 Gram(s) IV Intermittent every 8 hours  tamsulosin 0.4 milliGRAM(s) Oral at bedtime  vancomycin  IVPB 1000 milliGRAM(s) IV Intermittent <User Schedule>    MEDICATIONS  (PRN):  acetaminophen     Tablet .. 650 milliGRAM(s) Oral every 6 hours PRN Temp greater or equal to 38C (100.4F), Mild Pain (1 - 3)  dextrose Oral Gel 15 Gram(s) Oral once PRN Blood Glucose LESS THAN 70 milliGRAM(s)/deciliter      Labs:                        11.6   10.84 )-----------( 365      ( 30 Nov 2024 19:47 )             34.5     12-01    136  |  102  |  19  ----------------------------<  205[H]  4.4   |  22  |  1.17    Ca    9.4      01 Dec 2024 05:27  Phos  1.6     12-01  Mg     1.60     12-01    TPro  9.0[H]  /  Alb  3.6  /  TBili  0.8  /  DBili  x   /  AST  8   /  ALT  6   /  AlkPhos  120  11-30        Urinalysis Basic - ( 01 Dec 2024 05:27 )    Color: x / Appearance: x / SG: x / pH: x  Gluc: 205 mg/dL / Ketone: x  / Bili: x / Urobili: x   Blood: x / Protein: x / Nitrite: x   Leuk Esterase: x / RBC: x / WBC x   Sq Epi: x / Non Sq Epi: x / Bacteria: x      SARS-CoV-2: NotDetec (30 Nov 2024 19:28)      Radiology: Reviewed

## 2024-12-01 NOTE — DISCHARGE NOTE PROVIDER - NSDCMRMEDTOKEN_GEN_ALL_CORE_FT
amLODIPine 5 mg oral tablet: 1 tab(s) orally once a day  atorvastatin 10 mg oral tablet: 1 tab(s) orally once a day (at bedtime)  finasteride 5 mg oral tablet: 1 tab(s) orally once a day  Flomax 0.4 mg oral capsule: 1 cap(s) orally once a day  Insulin Glargine: 12 unit(s) once a day in the morning  metoprolol succinate 25 mg oral tablet, extended release: 1 tab(s) orally once a day  oxyBUTYnin 10 mg/24 hr oral tablet, extended release: 1 tab(s) orally once a day  Symtuza oral tablet: 1 tab(s) orally once a day   Admelog SoloStar 100 units/mL injectable solution: 4 unit(s) injectable 3 times a day (before meals)  Admelog SoloStar 100 units/mL injectable solution: 5 unit(s) injectable 3 times a day (before meals)  amLODIPine 5 mg oral tablet: 1 tab(s) orally once a day  atorvastatin 10 mg oral tablet: 1 tab(s) orally once a day (at bedtime)  finasteride 5 mg oral tablet: 1 tab(s) orally once a day  Flomax 0.4 mg oral capsule: 1 cap(s) orally once a day  Insulin Glargine: 12 unit(s) once a day in the morning  Lantus Solostar Pen 100 units/mL subcutaneous solution: 12 unit(s) subcutaneous once a day (at bedtime)  metoprolol succinate 25 mg oral tablet, extended release: 1 tab(s) orally once a day  oxyBUTYnin 10 mg/24 hr oral tablet, extended release: 1 tab(s) orally once a day  Symtuza oral tablet: 1 tab(s) orally once a day   Admelog SoloStar 100 units/mL injectable solution: 4 unit(s) injectable 3 times a day (before meals)  Admelog SoloStar 100 units/mL injectable solution: 5 unit(s) injectable 3 times a day (before meals)  alcohol swabs: Apply topically to affected area 4 times a day  amLODIPine 5 mg oral tablet: 1 tab(s) orally once a day  atorvastatin 10 mg oral tablet: 1 tab(s) orally once a day (at bedtime)  finasteride 5 mg oral tablet: 1 tab(s) orally once a day  Flomax 0.4 mg oral capsule: 1 cap(s) orally once a day  glucometer (per patient&#x27;s insurance): Test blood sugars four times a day. Dispense #1 glucometer.  glucose 1.5 g oral tablet, chewable: 2 tab(s) chewed prn as needed for Hypoglycemia  glucose 4 g oral tablet, chewable: 4 tab(s) chewed prn as needed for hypoglycemia Take 4 tabs for blood glucose &lt;70 mg/dL and repeat fingerstick in 15 minutes.  glucose tablets: Follow instructions on bottle when sugar is low.  HumaLOG KwikPen 100 units/mL injectable solution: 3 unit(s) injectable 3 times a day (before meals)  Insulin Glargine: 12 unit(s) once a day in the morning  Insulin Pen Needles, 4mm: 1 application subcutaneously 4 times a day. ** Use with insulin pen **  lancets: 1 application subcutaneously 4 times a day  Lantus Solostar Pen 100 units/mL subcutaneous solution: 10 unit(s) subcutaneous once a day (at bedtime)  Lantus Solostar Pen 100 units/mL subcutaneous solution: 12 unit(s) subcutaneous once a day (at bedtime)  metoprolol succinate 25 mg oral tablet, extended release: 1 tab(s) orally once a day  oxyBUTYnin 10 mg/24 hr oral tablet, extended release: 1 tab(s) orally once a day  Symtuza oral tablet: 1 tab(s) orally once a day  test strips (per patient&#x27;s insurance): 1 application subcutaneously 4 times a day. ** Compatible with patient&#x27;s glucometer **   Admelog SoloStar 100 units/mL injectable solution: 4 unit(s) injectable 3 times a day (before meals)  Admelog SoloStar 100 units/mL injectable solution: 5 unit(s) injectable 3 times a day (before meals)  alcohol swabs: Apply topically to affected area 4 times a day  alcohol swabs: Apply topically to affected area 4 times a day  amLODIPine 5 mg oral tablet: 1 tab(s) orally once a day  atorvastatin 10 mg oral tablet: 1 tab(s) orally once a day (at bedtime)  Ertapenem 1g QD x4 weeks through 12/30: Ertapenem 1g QD x4 weeks through 12/30  finasteride 5 mg oral tablet: 1 tab(s) orally once a day  Flomax 0.4 mg oral capsule: 1 cap(s) orally once a day  glucometer (per patient&#x27;s insurance): Test blood sugars four times a day. Dispense #1 glucometer.  glucometer (per patient&#x27;s insurance): Test blood sugars four times a day. Dispense #1 glucometer.  glucose 1.5 g oral tablet, chewable: 2 tab(s) chewed prn as needed for Hypoglycemia  glucose 4 g oral tablet, chewable: 4 tab(s) chewed prn as needed for hypoglycemia Take 4 tabs for blood glucose &lt;70 mg/dL and repeat fingerstick in 15 minutes.  glucose tablets: Follow instructions on bottle when sugar is low.  glucose tablets: Follow instructions on bottle when sugar is low.  HumaLOG KwikPen 100 units/mL injectable solution: 3 unit(s) injectable 3 times a day (before meals)  Insulin Glargine: 12 unit(s) once a day in the morning  Insulin Pen Needles, 4mm: 1 application subcutaneously 4 times a day. ** Use with insulin pen **  Insulin Pen Needles, 4mm: 1 application subcutaneously 4 times a day. ** Use with insulin pen **  lancets: 1 application subcutaneously 4 times a day  lancets: 1 application subcutaneously 4 times a day  Lantus Solostar Pen 100 units/mL subcutaneous solution: 10 unit(s) subcutaneous once a day (at bedtime)  Lantus Solostar Pen 100 units/mL subcutaneous solution: 12 unit(s) subcutaneous once a day (at bedtime)  metoprolol succinate 25 mg oral tablet, extended release: 1 tab(s) orally once a day  oxyBUTYnin 10 mg/24 hr oral tablet, extended release: 1 tab(s) orally once a day  Please flush midline: Please flush midline with 10cc NS before and after infusion  Symtuza oral tablet: 1 tab(s) orally once a day  test strips (per patient&#x27;s insurance): 1 application subcutaneously 4 times a day. ** Compatible with patient&#x27;s glucometer **  test strips (per patient&#x27;s insurance): 1 application subcutaneously 4 times a day. ** Compatible with patient&#x27;s glucometer **  Weekly CBC with differential, CMP, ESR, CRP: Weekly CBC with differential, CMP, ESR, CRP  Please fax results to Dr. Aly Ortega at [phone]   amLODIPine 5 mg oral tablet: 1 tab(s) orally once a day  atorvastatin 10 mg oral tablet: 1 tab(s) orally once a day (at bedtime)  finasteride 5 mg oral tablet: 1 tab(s) orally once a day  Flomax 0.4 mg oral capsule: 1 cap(s) orally once a day  Lantus Solostar Pen 100 units/mL subcutaneous solution: 10 unit(s) subcutaneous once a day (at bedtime)  metoprolol succinate 25 mg oral tablet, extended release: 1 tab(s) orally once a day  oxyBUTYnin 10 mg/24 hr oral tablet, extended release: 1 tab(s) orally once a day  pantoprazole 40 mg oral delayed release tablet: 1 tab(s) orally once a day (before a meal)  Symtuza oral tablet: 1 tab(s) orally once a day  Tradjenta 5 mg oral tablet: 1 tab(s) orally once a day   amLODIPine 5 mg oral tablet: 1 tab(s) orally once a day  atorvastatin 10 mg oral tablet: 1 tab(s) orally once a day (at bedtime)  finasteride 5 mg oral tablet: 1 tab(s) orally once a day  Flomax 0.4 mg oral capsule: 1 cap(s) orally once a day  Insulin Pen Needles, 4mm: 1 application subcutaneously 1 time a day. ** Use with insulin pen **  Lantus Solostar Pen 100 units/mL subcutaneous solution: 8 unit(s) subcutaneous once a day (at bedtime)  metFORMIN 500 mg oral tablet: 1 tab(s) orally 2 times a day  metoprolol succinate 25 mg oral tablet, extended release: 1 tab(s) orally once a day  oxyBUTYnin 10 mg/24 hr oral tablet, extended release: 1 tab(s) orally once a day  pantoprazole 40 mg oral delayed release tablet: 1 tab(s) orally once a day (before a meal)  Symtuza oral tablet: 1 tab(s) orally once a day  Tradjenta 5 mg oral tablet: 1 tab(s) orally once a day

## 2024-12-01 NOTE — H&P ADULT - PROBLEM SELECTOR PLAN 7
VTE PPx: Heparin SQ  GI PPx: Pantoprazole  Nutrition: NPO  Fluids: Dextrose 5% with 0.9% NS with 20mEq of KCL @150 cc/hr  Electrolytes: Maintain K>4, Mag >2, Phos > 3  Access: PIV  Dispo: pending clinical improvement

## 2024-12-01 NOTE — ED ADULT NURSE REASSESSMENT NOTE - NS ED NURSE REASSESS COMMENT FT1
Pt received with fluids infusing at 100cc via rt ac access. Pt awake alert pleasant following simple commands. Pt incont of urine PM care provided. postitioned for comfort. .

## 2024-12-01 NOTE — H&P ADULT - NSHPREVIEWOFSYSTEMS_GEN_ALL_CORE
History limited as patient is confused. Daughter not available for collateral information.     +Abd Pain, +Nausea +Vomiting +subjective fever

## 2024-12-01 NOTE — H&P ADULT - ASSESSMENT
84-year-old male with PMHx of DM2, HTN, HLD, HIV, BPH, and spinal stenosis who presents to the ED with nausea, vomiting, and abdominal pain of 4 days duration. Labwork notable for hyperglycemia in the setting of sepsis. UA with +LE, 87 WBC. CT A/P notable for 1.7 cm peripherally enhancing tubular fluid collection along the posterior aspect of the mid left kidney, possibly in a subcapsular space. Additional areas of new hypodensity in the left upper pole could reflect pyelonephritis versus interval worsening scarring. Nonspecific diffuse bladder wall thickening, which could reflect cystitis or chronic outlet obstruction secondary to markedly enlarged prostate. Urology consulted in ED. Recommended no urological interventions. BC, UCx obtained. Initiated vanc and zosyn per sepsis protocol. Patient s/p 2L LR Bolus, Lantus 10 units, and Lispro 8 units x2. Glucose improved to 345. pH 7.37, AG 17, BHB 2.4 -> 2.1, Patient admitted to medicine for further management and monitoring.

## 2024-12-01 NOTE — H&P ADULT - NSHPLABSRESULTS_GEN_ALL_CORE
11.6   10.84 )-----------( 365      ( 2024 19:47 )             34.5     133[L]  |  96[L]  |  21  ----------------------------<  538[HH]       3.9   |  20[L]  |  1.19    Ca    8.8      2024 22:42  Phos  2.6       Mg     1.60         TPro  9.0[H]  /  Alb  3.6  /  TBili  0.8  /  DBili  x   /  AST  8   /  ALT  6   /  AlkPhos  120          22:43 - VBG - pH: 7.37  | pCO2: 38    | pO2: 64    | Lactate: 2.1    19:47 - VBG - pH: 7.38  | pCO2: 42    | pO2: 30    | Lactate: 2.2            hs Troponin, T - 24 ng/L (24 @ 19:47)          Hgb A1c: 11.7 (24 @ 19:47)      Urinalysis Basic - ( 2024 23:28 )  Color: Yellow / Appearance: Cloudy / S.026 / pH: 6.0  Gluc: >=1000 mg/dL / Ketone: Trace mg/dL  / Bili: Negative / Urobili: 0.2 mg/dL   Blood: Small / Protein: 30 mg/dL / Nitrite: Negative   Leuk Esterase: Moderate / RBC: 0 /HPF / WBC 87 /HPF   Sq Epi: 1 /HPF / Bacteria: Moderate /HPF  Hyaline Casts: x/WBC Casts: x          Urinalysis with Rflx Culture (collected 2024 23:28)

## 2024-12-01 NOTE — CONSULT NOTE ADULT - ASSESSMENT
The patient is a 84y Male with PMH of T2DM, HIV who presented to the hospital with chest pain and noted to be hyperglycemic  Endocrinology consulted for hyperglycemia    #Uncontrolled Type 2 Diabetes Mellitus with hyperglycemia  - Follows with: PCP, no endocrinologist  - A1C with Estimated Average Glucose Result: 11.7 % (11-30-24)  - home regimen: Lantus 12  - eGFR: 61 mL/min/1.73m2 (12-01-24)  - Weight (kg): 63.5 (11-30-24)  - glucose elevated, no evidence of DKA on labs      INPATIENT PLAN:  - Recommend Lantus 15 units in the morning (last dose of Lantus was 10 units at 5:44 AM on 12/1)   - Recommend Admelog 5 units TID before meals (HOLD if NPO or not eating)  - Recommend Low dose admelog correction scale TIDQAC and separate low dose scale QHS  - Please check FSG before meals and QHS, or q6h while NPO  - Inpatient glucose goals: <140 pre-meal, <180 random  - consistent carb diet when eating      DISCHARGE PLANNING:  - Discharge recs pending clinical course, basal + metformin + GLP1a vs basal/bolus  - will need Endocrinology follow up. Please provide clinic info in DC paperwork for patient to make an appointment:  Endocrinology [facility]: [address] [place] [address]. [address]. [place] [address]. Tel: [phone]    #Hypertension  - Goal BP <130/80  - Management as per primary team  - check urine albumin level as outpatient    #Hyperlipidemia  - LDL goal <70  - Last LDL: 68 mg/dL (06-22-24)  - on atorvastatin 10 mg qhs  - check lipid panel as outpatient on a yearly basis    Discussed with attending physician.  Etienne Israel,    PGY-4 Endocrine Fellow  Can be reached via [employer] Teams.    For follow up questions, discharge recommendations or new consults, please email [email or web address] or [email or web address] ([facility]) or call the answering service at [phone] (weekdays); [phone] (nights/weekends).   For emergencies, please page fellow on call.    The patient is a 84y Male with PMH of T2DM, HIV who presented to the hospital with chest pain and noted to be hyperglycemic  Endocrinology consulted for hyperglycemia and uncontrolled Dm2.   Patient is high risk with high level decision making due to uncontrolled diabetes with A1C>11 and severe hyperglycemia to > 500s which places patient at high risk for cardiovascular and cerebrovascular events. Patient with lability of glucose requiring close monitoring and insulin adjustments.    #Uncontrolled Type 2 Diabetes Mellitus with hyperglycemia  - Follows with: PCP, no endocrinologist  - A1C with Estimated Average Glucose Result: 11.7 % (11-30-24)  - home regimen: Lantus 12  - eGFR: 61 mL/min/1.73m2 (12-01-24)  - Weight (kg): 63.5 (11-30-24)  - glucose elevated, no evidence of DKA on labs      INPATIENT PLAN:  - Recommend Lantus 15 units in the morning (last dose of Lantus was 10 units at 5:44 AM on 12/1)   - Recommend Admelog 5 units TID before meals (HOLD if NPO or not eating)  - Recommend Low dose admelog correction scale TIDQAC and separate low dose scale QHS  - Please check FSG before meals and QHS, or q6h while NPO  - Inpatient glucose goals: <140 pre-meal, <180 random  - consistent carb diet when eating      DISCHARGE PLANNING:  - Discharge recs pending clinical course, basal + metformin + GLP1a vs basal/bolus  - will need Endocrinology follow up. Please provide clinic info in DC paperwork for patient to make an appointment:  Endocrinology [facility]: [address] [place] [address]. [address]. [place] [address]. Tel: [phone]    #Hypertension  - Goal BP <130/80  - Management as per primary team  - check urine albumin level as outpatient    #Hyperlipidemia  - LDL goal <70  - Last LDL: 68 mg/dL (06-22-24)  - on atorvastatin 10 mg qhs  - check lipid panel as outpatient on a yearly basis    Discussed with attending physician.  Etienne Israel DO   PGY-4 Endocrine Fellow  Can be reached via Microsoft Teams.    For follow up questions, discharge recommendations or new consults, please email [email or web address] ([email or web address]) or [email or web address] ([facility]) or call the answering service at [phone] (weekdays); [phone] (nights/weekends).   For emergencies, please page fellow on call.

## 2024-12-01 NOTE — PATIENT PROFILE ADULT - FALL HARM RISK - HARM RISK INTERVENTIONS

## 2024-12-01 NOTE — H&P ADULT - PROBLEM SELECTOR PLAN 1
::Hyperglycemia with ketosis in the setting of sepsis  ::A1c 11.7%, Glucose 569 -> 345, Lactate 2.2 ->2.1, BHB 2.4 -> -> 2.1, pH 7.37  -Home regimen: Lantus (dose unclear)   -s/p 2L NS Bolus, Lantus 10 units, and Lispro 8 units x2 in ED  -Glucose improved to 217. Start Dextrose 5% with 0.9% NS with 20mEq of KCL @150 cc/hr  -NPO  -Lispro Corrective Q4H (mild)  -Start SQ Basal-Bolus regimen when patient is able to eat  -Endocrinology consultation in morning  -Glucose checks Q4 hours  -Hypoglycemia precautions

## 2024-12-01 NOTE — DISCHARGE NOTE PROVIDER - NSDCCPTREATMENT_GEN_ALL_CORE_FT
PRINCIPAL PROCEDURE  Procedure: CT abdomen and pelvis, with contrast  Findings and Treatment: (12/1)  Impression: 1.7 cm peripherally enhancing tubular fluid collection along the   posterior aspect of the mid left kidney, possibly in a subcapsular space.   Mild asymmetric left perinephric fluid/stranding. Findings are suspicious   for infectious process with possible small renal/perirenal abscess.   Additional areas of new hypodensity in the left upper pole could reflect   pyelonephritis versus interval worsening scarring.  Nonspecific diffuse bladder wall thickening, which could reflect cystitis   or chronic outlet obstruction secondary to markedly enlarged prostate.  2.  Clinical correlation and correlation with urinalysis is recommended.   Close interval follow-up imaging is recommended.  (12/5)  Left-sided pyelonephritis with small perinephric fluid collection, not  significant change from prior examination.  Bladder wall thickening and perivesicular stranding which may be seen in   the setting of cystitis.  Enlarged prostate.

## 2024-12-01 NOTE — CONSULT NOTE ADULT - SUBJECTIVE AND OBJECTIVE BOX
HPI:  84-year-old male with PMHx of DM2, HTN, HLD, HIV, BPH, and spinal stenosis who presents to the ED with nausea, vomiting, and abdominal pain of 4 days duration. Per ED report, patient’s daughter was at bedside and patient speaks a dialect of [place] language. Patient confused on arrival and fingerstick glucose was 522 on arrival. Patient has not been able to take his insulin and medications in the setting of these symptoms. History is limited and the writer was not able to reach the daughter again for more collateral information. Patient’s confusion is not baseline and the daughter brought the patient for this concern.     In ED, patient received 2L LR Bolus, Lantus 10 units, and Lispro 8 units x2. Glucose improved to 345. pH 7.37, AG 17, BHB 2.4 -> 2.1, A1c 11.7%, UA +LE, 87 WBC. Patient admitted to medicine for further management.  (01 Dec 2024 04:56)      DIABETES HX: Patient able to provide only limited history. Attempted to reach emergency contact but no answer. History obtained from chart review.   States he has DM for a long time. Takes insulin at home, per med rec Lantus 12 units daily.   Patient is reporting aching chest.   Patient has been on Aspart in the past but stopped due to hypoglycemia.  Patient was also on metformin and glipizide in the past.   A1C 11.7% here (11/30/24)  Glucose elevated        PAST MEDICAL & SURGICAL HISTORY:  Diabetes  HTN (hypertension)  HIV infection  Stage 3 chronic kidney disease  BPH (benign prostatic hyperplasia)  No significant past surgical history      Outpatient medications:  amLODIPine 5 mg oral tablet (12-01-24 @ 04:34)  atorvastatin 10 mg oral tablet (12-01-24 @ 04:34)  finasteride 5 mg oral tablet (12-01-24 @ 04:34)  Flomax 0.4 mg oral capsule (12-01-24 @ 04:34)  Insulin Glargine (12-01-24 @ 04:34)  oxyBUTYnin 10 mg/24 hr oral tablet, extended release (12-01-24 @ 04:34)  Symtuza oral tablet (12-01-24 @ 04:34)      Inpatient medications:  MEDICATIONS  (STANDING):  atorvastatin 10 milliGRAM(s) Oral at bedtime  dextrose 5% + sodium chloride 0.45% with potassium chloride 20 mEq/L 1000 milliLiter(s) (150 mL/Hr) IV Continuous <Continuous>  dextrose 5%. 1000 milliLiter(s) (50 mL/Hr) IV Continuous <Continuous>  dextrose 5%. 1000 milliLiter(s) (100 mL/Hr) IV Continuous <Continuous>  dextrose 50% Injectable 25 Gram(s) IV Push once  finasteride 5 milliGRAM(s) Oral daily  glucagon  Injectable 1 milliGRAM(s) IntraMuscular once  heparin   Injectable 5000 Unit(s) SubCutaneous every 8 hours  influenza  Vaccine (HIGH DOSE) 0.5 milliLiter(s) IntraMuscular once  insulin lispro (ADMELOG) corrective regimen sliding scale   SubCutaneous three times a day before meals  lidocaine   4% Patch 1 Patch Transdermal every 24 hours  oxybutynin XL 5 milliGRAM(s) Oral daily  piperacillin/tazobactam IVPB.. 3.375 Gram(s) IV Intermittent every 8 hours  tamsulosin 0.4 milliGRAM(s) Oral at bedtime  vancomycin  IVPB 1000 milliGRAM(s) IV Intermittent <User Schedule>    MEDICATIONS  (PRN):  acetaminophen     Tablet .. 650 milliGRAM(s) Oral every 6 hours PRN Temp greater or equal to 38C (100.4F), Mild Pain (1 - 3)  dextrose Oral Gel 15 Gram(s) Oral once PRN Blood Glucose LESS THAN 70 milliGRAM(s)/deciliter      Allergies    No Known Allergies    Intolerances      Review of Systems:  Constitutional: No fever  Eyes: No blurry vision  Neuro: No tremors  HEENT: No pain  Cardiovascular: No chest pain, palpitations  Respiratory: No SOB, no cough  GI: No nausea, vomiting, abdominal pain  : No dysuria  Skin: no rash  Psych: no depression  Endocrine: no polyuria, polydipsia  Hem/lymph: no swelling  Osteoporosis: no fractures    ALL OTHER SYSTEMS REVIEWED AND NEGATIVE    PHYSICAL EXAM:  VITALS: T(C): 36.4 (12-01-24 @ 12:06)  T(F): 97.5 (12-01-24 @ 12:06), Max: 101.9 (11-30-24 @ 20:11)  HR: 91 (12-01-24 @ 12:06) (80 - 111)  BP: 129/63 (12-01-24 @ 12:06) (129/63 - 157/68)  RR:  (17 - 19)  SpO2:  (100% - 100%)  Wt(kg): 63.5 kg  GENERAL: NAD, well-groomed, well-developed  EYES: No proptosis, no lid lag, anicteric  HEENT:  Atraumatic, Normocephalic, moist mucous membranes  RESPIRATORY: Normal respiratory effort; no audible wheezing  SKIN: Dry, intact, No rashes or lesions  MUSCULOSKELETAL: Full range of motion, normal strength  NEURO: sensation intact, extraocular movements intact, no tremor  PSYCH: Alert and oriented x 3, normal affect, normal mood  CUSHING'S SIGNS: no striae                          11.6   10.84 )-----------( 365      ( 30 Nov 2024 19:47 )             34.5       12-01    136  |  102  |  19  ----------------------------<  205[H]  4.4   |  22  |  1.17    eGFR: 61    Ca    9.4      12-01  Mg     1.60     12-01  Phos  1.6     12-01    TPro  9.0[H]  /  Alb  3.6  /  TBili  0.8  /  DBili  x   /  AST  8   /  ALT  6   /  AlkPhos  120  11-30      A1C with Estimated Average Glucose Result: 11.7 % (11-30-24 @ 19:47)  A1C with Estimated Average Glucose Result: 11.9 % (07-03-24 @ 10:26)  A1C with Estimated Average Glucose Result: 13.3 % (06-21-24 @ 09:30)      CAPILLARY BLOOD GLUCOSE      POCT Blood Glucose.: 219 mg/dL (01 Dec 2024 17:38)  POCT Blood Glucose.: 248 mg/dL (01 Dec 2024 13:04)  POCT Blood Glucose.: 192 mg/dL (01 Dec 2024 06:39)  POCT Blood Glucose.: 278 mg/dL (01 Dec 2024 05:41)  POCT Blood Glucose.: 217 mg/dL (01 Dec 2024 04:22)  POCT Blood Glucose.: 345 mg/dL (01 Dec 2024 02:14)  POCT Blood Glucose.: 396 mg/dL (01 Dec 2024 01:23)  POCT Blood Glucose.: 466 mg/dL (30 Nov 2024 23:27)  POCT Blood Glucose.: 517 mg/dL (30 Nov 2024 22:15)  POCT Blood Glucose.: 555 mg/dL (30 Nov 2024 20:37)  POCT Blood Glucose.: 522 mg/dL (30 Nov 2024 19:13)

## 2024-12-01 NOTE — DISCHARGE NOTE PROVIDER - ATTENDING DISCHARGE PHYSICAL EXAMINATION:
.  VITAL SIGNS:  T(C): 36.6 (12-06-24 @ 12:54), Max: 36.9 (12-05-24 @ 21:45)  T(F): 97.8 (12-06-24 @ 12:54), Max: 98.4 (12-05-24 @ 21:45)  HR: 75 (12-06-24 @ 12:54) (57 - 75)  BP: 123/51 (12-06-24 @ 12:54) (123/51 - 134/60)  BP(mean): --  RR: 18 (12-06-24 @ 12:54) (18 - 18)  SpO2: 100% (12-06-24 @ 12:54) (99% - 100%)  Wt(kg): --    PHYSICAL EXAM:    Constitutional: Comfortable and resting in bed   Head: NC/AT  Eyes: PERRL, EOMI, anicteric sclera  ENT: no nasal discharge; uvula midline, no oropharyngeal erythema or exudates; MMM  Respiratory: CTA B/L; no W/R/R, no retractions  Cardiac: +S1/S2; RRR; no M/R/G; PMI non-displaced  Gastrointestinal: abdomen soft, NT/ND; no rebound or guarding; +BSx4  Back: spine midline, no bony tenderness or step-offs; no CVAT B/L  Extremities: WWP, no clubbing or cyanosis; no peripheral edema  Musculoskeletal: NROM x4; no joint swelling, tenderness or erythema  Vascular: 2+ radial, femoral, DP/PT pulses B/L  Dermatologic: skin warm, dry and intact; no rashes, wounds, or scars  Neurologic: AAOx3; CNII-XII grossly intact; no focal deficits  Psychiatric: affect and characteristics of appearance, verbalizations, behaviors are appropriate

## 2024-12-01 NOTE — DISCHARGE NOTE PROVIDER - HOSPITAL COURSE
84-year-old male with PMHx of DM2, HTN, HLD, HIV, BPH, and spinal stenosis who presents to the ED admitted for pyelonephritis and hyperglycemia. Seen by urology with acute intervention recommended. Started on empiric antibiotics. 84-year-old male with PMHx of DM2, HTN, HLD, HIV, BPH, and spinal stenosis who presents to the ED admitted for pyelonephritis and hyperglycemia. Seen by urology with acute intervention recommended. Started on empiric antibiotics.     For full details, please see H&P, progress notes, consult notes and ancillary notes.    84-year-old male with PMHx of DM2, HTN, HLD, HIV, BPH, and spinal stenosis who presents to the ED with nausea, vomiting, and abdominal pain of 4 days duration. Per ED report, patient’s daughter was at bedside and patient speaks a dialect of [place] language. Patient confused on arrival and fingerstick glucose was 522 on arrival. Patient has not been able to take his insulin and medications in the setting of these symptoms. History is limited and the writer was not able to reach the daughter again for more collateral information. Patient’s confusion is not baseline and the daughter brought the patient for this concern.     ED Course: patient received 2L LR Bolus, Lantus 10 units, and Lispro 8 units x2. Glucose improved to 345. pH 7.37, AG 17, BHB 2.4 -> 2.1, A1c 11.7%, UA +LE, 87 WBC. Patient admitted to medicine for further management.     Hospital Course:   Patient was admitted to the floors for uncontrolled hyperglycemia and sepsis secondary to possible UTI. He arrived to the medicine floor in stable condition. Endocrinology was consulted for inpatient insulin regimen, and ruled out DKA. Per endo, patient was initially started on 15U Lantus in AM, and 5U admelog TID before meals. Glucose continued to resolve________          On day of discharge, patient is clinically stable with no new exam findings or acute symptoms compared to prior. The patient was seen by the attending physician on the date of discharge and deemed stable and acceptable for discharge. The patient's chronic medical conditions were treated accordingly per the patient's home medication regimen. The patient's medication reconciliation (with changes made to chronic medications), follow up appointments, discharge orders, instructions, and significant lab and diagnostic studies are as noted.     Discharge follow up action items:     1. Follow up with   2. Follow up labs:   3. Medication changes:   4. On hold medications:  5. Incidental findings:     Patient's ordered code status:   Patient disposition:     Patient will be discharged to ..... with close follow up. For full details, please see H&P, progress notes, consult notes and ancillary notes.    84-year-old male with PMHx of DM2, HTN, HLD, HIV, BPH, and spinal stenosis who presents to the ED with nausea, vomiting, and abdominal pain of 4 days duration. Per ED report, patient’s daughter was at bedside and patient speaks a dialect of [place] language. Patient confused on arrival and fingerstick glucose was 522 on arrival. Patient has not been able to take his insulin and medications in the setting of these symptoms. History is limited and the writer was not able to reach the daughter again for more collateral information. Patient’s confusion is not baseline and the daughter brought the patient for this concern.     ED Course: Patient received 2L LR Bolus, Lantus 10 units, and Lispro 8 units x2. Glucose improved to 345. pH 7.37, AG 17, BHB 2.4 -> 2.1, A1c 11.7%. UA +LE, 87 WBC. Patient given 1x Vancomycin/1x Zosyn/1x dose IV Ofirmev. CTAP demonstrating 1.7cm tubular fluid collection along left kidney c/f possible small renal/perirenal abscess with areas of hypodensity c/f pyelonephritis vs worsening renal scarring, no hydronephrosis bilaterally, bladder wall thickening c/f cystitis vs chronic outlet obstruction. Urology consulted in ED. Recommended no acute urological interventions. Patient admitted to medicine for further management.     Hospital Course:   Patient was admitted to the floors for uncontrolled hyperglycemia and sepsis secondary to possible UTI vs intraabdominal infection. He arrived to the medicine floor in stable condition.     Started on D5NS with with 20mEq of KCL @150 cc/hr. Endocrinology consulted for inpatient insulin regimen, and ruled out DKA. Per endo, transitioned to consistent carb from NPO, started on 15U Lantus in AM/5U Admelog premeal TID however, decreased dosing to 12U Lantus AM and 4U premeal in the setting of continued poor PO intake. Given PO, D5NS was d/c on 12/2 per Endo. Glucose continued to downtrend from 217mg/dL on admission 12/1 to 84mg/dL on 12/3 --- ________.     Urine culture and blood culture growing ESBL Klebsiella. Started on Vancomycin qD (12/1 - 12/2) and Ertapenem qD (12/1 - ___). Per ID pharm, Vanc d/c given BCx results. ID was consulted due to lack of source control and with concerning renal findings on CT. ID recommended continuing Ertapenem 1g IV, daily (12/1 - ___). Urology recommended repeat CT A/P on 12/5.     On 12/2, patient reported chest discomfort in PM. EKG showing non-specific T wave abnormalities. In 12/3 AM, patient reported chronic history of exertional dyspnea/chest pain this past year, denied syncope. TTE in Jan 2023 showed LVEF 65% and mild mitral calcification. TTE done on ______ showed. Patient to referred to see Cardiologist outpatient for further workup.         On day of discharge, patient is clinically stable with no new exam findings or acute symptoms compared to prior. The patient was seen by the attending physician on the date of discharge and deemed stable and acceptable for discharge. The patient's chronic medical conditions were treated accordingly per the patient's home medication regimen. The patient's medication reconciliation (with changes made to chronic medications), follow up appointments, discharge orders, instructions, and significant lab and diagnostic studies are as noted.     Discharge follow up action items:     1. Follow up with   2. Follow up labs:   3. Medication changes:   4. On hold medications:  5. Incidental findings:     Patient's ordered code status:   Patient disposition:     Patient will be discharged to ..... with close follow up. For full details, please see H&P, progress notes, consult notes and ancillary notes.    84-year-old male with PMHx of DM2, HTN, HLD, HIV, BPH, and spinal stenosis who presents to the ED with nausea, vomiting, and abdominal pain of 4 days duration. Per ED report, patient’s daughter was at bedside and patient speaks a dialect of [place] language. Patient confused on arrival and fingerstick glucose was 522 on arrival. Patient has not been able to take his insulin and medications in the setting of these symptoms. History is limited and the writer was not able to reach the daughter again for more collateral information. Patient’s confusion is not baseline and the daughter brought the patient for this concern.     ED Course: Patient received 2L LR Bolus, Lantus 10 units, and Lispro 8 units x2. Glucose improved to 345. pH 7.37, AG 17, BHB 2.4 -> 2.1, A1c 11.7%. UA +LE, 87 WBC. Patient given 1x Vancomycin/1x Zosyn/1x dose IV Ofirmev. CTAP demonstrating 1.7cm tubular fluid collection along left kidney c/f possible small renal/perirenal abscess with areas of hypodensity c/f pyelonephritis vs worsening renal scarring, no hydronephrosis bilaterally, bladder wall thickening c/f cystitis vs chronic outlet obstruction. Urology consulted in ED. Recommended no acute urological interventions. Patient admitted to medicine for further management.     Hospital Course:   Patient was admitted to the floors for uncontrolled hyperglycemia and sepsis secondary to possible UTI vs intraabdominal infection. He arrived to the medicine floor in stable condition.     Started on D5NS with 20mEq of KCL @150 cc/hr. Endocrinology consulted for inpatient insulin regimen, and ruled out DKA. Per endo, transitioned to consistent carb from NPO, d/c D5NS (12/2), and started on 15U Lantus in AM/5U Admelog premeal TID. However, eventually decreased dosing to 10U Lantus AM, held 5U premeal Admelog, and added Tradjenta 5mg PO, daily. Glucose continued to downtrend and remained stable until day of d/c. Endo recommended the following home regimen upon discharge: _________, as well as outpatient follow-up with Endocrinologist.     Urine culture and blood culture growing ESBL Klebsiella. Started on Vancomycin qD (12/1 - 12/2) and Ertapenem qD. Per ID pharm, Vanc d/c given BCx results. ID was consulted due to lack of source control and with concerning renal findings on CT, recommending continuing Ertapenem 1g IV daily inpatient (12/1-12/6). Repeat CT 12/5 showing findings unchanged from prior CT. Per repeat CT findings, ID recommended discharging patient to home with midline catheter insertion for infusion of Ertapenem 1g IV x 4 weeks. Midline placed on 12/6. Pt to follow up with ID outpatient in 1-2 weeks.     On 12/2, patient reported chest discomfort in PM. EKG showing non-specific T wave abnormalities. In 12/3 AM, patient reported chronic history of exertional dyspnea/chest pain this past year, denied syncope. TTE in Jan 2023 showed LVEF 65% and mild mitral calcification. TTE on 12/3 showing LVEF 63%, mild diastolic LV dysfunction, and mild mitral annular calcification. Given chest pain likely epigastric/non-cardiac in nature, pt. started on pantoprazole 40mg PO, daily. No further inpatient cardiac testing was recommended.     Patient with history of HIV (VL not detected). Received dose of home symtuza on 11/30 prior to admission. Started on equivalent medication (12/3), Prezcobix and Descovy, daily until day of discharge.      On day of discharge, patient is clinically stable with no new exam findings or acute symptoms compared to prior. The patient was seen by the attending physician on the date of discharge and deemed stable and acceptable for discharge. The patient's chronic medical conditions were treated accordingly per the patient's home medication regimen. The patient's medication reconciliation (with changes made to chronic medications), follow up appointments, discharge orders, instructions, and significant lab and diagnostic studies are as noted.     Discharge follow up action items:     1. Follow up with:  	- Primary Care Physician in 1-2 weeks  	- Infectious Disease in 1-2 weeks for repeat imaging   	- Endocrinology in 1-2 weeks, for further diabetes management   	- Ophthalmology in 1-2 weeks, due to history of diabetes   	- Podiatry in 1-2 weeks   2. Follow up labs: Repeat CT A/P during ID follow-up.   3. Medication changes: Started on Ertapenem 1g IV via midline, continue until 12/30.   4. On hold medications: None   5. Incidental findings: None     Patient's ordered code status: FULL CODE  Patient disposition: home     Patient will be discharged to home with close follow up. For full details, please see H&P, progress notes, consult notes and ancillary notes.    84-year-old male with PMHx of DM2, HTN, HLD, HIV, BPH, and spinal stenosis who presents to the ED with nausea, vomiting, and abdominal pain of 4 days duration. Per ED report, patient’s daughter was at bedside and patient speaks a dialect of [place]ese language. Patient confused on arrival and fingerstick glucose was 522 on arrival. Patient has not been able to take his insulin and medications in the setting of these symptoms. History is limited and the writer was not able to reach the daughter again for more collateral information. Patient’s confusion is not baseline and the daughter brought the patient for this concern.     ED Course: Patient received 2L LR Bolus, Lantus 10 units, and Lispro 8 units x2. Glucose improved to 345. pH 7.37, AG 17, BHB 2.4 -> 2.1, A1c 11.7%. UA +LE, 87 WBC. Patient given 1x Vancomycin/1x Zosyn/1x dose IV Ofirmev. CTAP demonstrating 1.7cm tubular fluid collection along left kidney c/f possible small renal/perirenal abscess with areas of hypodensity c/f pyelonephritis vs worsening renal scarring, no hydronephrosis bilaterally, bladder wall thickening c/f cystitis vs chronic outlet obstruction. Urology consulted in ED. Recommended no acute urological interventions. Patient admitted to medicine for further management.     Hospital Course:   Patient was admitted to the floors for uncontrolled hyperglycemia and sepsis secondary to possible UTI vs intraabdominal infection. He arrived to the medicine floor in stable condition.     Started on D5NS with 20mEq of KCL @150 cc/hr. Endocrinology consulted for inpatient insulin regimen, and ruled out DKA. Per endo, transitioned to consistent carb from NPO, d/c D5NS (12/2), and started on 15U Lantus in AM/5U Admelog premeal TID. However, eventually decreased dosing to 10U Lantus AM, held 5U premeal Admelog, and added Tradjenta 5mg PO, daily. Glucose continued to downtrend and remained stable until day of d/c. Endo recommended the following home regimen upon discharge: _________, as well as outpatient follow-up with Endocrinologist.     Urine culture and blood culture growing ESBL Klebsiella. Started on Vancomycin qD (12/1 - 12/2) and Ertapenem qD. Per ID pharm, Vanc d/c given BCx results. ID was consulted due to lack of source control and with concerning renal findings on CT, recommending continuing Ertapenem 1g IV daily inpatient (12/1-12/6). Repeat CT 12/5 showing findings unchanged from prior CT. Per repeat CT findings, ID recommended discharging patient to home with midline catheter insertion for infusion of Ertapenem 1g IV x 4 weeks. Midline placed on 12/6. Pt to follow up with ID outpatient in 1-2 weeks.     On 12/2, patient reported chest discomfort in PM. EKG showing non-specific T wave abnormalities. In 12/3 AM, patient reported chronic history of exertional dyspnea/chest pain this past year, denied syncope. TTE in Jan 2023 showed LVEF 65% and mild mitral calcification. TTE on 12/3 showing LVEF 63%, mild diastolic LV dysfunction, and mild mitral annular calcification. Given chest pain likely epigastric/non-cardiac in nature, pt. started on pantoprazole 40mg PO, daily. No further inpatient cardiac testing was recommended.     Patient with history of HIV (VL not detected). Received dose of home symtuza on 11/30 prior to admission. Started on equivalent medication (12/3), Prezcobix and Descovy, daily until day of discharge.      On day of discharge, patient is clinically stable with no new exam findings or acute symptoms compared to prior. The patient was seen by the attending physician on the date of discharge and deemed stable and acceptable for discharge. The patient's chronic medical conditions were treated accordingly per the patient's home medication regimen. The patient's medication reconciliation (with changes made to chronic medications), follow up appointments, discharge orders, instructions, and significant lab and diagnostic studies are as noted.     Discharge follow up action items:     1. Follow up with:  	- Primary Care Physician in 1-2 weeks  	- Infectious Disease in 1-2 weeks for repeat imaging   	- Endocrinology in 1-2 weeks, for further diabetes management   	- Ophthalmology in 1-2 weeks, due to history of diabetes   	- Podiatry in 1-2 weeks   2. Follow up labs: Weekly labs will be drawn via home health aide. Repeat CT A/P during ID follow-up.   3. Medication changes: Started on Ertapenem 1g IV via midline, continue until 12/30. Started on Tradjenta 5mg PO, daily. Home Lantus changed from 12U in AM to 8U in AM upon discharge.   4. On hold medications: None   5. Incidental findings: None     Patient's ordered code status: FULL CODE  Patient disposition: home     Patient will be discharged to home with close follow up.

## 2024-12-01 NOTE — ED ADULT NURSE REASSESSMENT NOTE - GENERAL PATIENT STATE
As per handoff report pt is non english (karla daughters number in chart.)  pt treated and eval for c/o abd pain, vomiting hx of DM, stage 3 CKD, HIV ,  elevated glucose glucose >500 insulin and fluids given . Will cont to monitor./comfortable appearance/smiling/interactive

## 2024-12-01 NOTE — PHYSICAL THERAPY INITIAL EVALUATION ADULT - ADDITIONAL COMMENTS
Pt lives in a private house with his daughter; there are 8 steps to enter. Has a home health aide 8hrs/day x 5 days/wk.    Has a rolling walker.

## 2024-12-01 NOTE — PATIENT PROFILE ADULT - NSPROGENOTHERPROVIDER_GEN_A_NUR
Diabetic Ketoacidosis resolved   Continue with the same regimen while inpatient   patient can be discharged on the same regimen Diabetic Ketoacidosis resolved   on Lantus 15 units and Lispro sliding scale coverage with meals   Continue with the same regimen while inpatient   may need prandial lispro added   Patient should have strict diet control and do exercise as tolerated upon discharge Continue with the same regimen while inpatient   while inpatient Finger Sticks should be in 140-180 range, preferably <160   Encourage more nutrition add prandial lispro 4 units   Encourage more nutrition   while inpatient Finger Sticks should be in 140-180 range, preferably <160 dka resolved, uncontrolled DM  increase lantus to 15units daily  continue JENNY with meals  will follow none

## 2024-12-01 NOTE — PHYSICAL THERAPY INITIAL EVALUATION ADULT - GAIT DEVIATIONS NOTED, PT EVAL
forward flexed posture with downward gaze; poor rolling walker negotiation/decreased emily/decreased velocity of limb motion/decreased step length/decreased stride length/decreased weight-shifting ability

## 2024-12-01 NOTE — DISCHARGE NOTE PROVIDER - NSDCFUSCHEDAPPT_GEN_ALL_CORE_FT
Conchita Mann  [facility] Physician [facility]  UROLOGY [address]  Scheduled Appointment: 12/19/2024     [facility]  INFDISEASE [address]  Scheduled Appointment: 12/16/2024    Conchita Mann  [facility]  UROLOGY [address]  Scheduled Appointment: 12/19/2024

## 2024-12-01 NOTE — CONSULT NOTE ADULT - SUBJECTIVE AND OBJECTIVE BOX
HPI  84yoM w PMH DM, HTN, HIV, CKD3, BPH who presents to ED with abdominal and chest pain, nausea, vomiting, subjective fevers, altered. Urology consulted given CT findings of possible small L renal abscess.    In ED, patient is febrile to 101.9, and tachycardic to 111, but otherwise vitally stable. WBC 10.8, Hct 34.5, Cr 1.19 from 1.3, Glucose initially >500 now 345, lactate 2.1, UA positive for mod LE, 87 WBC, moderate bacteria, >1000 glucose. CTAP demonstrating 1.7cm tubular fluid collection along left kidney c/f possible small renal/perirenal abscess with areas of hypodensity c/f pyelonephritis vs worsening renal scarring, no hydronephrosis bilaterally, bladder wall thickening c/f cystitis vs chronic outlet obstruction, 97g prostate.    PAST MEDICAL & SURGICAL HISTORY:  Diabetes      HTN (hypertension)      HIV infection      Stage 3 chronic kidney disease      BPH (benign prostatic hyperplasia)      No significant past surgical history          MEDICATIONS  (STANDING):  lactated ringers. 1000 milliLiter(s) (100 mL/Hr) IV Continuous <Continuous>  potassium chloride    Tablet ER 40 milliEquivalent(s) Oral once    MEDICATIONS  (PRN):      FAMILY HISTORY:      Allergies    No Known Allergies    Intolerances        SOCIAL HISTORY:    REVIEW OF SYSTEMS: Otherwise negative as stated in HPI    Physical Exam  Vital signs  T(C): 38.8 (24 @ 20:11), Max: 38.8 (24 @ 20:11)  HR: 111 (24 @ 19:00)  BP: 134/80 (24 @ 19:00)  SpO2: 100% (24 @ 19:00)  Wt(kg): --    Output      Gen: NAD  Pulm: No respiratory distress	  CV: RRR  GI: S/ND/NT  :   MSK: moves all 4 limbs spontaneously    LABS:       @ 22:42    WBC --    / Hct --    / SCr 1.19      @ 19:47    WBC 10.84 / Hct 34.5  / SCr 1.34         133[L]  |  96[L]  |  21  ----------------------------<  538[HH]  3.9   |  20[L]  |  1.19    Ca    8.8      2024 22:42  Phos  2.6       Mg     1.60         TPro  9.0[H]  /  Alb  3.6  /  TBili  0.8  /  DBili  x   /  AST  8   /  ALT  6   /  AlkPhos  120        Urinalysis Basic - ( 2024 23:28 )    Color: Yellow / Appearance: Cloudy / S.026 / pH: x  Gluc: x / Ketone: Trace mg/dL  / Bili: Negative / Urobili: 0.2 mg/dL   Blood: x / Protein: 30 mg/dL / Nitrite: Negative   Leuk Esterase: Moderate / RBC: 0 /HPF / WBC 87 /HPF   Sq Epi: x / Non Sq Epi: 1 /HPF / Bacteria: Moderate /HPF          Urinalysis with Rflx Culture (collected 24 @ 23:28)        Urine Cx:    Blood Cx:    RADIOLOGY:  ACC: [id number] EXAM: CT ABDOMEN AND PELVIS IC ORDERED BY: IBAN TILLMAN    PROCEDURE DATE: 2024        INTERPRETATION: CLINICAL INFORMATION: Abdominal pain and fever.    COMPARISON: 2024.    CONTRAST/COMPLICATIONS:  IV Contrast: Omnipaque 350 90 cc administered 10 cc discarded  Oral Contrast: NONE      PROCEDURE:  CT of the Abdomen and Pelvis was performed.  Sagittal and coronal reformats were performed.    FINDINGS:  LOWER CHEST: Mild bibasilar atelectasis. Trace pericardial fluid. Mild bilateral gynecomastia. Nonspecific trace fluid adjacent to the distal esophagus.    LIVER: Subcentimeter right hepatic hypodensity too small to characterize.  BILE DUCTS: Normal caliber.  GALLBLADDER: Cholelithiasis.  SPLEEN: Within normal limits.  PANCREAS: Within normal limits.  ADRENALS: Nonspecific small right adrenal gland calcification.  KIDNEYS/URETERS: Multifocal left renal cortical scarring again noted. New hypodense areas extending to the cortex in the left upper pole could reflect new or worsened areas of scarring versus possibly pyelonephritis. 1.7 cm peripherally enhancing tubular collection of fluid along the posterior aspect of the mid left kidney (301, 43), possibly in the subcapsular space. Associated mild asymmetric left perinephric fluid/stranding.    BLADDER: Diffuse bladder wall thickening.  REPRODUCTIVE ORGANS: Markedly enlarged prostate.    BOWEL: No bowel obstruction. Appendix is normal.  PERITONEUM/RETROPERITONEUM: Within normal limits.  VESSELS: Atherosclerotic changes.  LYMPH NODES: No lymphadenopathy.  ABDOMINAL WALL: Within normal limits.  BONES: Degenerative changes.    IMPRESSION:  1.7 cm peripherally enhancing tubular fluid collection along the posterior aspect of the mid left kidney, possibly in a subcapsular space. Mild asymmetric left perinephric fluid/stranding. Findings are suspicious for infectious process with possible small renal/perirenal abscess. Additional areas of new hypodensity in the left upper pole could reflect pyelonephritis versus interval worsening scarring.    Nonspecific diffuse bladder wall thickening, which could reflect cystitis or chronic outlet obstruction secondary to markedly enlarged prostate.  2.    Clinical correlation and correlation with urinalysis is recommended. Close interval follow-up imaging is recommended.    --- End of Report ---   HPI  84yoM w PMH DM, HTN, HIV, CKD3, BPH who presents to ED with abdominal and chest pain, nausea, vomiting, subjective fevers, altered. Urology consulted given CT findings of possible small L renal abscess.    Patient AOx1 and unable to provide history, but from chart review has chest pain nausea vomiting and abdominal pain for 4 days in setting of not being able to take his insulin and medications.     In ED, patient is febrile to 101.9, and tachycardic to 111, but otherwise vitally stable. WBC 10.8, Hct 34.5, Cr 1.19 from 1.3, Glucose initially >500 now 345, lactate 2.1, UA positive for mod LE, 87 WBC, moderate bacteria, >1000 glucose. CTAP demonstrating 1.7cm tubular fluid collection along left kidney c/f possible small renal/perirenal abscess with areas of hypodensity c/f pyelonephritis vs worsening renal scarring, no hydronephrosis bilaterally, bladder wall thickening c/f cystitis vs chronic outlet obstruction, 97g prostate.    PAST MEDICAL & SURGICAL HISTORY:  Diabetes      HTN (hypertension)      HIV infection      Stage 3 chronic kidney disease      BPH (benign prostatic hyperplasia)      No significant past surgical history          MEDICATIONS  (STANDING):  lactated ringers. 1000 milliLiter(s) (100 mL/Hr) IV Continuous <Continuous>  potassium chloride    Tablet ER 40 milliEquivalent(s) Oral once    MEDICATIONS  (PRN):      FAMILY HISTORY:      Allergies    No Known Allergies    Intolerances        SOCIAL HISTORY:    REVIEW OF SYSTEMS: Otherwise negative as stated in HPI    Physical Exam  Vital signs  T(C): 38.8 (24 @ 20:11), Max: 38.8 (24 @ 20:11)  HR: 111 (24 @ 19:00)  BP: 134/80 (24 @ 19:00)  SpO2: 100% (24 @ 19:00)  Wt(kg): --    Output      Gen: NAD  Pulm: No respiratory distress	  CV: RRR  GI: S/ND/NT  : palpable bladder, L CVAT, no R CVAT, uncircumcised, urine at bedside is cloudy yellow  MSK: moves all 4 limbs spontaneously    LABS:       @ 22:42    WBC --    / Hct --    / SCr 1.19      @ 19:47    WBC 10.84 / Hct 34.5  / SCr 1.34         133[L]  |  96[L]  |  21  ----------------------------<  538[HH]  3.9   |  20[L]  |  1.19    Ca    8.8      2024 22:42  Phos  2.6       Mg     1.60         TPro  9.0[H]  /  Alb  3.6  /  TBili  0.8  /  DBili  x   /  AST  8   /  ALT  6   /  AlkPhos  120        Urinalysis Basic - ( 2024 23:28 )    Color: Yellow / Appearance: Cloudy / S.026 / pH: x  Gluc: x / Ketone: Trace mg/dL  / Bili: Negative / Urobili: 0.2 mg/dL   Blood: x / Protein: 30 mg/dL / Nitrite: Negative   Leuk Esterase: Moderate / RBC: 0 /HPF / WBC 87 /HPF   Sq Epi: x / Non Sq Epi: 1 /HPF / Bacteria: Moderate /HPF          Urinalysis with Rflx Culture (collected 24 @ 23:28)        Urine Cx:    Blood Cx:    RADIOLOGY:  ACC: [id number] EXAM: CT ABDOMEN AND PELVIS IC ORDERED BY: IBAN TILLMAN    PROCEDURE DATE: 2024        INTERPRETATION: CLINICAL INFORMATION: Abdominal pain and fever.    COMPARISON: 2024.    CONTRAST/COMPLICATIONS:  IV Contrast: Omnipaque 350 90 cc administered 10 cc discarded  Oral Contrast: NONE      PROCEDURE:  CT of the Abdomen and Pelvis was performed.  Sagittal and coronal reformats were performed.    FINDINGS:  LOWER CHEST: Mild bibasilar atelectasis. Trace pericardial fluid. Mild bilateral gynecomastia. Nonspecific trace fluid adjacent to the distal esophagus.    LIVER: Subcentimeter right hepatic hypodensity too small to characterize.  BILE DUCTS: Normal caliber.  GALLBLADDER: Cholelithiasis.  SPLEEN: Within normal limits.  PANCREAS: Within normal limits.  ADRENALS: Nonspecific small right adrenal gland calcification.  KIDNEYS/URETERS: Multifocal left renal cortical scarring again noted. New hypodense areas extending to the cortex in the left upper pole could reflect new or worsened areas of scarring versus possibly pyelonephritis. 1.7 cm peripherally enhancing tubular collection of fluid along the posterior aspect of the mid left kidney (301, 43), possibly in the subcapsular space. Associated mild asymmetric left perinephric fluid/stranding.    BLADDER: Diffuse bladder wall thickening.  REPRODUCTIVE ORGANS: Markedly enlarged prostate.    BOWEL: No bowel obstruction. Appendix is normal.  PERITONEUM/RETROPERITONEUM: Within normal limits.  VESSELS: Atherosclerotic changes.  LYMPH NODES: No lymphadenopathy.  ABDOMINAL WALL: Within normal limits.  BONES: Degenerative changes.    IMPRESSION:  1.7 cm peripherally enhancing tubular fluid collection along the posterior aspect of the mid left kidney, possibly in a subcapsular space. Mild asymmetric left perinephric fluid/stranding. Findings are suspicious for infectious process with possible small renal/perirenal abscess. Additional areas of new hypodensity in the left upper pole could reflect pyelonephritis versus interval worsening scarring.    Nonspecific diffuse bladder wall thickening, which could reflect cystitis or chronic outlet obstruction secondary to markedly enlarged prostate.  2.    Clinical correlation and correlation with urinalysis is recommended. Close interval follow-up imaging is recommended.    --- End of Report ---

## 2024-12-01 NOTE — CONSULT NOTE ADULT - ATTENDING COMMENTS
Agree with above  No indication for drainage at present  Monitor on IV abx and f/u cultures
85yo M with uncontrolled dm2 and hyperglycemia, a1c 11.7%, glucose in 500s on admission without DKA. Unable to provide complete history and unable to reach family to obtain collateral. Will need to clarify home regimen/med rec. While inpatient, initiate basal/bolus insulin regimen as above. Will follow.

## 2024-12-01 NOTE — PHYSICAL THERAPY INITIAL EVALUATION ADULT - GENERAL OBSERVATIONS, REHAB EVAL
Upon entry, pt semi-supine in bed in NAD. HR 91 bpm. Pt left as received with all tubes/lines intact, bed alarm on, call bell in reach and in NAD.

## 2024-12-01 NOTE — H&P ADULT - NSCORESITESY/N_GEN_A_CORE_RD
Arrived to the [facility]. Injectafer completed. Patient tolerated well. Any issues or concerns during appointment: Spoke with Kerry Sánchez RN about pt K+ level, Dr. Fabiola Colin to notify [facility] physician. Pt taking potassium supplements at home at this time. No new orders. Pt aware of next appointment on February 11th at 1600. Discharged ambulatory. No

## 2024-12-01 NOTE — PROGRESS NOTE ADULT - PROBLEM SELECTOR PLAN 6
-Continue with home Flomax, Finasteride, and Oxybutynin -HIV VL not detected   -Continue with home Symtuza

## 2024-12-01 NOTE — PROGRESS NOTE ADULT - PROBLEM SELECTOR PLAN 3
-Hold home antihypertensives in the setting of sepsis (Amlodipine 5 mg , Metoprolol 25 mg ) F/u cardiac cenzymes

## 2024-12-01 NOTE — CONSULT NOTE ADULT - ASSESSMENT
84yoM w PMH DM, HTN, HIV, CKD3, BPH who presents to ED with abdominal and chest pain, nausea, vomiting, subjective fevers, altered mental status. Urology consulted given CT findings of possible small L renal abscess.  In ED, patient is febrile to 101.9, and tachycardic to 111, but otherwise vitally stable. WBC 10.8, Hct 34.5, Cr 1.19 from 1.3, Glucose initially >500 now 345, lactate 2.1, UA positive for mod LE, 87 WBC, moderate bacteria, >1000 glucose. CTAP demonstrating 1.7cm tubular fluid collection along left kidney c/f possible small renal/perirenal abscess with areas of hypodensity c/f pyelonephritis vs worsening renal scarring, no hydronephrosis bilaterally, bladder wall thickening c/f cystitis vs chronic outlet obstruction, 97g prostate.    Perirenal collection not well defined and also <3cm, no indication for drainage of collection. More likely manifestation of ascending urinary tract infection that can be managed with medical admission and IV antibiotics.    Recommendations:  - No emergent urologic intervention  - Medical admission for management of DKA and for IV antibiotics  - F/u cultures  - Document PVR's, place forrester catheter if patient retaining  - Trend fevers  - Trend Cr > downtrending  - Rest of care per primary team  ***incomplete***    To be D/w Dr. Suggs in [facility]  [address] [place] [id number] [phone]   84yoM w PMH DM, HTN, HIV, CKD3, BPH who presents to ED with abdominal and chest pain, nausea, vomiting, subjective fevers, altered mental status. Urology consulted given CT findings of possible small L renal abscess.  In ED, patient is febrile to 101.9, and tachycardic to 111, but otherwise vitally stable. WBC 10.8, Hct 34.5, Cr 1.19 from 1.3, Glucose initially >500 now 345, lactate 2.1, UA positive for mod LE, 87 WBC, moderate bacteria, >1000 glucose. CTAP demonstrating 1.7cm tubular fluid collection along left kidney c/f possible small renal/perirenal abscess with areas of hypodensity c/f pyelonephritis vs worsening renal scarring, no hydronephrosis bilaterally, bladder wall thickening c/f cystitis vs chronic outlet obstruction, 97g prostate.    Perirenal collection not well defined and also <3cm, no indication for drainage of collection. More likely manifestation of ascending urinary tract infection that can be managed with medical admission and IV antibiotics.    Recommendations:  - No urologic intervention given size. Continue to treat with IV abx for now  - Medical admission for management of DKA  - F/u cultures  - Document PVR's, place forrester catheter if patient retaining  - Trend fevers  - Trend Cr > downtrending  - Rest of care per primary team        [address] [place] [address] [phone]

## 2024-12-01 NOTE — DISCHARGE NOTE PROVIDER - NSDCFUADDAPPT_GEN_ALL_CORE_FT
APPTS ARE READY TO BE MADE: [ ] YES    Best Family or Patient Contact (if needed):    Additional Information about above appointments (if needed):    1: PCP (Ramsey Huang [phone])  2: Podiatry  3: Endocrinology  4: Ophthalmology    Other comments or requests:

## 2024-12-01 NOTE — DISCHARGE NOTE PROVIDER - NSFOLLOWUPCLINICS_GEN_ALL_ED_FT
[facility]  Podiatry  [address] - [address] [place] [address]  Phone: [phone]  Fax:   Follow Up Time: 2 weeks    [facility]  Endocrinology  [address] [place] [address]  Phone: [phone]  Fax: [phone]  Follow Up Time: 2 weeks    [facility]  Ophthalmology  [address], [address]  [place] [address]  Phone: [phone]  Fax:   Follow Up Time: 2 weeks

## 2024-12-01 NOTE — H&P ADULT - PROBLEM SELECTOR PLAN 2
::Source: UTI vs Intraabdominal infection  -Tmax 101.9, Tachycardic to 11, WBC 10.84 (w/ IANC 9.04), Lactate 2.2, Glucose 569, BHB 2.4  -UA positive for mod LE, 87 WBC, moderate bacteria, >1000 glucose  -CTAP demonstrating 1.7cm tubular fluid collection along left kidney c/f possible small renal/perirenal abscess with areas of hypodensity c/f pyelonephritis vs worsening renal scarring, no hydronephrosis bilaterally, bladder wall thickening c/f cystitis vs chronic outlet obstruction  -Urology consulted in ED. Recommended no acute urological interventions.   -s/p 2L LR Bolus  -Broad spectrum Abx: Vanc + Zosyn   -Follow up Blood and urine cxs  -Trend lactate   -Vitals Q4H

## 2024-12-01 NOTE — H&P ADULT - HISTORY OF PRESENT ILLNESS
84-year-old male with PMHx of DM2, HTN, HLD, HIV, BPH, and spinal stenosis who presents to the ED with nausea, vomiting, and abdominal pain of 4 days duration. Per ED report, patient’s daughter was at bedside and patient speaks a dialect of Fijian language. Patient confused on arrival and fingerstick glucose was 522 on arrival. Patient has not been able to take his insulin and medications in the setting of these symptoms. History is limited and the writer was not able to reach the daughter again for more collateral information. Patient’s confusion is not baseline and the daughter brought the patient for this concern.     In ED, patient received 2L LR Bolus, Lantus 10 units, and Lispro 8 units x2. Glucose improved to 345. pH 7.37, AG 17, BHB 2.4 -> 2.1, A1c 11.7%, UA +LE, 87 WBC. Patient admitted to medicine for further management.

## 2024-12-01 NOTE — PHYSICAL THERAPY INITIAL EVALUATION ADULT - PERTINENT HX OF CURRENT PROBLEM, REHAB EVAL
Pt is an 84 year old male presenting with nausea, vomiting, and abdominal pain. CXR clear lungs. CT head negative for acute findings. CT A/P showed  enhancing tubular fluid collection along the posterior aspect of the mid left kidney and mild asymmetric left perinephric fluid/stranding; findings are suspicious for infectious process with possible small renal/perirenal abscess. In ED, patient found to be hyperglycemic and + UTI. Pt admitted for sepsis and hyperglycemia with ketosis in the setting of sepsis.

## 2024-12-01 NOTE — PROGRESS NOTE ADULT - PROBLEM SELECTOR PLAN 7
VTE PPx: Heparin SQ  GI PPx: Pantoprazole  Nutrition: NPO  Fluids: Dextrose 5% with 0.9% NS with 20mEq of KCL @150 cc/hr  Electrolytes: Maintain K>4, Mag >2, Phos > 3  Access: PIV  Dispo: pending clinical improvement -Continue with home Flomax, Finasteride, and Oxybutynin

## 2024-12-01 NOTE — DISCHARGE NOTE PROVIDER - NSDCCPCAREPLAN_GEN_ALL_CORE_FT
PRINCIPAL DISCHARGE DIAGNOSIS  Diagnosis: Sepsis  Assessment and Plan of Treatment: You came into the hospital with a fever. You were found to have an infection of your kidney. The urologists did not recommend any further treatment. You were treated with IV antibiotics during your time here.  Please return to the hospital if you experience fever, chills, severe headache, dizziness, lightheadedness, loss of consciousness, visual disturbance, chest pain, palpitations, shortness of breath,  abdominal pain, nausea, vomiting, diarrhea, blood in your vomit/stool/urine, burning with urination or change in urinary pattern, numbness, weakness, tingling, or other alarming symptoms.        SECONDARY DISCHARGE DIAGNOSES  Diagnosis: Type 2 diabetes mellitus with hyperglycemia, with long-term current use of insulin  Assessment and Plan of Treatment: You came into the hospital with high sugars. We consulted the endocrinologists who recommended ...     PRINCIPAL DISCHARGE DIAGNOSIS  Diagnosis: Sepsis  Assessment and Plan of Treatment: You came into the hospital with a fever. You were found to have an infection of your kidney, which was confirmed by imaging. The urologists did not recommend any further treatment. We consulted the infectious disease specialists and they recommended treatment IV antibiotics during your time here. Your infection was found to b caused by a strain of bacteria that is resistant to oral medications. As a result, a catheter was placed in your arm so that you would be be able to receive the remainder of your IV antibiotics at home. You were discharged home with this catheter and will be receiving IV antibiotics until 12/30.  Medications: Ertapenem 1g via IV midline catheter, to be infused daily until 12/30.  Follow-up:  - You will follow-up with your primary care physician, Dr. Ramsey Huang in 1-2 weeks.   - You will follow-up with Infectious Disease in 1-2 weeks to get repeat imaging of your kidneys.   Please return to the hospital if you experience fever, chills, severe headache, dizziness, lightheadedness, loss of consciousness, visual disturbance, chest pain, palpitations, shortness of breath,  abdominal pain, nausea, vomiting, diarrhea, blood in your vomit/stool/urine, burning with urination or change in urinary pattern, numbness, weakness, tingling, or other alarming symptoms.      SECONDARY DISCHARGE DIAGNOSES  Diagnosis: Type 2 diabetes mellitus with hyperglycemia, with long-term current use of insulin  Assessment and Plan of Treatment: You came into the hospital with high sugars. We consulted the endocrinologists who recommended starting you on a morning dose of a long-acting insulin as well as insulin doses before each meal, three times a day. Your sugars began to decrease as a result of the insulin regimen you were on. However, you reported that your appetite was low and so the insulin was decreased as well. The endocrinologists recommended adding another medication, called Tradjenta to help control your sugars. Eventually, your sugars returned to a healthy range in the hospital prior to your discharge.   Medications:   Please take the following medications: Tradjenta 5mg oral, 1x per day; Metformin 500mg oral,_______, and _____.   Follow-up:  - You will follow-up with an Endocrinologist in 1-2 weeks for further diabetes counseling.  - You will also follow-up with an Ophthalmologist in 1-2 weeks for vision testing given your history of diabetes.   Please return to the hospital if you experience fever, chills, severe headache, seizure, dizziness, lightheadedness, weakness, confusion, loss of consciousness, visual disturbance, chest pain, palpitations, shortness of breath, abdominal pain, nausea, vomiting, diarrhea, blood in your vomit/stool/urine, burning with urination or change in urinary pattern, numbness, weakness, tingling, or other alarming symptoms.     PRINCIPAL DISCHARGE DIAGNOSIS  Diagnosis: Sepsis  Assessment and Plan of Treatment: You came into the hospital with a fever. You were found to have an infection of your kidney, which was confirmed by imaging. The urologists did not recommend any further treatment. We consulted the infectious disease specialists and they recommended treatment IV antibiotics during your time here. Your infection was found to b caused by a strain of bacteria that is resistant to oral medications. As a result, a catheter was placed in your arm so that you would be be able to receive the remainder of your IV antibiotics at home. You were discharged home with this catheter and will be receiving IV antibiotics until 12/30.  Medications: Ertapenem 1g via IV midline catheter, to be infused daily until 12/30.  Follow-up:  - You will follow-up with your primary care physician, Dr. Ramsey Huang in 1-2 weeks.   - You will follow-up with Infectious Disease in 1-2 weeks to get repeat imaging of your kidneys.   Please return to the hospital if you experience fever, chills, severe headache, dizziness, lightheadedness, loss of consciousness, visual disturbance, chest pain, palpitations, shortness of breath,  abdominal pain, nausea, vomiting, diarrhea, blood in your vomit/stool/urine, burning with urination or change in urinary pattern, numbness, weakness, tingling, or other alarming symptoms.      SECONDARY DISCHARGE DIAGNOSES  Diagnosis: Type 2 diabetes mellitus with hyperglycemia, with long-term current use of insulin  Assessment and Plan of Treatment: You came into the hospital with high sugars. We consulted the endocrinologists who recommended starting you on a morning dose of a long-acting insulin as well as insulin doses before each meal, three times a day. Your sugars began to decrease as a result of the insulin regimen you were on. However, you reported that your appetite was low and so the insulin was decreased as well. The endocrinologists recommended adding another medication, called Tradjenta to help control your sugars. Eventually, your sugars returned to a healthy range in the hospital prior to your discharge.   Medications:   Please take the following medications: 8 units of Lantus in the morning; Tradjenta 5mg oral, 1x per day; Metformin 500mg oral, 2x/day.  Follow-up:  - You will follow-up with an Endocrinologist in 1-2 weeks for further diabetes counseling.  - You will also follow-up with an Ophthalmologist in 1-2 weeks for vision testing given your history of diabetes.   - You will follow-up with Podiatry in 1-2 weeks.   Please return to the hospital if you experience fever, chills, severe headache, seizure, dizziness, lightheadedness, weakness, confusion, loss of consciousness, visual disturbance, chest pain, palpitations, shortness of breath, abdominal pain, nausea, vomiting, diarrhea, blood in your vomit/stool/urine, burning with urination or change in urinary pattern, numbness, weakness, tingling, or other alarming symptoms.

## 2024-12-01 NOTE — H&P ADULT - NSHPPHYSICALEXAM_GEN_ALL_CORE
- GENERAL: Alert and oriented x 1 (name). Not place, time, and circumstances   - EYES: EOMI. Anicteric.  - HENT: No scleral icterus. No cervical lymphadenopathy.  - LUNGS: Clear to auscultation bilaterally. No accessory muscle use.  - CARDIOVASCULAR: Regular rate and rhythm. No murmur. No JVD.  - ABDOMEN: Soft, non-tender and non-distended. No palpable masses.  - EXTREMITIES: No edema. Non-tender.  - SKIN: No rashes or lesions. Warm.  - NEUROLOGIC: No focal neurological deficits. CN II-XII grossly intact, but not individually tested.  - PSYCHIATRIC: Confused.

## 2024-12-02 DIAGNOSIS — E11.65 TYPE 2 DIABETES MELLITUS WITH HYPERGLYCEMIA: ICD-10-CM

## 2024-12-02 LAB
-  CTX-M RESISTANCE MARKER: SIGNIFICANT CHANGE UP
-  ESBL: SIGNIFICANT CHANGE UP
-  K. PNEUMONIAE GROUP: SIGNIFICANT CHANGE UP
ALBUMIN SERPL ELPH-MCNC: 2.9 G/DL — LOW (ref 3.3–5)
ALP SERPL-CCNC: 98 U/L — SIGNIFICANT CHANGE UP (ref 40–120)
ALT FLD-CCNC: 8 U/L — SIGNIFICANT CHANGE UP (ref 4–41)
ANION GAP SERPL CALC-SCNC: 13 MMOL/L — SIGNIFICANT CHANGE UP (ref 7–14)
AST SERPL-CCNC: 16 U/L — SIGNIFICANT CHANGE UP (ref 4–40)
BILIRUB SERPL-MCNC: 0.3 MG/DL — SIGNIFICANT CHANGE UP (ref 0.2–1.2)
BUN SERPL-MCNC: 11 MG/DL — SIGNIFICANT CHANGE UP (ref 7–23)
CALCIUM SERPL-MCNC: 8.9 MG/DL — SIGNIFICANT CHANGE UP (ref 8.4–10.5)
CHLORIDE SERPL-SCNC: 99 MMOL/L — SIGNIFICANT CHANGE UP (ref 98–107)
CO2 SERPL-SCNC: 20 MMOL/L — LOW (ref 22–31)
CREAT SERPL-MCNC: 1.06 MG/DL — SIGNIFICANT CHANGE UP (ref 0.5–1.3)
EGFR: 69 ML/MIN/1.73M2 — SIGNIFICANT CHANGE UP
GLUCOSE BLDC GLUCOMTR-MCNC: 109 MG/DL — HIGH (ref 70–99)
GLUCOSE BLDC GLUCOMTR-MCNC: 72 MG/DL — SIGNIFICANT CHANGE UP (ref 70–99)
GLUCOSE BLDC GLUCOMTR-MCNC: 84 MG/DL — SIGNIFICANT CHANGE UP (ref 70–99)
GLUCOSE SERPL-MCNC: 310 MG/DL — HIGH (ref 70–99)
GRAM STN FLD: ABNORMAL
HCT VFR BLD CALC: 32.5 % — LOW (ref 39–50)
HGB BLD-MCNC: 10.3 G/DL — LOW (ref 13–17)
HIV-1 VIRAL LOAD RESULT: ABNORMAL
HIV1 RNA # SERPL NAA+PROBE: 36 — SIGNIFICANT CHANGE UP
HIV1 RNA SER-IMP: SIGNIFICANT CHANGE UP
HIV1 RNA SERPL NAA+PROBE-ACNC: ABNORMAL
HIV1 RNA SERPL NAA+PROBE-LOG#: 1.56 — SIGNIFICANT CHANGE UP
MAGNESIUM SERPL-MCNC: 1.3 MG/DL — LOW (ref 1.6–2.6)
MCHC RBC-ENTMCNC: 26.6 PG — LOW (ref 27–34)
MCHC RBC-ENTMCNC: 31.7 G/DL — LOW (ref 32–36)
MCV RBC AUTO: 84 FL — SIGNIFICANT CHANGE UP (ref 80–100)
METHOD TYPE: SIGNIFICANT CHANGE UP
NRBC # BLD: 0 /100 WBCS — SIGNIFICANT CHANGE UP (ref 0–0)
NRBC # FLD: 0 K/UL — SIGNIFICANT CHANGE UP (ref 0–0)
PHOSPHATE SERPL-MCNC: 1.8 MG/DL — LOW (ref 2.5–4.5)
PLATELET # BLD AUTO: 297 K/UL — SIGNIFICANT CHANGE UP (ref 150–400)
POTASSIUM SERPL-MCNC: 4.4 MMOL/L — SIGNIFICANT CHANGE UP (ref 3.5–5.3)
POTASSIUM SERPL-SCNC: 4.4 MMOL/L — SIGNIFICANT CHANGE UP (ref 3.5–5.3)
PROT SERPL-MCNC: 7.4 G/DL — SIGNIFICANT CHANGE UP (ref 6–8.3)
RBC # BLD: 3.87 M/UL — LOW (ref 4.2–5.8)
RBC # FLD: 13.6 % — SIGNIFICANT CHANGE UP (ref 10.3–14.5)
SODIUM SERPL-SCNC: 132 MMOL/L — LOW (ref 135–145)
WBC # BLD: 7.44 K/UL — SIGNIFICANT CHANGE UP (ref 3.8–10.5)
WBC # FLD AUTO: 7.44 K/UL — SIGNIFICANT CHANGE UP (ref 3.8–10.5)

## 2024-12-02 PROCEDURE — 99232 SBSQ HOSP IP/OBS MODERATE 35: CPT | Mod: GC

## 2024-12-02 PROCEDURE — 93010 ELECTROCARDIOGRAM REPORT: CPT

## 2024-12-02 PROCEDURE — 99223 1ST HOSP IP/OBS HIGH 75: CPT

## 2024-12-02 PROCEDURE — 99232 SBSQ HOSP IP/OBS MODERATE 35: CPT

## 2024-12-02 RX ORDER — ISOPROPYL ALCOHOL, BENZOCAINE .7; .06 ML/ML; ML/ML
0 SWAB TOPICAL
Qty: 100 | Refills: 1
Start: 2024-12-02

## 2024-12-02 RX ORDER — GLUCAGON INJECTION, SOLUTION 0.5 MG/.1ML
0 INJECTION, SOLUTION SUBCUTANEOUS
Qty: 1 | Refills: 0
Start: 2024-12-02

## 2024-12-02 RX ORDER — VANCOMYCIN HCL 900 MCG/MG
1000 POWDER (GRAM) MISCELLANEOUS
Refills: 0 | Status: DISCONTINUED | OUTPATIENT
Start: 2024-12-02 | End: 2024-12-02

## 2024-12-02 RX ORDER — INSULIN GLARGINE 100 [IU]/ML
0 INJECTION, SOLUTION SUBCUTANEOUS
Qty: 15 | Refills: 0
Start: 2024-12-02

## 2024-12-02 RX ORDER — INSULIN GLARGINE 100 [IU]/ML
12 INJECTION, SOLUTION SUBCUTANEOUS
Qty: 15 | Refills: 0
Start: 2024-12-02 | End: 2024-12-31

## 2024-12-02 RX ORDER — INSULIN GLARGINE 100 [IU]/ML
12 INJECTION, SOLUTION SUBCUTANEOUS
Refills: 0 | Status: DISCONTINUED | OUTPATIENT
Start: 2024-12-03 | End: 2024-12-04

## 2024-12-02 RX ORDER — ERTAPENEM 1 G/1
1000 INJECTION, POWDER, LYOPHILIZED, FOR SOLUTION INTRAMUSCULAR; INTRAVENOUS EVERY 24 HOURS
Refills: 0 | Status: DISCONTINUED | OUTPATIENT
Start: 2024-12-02 | End: 2024-12-06

## 2024-12-02 RX ORDER — AMLODIPINE BESYLATE 10 MG/1
5 TABLET ORAL DAILY
Refills: 0 | Status: DISCONTINUED | OUTPATIENT
Start: 2024-12-02 | End: 2024-12-06

## 2024-12-02 RX ORDER — SODIUM,POTASSIUM PHOSPHATES 278-250MG
1 POWDER IN PACKET (EA) ORAL EVERY 6 HOURS
Refills: 0 | Status: COMPLETED | OUTPATIENT
Start: 2024-12-02 | End: 2024-12-03

## 2024-12-02 RX ORDER — METOPROLOL TARTRATE 100 MG/1
25 TABLET, FILM COATED ORAL DAILY
Refills: 0 | Status: DISCONTINUED | OUTPATIENT
Start: 2024-12-02 | End: 2024-12-06

## 2024-12-02 RX ADMIN — Medication 1 PACKET(S): at 09:18

## 2024-12-02 RX ADMIN — Medication 5 MILLIGRAM(S): at 11:55

## 2024-12-02 RX ADMIN — Medication 5 MILLIGRAM(S): at 11:54

## 2024-12-02 RX ADMIN — INSULIN GLARGINE 15 UNIT(S): 100 INJECTION, SOLUTION SUBCUTANEOUS at 09:17

## 2024-12-02 RX ADMIN — LIDOCAINE 1 PATCH: 40 CREAM TOPICAL at 09:27

## 2024-12-02 RX ADMIN — Medication 1 PACKET(S): at 16:52

## 2024-12-02 RX ADMIN — TAMSULOSIN HYDROCHLORIDE 0.4 MILLIGRAM(S): 0.4 CAPSULE ORAL at 21:46

## 2024-12-02 RX ADMIN — Medication 5000 UNIT(S): at 05:11

## 2024-12-02 RX ADMIN — Medication 5 UNIT(S): at 09:13

## 2024-12-02 RX ADMIN — Medication 5000 UNIT(S): at 21:50

## 2024-12-02 RX ADMIN — Medication 10 MILLIGRAM(S): at 21:46

## 2024-12-02 RX ADMIN — AMLODIPINE BESYLATE 5 MILLIGRAM(S): 10 TABLET ORAL at 16:54

## 2024-12-02 RX ADMIN — ERTAPENEM 120 MILLIGRAM(S): 1 INJECTION, POWDER, LYOPHILIZED, FOR SOLUTION INTRAMUSCULAR; INTRAVENOUS at 04:44

## 2024-12-02 RX ADMIN — METOPROLOL TARTRATE 25 MILLIGRAM(S): 100 TABLET, FILM COATED ORAL at 16:54

## 2024-12-02 RX ADMIN — Medication 150 MILLILITER(S): at 09:08

## 2024-12-02 RX ADMIN — LIDOCAINE 1 PATCH: 40 CREAM TOPICAL at 23:05

## 2024-12-02 RX ADMIN — Medication 25 GRAM(S): at 09:18

## 2024-12-02 RX ADMIN — Medication 3: at 09:13

## 2024-12-02 RX ADMIN — PIPERACILLIN SODIUM AND TAZOBACTAM SODIUM 25 GRAM(S): 4; .5 INJECTION, POWDER, LYOPHILIZED, FOR SOLUTION INTRAVENOUS at 00:14

## 2024-12-02 RX ADMIN — Medication 5000 UNIT(S): at 14:22

## 2024-12-02 RX ADMIN — Medication 5 UNIT(S): at 17:40

## 2024-12-02 RX ADMIN — Medication 150 MILLILITER(S): at 02:16

## 2024-12-02 RX ADMIN — Medication 1 PACKET(S): at 21:49

## 2024-12-02 RX ADMIN — Medication 5 UNIT(S): at 12:51

## 2024-12-02 RX ADMIN — LIDOCAINE 1 PATCH: 40 CREAM TOPICAL at 19:17

## 2024-12-02 NOTE — PROGRESS NOTE ADULT - PROBLEM SELECTOR PLAN 4
-Hold home antihypertensives in the setting of sepsis (Amlodipine 5 mg , Metoprolol 25 mg ) HDS on arrival in ED. /70 (12/2 AM)     - Re-start home antihypertensives given low concern for hypotension i/s/o sepsis. (Amlodipine 5 mg , Metoprolol 25 mg )

## 2024-12-02 NOTE — PHARMACOTHERAPY INTERVENTION NOTE - COMMENTS
Recommended an ID consult in the setting of K. pneumoniae, ESBL bacteremia.     Kaia Miller, PharmD  Clinical Pharmacy Specialist, Infectious Diseases  Tele-Antimicrobial Stewardship Program (Tele-ASP)  Tele-ASP Phone: [phone]

## 2024-12-02 NOTE — PHARMACOTHERAPY INTERVENTION NOTE - COMMENTS
Recommended switching from piperacillin-tazobactam to ertapenem IV 1g q24hrs. Dose based on eGFR of 61mL/min/m2.    Kaia Miller, PharmD  Clinical Pharmacy Specialist, Infectious Diseases  Tele-Antimicrobial Stewardship Program (Tele-ASP)  Tele-ASP Phone: [phone]  Recommended switching from piperacillin-tazobactam to ertapenem IV 1g q24hrs. Dose based on eGFR of 61mL/min/m2. Of note, leukocytosis trending up. WBC: 10.84 mcg/dL     Kaia Miller, PharmD  Clinical Pharmacy Specialist, Infectious Diseases  Tele-Antimicrobial Stewardship Program (Tele-ASP)  Tele-ASP Phone: [phone]  Recommended switching from piperacillin-tazobactam to ertapenem IV 1g q24hrs in the setting of K. pneumoniae, ESBL bacteremia. Dose based on eGFR of 61mL/min/m2. Of note, leukocytosis trending up. WBC: 10.84 mcg/dL     Kaia Miller, PharmD  Clinical Pharmacy Specialist, Infectious Diseases  Tele-Antimicrobial Stewardship Program (Tele-ASP)  Tele-ASP Phone: [phone]

## 2024-12-02 NOTE — PROGRESS NOTE ADULT - PROBLEM SELECTOR PLAN 1
::Hyperglycemia with ketosis in the setting of sepsis  ::A1c 11.7%, Glucose 569 -> 345, Lactate 2.2 ->2.1, BHB 2.4 -> -> 2.1, pH 7.37  -Home regimen: Lantus (dose unclear)   -s/p 2L NS Bolus, Lantus 10 units, and Lispro 8 units x2 in ED  -Glucose improved to 217. Start Dextrose 5% with 0.9% NS with 20mEq of KCL @150 cc/hr  -NPO  -Lispro Corrective Q4H (mild)  -Start SQ Basal-Bolus regimen when patient is able to eat  -Endocrinology consultation in morning  -Glucose checks Q4 hours  -Hypoglycemia precautions ::Hyperglycemia with ketosis in the setting of sepsis  ::A1c 11.7%, Glucose 569 -> 345 --> 218, Lactate 2.2 ->2.1 -> 2.0, BHB 2.4 -> -> 2.1, pH 7.37  -Home regimen: Lantus (dose unclear)   -s/p 2L NS Bolus, Lantus 10 units, and Lispro 8 units x2 in ED  -Glucose improved to 217. Start Dextrose 5% with 0.9% NS with 20mEq of KCL @150 cc/hr  -Diet: consistent carb   -Lispro Corrective Q4H (mild)  SQ Basal-Bolus regimen   -Endocrinology consultation in morning  -Glucose checks Q4 hours  -Hypoglycemia precautions ::Hyperglycemia with ketosis in the setting of sepsis  ::A1c 11.7%, Glucose 569 -> 345 --> 218 --> 300. Lactate 2.2 ->2.1 -> 2.0, BHB 2.4 -> 2.1 --> 0.0, pH 7.37  -Home regimen: Lantus (dose unclear)   -s/p 2L NS Bolus, Lantus 10 units, and Lispro 8 units x2 in ED.   Glucose improved to 217.     Plan -   - d/c D5NS   - Diet: consistent carb   - Per Endo, 15U Lantus AM, 5U Admelog pre-meal TID  - Lispro Corrective Q4H (mild)  - FS glucose q4h  - Hypoglycemia precautions

## 2024-12-02 NOTE — PHARMACOTHERAPY INTERVENTION NOTE - COMMENTS
Recommended discontinuing empiric vancomycin in the setting of K. pneumoniae, ESBL bacteremia.     Kaia Miller, PharmD  Clinical Pharmacy Specialist, Infectious Diseases  Tele-Antimicrobial Stewardship Program (Tele-ASP)  Tele-ASP Phone: [phone]

## 2024-12-02 NOTE — PROGRESS NOTE ADULT - PROBLEM SELECTOR PLAN 2
::Source: UTI vs Intraabdominal infection  -Tmax 101.9, Tachycardic to 11, WBC 10.84 (w/ IANC 9.04), Lactate 2.2, Glucose 569, BHB 2.4  -UA positive for mod LE, 87 WBC, moderate bacteria, >1000 glucose  -CTAP demonstrating 1.7cm tubular fluid collection along left kidney c/f possible small renal/perirenal abscess with areas of hypodensity c/f pyelonephritis vs worsening renal scarring, no hydronephrosis bilaterally, bladder wall thickening c/f cystitis vs chronic outlet obstruction  -Urology consulted in ED. Recommended no acute urological interventions.   -s/p 2L LR Bolus  -Broad spectrum Abx: Vanc + Zosyn   -Follow up Blood and urine cxs  -Trend lactate   -Vitals Q4H ::Source: UTI vs Intraabdominal infection  -Tmax 101.9, Tachycardic to 11, WBC 10.84 --> 7.84., Lactate 2.2 --> 2.0 Glucose 569 ->-> 310, BHB 2.4 -> -> 0.0   -UA positive for mod LE, 87 WBC, moderate bacteria, >1000 glucose  -Blood/UCx pos. ESBL Klebsiella  - CTAP demonstrating 1.7cm tubular fluid collection along left kidney c/f possible small renal/perirenal abscess with areas of hypodensity c/f pyelonephritis vs worsening renal scarring, no hydronephrosis bilaterally, bladder wall thickening c/f cystitis vs chronic outlet obstruction  - s/p 2L LR Bolus    Plan  - Per Urology, repeat scan in 3-4 days, if findings persistent, re-consult.   - Per ID pharm, d/c vanc --> c/w Ertapenem 1g IV  - Ertapenem time course pending ID recs given no source ctrl  - Vitals Q4H

## 2024-12-02 NOTE — PROGRESS NOTE ADULT - ASSESSMENT
The patient is a 84y Male with PMH of T2DM, HIV who presented to the hospital with chest pain and noted to be hyperglycemic  Endocrinology consulted for hyperglycemia and uncontrolled Dm2.   Patient is high risk with high level decision making due to uncontrolled diabetes with A1C>11 and severe hyperglycemia to > 500s which places patient at high risk for cardiovascular and cerebrovascular events. Patient with lability of glucose requiring close monitoring and insulin adjustments.    #Uncontrolled Type 2 Diabetes Mellitus with hyperglycemia  - Follows with: PCP, no endocrinologist  - A1C with Estimated Average Glucose Result: 11.7 % (11-30-24)  - home regimen: Lantus 12 (stopped taking one week ago)   - eGFR: 61 mL/min/1.73m2 (12-01-24)  - Weight (kg): 63.5 (11-30-24)  - glucose elevated, no evidence of DKA on labs      INPATIENT PLAN:  - Pt was on dextrose IV 150ml/hr which has now been discontinued. Please notify endocrine if dextrose IVF is resumed as this may affect insulin doses   - glucose target 100-180mg/dl: above goal this am on dextrose IVF. Now within goal. Although  pt received premeal insulin without eating breakfast. Advised RN to make sure pt is going to eat prior to giving premeal insulin dosing.   - Continue Lantus 15 units q am (1st dose today) will see affect in am   - Continue Admelog 5 units TID before meals (HOLD if NPO or not eating)  - Recommend Low dose admelog correction scale TIDQAC and separate low dose scale QHS  - Please check FSG before meals and QHS, or q6h while NPO  - consistent carb diet when eating  - RD consult  - Hypoglycemia Protocol   - Provider to Rn for insulin pen review   - Advised daughter to bring in food from home but to follow a consistent carb diet, follow the hospital meal schedule, and to avoid pt eating double portions.       DISCHARGE PLANNING:  - Discharge recs pending clinical course, basal + metformin (if labs allow) + Tradjenta vs basal/bolus (final tbd based on inpt insulin requirements)   - GLP1a daughter declines she doesn't want any medicaiton that will cause weight loss and or reduce his appetite  - Insulin pen review prior to discharge   Please send Lantus solostar pen and humalog kwikpen as test script to check insurance coverage.  Ok to send with current doses and update prior to d/c    If Lantus not covered- can try alternating with one of following   tresiba/basaglar/toujeo/Levemir    If Humalog not covered- can try alternating with one of following  novolog/apidra/admelog/fiasp    Ensure patient has working glucometer, test strips, lancets, alcohol pads, and BD melonie pen needles    - For severe hypoglycemia: Please prescribe Glucose tablets 4G (take 4 tablets) or 15G tablets for blood sugar less than 70 mG/dL repeat fingerstick in 15 minutes.     - will need Endocrinology follow up. Please provide clinic info in DC paperwork for patient to make an appointment:  - [facility]: [address] [place] [address]. [address]. [place] [address]. Tel: [phone]; will clarify with daughter if ok to request for an appointment prior to discharge   - D/w Daughter Toney [phone]  - CDPAP will learn pen administrations and how to monitor glucose prior to discharge (as per daughter)  - Daughter unable to assist with insulin pen administration as she is blind   - SANIA and RAJWINDER for safe dc plan     #Hypertension  - Goal BP <130/80  - Management as per primary team  - check urine albumin level as outpatient    #Hyperlipidemia  - LDL goal <70  - Last LDL: 68 mg/dL (06-22-24)  - on atorvastatin 10 mg qhs  - check lipid panel as outpatient on a yearly basis    d/w Dr. Joe Olivo  Nurse Practitioner  Division of Endocrinology & Diabetes  In house pager #[phone]    If before 9AM or after 6PM, or on weekends/holidays, please call endocrine answering service for assistance ([phone]).For nonurgent matters email [email or web address] for assistance.  The patient is a 84y Male with PMH of T2DM, HIV who presented to the hospital with chest pain and noted to be hyperglycemic  Endocrinology consulted for hyperglycemia and uncontrolled Dm2.   Patient is high risk with high level decision making due to uncontrolled diabetes with A1C>11 and severe hyperglycemia to > 500s which places patient at high risk for cardiovascular and cerebrovascular events. Patient with lability of glucose requiring close monitoring and insulin adjustments.    #Uncontrolled Type 2 Diabetes Mellitus with hyperglycemia  - Follows with: PCP, no endocrinologist  - A1C with Estimated Average Glucose Result: 11.7 % (11-30-24)  - home regimen: Lantus 12 (stopped taking one week ago)   - eGFR: 61 mL/min/1.73m2 (12-01-24)  - Weight (kg): 63.5 (11-30-24)  - glucose elevated, no evidence of DKA on labs      INPATIENT PLAN:  - Pt was on dextrose IV 150ml/hr which has now been discontinued. Please notify endocrine if dextrose IVF is resumed as this may affect insulin doses   - glucose target 100-180mg/dl: above goal this am on dextrose IVF. Now within goal. Although  pt received premeal insulin without eating breakfast. Advised RN to make sure pt is going to eat prior to giving premeal insulin dosing. Now FS 84 as per RN pt ate 75% of his lunch.  Pt already received premeal insulin for dinner. Therefore will reduce insulin dose for am and check a 3 am FS. due to pts age would like to avoid hypoglycemia  - Decrease Lantus to 12 units q am 1st dose in am changed time to 10 am in order to adjust insulin doses if necessary   - Decrease Admelog to 4 units TID before meals (HOLD if NPO or not eating)  - Recommend Low dose admelog correction scale TIDQAC and separate low dose scale QHS  - Please check FSG before meals and QHS, or q6h while NPO  - consistent carb diet when eating  - RD consult  - Hypoglycemia Protocol   - Provider to Rn for insulin pen review   - Advised daughter to bring in food from home but to follow a consistent carb diet, follow the hospital meal schedule, and to avoid pt eating double portions.       DISCHARGE PLANNING:  - Discharge recs pending clinical course, basal + metformin (if labs allow) + Tradjenta vs basal/bolus (final tbd based on inpt insulin requirements)   - GLP1a daughter declines she doesn't want any medicaiton that will cause weight loss and or reduce his appetite  - Insulin pen review prior to discharge   Please send Lantus solostar pen and humalog kwikpen as test script to check insurance coverage.  Ok to send with current doses and update prior to d/c    If Lantus not covered- can try alternating with one of following   tresiba/basaglar/toujeo/Levemir    If Humalog not covered- can try alternating with one of following  novolog/apidra/admelog/fiasp    Ensure patient has working glucometer, test strips, lancets, alcohol pads, and BD melonie pen needles    - For severe hypoglycemia: Please prescribe Glucose tablets 4G (take 4 tablets) or 15G tablets for blood sugar less than 70 mG/dL repeat fingerstick in 15 minutes.     - will need Endocrinology follow up. Please provide clinic info in DC paperwork for patient to make an appointment:  - [facility]: [address] [place] [address]. [address]. [place] [address]. Tel: [phone]; will clarify with daughter if ok to request for an appointment prior to discharge   - D/w Daughter Toney [phone]  - CDPAP will learn pen administrations and how to monitor glucose prior to discharge (as per daughter)  - Daughter unable to assist with insulin pen administration as she is blind   - SANIA and CM for safe dc plan     #Hypertension  - Goal BP <130/80  - Management as per primary team  - check urine albumin level as outpatient    #Hyperlipidemia  - LDL goal <70  - Last LDL: 68 mg/dL (06-22-24)  - on atorvastatin 10 mg qhs  - check lipid panel as outpatient on a yearly basis    d/w Dr. Joe Olivo  Nurse Practitioner  Division of Endocrinology & Diabetes  In house pager #[phone]    If before 9AM or after 6PM, or on weekends/holidays, please call endocrine answering service for assistance ([phone]).For nonurgent matters email [email or web address] for assistance.

## 2024-12-02 NOTE — CONSULT NOTE ADULT - ASSESSMENT
83 yo M with PMHx of DM2, HTN, HLD, HIV, BPH, and spinal stenosis presented to the ED with nausea, vomiting, and abdominal pain.    Upon arrival, patient was febrile to Tmax 101.9F.  Labs with mils leukocytosis, hyperglycemia     Workup:   -CT AP w/IV cont: 1.7 cm peripherally enhancing tubular fluid collection along the posterior aspect of the mid left kidney, possibly in a subcapsular space. Mild asymmetric left perinephric fluid/stranding. Findings are suspicious for infectious process with possible small renal/perirenal abscess. Additional areas of new hypodensity in the left upper pole could reflect pyelonephritis versus interval worsening scarring.Nonspecific diffuse bladder wall thickening, which could reflect cystitis   or chronic outlet obstruction secondary to markedly enlarged prostate.    Workup:   -UA +   -Urine Cx : GNR  -Blood Cx: + Klebsiella ESBL   -CT AP: 1.7 cm peripherally enhancing tubular fluid collection along the posterior aspect of the mid left kidney, possibly in a subcapsular space. Mild asymmetric left perinephric fluid/stranding. Findings are suspicious for infectious process with possible small renal/perirenal abscess. Additional areas of new hypodensity in the left upper pole could reflect pyelonephritis versus interval worsening scarring.Nonspecific diffuse bladder wall thickening, which could reflect cystitis or chronic outlet obstruction secondary to markedly enlarged prostate.    #Klebsiella ESBL bacteremia   #L pyelonephritis with questionable small perirenal abscess  #Fever, leukocytosis  #Hx of HIV on Symtuza     Recommendations:  -Continue Ertapenem 1 g IV daily   -F/up final klebsiella sensitivity   -F/up urine Cx   -Urology following - no indication for drainage, Repeat imaging in 3-4 days  -Continue home HIV med Symtuza    Discussed with primary team     Aly Ortega MD  ID physician  [facility] Preferred  After 5pm/weekends call [phone]

## 2024-12-02 NOTE — CONSULT NOTE ADULT - SUBJECTIVE AND OBJECTIVE BOX
HPI:    83 yo M with PMHx of DM2, HTN, HLD, HIV, BPH, and spinal stenosis presented to the ED with nausea, vomiting, and abdominal pain of 4 days duration. Per ED report, patient’s daughter was at bedside and patient speaks a dialect of [place] language. Patient confused on arrival and fingerstick glucose was 522 on arrival. Patient has not been able to take his insulin and medications in the setting of these symptoms. History is limited and the writer was not able to reach the daughter again for more collateral information. Patient’s confusion is not baseline and the daughter brought the patient for this concern. In ED, patient received 2L LR Bolus, Lantus 10 units, and Lispro 8 units x2. Glucose improved to 345. pH 7.37, AG 17, BHB 2.4 -> 2.1, A1c 11.7%, UA +LE, 87 WBC. Patient admitted to medicine for further management.      ID consulted for bacteremia.     REVIEW OF SYSTEMS  [  ] ROS unobtainable because:    [X] All other systems negative except as noted below    Constitutional:  [ ] fever [ ] chills  [ ] weight loss  [ ]night sweat  [ ]poor appetite/PO intake [ ]fatigue   Skin:  [ ] rash [ ] phlebitis	  Eyes: [ ] icterus [ ] pain  [ ] discharge	  ENMT: [ ] sore throat  [ ] thrush [ ] ulcers [ ] exudates [ ]anosmia  Respiratory: [ ] dyspnea [ ] hemoptysis [ ] cough [ ] sputum	  Cardiovascular:  [ ] chest pain [ ] palpitations [ ] edema	  Gastrointestinal:  [ ] nausea [ ] vomiting [ ] diarrhea [ ] constipation [ ] pain	  Genitourinary:  [ ] dysuria [ ] frequency [ ] hematuria [ ] discharge [X ] L flank pain  [ ] incontinence  Musculoskeletal:  [ ] myalgias [ ] arthralgias [ ] arthritis  [ ] back pain  Neurological:  [ ] headache [ ] weakness [ ] seizures  [ ] confusion/altered mental status    prior hospital charts reviewed [V]  primary team notes reviewed [V]  other consultant notes reviewed [V]    PAST MEDICAL & SURGICAL HISTORY:  Diabetes      HTN (hypertension)      HIV infection      Stage 3 chronic kidney disease      BPH (benign prostatic hyperplasia)      No significant past surgical history          SOCIAL HISTORY:  - Denied smoking/vaping/alcohol/recreational drug use    FAMILY HISTORY:      Allergies  No Known Allergies        ANTIMICROBIALS:  ertapenem  IVPB 1000 every 24 hours      ANTIMICROBIALS (past 90 days):  MEDICATIONS  (STANDING):  ertapenem  IVPB   120 mL/Hr IV Intermittent (12-02-24 @ 04:44)    meropenem  IVPB   100 mL/Hr IV Intermittent (12-01-24 @ 04:30)    piperacillin/tazobactam IVPB..   25 mL/Hr IV Intermittent (12-02-24 @ 00:14)   25 mL/Hr IV Intermittent (12-01-24 @ 18:29)    piperacillin/tazobactam IVPB...   200 mL/Hr IV Intermittent (11-30-24 @ 21:03)    vancomycin  IVPB   250 mL/Hr IV Intermittent (12-01-24 @ 21:44)    vancomycin  IVPB.   250 mL/Hr IV Intermittent (11-30-24 @ 21:50)        OTHER MEDS:   MEDICATIONS  (STANDING):  acetaminophen     Tablet .. 650 every 6 hours PRN  amLODIPine   Tablet 5 daily  atorvastatin 10 at bedtime  dextrose 50% Injectable 25 once  dextrose Oral Gel 15 once PRN  finasteride 5 daily  glucagon  Injectable 1 once  heparin   Injectable 5000 every 8 hours  influenza  Vaccine (HIGH DOSE) 0.5 once  insulin glargine Injectable (LANTUS) 15 before breakfast  insulin lispro (ADMELOG) corrective regimen sliding scale  three times a day before meals  insulin lispro (ADMELOG) corrective regimen sliding scale  at bedtime  insulin lispro Injectable (ADMELOG) 5 three times a day before meals  metoprolol succinate ER 25 daily  oxybutynin XL 5 daily  tamsulosin 0.4 at bedtime      VITALS:  Vital Signs Last 24 Hrs  T(F): 97.8 (12-02-24 @ 15:01), Max: 101.9 (11-30-24 @ 20:11)    Vital Signs Last 24 Hrs  HR: 93 (12-02-24 @ 15:01) (90 - 98)  BP: 127/54 (12-02-24 @ 15:01) (127/54 - 145/70)  RR: 18 (12-02-24 @ 15:01)  SpO2: 97% (12-02-24 @ 15:01) (97% - 100%)  Wt(kg): --    EXAM:  Physical Exam:  Constitutional: comfortable  LUNGS:  CTA  CVS:  normal S1, S2, no murmur  Abd:  soft, +suprapubic tenderness, L flank tenderness  Ext:  no edema  Vascular:  IV site no erythema tenderness or discharge  Neuro: AAO X 3    Labs:                        10.3   7.44  )-----------( 297      ( 02 Dec 2024 06:05 )             32.5     12-02    132[L]  |  99  |  11  ----------------------------<  310[H]  4.4   |  20[L]  |  1.06    Ca    8.9      02 Dec 2024 06:05  Phos  1.8     12-02  Mg     1.30     12-02    TPro  7.4  /  Alb  2.9[L]  /  TBili  0.3  /  DBili  x   /  AST  16  /  ALT  8   /  AlkPhos  98  12-02      WBC Trend:  WBC Count: 7.44 (12-02-24 @ 06:05)  WBC Count: 10.84 (11-30-24 @ 19:47)      Auto Neutrophil #: 9.04 K/uL (11-30-24 @ 19:47)  Auto Neutrophil #: 6.37 K/uL (09-04-24 @ 05:38)  Auto Neutrophil #: 13.36 K/uL (09-03-24 @ 12:00)  Auto Neutrophil #: 3.26 K/uL (09-01-24 @ 05:10)  Auto Neutrophil #: 3.52 K/uL (08-30-24 @ 18:15)      Creatine Trend:  Creatinine: 1.06 (12-02)  Creatinine: 1.17 (12-01)  Creatinine: 1.19 (11-30)  Creatinine: 1.34 (11-30)      Liver Biochemical Testing Trend:  Alanine Aminotransferase (ALT/SGPT): 8 (12-02)  Alanine Aminotransferase (ALT/SGPT): 6 (11-30)  Alanine Aminotransferase (ALT/SGPT): 21 (09-06)  Alanine Aminotransferase (ALT/SGPT): 26 (09-05)  Alanine Aminotransferase (ALT/SGPT): 21 (09-04)  Aspartate Aminotransferase (AST/SGOT): 16 (12-02-24 @ 06:05)  Aspartate Aminotransferase (AST/SGOT): 8 (11-30-24 @ 19:47)  Aspartate Aminotransferase (AST/SGOT): 17 (09-06-24 @ 05:44)  Aspartate Aminotransferase (AST/SGOT): 26 (09-05-24 @ 05:12)  Aspartate Aminotransferase (AST/SGOT): 25 (09-04-24 @ 05:38)  Bilirubin Total: 0.3 (12-02)  Bilirubin Total: 0.8 (11-30)  Bilirubin Total: 0.3 (09-06)  Bilirubin Total: 0.3 (09-05)  Bilirubin Total: 0.3 (09-04)      Trend LDH      Auto Eosinophil %: 0.0 % (11-30-24 @ 19:47)      Urinalysis Basic - ( 02 Dec 2024 06:05 )    Color: x / Appearance: x / SG: x / pH: x  Gluc: 310 mg/dL / Ketone: x  / Bili: x / Urobili: x   Blood: x / Protein: x / Nitrite: x   Leuk Esterase: x / RBC: x / WBC x   Sq Epi: x / Non Sq Epi: x / Bacteria: x        MICROBIOLOGY:  Vancomycin Level, Trough: 4.5 (12-01 @ 19:40)    MRSA PCR Result.: NotDetec (08-31-24 @ 12:52)  MRSA PCR Result.: NotDetec (08-05-24 @ 19:00)  MRSA PCR Result.: NotDetec (07-06-24 @ 18:58)      Culture - Urine (collected 30 Nov 2024 23:28)  Source: Clean Catch  Preliminary Report:    50,000 - 99,000 CFU/mL Gram Negative Rods    Urinalysis with Rflx Culture (collected 30 Nov 2024 23:28)    Culture - Blood (collected 30 Nov 2024 21:00)  Source: .Blood BLOOD  Preliminary Report:    Growth in aerobic bottle: Gram Negative Rods    Direct identification is available within approximately 3-5    hours either by Blood Panel Multiplexed PCR or Direct    MALDI-TOF. Details: [email or web address]  Organism: Blood Culture PCR  Organism: Blood Culture PCR    Sensitivities:      Method Type: PCR      -  K. pneumoniae group: Detec (K. pneumoniae, K. quasipneumoniae, K. variicola)      -  ESBL: Detec      -  CTX-M Resistance Marker: Detec    Culture - Blood (collected 30 Nov 2024 20:40)  Source: .Blood BLOOD  Preliminary Report:    No growth at 24 hours    Culture - Blood (collected 03 Sep 2024 12:00)  Source: .Blood Blood-Peripheral  Final Report:    No growth at 5 days    Culture - Blood (collected 03 Sep 2024 12:00)  Source: .Blood Blood-Peripheral  Final Report:    No growth at 5 days    Culture - Urine (collected 30 Aug 2024 22:22)  Source: Clean Catch Clean Catch (Midstream)  Final Report:    >100,000 CFU/ml Klebsiella pneumoniae ESBL  Organism: Klebsiella pneumoniae ESBL  Organism: Klebsiella pneumoniae ESBL    Sensitivities:      Method Type: ZAIDA      -  Ampicillin: R >16 These ampicillin results predict results for amoxicillin      -  Ampicillin/Sulbactam: I 16/8      -  Aztreonam: R 16      -  Cefazolin: R >16 For uncomplicated UTI with K. pneumoniae, E. coli, or P. mirablis: ZAIDA <=16 is sensitive and ZAIDA >=32 is resistant. This also predicts results for oral agents cefaclor, cefdinir, cefpodoxime, cefprozil, cefuroxime axetil, cephalexin and locarbef for uncomplicated UTI. Note that some isolates may be susceptible to these agents while testing resistant to cefazolin.      -  Cefepime: R 8      -  Ceftriaxone: R >32      -  Cefuroxime: R >16      -  Ciprofloxacin: S <=0.25      -  Ertapenem: S <=0.5      -  Gentamicin: S <=2      -  Imipenem: S <=1      -  Levofloxacin: S <=0.5      -  Meropenem: S <=1      -  Nitrofurantoin: S <=32 Should not be used to treat pyelonephritis      -  Piperacillin/Tazobactam: S <=8      -  Tobramycin: S <=2      -  Trimethoprim/Sulfamethoxazole: R >2/38    Culture - Urine (collected 22 Aug 2024 09:37)  Source: Catheterized Catheterized  Final Report:    10,000 - 49,000 CFU/mL Candida parapsilosis "Susceptibilities not    performed"    Culture - Urine (collected 05 Aug 2024 07:42)  Source: Catheterized Catheterized  Final Report:    >=3 organisms. Probable collection contamination.    Culture - Urine (collected 05 Aug 2024 00:23)  Source: Catheterized  Final Report:    >=3 organisms. Probable collection contamination.        ABS CD4: 599 cells/uL (07-05-24 @ 12:03)    HIV-1 RNA Quantitative, Viral Load: 36 (12-01-24 @ 05:27)  HIV-1 Viral Load Result: DET. (12-01-24 @ 05:27)  HIV-1 RNA Quantitative, Viral Load: DET. <30 copies/mL (07-04-24 @ 06:04)  HIV-1 Viral Load Result: DET. (07-04-24 @ 06:04)                Rapid RVP Result: NotDetec (11-30 @ 19:28)      Troponin T, High Sensitivity Result: 29 (12-01)  Troponin T, High Sensitivity Result: 24 (11-30)    Lactate, Blood: 2.0 (12-01 @ 05:27)  Blood Gas Venous - Lactate: 1.9 (12-01 @ 05:27)  Blood Gas Venous - Lactate: 2.1 (11-30 @ 22:43)  Blood Gas Venous - Lactate: 2.2 (11-30 @ 19:47)    A1C with Estimated Average Glucose Result: 11.7 % (11-30-24 @ 19:47)  A1C with Estimated Average Glucose Result: 11.9 % (07-03-24 @ 10:26)  A1C with Estimated Average Glucose Result: 13.3 % (06-21-24 @ 09:30)    RADIOLOGY:  imaging below personally reviewed      < from: CT Abdomen and Pelvis w/ IV Cont (12.01.24 @ 00:47) >  IMPRESSION:  1.7 cm peripherally enhancing tubular fluid collection along the   posterior aspect of the mid left kidney, possibly in a subcapsular space.   Mild asymmetric left perinephric fluid/stranding. Findings are suspicious   for infectious process with possible small renal/perirenal abscess.   Additional areas of new hypodensity in the left upper pole could reflect   pyelonephritis versus interval worsening scarring.    Nonspecific diffuse bladder wall thickening, which could reflect cystitis   or chronic outlet obstruction secondary to markedly enlarged prostate.  2.    Clinical correlation and correlation with urinalysis is recommended.   Close interval follow-up imaging is recommended.    --- End of Report ---        < end of copied text >

## 2024-12-02 NOTE — PROGRESS NOTE ADULT - SUBJECTIVE AND OBJECTIVE BOX
Patient is a 84y old  Male who presents with a chief complaint of Sepsis; Hyperglycemia (01 Dec 2024 16:45)      SUBJECTIVE / OVERNIGHT EVENTS:  Patient seen and evaluated at bedside.    Denies any fevers, chills, CP, or SOB.      REVIEW OF SYSTEMS:    CONSTITUTIONAL:  No weakness, fevers or chills  EYES/ENT:  No visual changes;  No vertigo or throat pain   NECK:  No pain or stiffness  RESPIRATORY:  No cough, wheezing, hemoptysis; No shortness of breath  CARDIOVASCULAR:  No chest pain or palpitations  GASTROINTESTINAL:  No abdominal or epigastric pain. No nausea, vomiting, or hematemesis; No diarrhea or constipation. No melena or hematochezia.  GENITOURINARY:  No dysuria, frequency or hematuria  NEUROLOGICAL:  No numbness or weakness  SKIN:  No itching, rashes        acetaminophen     Tablet .. 650 milliGRAM(s) Oral every 6 hours PRN Temp greater or equal to 38C (100.4F), Mild Pain (1 - 3)  atorvastatin 10 milliGRAM(s) Oral at bedtime  dextrose 5% + sodium chloride 0.45% with potassium chloride 20 mEq/L 1000 milliLiter(s) IV Continuous <Continuous>  dextrose 5%. 1000 milliLiter(s) IV Continuous <Continuous>  dextrose 5%. 1000 milliLiter(s) IV Continuous <Continuous>  dextrose 50% Injectable 25 Gram(s) IV Push once  dextrose Oral Gel 15 Gram(s) Oral once PRN Blood Glucose LESS THAN 70 milliGRAM(s)/deciliter  ertapenem  IVPB 1000 milliGRAM(s) IV Intermittent every 24 hours  finasteride 5 milliGRAM(s) Oral daily  glucagon  Injectable 1 milliGRAM(s) IntraMuscular once  heparin   Injectable 5000 Unit(s) SubCutaneous every 8 hours  influenza  Vaccine (HIGH DOSE) 0.5 milliLiter(s) IntraMuscular once  insulin glargine Injectable (LANTUS) 15 Unit(s) SubCutaneous before breakfast  insulin lispro (ADMELOG) corrective regimen sliding scale   SubCutaneous three times a day before meals  insulin lispro (ADMELOG) corrective regimen sliding scale   SubCutaneous at bedtime  insulin lispro Injectable (ADMELOG) 5 Unit(s) SubCutaneous three times a day before meals  lidocaine   4% Patch 1 Patch Transdermal every 24 hours  oxybutynin XL 5 milliGRAM(s) Oral daily  tamsulosin 0.4 milliGRAM(s) Oral at bedtime  vancomycin  IVPB 1000 milliGRAM(s) IV Intermittent <User Schedule>        Vital Signs Last 24 Hrs  T(C): 37.1 (02 Dec 2024 05:09), Max: 37.5 (01 Dec 2024 21:49)  T(F): 98.7 (02 Dec 2024 05:09), Max: 99.5 (01 Dec 2024 21:49)  HR: 98 (02 Dec 2024 05:09) (90 - 98)  BP: 145/70 (02 Dec 2024 05:09) (129/63 - 145/70)  BP(mean): --  RR: 18 (02 Dec 2024 05:09) (17 - 18)  SpO2: 100% (02 Dec 2024 05:09) (100% - 100%)    Parameters below as of 02 Dec 2024 05:09  Patient On (Oxygen Delivery Method): room air          PHYSICAL EXAM:  GENERAL: NAD, lying in bed comfortably, conversational  HEAD:  Atraumatic, normocephalic  EYES: EOMI, PERRLA, conjunctiva and sclera clear  NECK: Supple, trachea midline, no JVD  HEART: +S1/S2, RRR, no murmurs, rubs, or gallops  LUNGS: Unlabored respirations. CTA b/l, no crackles, wheezing, or rhonchi  ABDOMEN: Soft, NTND, +BS. No masses or hernia palpated  EXTREMITIES: 2+ peripheral pulses bilaterally. No clubbing, cyanosis, or edema  MSK: no gross deformity in extremities, no step off or deformity in C/T/L spine, normal muscle strength/tone  NERVOUS SYSTEM: CN II-XII intact, A&Ox3, moving all extremities spontaneously, no focal deficits, sensation intact and equal in b/l extremities  SKIN: No rashes or lesions      LABS:                        10.3   7.44  )-----------( 297      ( 02 Dec 2024 06:05 )             32.5     Hgb Trend: 10.3<--, 11.6<--  12-02    132[L]  |  99  |  11  ----------------------------<  310[H]  4.4   |  20[L]  |  1.06    Ca    8.9      02 Dec 2024 06:05  Phos  1.8     12-02  Mg     1.30     12-02    TPro  7.4  /  Alb  2.9[L]  /  TBili  0.3  /  DBili  x   /  AST  16  /  ALT  8   /  AlkPhos  98  12-02    Creatinine Trend: 1.06<--, 1.17<--, 1.19<--, 1.34<--  CARDIAC MARKERS ( 01 Dec 2024 05:27 )  x     / x     / x     / x     / <1.0 ng/mL      LIVER FUNCTIONS - ( 02 Dec 2024 06:05 )  Alb: 2.9 g/dL / Pro: 7.4 g/dL / ALK PHOS: 98 U/L / ALT: 8 U/L / AST: 16 U/L / GGT: x             Urinalysis Basic - ( 02 Dec 2024 06:05 )    Color: x / Appearance: x / SG: x / pH: x  Gluc: 310 mg/dL / Ketone: x  / Bili: x / Urobili: x   Blood: x / Protein: x / Nitrite: x   Leuk Esterase: x / RBC: x / WBC x   Sq Epi: x / Non Sq Epi: x / Bacteria: x     Patient is a 84y old  Male who presents with a chief complaint of Sepsis; Hyperglycemia (01 Dec 2024 16:45)      SUBJECTIVE / OVERNIGHT EVENTS: NAONE.   Patient seen and evaluated at bedside. Denies any fevers, chills, CP, or SOB.      REVIEW OF SYSTEMS:     CONSTITUTIONAL:  No weakness, fevers or chills  EYES/ENT:  No visual changes;  No vertigo or throat pain   NECK:  No pain or stiffness  RESPIRATORY:  No cough, wheezing, hemoptysis; No shortness of breath  CARDIOVASCULAR:  No chest pain or palpitations  GASTROINTESTINAL:  No abdominal or epigastric pain. No nausea, vomiting, or hematemesis; No diarrhea or constipation. No melena or hematochezia.  GENITOURINARY:  No dysuria, frequency or hematuria  NEUROLOGICAL:  No numbness or weakness  SKIN:  No itching, rashes        acetaminophen     Tablet .. 650 milliGRAM(s) Oral every 6 hours PRN Temp greater or equal to 38C (100.4F), Mild Pain (1 - 3)  atorvastatin 10 milliGRAM(s) Oral at bedtime  dextrose 5% + sodium chloride 0.45% with potassium chloride 20 mEq/L 1000 milliLiter(s) IV Continuous <Continuous>  dextrose 5%. 1000 milliLiter(s) IV Continuous <Continuous>  dextrose 5%. 1000 milliLiter(s) IV Continuous <Continuous>  dextrose 50% Injectable 25 Gram(s) IV Push once  dextrose Oral Gel 15 Gram(s) Oral once PRN Blood Glucose LESS THAN 70 milliGRAM(s)/deciliter  ertapenem  IVPB 1000 milliGRAM(s) IV Intermittent every 24 hours  finasteride 5 milliGRAM(s) Oral daily  glucagon  Injectable 1 milliGRAM(s) IntraMuscular once  heparin   Injectable 5000 Unit(s) SubCutaneous every 8 hours  influenza  Vaccine (HIGH DOSE) 0.5 milliLiter(s) IntraMuscular once  insulin glargine Injectable (LANTUS) 15 Unit(s) SubCutaneous before breakfast  insulin lispro (ADMELOG) corrective regimen sliding scale   SubCutaneous three times a day before meals  insulin lispro (ADMELOG) corrective regimen sliding scale   SubCutaneous at bedtime  insulin lispro Injectable (ADMELOG) 5 Unit(s) SubCutaneous three times a day before meals  lidocaine   4% Patch 1 Patch Transdermal every 24 hours  oxybutynin XL 5 milliGRAM(s) Oral daily  tamsulosin 0.4 milliGRAM(s) Oral at bedtime  vancomycin  IVPB 1000 milliGRAM(s) IV Intermittent <User Schedule>        Vital Signs Last 24 Hrs  T(C): 37.1 (02 Dec 2024 05:09), Max: 37.5 (01 Dec 2024 21:49)  T(F): 98.7 (02 Dec 2024 05:09), Max: 99.5 (01 Dec 2024 21:49)  HR: 98 (02 Dec 2024 05:09) (90 - 98)  BP: 145/70 (02 Dec 2024 05:09) (129/63 - 145/70)  BP(mean): --  RR: 18 (02 Dec 2024 05:09) (17 - 18)  SpO2: 100% (02 Dec 2024 05:09) (100% - 100%)    Parameters below as of 02 Dec 2024 05:09  Patient On (Oxygen Delivery Method): room air      PHYSICAL EXAM:  GENERAL: Awake, alert and interactive, NAD, appears comfortable  NEURO: A&Ox2, no focal deficits, 5/5 strength in all ext, reflexes 2+ throughout, CN 2-12 intact  HEENT: Normocephalic, atraumatic, no conjunctivitis or scleral icterus, oral mucosa moist, no oral lesions noted  NECK: Supple, no LAD, no JVD, thyroid not palpable  CARDIAC: Regular rate and rhythm, +S1/S2, no murmurs/rubs/gallops  PULM: Breathing comfortably on RA, clear to auscultation bilaterally, no wheezes/rales/rhonchi  ABDOMEN: Soft, nontender, nondistended, +bs, no hepatosplenomegaly, no rebound tenderness or fluid wave, no CVA tenderness  SKIN: Warm and dry, no rashes, lesions  VASC: Cap refill <2 sec, 2+ peripheral pulses, no edema, no LE tenderness  Psych: Appropriate affect      LABS:                        10.3   7.44  )-----------( 297      ( 02 Dec 2024 06:05 )             32.5     Hgb Trend: 10.3<--, 11.6<--  12-02    132[L]  |  99  |  11  ----------------------------<  310[H]  4.4   |  20[L]  |  1.06    Ca    8.9      02 Dec 2024 06:05  Phos  1.8     12-02  Mg     1.30     12-02    TPro  7.4  /  Alb  2.9[L]  /  TBili  0.3  /  DBili  x   /  AST  16  /  ALT  8   /  AlkPhos  98  12-02    Creatinine Trend: 1.06<--, 1.17<--, 1.19<--, 1.34<--  CARDIAC MARKERS ( 01 Dec 2024 05:27 )  x     / x     / x     / x     / <1.0 ng/mL      LIVER FUNCTIONS - ( 02 Dec 2024 06:05 )  Alb: 2.9 g/dL / Pro: 7.4 g/dL / ALK PHOS: 98 U/L / ALT: 8 U/L / AST: 16 U/L / GGT: x             Urinalysis Basic - ( 02 Dec 2024 06:05 )    Color: x / Appearance: x / SG: x / pH: x  Gluc: 310 mg/dL / Ketone: x  / Bili: x / Urobili: x   Blood: x / Protein: x / Nitrite: x   Leuk Esterase: x / RBC: x / WBC x   Sq Epi: x / Non Sq Epi: x / Bacteria: x

## 2024-12-02 NOTE — CHART NOTE - NSCHARTNOTEFT_GEN_A_CORE
84yoM w PMH DM, HTN, HIV, CKD3, BPH who presents to ED with abdominal and chest pain, nausea, vomiting, subjective fevers, altered mental status. Urology consulted given CT findings of possible small L renal abscess. In ED, patient is febrile to 101.9, and tachycardic to 111, but otherwise vitally stable. WBC 10.8, Hct 34.5, Cr 1.19 from 1.3, Glucose initially >500 now 345, lactate 2.1, UA positive for mod LE, 87 WBC, moderate bacteria, >1000 glucose. CTAP demonstrating 1.7cm tubular fluid collection along left kidney c/f possible small renal/perirenal abscess with areas of hypodensity c/f pyelonephritis vs worsening renal scarring, no hydronephrosis bilaterally, bladder wall thickening c/f cystitis vs chronic outlet obstruction, 97g prostate.    Recommendations:  - Perirenal collection not well defined and also <3cm, no indication for drainage of collection. More likely manifestation of ascending urinary tract infection that can be managed with IV antibiotics  - Repeat imaging in 3-4 days, if not improved please call urology back  - F/u cultures - urine pending, blood with ESBL klebsiella  - Trend Cr > downtrending  - Rest of care per primary team  - Urology to sign off at this time    Discussed with Dr. Ifeanyi Suggs [facility]  [address] [place] [phone]

## 2024-12-02 NOTE — PROGRESS NOTE ADULT - SUBJECTIVE AND OBJECTIVE BOX
Chief Complaint: Type 2 DM     History: Pt seen at bedside. Pt tolerating oral diet. Pt denies nausea and vomiting/any signs of hypoglycemia. Pt reports an adequate appetite. Pt doesn't like the hospital food.     MEDICATIONS  (STANDING):  amLODIPine   Tablet 5 milliGRAM(s) Oral daily  atorvastatin 10 milliGRAM(s) Oral at bedtime  dextrose 5%. 1000 milliLiter(s) (50 mL/Hr) IV Continuous <Continuous>  dextrose 5%. 1000 milliLiter(s) (100 mL/Hr) IV Continuous <Continuous>  dextrose 50% Injectable 25 Gram(s) IV Push once  ertapenem  IVPB 1000 milliGRAM(s) IV Intermittent every 24 hours  finasteride 5 milliGRAM(s) Oral daily  glucagon  Injectable 1 milliGRAM(s) IntraMuscular once  heparin   Injectable 5000 Unit(s) SubCutaneous every 8 hours  influenza  Vaccine (HIGH DOSE) 0.5 milliLiter(s) IntraMuscular once  insulin glargine Injectable (LANTUS) 15 Unit(s) SubCutaneous before breakfast  insulin lispro (ADMELOG) corrective regimen sliding scale   SubCutaneous three times a day before meals  insulin lispro (ADMELOG) corrective regimen sliding scale   SubCutaneous at bedtime  insulin lispro Injectable (ADMELOG) 5 Unit(s) SubCutaneous three times a day before meals  lidocaine   4% Patch 1 Patch Transdermal every 24 hours  metoprolol succinate ER 25 milliGRAM(s) Oral daily  oxybutynin XL 5 milliGRAM(s) Oral daily  potassium phosphate / sodium phosphate Powder (PHOS-NaK) 1 Packet(s) Oral every 6 hours  tamsulosin 0.4 milliGRAM(s) Oral at bedtime    MEDICATIONS  (PRN):  acetaminophen     Tablet .. 650 milliGRAM(s) Oral every 6 hours PRN Temp greater or equal to 38C (100.4F), Mild Pain (1 - 3)  dextrose Oral Gel 15 Gram(s) Oral once PRN Blood Glucose LESS THAN 70 milliGRAM(s)/deciliter      Allergies: No Known Allergies    Review of Systems:  Respiratory: No SOB, no cough  GI: No nausea, vomiting, abdominal pain  Endocrine: no polyuria, polydipsia    PHYSICAL EXAM:  VITALS: T(C): 36.6 (12-02-24 @ 15:01)  T(F): 97.8 (12-02-24 @ 15:01), Max: 99.5 (12-01-24 @ 21:49)  HR: 93 (12-02-24 @ 15:01) (90 - 98)  BP: 127/54 (12-02-24 @ 15:01) (127/54 - 145/70)  RR:  (17 - 18)  SpO2:  (97% - 100%)  Wt(kg): --  GENERAL: NAD, well-groomed, well-developed  RESPIRATORY: No labored breathing   GI: Soft, nontender, non distended: epigastric/chest pain; primary team made aware   PSYCH: Alert and oriented x 3, normal affect, normal mood      CAPILLARY BLOOD GLUCOSE  POCT Blood Glucose.: 109 mg/dL (02 Dec 2024 12:39)  POCT Blood Glucose.: 300 mg/dL (02 Dec 2024 09:11)  POCT Blood Glucose.: 218 mg/dL (01 Dec 2024 22:08)  POCT Blood Glucose.: 219 mg/dL (01 Dec 2024 17:38)    A1C with Estimated Average Glucose (11.30.24 @ 19:47)    A1C with Estimated Average Glucose Result: 11.7   Estimated Average Glucose: 289      12-02    132[L]  |  99  |  11  ----------------------------<  310[H]  4.4   |  20[L]  |  1.06    eGFR: 69    Ca    8.9      12-02  Mg     1.30     12-02  Phos  1.8     12-02    TPro  7.4  /  Alb  2.9[L]  /  TBili  0.3  /  DBili  x   /  AST  16  /  ALT  8   /  AlkPhos  98  12-02    Thyroid Function Tests:  11-30 @ 19:47 TSH 0.35 FreeT4 -- T3 -- Anti TPO -- Anti Thyroglobulin Ab -- TSI --    Diet, Consistent Carbohydrate w/Evening Snack (12-01-24 @ 13:24) [Active]

## 2024-12-02 NOTE — PROGRESS NOTE ADULT - ASSESSMENT
84-year-old male with PMHx of DM2, HTN, HLD, HIV, BPH, and spinal stenosis who presents to the ED with nausea, vomiting, and abdominal pain of 4 days duration. Labwork notable for hyperglycemia in the setting of sepsis. UA with +LE, 87 WBC. CT A/P notable for 1.7 cm peripherally enhancing tubular fluid collection along the posterior aspect of the mid left kidney, possibly in a subcapsular space. Additional areas of new hypodensity in the left upper pole could reflect pyelonephritis versus interval worsening scarring. Nonspecific diffuse bladder wall thickening, which could reflect cystitis or chronic outlet obstruction secondary to markedly enlarged prostate. Urology consulted in ED. Recommended no urological interventions. BC, UCx obtained. Initiated vanc and zosyn per sepsis protocol. Patient s/p 2L LR Bolus, Lantus 10 units, and Lispro 8 units x2. Glucose improved to 345. pH 7.37, AG 17, BHB 2.4 -> 2.1, Patient admitted to medicine for further management and monitoring.      84-year-old male with PMHx of DM2, HTN, HLD, HIV, BPH, and spinal stenosis who presents to the ED with nausea, vomiting, and abdominal pain of 4 days duration. Labwork notable for hyperglycemia in the setting of sepsis. UA with +LE, 87 WBC. CT A/P notable for 1.7 cm peripherally enhancing tubular fluid collection along the posterior aspect of the mid left kidney, possibly in a subcapsular space. Additional areas of new hypodensity in the left upper pole could reflect pyelonephritis versus interval worsening scarring. Nonspecific diffuse bladder wall thickening, which could reflect cystitis or chronic outlet obstruction secondary to markedly enlarged prostate. Urology consulted in ED. Recommended no urological interventions. BC pending, Initially on Vanc/Zosyn per sepsis protocol --> UCx growing ESBL Klebsiella --> switched to Vanc/Ertapenem. Patient s/p 2L LR Bolus, Lantus 10 units, and Lispro 8 units x2. Glucose improved to 345 --> 218. pH 7.37, AG 17, BHB 2.4 -> 2.1, Patient admitted to medicine for further management and monitoring.      84-year-old male with PMHx of DM2, HTN, HLD, HIV, BPH, and spinal stenosis who presents to the ED with nausea, vomiting, and abdominal pain of 4 days duration. Labwork notable for hyperglycemia in the setting of sepsis. UA with +LE, 87 WBC. CT A/P notable for 1.7 cm peripherally enhancing tubular fluid collection along the posterior aspect of the mid left kidney, possibly in a subcapsular space. Additional areas of new hypodensity in the left upper pole could reflect pyelonephritis versus interval worsening scarring. Nonspecific diffuse bladder wall thickening, which could reflect cystitis or chronic outlet obstruction secondary to markedly enlarged prostate. Urology consulted in ED. Recommended no urological interventions. Initially on Vanc/Zosyn per sepsis protocol --> Blood/U Cx growing ESBL Klebsiella --> switched to Vanc/Ertapenem. Patient s/p 2L LR Bolus, Lantus 10 units, and Lispro 8 units x2. Glucose improved to 345 --> 218. pH 7.37, AG 17, BHB 2.4 -> 2.1, Patient admitted to medicine for further management and monitoring.      84-year-old male with PMHx of DM2, HTN, HLD, HIV, BPH, and spinal stenosis who presents to the ED with nausea, vomiting, and abdominal pain of 4 days duration. Labwork notable for hyperglycemia in the setting of sepsis. UA with +LE, 87 WBC. CT A/P notable for 1.7 cm peripherally enhancing tubular fluid collection along the posterior aspect of the mid left kidney, possibly in a subcapsular space. Additional areas of new hypodensity in the left upper pole could reflect pyelonephritis versus interval worsening scarring. Nonspecific diffuse bladder wall thickening, which could reflect cystitis or chronic outlet obstruction secondary to markedly enlarged prostate. Urology consulted in ED. Recommended no urological interventions. Initially on Vanc/Zosyn per sepsis protocol --> Blood Cx growing ESBL Klebsiella/UCx pending final gram stain --> switched to Vanc/Ertapenem --> d/c Vanc per ID pharm.     Patient s/p 2L LR Bolus, Lantus 10 units, and Lispro 8 units x2. Glucose improved to 345 --> 218. pH 7.37, AG 17, BHB 2.4 -> 2.1 -> 0.0. Patient admitted to medicine for further management and monitoring.

## 2024-12-02 NOTE — PROGRESS NOTE ADULT - PROBLEM SELECTOR PLAN 8
VTE PPx: Heparin SQ  GI PPx: Pantoprazole  Nutrition: NPO  Fluids: Dextrose 5% with 0.9% NS with 20mEq of KCL @150 cc/hr  Electrolytes: Maintain K>4, Mag >2, Phos > 3  Access: PIV  Dispo: pending clinical improvement VTE PPx: Heparin SQ  GI PPx: Pantoprazole  Nutrition: Consistent Carb  Electrolytes: Maintain K>4, Mag >2, Phos > 3  - Mg 1.3 in AM (12/2) --> replete with PO mag.   Access: PIV  Dispo: pending clinical improvement

## 2024-12-03 ENCOUNTER — RESULT REVIEW (OUTPATIENT)
Age: 84
End: 2024-12-03

## 2024-12-03 ENCOUNTER — NON-APPOINTMENT (OUTPATIENT)
Age: 84
End: 2024-12-03

## 2024-12-03 LAB
-  AMPICILLIN/SULBACTAM: SIGNIFICANT CHANGE UP
-  AMPICILLIN/SULBACTAM: SIGNIFICANT CHANGE UP
-  AMPICILLIN: SIGNIFICANT CHANGE UP
-  AMPICILLIN: SIGNIFICANT CHANGE UP
-  AZTREONAM: SIGNIFICANT CHANGE UP
-  AZTREONAM: SIGNIFICANT CHANGE UP
-  CEFAZOLIN: SIGNIFICANT CHANGE UP
-  CEFAZOLIN: SIGNIFICANT CHANGE UP
-  CEFEPIME: SIGNIFICANT CHANGE UP
-  CEFEPIME: SIGNIFICANT CHANGE UP
-  CEFTRIAXONE: SIGNIFICANT CHANGE UP
-  CEFTRIAXONE: SIGNIFICANT CHANGE UP
-  CEFUROXIME: SIGNIFICANT CHANGE UP
-  CIPROFLOXACIN: SIGNIFICANT CHANGE UP
-  CIPROFLOXACIN: SIGNIFICANT CHANGE UP
-  ERTAPENEM: SIGNIFICANT CHANGE UP
-  ERTAPENEM: SIGNIFICANT CHANGE UP
-  GENTAMICIN: SIGNIFICANT CHANGE UP
-  GENTAMICIN: SIGNIFICANT CHANGE UP
-  IMIPENEM: SIGNIFICANT CHANGE UP
-  IMIPENEM: SIGNIFICANT CHANGE UP
-  LEVOFLOXACIN: SIGNIFICANT CHANGE UP
-  LEVOFLOXACIN: SIGNIFICANT CHANGE UP
-  MEROPENEM: SIGNIFICANT CHANGE UP
-  MEROPENEM: SIGNIFICANT CHANGE UP
-  NITROFURANTOIN: SIGNIFICANT CHANGE UP
-  PIPERACILLIN/TAZOBACTAM: SIGNIFICANT CHANGE UP
-  PIPERACILLIN/TAZOBACTAM: SIGNIFICANT CHANGE UP
-  TOBRAMYCIN: SIGNIFICANT CHANGE UP
-  TOBRAMYCIN: SIGNIFICANT CHANGE UP
-  TRIMETHOPRIM/SULFAMETHOXAZOLE: SIGNIFICANT CHANGE UP
-  TRIMETHOPRIM/SULFAMETHOXAZOLE: SIGNIFICANT CHANGE UP
ALBUMIN SERPL ELPH-MCNC: 2.9 G/DL — LOW (ref 3.3–5)
ALP SERPL-CCNC: 99 U/L — SIGNIFICANT CHANGE UP (ref 40–120)
ALT FLD-CCNC: 11 U/L — SIGNIFICANT CHANGE UP (ref 4–41)
ANION GAP SERPL CALC-SCNC: 13 MMOL/L — SIGNIFICANT CHANGE UP (ref 7–14)
AST SERPL-CCNC: 23 U/L — SIGNIFICANT CHANGE UP (ref 4–40)
BILIRUB SERPL-MCNC: 0.4 MG/DL — SIGNIFICANT CHANGE UP (ref 0.2–1.2)
BUN SERPL-MCNC: 12 MG/DL — SIGNIFICANT CHANGE UP (ref 7–23)
CALCIUM SERPL-MCNC: 8.9 MG/DL — SIGNIFICANT CHANGE UP (ref 8.4–10.5)
CHLORIDE SERPL-SCNC: 99 MMOL/L — SIGNIFICANT CHANGE UP (ref 98–107)
CO2 SERPL-SCNC: 21 MMOL/L — LOW (ref 22–31)
CREAT SERPL-MCNC: 1.04 MG/DL — SIGNIFICANT CHANGE UP (ref 0.5–1.3)
CULTURE RESULTS: ABNORMAL
CULTURE RESULTS: ABNORMAL
EGFR: 70 ML/MIN/1.73M2 — SIGNIFICANT CHANGE UP
GLUCOSE BLDC GLUCOMTR-MCNC: 108 MG/DL — HIGH (ref 70–99)
GLUCOSE BLDC GLUCOMTR-MCNC: 110 MG/DL — HIGH (ref 70–99)
GLUCOSE BLDC GLUCOMTR-MCNC: 111 MG/DL — HIGH (ref 70–99)
GLUCOSE BLDC GLUCOMTR-MCNC: 123 MG/DL — HIGH (ref 70–99)
GLUCOSE BLDC GLUCOMTR-MCNC: 65 MG/DL — LOW (ref 70–99)
GLUCOSE BLDC GLUCOMTR-MCNC: 65 MG/DL — LOW (ref 70–99)
GLUCOSE BLDC GLUCOMTR-MCNC: 74 MG/DL — SIGNIFICANT CHANGE UP (ref 70–99)
GLUCOSE BLDC GLUCOMTR-MCNC: 81 MG/DL — SIGNIFICANT CHANGE UP (ref 70–99)
GLUCOSE BLDC GLUCOMTR-MCNC: 93 MG/DL — SIGNIFICANT CHANGE UP (ref 70–99)
GLUCOSE BLDC GLUCOMTR-MCNC: 94 MG/DL — SIGNIFICANT CHANGE UP (ref 70–99)
GLUCOSE SERPL-MCNC: 95 MG/DL — SIGNIFICANT CHANGE UP (ref 70–99)
HCT VFR BLD CALC: 30.3 % — LOW (ref 39–50)
HGB BLD-MCNC: 9.8 G/DL — LOW (ref 13–17)
MAGNESIUM SERPL-MCNC: 1.9 MG/DL — SIGNIFICANT CHANGE UP (ref 1.6–2.6)
MCHC RBC-ENTMCNC: 26.8 PG — LOW (ref 27–34)
MCHC RBC-ENTMCNC: 32.3 G/DL — SIGNIFICANT CHANGE UP (ref 32–36)
MCV RBC AUTO: 83 FL — SIGNIFICANT CHANGE UP (ref 80–100)
METHOD TYPE: SIGNIFICANT CHANGE UP
METHOD TYPE: SIGNIFICANT CHANGE UP
MRSA PCR RESULT.: SIGNIFICANT CHANGE UP
NRBC # BLD: 0 /100 WBCS — SIGNIFICANT CHANGE UP (ref 0–0)
NRBC # FLD: 0 K/UL — SIGNIFICANT CHANGE UP (ref 0–0)
ORGANISM # SPEC MICROSCOPIC CNT: ABNORMAL
PHOSPHATE SERPL-MCNC: 3.2 MG/DL — SIGNIFICANT CHANGE UP (ref 2.5–4.5)
PLATELET # BLD AUTO: 312 K/UL — SIGNIFICANT CHANGE UP (ref 150–400)
POTASSIUM SERPL-MCNC: 3.9 MMOL/L — SIGNIFICANT CHANGE UP (ref 3.5–5.3)
POTASSIUM SERPL-SCNC: 3.9 MMOL/L — SIGNIFICANT CHANGE UP (ref 3.5–5.3)
PROT SERPL-MCNC: 7.4 G/DL — SIGNIFICANT CHANGE UP (ref 6–8.3)
RBC # BLD: 3.65 M/UL — LOW (ref 4.2–5.8)
RBC # FLD: 13.5 % — SIGNIFICANT CHANGE UP (ref 10.3–14.5)
S AUREUS DNA NOSE QL NAA+PROBE: SIGNIFICANT CHANGE UP
SODIUM SERPL-SCNC: 133 MMOL/L — LOW (ref 135–145)
SPECIMEN SOURCE: SIGNIFICANT CHANGE UP
SPECIMEN SOURCE: SIGNIFICANT CHANGE UP
WBC # BLD: 6.34 K/UL — SIGNIFICANT CHANGE UP (ref 3.8–10.5)
WBC # FLD AUTO: 6.34 K/UL — SIGNIFICANT CHANGE UP (ref 3.8–10.5)

## 2024-12-03 PROCEDURE — 99232 SBSQ HOSP IP/OBS MODERATE 35: CPT

## 2024-12-03 PROCEDURE — 93306 TTE W/DOPPLER COMPLETE: CPT | Mod: 26

## 2024-12-03 PROCEDURE — 99232 SBSQ HOSP IP/OBS MODERATE 35: CPT | Mod: GC

## 2024-12-03 RX ORDER — EMTRICITABINE AND TENOFOVIR DISOPROXIL FUMARATE 200; 300 MG/1; MG/1
1 TABLET, FILM COATED ORAL DAILY
Refills: 0 | Status: DISCONTINUED | OUTPATIENT
Start: 2024-12-03 | End: 2024-12-06

## 2024-12-03 RX ORDER — DARUNAVIR ETHANOLATE AND COBICISTAT 800; 150 MG/1; MG/1
1 TABLET, FILM COATED ORAL DAILY
Refills: 0 | Status: DISCONTINUED | OUTPATIENT
Start: 2024-12-03 | End: 2024-12-06

## 2024-12-03 RX ORDER — LINAGLIPTIN 5 MG/1
5 TABLET, FILM COATED ORAL DAILY
Refills: 0 | Status: DISCONTINUED | OUTPATIENT
Start: 2024-12-04 | End: 2024-12-04

## 2024-12-03 RX ADMIN — INSULIN GLARGINE 12 UNIT(S): 100 INJECTION, SOLUTION SUBCUTANEOUS at 10:40

## 2024-12-03 RX ADMIN — TAMSULOSIN HYDROCHLORIDE 0.4 MILLIGRAM(S): 0.4 CAPSULE ORAL at 22:44

## 2024-12-03 RX ADMIN — Medication 5000 UNIT(S): at 13:19

## 2024-12-03 RX ADMIN — Medication 5000 UNIT(S): at 22:44

## 2024-12-03 RX ADMIN — LIDOCAINE 1 PATCH: 40 CREAM TOPICAL at 19:20

## 2024-12-03 RX ADMIN — LIDOCAINE 1 PATCH: 40 CREAM TOPICAL at 23:07

## 2024-12-03 RX ADMIN — Medication 5 MILLIGRAM(S): at 13:19

## 2024-12-03 RX ADMIN — ERTAPENEM 120 MILLIGRAM(S): 1 INJECTION, POWDER, LYOPHILIZED, FOR SOLUTION INTRAMUSCULAR; INTRAVENOUS at 04:47

## 2024-12-03 RX ADMIN — AMLODIPINE BESYLATE 5 MILLIGRAM(S): 10 TABLET ORAL at 05:38

## 2024-12-03 RX ADMIN — LIDOCAINE 1 PATCH: 40 CREAM TOPICAL at 09:00

## 2024-12-03 RX ADMIN — METOPROLOL TARTRATE 25 MILLIGRAM(S): 100 TABLET, FILM COATED ORAL at 06:33

## 2024-12-03 RX ADMIN — Medication 5000 UNIT(S): at 05:38

## 2024-12-03 RX ADMIN — DARUNAVIR ETHANOLATE AND COBICISTAT 1 TABLET(S): 800; 150 TABLET, FILM COATED ORAL at 18:00

## 2024-12-03 RX ADMIN — Medication 10 MILLIGRAM(S): at 22:44

## 2024-12-03 RX ADMIN — Medication 4 UNIT(S): at 08:59

## 2024-12-03 RX ADMIN — EMTRICITABINE AND TENOFOVIR DISOPROXIL FUMARATE 1 TABLET(S): 200; 300 TABLET, FILM COATED ORAL at 18:12

## 2024-12-03 RX ADMIN — Medication 1 PACKET(S): at 01:00

## 2024-12-03 NOTE — DIETITIAN INITIAL EVALUATION ADULT - ORAL INTAKE PTA/DIET HISTORY
Patient seen for assessment. Attempted to speak w/ patient via  however Ashley  unavailable.  Patient seen for assessment. Attempted to speak w/ patient via  however Ashley  unavailable. Spoke w/ daughter Maddy over phone. Reports patient w/ reduced appetite 1-2 weeks prior to admission. Reports they weren't watching carbohydrate/sugar intake previously. Per endo (12/1), patient was on Lantus. A1c is 11.7% per chart.

## 2024-12-03 NOTE — DIETITIAN INITIAL EVALUATION ADULT - ADD RECOMMEND
- Continue diet as ordered  - Nutrition department will provide Orgain 1x daily (240 kcal, 16 g pro)  - Monitor PO intake, tolerance to diet/supplement, nutrition related lab values, weight trends, BMs/GI distress, hydration status, skin integrity

## 2024-12-03 NOTE — PROGRESS NOTE ADULT - PROBLEM SELECTOR PLAN 6
-HIV VL not detected   -Continue with home Symtuza -HIV VL not detected   -Has not received home Symtuza since last dose Saturday (11/30).  -Continue with home Symtuza (daughter to bring in PM)

## 2024-12-03 NOTE — PROGRESS NOTE ADULT - SUBJECTIVE AND OBJECTIVE BOX
Follow Up:      Interval History/ROS:Patient is a 84y old  Male who presents with a chief complaint of Sepsis  Seen at bedside, afebrile, no new leukocytosis. L flank pain improving. No new complaints.     Allergies  No Known Allergies    ANTIMICROBIALS:    darunavir 800 mG/cobicstat 150 mG 1 daily  emtricitabine 200 mG/tenofovir alafenamide 25 mG (DESCOVY) Tablet 1 daily  ertapenem  IVPB 1000 every 24 hours    OTHER MEDS: MEDICATIONS  (STANDING):  acetaminophen     Tablet .. 650 every 6 hours PRN  amLODIPine   Tablet 5 daily  atorvastatin 10 at bedtime  dextrose 50% Injectable 25 once  dextrose Oral Gel 15 once PRN  finasteride 5 daily  glucagon  Injectable 1 once  heparin   Injectable 5000 every 8 hours  influenza  Vaccine (HIGH DOSE) 0.5 once  insulin glargine Injectable (LANTUS) 12 <User Schedule>  insulin lispro (ADMELOG) corrective regimen sliding scale  at bedtime  insulin lispro (ADMELOG) corrective regimen sliding scale  three times a day before meals  insulin lispro Injectable (ADMELOG) 4 three times a day before meals  metoprolol succinate ER 25 daily  oxybutynin XL 5 daily  tamsulosin 0.4 at bedtime      Vital Signs Last 24 Hrs  T(F): 98.5 (12-03-24 @ 05:27), Max: 101.9 (11-30-24 @ 20:11)    Vital Signs Last 24 Hrs  HR: 65 (12-03-24 @ 05:27) (65 - 93)  BP: 137/69 (12-03-24 @ 05:27) (121/77 - 143/75)  RR: 18 (12-03-24 @ 05:27)  SpO2: 98% (12-03-24 @ 05:27) (97% - 98%)  Wt(kg): --    EXAM:    Physical Exam:  Constitutional: comfortable  LUNGS:  CTA  CVS:  normal S1, S2, no murmur  Abd:  soft, nontender  Ext:  no edema  Vascular:  IV site no erythema tenderness or discharge  Neuro: AAO X 3      Labs:                        9.8    6.34  )-----------( 312      ( 03 Dec 2024 05:20 )             30.3     12-03    133[L]  |  99  |  12  ----------------------------<  95  3.9   |  21[L]  |  1.04    Ca    8.9      03 Dec 2024 06:55  Phos  3.2     12-03  Mg     1.90     12-03    TPro  7.4  /  Alb  2.9[L]  /  TBili  0.4  /  DBili  x   /  AST  23  /  ALT  11  /  AlkPhos  99  12-03      WBC Trend:  WBC Count: 6.34 (12-03-24 @ 05:20)  WBC Count: 7.44 (12-02-24 @ 06:05)  WBC Count: 10.84 (11-30-24 @ 19:47)      Creatine Trend:  Creatinine: 1.04 (12-03)  Creatinine: 1.06 (12-02)  Creatinine: 1.17 (12-01)  Creatinine: 1.19 (11-30)      Liver Biochemical Testing Trend:  Alanine Aminotransferase (ALT/SGPT): 11 (12-03)  Alanine Aminotransferase (ALT/SGPT): 8 (12-02)  Alanine Aminotransferase (ALT/SGPT): 6 (11-30)  Alanine Aminotransferase (ALT/SGPT): 21 (09-06)  Alanine Aminotransferase (ALT/SGPT): 26 (09-05)  Aspartate Aminotransferase (AST/SGOT): 23 (12-03-24 @ 06:55)  Aspartate Aminotransferase (AST/SGOT): 16 (12-02-24 @ 06:05)  Aspartate Aminotransferase (AST/SGOT): 8 (11-30-24 @ 19:47)  Aspartate Aminotransferase (AST/SGOT): 17 (09-06-24 @ 05:44)  Aspartate Aminotransferase (AST/SGOT): 26 (09-05-24 @ 05:12)  Bilirubin Total: 0.4 (12-03)  Bilirubin Total: 0.3 (12-02)  Bilirubin Total: 0.8 (11-30)  Bilirubin Total: 0.3 (09-06)  Bilirubin Total: 0.3 (09-05)      Trend LDH      Urinalysis Basic - ( 03 Dec 2024 06:55 )    Color: x / Appearance: x / SG: x / pH: x  Gluc: 95 mg/dL / Ketone: x  / Bili: x / Urobili: x   Blood: x / Protein: x / Nitrite: x   Leuk Esterase: x / RBC: x / WBC x   Sq Epi: x / Non Sq Epi: x / Bacteria: x        MICROBIOLOGY:  Vancomycin Level, Trough: 4.5 (12-01 @ 19:40)    MRSA PCR Result.: NotDetec (08-31-24 @ 12:52)  MRSA PCR Result.: NotDetec (08-05-24 @ 19:00)  MRSA PCR Result.: NotDetec (07-06-24 @ 18:58)      Culture - Urine (collected 30 Nov 2024 23:28)  Source: Clean Catch  Final Report:    50,000 - 99,000 CFU/mL Klebsiella pneumoniae ESBL  Organism: Klebsiella pneumoniae ESBL  Organism: Klebsiella pneumoniae ESBL    Sensitivities:      Method Type: ZAIDA      -  Ampicillin: R >16 These ampicillin results predict results for amoxicillin      -  Ampicillin/Sulbactam: R >16/8      -  Aztreonam: R >16      -  Cefazolin: R >16 For uncomplicated UTI with K. pneumoniae, E. coli, or P. mirablis: ZAIDA <=16 is sensitive and ZAIDA >=32 is resistant. This also predicts results for oral agents cefaclor, cefdinir, cefpodoxime, cefprozil, cefuroxime axetil, cephalexin and locarbef for uncomplicated UTI. Note that some isolates may be susceptible to these agents while testing resistant to cefazolin.      -  Cefepime: R >16      -  Ceftriaxone: R >32      -  Cefuroxime: R >16      -  Ciprofloxacin: I 0.5      -  Ertapenem: S <=0.5      -  Gentamicin: S <=2      -  Imipenem: S <=1      -  Levofloxacin: S <=0.5      -  Meropenem: S <=1      -  Nitrofurantoin: I 64 Should not be used to treat pyelonephritis      -  Piperacillin/Tazobactam: S <=8      -  Tobramycin: S <=2      -  Trimethoprim/Sulfamethoxazole: R >2/38    Urinalysis with Rflx Culture (collected 30 Nov 2024 23:28)    Culture - Blood (collected 30 Nov 2024 21:00)  Source: .Blood BLOOD  Preliminary Report:    Growth in aerobic bottle: Klebsiella pneumoniae    Direct identification is available within approximately 3-5    hours either by Blood Panel Multiplexed PCR or Direct    MALDI-TOF. Details: [email or web address]  Organism: Blood Culture PCR  Organism: Blood Culture PCR    Sensitivities:      Method Type: PCR      -  K. pneumoniae group: Detec (K. pneumoniae, K. quasipneumoniae, K. variicola)      -  ESBL: Detec      -  CTX-M Resistance Marker: Detec    Culture - Blood (collected 30 Nov 2024 20:40)  Source: .Blood BLOOD  Preliminary Report:    No growth at 48 Hours    Culture - Blood (collected 03 Sep 2024 12:00)  Source: .Blood Blood-Peripheral  Final Report:    No growth at 5 days    Culture - Blood (collected 03 Sep 2024 12:00)  Source: .Blood Blood-Peripheral  Final Report:    No growth at 5 days    Culture - Urine (collected 30 Aug 2024 22:22)  Source: Clean Catch Clean Catch (Midstream)  Final Report:    >100,000 CFU/ml Klebsiella pneumoniae ESBL  Organism: Klebsiella pneumoniae ESBL  Organism: Klebsiella pneumoniae ESBL    Sensitivities:      Method Type: ZAIDA      -  Ampicillin: R >16 These ampicillin results predict results for amoxicillin      -  Ampicillin/Sulbactam: I 16/8      -  Aztreonam: R 16      -  Cefazolin: R >16 For uncomplicated UTI with K. pneumoniae, E. coli, or P. mirablis: ZAIDA <=16 is sensitive and ZAIDA >=32 is resistant. This also predicts results for oral agents cefaclor, cefdinir, cefpodoxime, cefprozil, cefuroxime axetil, cephalexin and locarbef for uncomplicated UTI. Note that some isolates may be susceptible to these agents while testing resistant to cefazolin.      -  Cefepime: R 8      -  Ceftriaxone: R >32      -  Cefuroxime: R >16      -  Ciprofloxacin: S <=0.25      -  Ertapenem: S <=0.5      -  Gentamicin: S <=2      -  Imipenem: S <=1      -  Levofloxacin: S <=0.5      -  Meropenem: S <=1      -  Nitrofurantoin: S <=32 Should not be used to treat pyelonephritis      -  Piperacillin/Tazobactam: S <=8      -  Tobramycin: S <=2      -  Trimethoprim/Sulfamethoxazole: R >2/38    Culture - Urine (collected 22 Aug 2024 09:37)  Source: Catheterized Catheterized  Final Report:    10,000 - 49,000 CFU/mL Candida parapsilosis "Susceptibilities not    performed"    Culture - Urine (collected 05 Aug 2024 07:42)  Source: Catheterized Catheterized  Final Report:    >=3 organisms. Probable collection contamination.    Culture - Urine (collected 05 Aug 2024 00:23)  Source: Catheterized  Final Report:    >=3 organisms. Probable collection contamination.        ABS CD4: 599 cells/uL (07-05-24 @ 12:03)    HIV-1 RNA Quantitative, Viral Load: 36 (12-01-24 @ 05:27)  HIV-1 Viral Load Result: DET. (12-01-24 @ 05:27)  HIV-1 RNA Quantitative, Viral Load: DET. <30 copies/mL (07-04-24 @ 06:04)  HIV-1 Viral Load Result: DET. (07-04-24 @ 06:04)                Rapid RVP Result: NotDetec (11-30 @ 19:28)                    Troponin T, High Sensitivity Result: 29 (12-01)  Troponin T, High Sensitivity Result: 24 (11-30)    Lactate, Blood: 2.0 (12-01 @ 05:27)  Blood Gas Venous - Lactate: 1.9 (12-01 @ 05:27)  Blood Gas Venous - Lactate: 2.1 (11-30 @ 22:43)  Blood Gas Venous - Lactate: 2.2 (11-30 @ 19:47)        RADIOLOGY:  imaging below personally reviewed    Xray Chest 1 View- PORTABLE-Urgent:  (30 Nov 2024 19:59)              CT Abdomen and Pelvis w/ IV Cont:   ACC: [id number] EXAM:  CT ABDOMEN AND PELVIS IC   ORDERED BY: IBAN TILLMAN     PROCEDURE DATE:  12/01/2024          INTERPRETATION:  CLINICAL INFORMATION: Abdominal pain and fever.    COMPARISON: 8/22/2024.    CONTRAST/COMPLICATIONS:  IV Contrast: Omnipaque 350  90 cc administered   10 cc discarded  Oral Contrast: NONE      PROCEDURE:  CT of the Abdomen and Pelvis was performed.  Sagittal and coronal reformats were performed.    FINDINGS:  LOWER CHEST: Mild bibasilar atelectasis. Trace pericardial fluid. Mild   bilateral gynecomastia. Nonspecific trace fluid adjacent to the distal   esophagus.    LIVER: Subcentimeter right hepatic hypodensity too small to characterize.  BILE DUCTS: Normal caliber.  GALLBLADDER: Cholelithiasis.  SPLEEN: Within normal limits.  PANCREAS: Within normal limits.  ADRENALS: Nonspecific small right adrenal gland calcification.  KIDNEYS/URETERS: Multifocal left renal cortical scarring again noted. New   hypodense areas extending to the cortex in the left upper pole could   reflect new or worsened areas of scarring versus possibly pyelonephritis.   1.7 cm peripherally enhancing tubular collection of fluid along the   posterior aspect of the mid left kidney (301, 43), possibly in the   subcapsular space. Associated mild asymmetric left perinephric   fluid/stranding.    BLADDER: Diffuse bladder wall thickening.  REPRODUCTIVE ORGANS: Markedly enlarged prostate.    BOWEL: No bowel obstruction. Appendix is normal.  PERITONEUM/RETROPERITONEUM: Within normal limits.  VESSELS: Atherosclerotic changes.  LYMPH NODES: No lymphadenopathy.  ABDOMINAL WALL: Within normal limits.  BONES: Degenerative changes.    IMPRESSION:  1.7 cm peripherally enhancing tubular fluid collection along the   posterior aspect of the mid left kidney, possibly in a subcapsular space.   Mild asymmetric left perinephric fluid/stranding. Findings are suspicious   for infectious process with possible small renal/perirenal abscess.   Additional areas of new hypodensity in the left upper pole could reflect   pyelonephritis versus interval worsening scarring.    Nonspecific diffuse bladder wall thickening, which could reflect cystitis   or chronic outlet obstruction secondary to markedly enlarged prostate.  2.    Clinical correlation and correlation with urinalysis is recommended.   Close interval follow-up imaging is recommended.    --- End of Report ---            NAHOMY SEXTON MD; Attending Radiologist  This document has been electronically signed. Dec  1 2024  1:44AM (12-01-24 @ 00:47)  CT Head No Cont:   ACC: [id number] EXAM:  CT BRAIN   ORDERED BY: IBAN CARLIN DATE:  12/01/2024          INTERPRETATION:  CLINICAL INFORMATION: Altered mental status.    COMPARISON: CT head 7/3/2024    CONTRAST:  IV Contrast: None    TECHNIQUE:  Serial axial images were obtained from the skull base to the   vertex using multi-slice helical technique. Sagittal and coronal   reformats were obtained.    FINDINGS:    VENTRICLES AND SULCI: Age appropriate involutional changes.  INTRA-AXIAL: No mass effect, acute hemorrhage, or midline shift.  There   are periventricular and subcortical white matter hypodensities,   consistent with microvascular type changes.  EXTRA-AXIAL: No mass or fluid collection. Basal cisterns are normal in   appearance.    VISUALIZEDSINUSES:  Mild mucosal thickening within the ethmoid air cells   and sphenoid sinus.  TYMPANOMASTOID CAVITIES:  Clear.  VISUALIZED ORBITS: Bilateral lens replacement.  CALVARIUM: Intact.    MISCELLANEOUS: None.      IMPRESSION:  No acute intracranialhemorrhage, mass effect, or midline shift.    --- End of Report ---          AIME TERRY MD; Resident Radiologist  This document has been electronically signed.  NAHOMY SEXTON MD; Attending Radiologist  This document has been electronically signed. Dec  1 2024  2:16AM (12-01-24 @ 00:46)

## 2024-12-03 NOTE — PROGRESS NOTE ADULT - PROBLEM SELECTOR PLAN 3
No complaints of chest pain in AM.   Troponin: 29 (12/1), CK 39, CKMB <1.0, Reports improving chest pain from yesterday PM. Endorses chronic history of mild dyspnea and chest pain on exertion with infrequent lightheadedness (most recently prior to arrival in ED), but denies syncopal episodes.     Troponin: 29 (12/1), CK 39, CKMB <1.0,  TTE on 1/30/23 with LVEF 65% and mild mitral annular calcification.     Plan  - F/u with cardiologist outpt for further testing Reports improving chest pain from yesterday PM. Endorses chronic history of mild dyspnea and chest pain on exertion with infrequent lightheadedness (most recently prior to arrival in ED), but denies syncopal episodes.   TTE on 1/30/23 with LVEF 65% and mild mitral annular calcification.   Troponin: 29 (12/1), CK 39, CKMB <1.0,      Plan  - TTE ordered 12/3   - Outpt referral to Cardiologist for further testing

## 2024-12-03 NOTE — PROGRESS NOTE ADULT - PROBLEM SELECTOR PLAN 4
HDS on arrival in ED. /70 (12/2 AM)     - Re-start home antihypertensives given low concern for hypotension i/s/o sepsis. (Amlodipine 5 mg , Metoprolol 25 mg ) HDS on arrival in ED. /70 (12/2 AM)     - C/w home antihypertensives given low concern for hypotension i/s/o sepsis. (Amlodipine 5 mg, Metoprolol 25 mg)

## 2024-12-03 NOTE — DIETITIAN INITIAL EVALUATION ADULT - PERTINENT LABORATORY DATA
12-03    133[L]  |  99  |  12  ----------------------------<  95  3.9   |  21[L]  |  1.04    Ca    8.9      03 Dec 2024 06:55  Phos  3.2     12-03  Mg     1.90     12-03    TPro  7.4  /  Alb  2.9[L]  /  TBili  0.4  /  DBili  x   /  AST  23  /  ALT  11  /  AlkPhos  99  12-03  POCT Blood Glucose.: 123 mg/dL (12-03-24 @ 10:10)  A1C with Estimated Average Glucose Result: 11.7 % (11-30-24 @ 19:47)  A1C with Estimated Average Glucose Result: 11.9 % (07-03-24 @ 10:26)  A1C with Estimated Average Glucose Result: 13.3 % (06-21-24 @ 09:30)

## 2024-12-03 NOTE — PROGRESS NOTE ADULT - SUBJECTIVE AND OBJECTIVE BOX
Chief Complaint: Type 2 DM     History: Deferred to daughter for translation for [place] (dialect of [facility]). Pt tolerating oral diet. Pt denies nausea and vomiting/any signs of hypoglycemia. Pt reports an adequate appetite. Rn confirmed.      MEDICATIONS  (STANDING):  amLODIPine   Tablet 5 milliGRAM(s) Oral daily  atorvastatin 10 milliGRAM(s) Oral at bedtime  darunavir 800 mG/cobicstat 150 mG 1 Tablet(s) Oral daily  dextrose 5%. 1000 milliLiter(s) (50 mL/Hr) IV Continuous <Continuous>  dextrose 5%. 1000 milliLiter(s) (100 mL/Hr) IV Continuous <Continuous>  dextrose 50% Injectable 25 Gram(s) IV Push once  emtricitabine 200 mG/tenofovir alafenamide 25 mG (DESCOVY) Tablet 1 Tablet(s) Oral daily  ertapenem  IVPB 1000 milliGRAM(s) IV Intermittent every 24 hours  finasteride 5 milliGRAM(s) Oral daily  glucagon  Injectable 1 milliGRAM(s) IntraMuscular once  heparin   Injectable 5000 Unit(s) SubCutaneous every 8 hours  influenza  Vaccine (HIGH DOSE) 0.5 milliLiter(s) IntraMuscular once  insulin glargine Injectable (LANTUS) 12 Unit(s) SubCutaneous <User Schedule>  insulin lispro (ADMELOG) corrective regimen sliding scale   SubCutaneous three times a day before meals  insulin lispro (ADMELOG) corrective regimen sliding scale   SubCutaneous at bedtime  insulin lispro Injectable (ADMELOG) 2 Unit(s) SubCutaneous three times a day before meals  lidocaine   4% Patch 1 Patch Transdermal every 24 hours  metoprolol succinate ER 25 milliGRAM(s) Oral daily  oxybutynin XL 5 milliGRAM(s) Oral daily  tamsulosin 0.4 milliGRAM(s) Oral at bedtime    MEDICATIONS  (PRN):  acetaminophen     Tablet .. 650 milliGRAM(s) Oral every 6 hours PRN Temp greater or equal to 38C (100.4F), Mild Pain (1 - 3)  dextrose Oral Gel 15 Gram(s) Oral once PRN Blood Glucose LESS THAN 70 milliGRAM(s)/deciliter      Allergies: No Known Allergies      Review of Systems:  Respiratory: No SOB, no cough  GI: No nausea, vomiting, abdominal pain  Endocrine: no polyuria, polydipsia    PHYSICAL EXAM:  VITALS: T(C): 36.7 (12-03-24 @ 14:56)  T(F): 98 (12-03-24 @ 14:56), Max: 98.5 (12-03-24 @ 05:27)  HR: 64 (12-03-24 @ 14:56) (64 - 89)  BP: 129/70 (12-03-24 @ 14:56) (121/77 - 143/75)  RR:  (18 - 18)  SpO2:  (97% - 98%)  Wt(kg): --  GENERAL: NAD, well-groomed, well-developed  RESPIRATORY: No labored breathing   GI: Soft, nontender, non distended  PSYCH: Alert and oriented x 3, normal affect, normal mood      CAPILLARY BLOOD GLUCOSE  POCT Blood Glucose.: 81 mg/dL (03 Dec 2024 12:33)  POCT Blood Glucose.: 123 mg/dL (03 Dec 2024 10:10)  POCT Blood Glucose.: 111 mg/dL (03 Dec 2024 08:53)  POCT Blood Glucose.: 108 mg/dL (03 Dec 2024 02:58)  POCT Blood Glucose.: 72 mg/dL (02 Dec 2024 21:43)  POCT Blood Glucose.: 84 mg/dL (02 Dec 2024 17:29)    A1C with Estimated Average Glucose (11.30.24 @ 19:47)    A1C with Estimated Average Glucose Result: 11.7   Estimated Average Glucose: 289      12-03    133[L]  |  99  |  12  ----------------------------<  95  3.9   |  21[L]  |  1.04    eGFR: 70    Ca    8.9      12-03  Mg     1.90     12-03  Phos  3.2     12-03    TPro  7.4  /  Alb  2.9[L]  /  TBili  0.4  /  DBili  x   /  AST  23  /  ALT  11  /  AlkPhos  99  12-03    Thyroid Function Tests:  11-30 @ 19:47 TSH 0.35 FreeT4 -- T3 -- Anti TPO -- Anti Thyroglobulin Ab -- TSI --    Diet, Consistent Carbohydrate w/Evening Snack (12-01-24 @ 13:24) [Active]

## 2024-12-03 NOTE — DIETITIAN INITIAL EVALUATION ADULT - PERTINENT MEDS FT
MEDICATIONS  (STANDING):  amLODIPine   Tablet 5 milliGRAM(s) Oral daily  atorvastatin 10 milliGRAM(s) Oral at bedtime  darunavir 800 mG/cobicstat 150 mG 1 Tablet(s) Oral daily  dextrose 5%. 1000 milliLiter(s) (50 mL/Hr) IV Continuous <Continuous>  dextrose 5%. 1000 milliLiter(s) (100 mL/Hr) IV Continuous <Continuous>  dextrose 50% Injectable 25 Gram(s) IV Push once  emtricitabine 200 mG/tenofovir alafenamide 25 mG (DESCOVY) Tablet 1 Tablet(s) Oral daily  ertapenem  IVPB 1000 milliGRAM(s) IV Intermittent every 24 hours  finasteride 5 milliGRAM(s) Oral daily  glucagon  Injectable 1 milliGRAM(s) IntraMuscular once  heparin   Injectable 5000 Unit(s) SubCutaneous every 8 hours  influenza  Vaccine (HIGH DOSE) 0.5 milliLiter(s) IntraMuscular once  insulin glargine Injectable (LANTUS) 12 Unit(s) SubCutaneous <User Schedule>  insulin lispro (ADMELOG) corrective regimen sliding scale   SubCutaneous three times a day before meals  insulin lispro (ADMELOG) corrective regimen sliding scale   SubCutaneous at bedtime  insulin lispro Injectable (ADMELOG) 4 Unit(s) SubCutaneous three times a day before meals  lidocaine   4% Patch 1 Patch Transdermal every 24 hours  metoprolol succinate ER 25 milliGRAM(s) Oral daily  oxybutynin XL 5 milliGRAM(s) Oral daily  tamsulosin 0.4 milliGRAM(s) Oral at bedtime    MEDICATIONS  (PRN):  acetaminophen     Tablet .. 650 milliGRAM(s) Oral every 6 hours PRN Temp greater or equal to 38C (100.4F), Mild Pain (1 - 3)  dextrose Oral Gel 15 Gram(s) Oral once PRN Blood Glucose LESS THAN 70 milliGRAM(s)/deciliter

## 2024-12-03 NOTE — PROVIDER CONTACT NOTE (CRITICAL VALUE NOTIFICATION) - TEST AND RESULT REPORTED:
Urine studies collected on 11/30- final report   50,000-99,000 CFU mL Klebsiella Pneumoniae ESBL
Blood cultures from 11/30 are gram - rods in aerobic and anaerobic bottles

## 2024-12-03 NOTE — DIETITIAN INITIAL EVALUATION ADULT - OTHER INFO
84-year-old male with a PMH of DM, HTN, HLD, HIV and spinal stenosis who presents to the ED with nausea, vomiting, and abdominal pain of 4 days duration per chart.    Patient seen during breakfast and noted w/ minimal PO intake. No GI distress noted. Has no food allergies. Per HIE, noted w/ weights 68 kg (7/4) and 61.5 kg (6/21), ?history. Per RD note (9/6), noted w/ weight 57.6 kg. ABW is 63.5 kg (11/30) per chart indicating a +10.2% weight gain x 2 months, etiology unclear at this time. No edema or pressure injuries noted per RN flow sheet. 84-year-old male with a PMH of DM, HTN, HLD, HIV and spinal stenosis who presents to the ED with nausea, vomiting, and abdominal pain of 4 days duration per chart.    Patient seen during breakfast and noted w/ minimal PO intake. No GI distress noted. Has no food allergies. Daughter reports UBW is 150 lbs. and lost weight priot to admission. Per HIE, noted w/ weights 68 kg (7/4) and 61.5 kg (6/21). Per RD note (9/6), noted w/ weight 57.6 kg. ABW is 63.5 kg (11/30) per chart indicating a +10.2% weight gain x 2 months. No edema or pressure injuries noted per RN flow sheet.    Provided pt with handout Carbohydrate Counting for People with Diabetes and MyPlate for Diabetes. Discussed carbohydrate sources, carbohydrate portions, protein sources, mixed meals, and nutrition label reading. Stressed importance of balanced meals with adequate protein and fiber.

## 2024-12-03 NOTE — PROGRESS NOTE ADULT - ASSESSMENT
85 yo M with PMHx of DM2, HTN, HLD, HIV, BPH, and spinal stenosis presented to the ED with nausea, vomiting, and abdominal pain.    Upon arrival, patient was febrile to Tmax 101.9F.  Labs with mils leukocytosis, hyperglycemia     Workup:   -UA +   -Urine Cx : GNR  -Blood Cx: + Klebsiella ESBL   -CT AP: 1.7 cm peripherally enhancing tubular fluid collection along the posterior aspect of the mid left kidney, possibly in a subcapsular space. Mild asymmetric left perinephric fluid/stranding. Findings are suspicious for infectious process with possible small renal/perirenal abscess. Additional areas of new hypodensity in the left upper pole could reflect pyelonephritis versus interval worsening scarring.Nonspecific diffuse bladder wall thickening, which could reflect cystitis or chronic outlet obstruction secondary to markedly enlarged prostate.    #Klebsiella ESBL bacteremia   #L pyelonephritis with questionable small perirenal abscess  #Fever, leukocytosis  #Hx of HIV on Symtuza     Recommendations:  -Continue Ertapenem 1 g IV daily   -F/up final blood Cx sensitivity   -Urology following - no indication for drainage, Repeat imaging in few days   -Final antibiotics recommendations pending above   -Continue Prezcobix/Descovy for HIV     Aly Ortega MD  ID physician  [facility] Teams Preferred  After 5pm/weekends call [phone]

## 2024-12-03 NOTE — PROGRESS NOTE ADULT - ASSESSMENT
84-year-old male with PMHx of DM2, HTN, HLD, HIV, BPH, and spinal stenosis who presents to the ED with nausea, vomiting, and abdominal pain of 4 days duration. Labwork notable for hyperglycemia in the setting of sepsis. UA with +LE, 87 WBC. CT A/P notable for 1.7 cm peripherally enhancing tubular fluid collection along the posterior aspect of the mid left kidney, possibly in a subcapsular space. Additional areas of new hypodensity in the left upper pole could reflect pyelonephritis versus interval worsening scarring. Nonspecific diffuse bladder wall thickening, which could reflect cystitis or chronic outlet obstruction secondary to markedly enlarged prostate. Urology consulted in ED. Recommended no urological interventions. Initially on Vanc/Zosyn per sepsis protocol --> Blood Cx growing ESBL Klebsiella/UCx pending final gram stain --> switched to Vanc/Ertapenem --> d/c Vanc per ID pharm.     Patient s/p 2L LR Bolus, Lantus 10 units, and Lispro 8 units x2. Glucose improved to 345 --> 218. pH 7.37, AG 17, BHB 2.4 -> 2.1 -> 0.0. Patient admitted to medicine for further management and monitoring.      84-year-old male with PMHx of DM2, HTN, HLD, HIV, BPH, and spinal stenosis who presents to the ED with nausea, vomiting, and abdominal pain of 4 days duration. Labwork notable for hyperglycemia in the setting of sepsis. UA with +LE, 87 WBC. CT A/P notable for 1.7 cm peripherally enhancing tubular fluid collection along the posterior aspect of the mid left kidney, possibly in a subcapsular space. Additional areas of new hypodensity in the left upper pole could reflect pyelonephritis versus interval worsening scarring. Nonspecific diffuse bladder wall thickening, which could reflect cystitis or chronic outlet obstruction secondary to markedly enlarged prostate. Urology consulted in ED. Recommended no urological interventions. Initially on Vanc/Zosyn per sepsis protocol --> Blood Cx growing ESBL Klebsiella/UCx pending final gram stain --> switched to Vanc/Ertapenem --> d/c Vanc per ID pharm --> now on Ertapenem 1g (12/2 -  ).     Patient s/p 2L LR Bolus, Lantus 10 units, and Lispro 8 units x2. Glucose improved to 345 --> 218. pH 7.37, AG 17, BHB 2.4 -> 2.1 -> 0.0. Patient admitted to medicine for further management and monitoring.

## 2024-12-03 NOTE — PROVIDER CONTACT NOTE (CRITICAL VALUE NOTIFICATION) - BACKGROUND
Patient admitted with sepsis. Hx of BPH, HIV, HTN, and diabetes
Patient admitted with sepsis. PMH of DM and stage 3 CKD

## 2024-12-03 NOTE — PROVIDER CONTACT NOTE (HYPOGLYCEMIA EVENT) - NS PROVIDER CONTACT BACKGROUND-HYPO
Age: 84y    Gender: Male    POCT Blood Glucose:  110 mg/dL (12-03-24 @ 22:55)  93 mg/dL (12-03-24 @ 22:39)  74 mg/dL (12-03-24 @ 22:13)  65 mg/dL (12-03-24 @ 22:12)  65 mg/dL (12-03-24 @ 21:55)  94 mg/dL (12-03-24 @ 17:41)  81 mg/dL (12-03-24 @ 12:33)  123 mg/dL (12-03-24 @ 10:10)      eMAR:atorvastatin   10 milliGRAM(s) Oral (12-03-24 @ 22:44)    finasteride   5 milliGRAM(s) Oral (12-03-24 @ 13:19)    insulin glargine Injectable (LANTUS)   12 Unit(s) SubCutaneous (12-03-24 @ 10:40)    insulin lispro Injectable (ADMELOG)   4 Unit(s) SubCutaneous (12-03-24 @ 08:59)

## 2024-12-03 NOTE — PHARMACOTHERAPY INTERVENTION NOTE - COMMENTS
84 M with HIV on Symtuza once daily at home. Combination product not available at [facility].    Recommendation(s):  1) Consider initiating Prezcobix (darunavir/cobicistat) + Descovy (emtricitabine/tenofovir alafenamide) while inpatient    Hemal Palma, PharmD  PGY-1 Pharmacy Resident  Spectra [phone]

## 2024-12-03 NOTE — PROGRESS NOTE ADULT - ASSESSMENT
The patient is a 84y Male with PMH of T2DM, HIV who presented to the hospital with chest pain and noted to be hyperglycemic  Endocrinology consulted for hyperglycemia and uncontrolled Dm2.   Patient is high risk with high level decision making due to uncontrolled diabetes with A1C>11 and severe hyperglycemia to > 500s which places patient at high risk for cardiovascular and cerebrovascular events. Patient with lability of glucose requiring close monitoring and insulin adjustments.    #Uncontrolled Type 2 Diabetes Mellitus with hyperglycemia  - Follows with: PCP, no endocrinologist  - A1C with Estimated Average Glucose Result: 11.7 % (11-30-24)  - home regimen: Lantus 12 (stopped taking one week ago)   - eGFR: 61 mL/min/1.73m2 (12-01-24)  - Weight (kg): 63.5 (11-30-24)  - glucose elevated, no evidence of DKA on labs      INPATIENT PLAN:  - glucose target 100-180mg/dl: below goal. Stable this am. Advised RN to hold premeal insulin for lunch today  - Continue Lantus 12 units q am 1st dose today will see affect in am (as per family will continue in the morning)   - Decrease Admelog to 2 units TID before meals (HOLD if NPO or not eating) starting at dinner today.   - Recommend Low dose admelog correction scale TIDQAC and separate low dose scale QHS  - Please check FSG before meals and QHS, or q6h while NPO: please check 3 am FS   - consistent carb diet when eating  - RD consult  - Hypoglycemia Protocol   - Provider to Rn for insulin pen review   - Advised daughter to bring in food from home but to follow a consistent carb diet, follow the hospital meal schedule, and to avoid pt eating double portions.   - If remain on low premeal insulin and glucose well controlled consider discontinuing premeal insulin and adding Tradjenta 5mg p.o. daily       DISCHARGE PLANNING:  - Discharge recs pending clinical course, basal + metformin (if labs allow) + Tradjenta 5mg p.o. daily vs basal/bolus (final plan TBD based on inpt glucose trend and insulin requirements)    - GLP1a daughter declines she doesn't want any medication that will cause weight loss and or reduce his appetite    - Insulin pen review prior to discharge with family member     - Please send Lantus solostar pen and humalog kwikpen as test script to check insurance coverage.  Ok to send with current doses and update prior to d/c    If Lantus not covered- can try alternating with one of following   tresiba/basaglar/toujeo/Levemir    If Humalog not covered- can try alternating with one of following  novolog/apidra/admelog/fiasp    - Ensure patient has working glucometer, test strips, lancets, alcohol pads, and BD melonie pen needles    - For severe hypoglycemia: Please prescribe Glucose tablets 4G (take 4 tablets) or 15G tablets for blood sugar less than 70 mG/dL repeat fingerstick in 15 minutes.     - CDPAP will learn pen administrations and how to monitor glucose prior to discharge (as per daughter)    - Family is taking full responsibility for insulin pen administration and glucose monitoring     - Daughter unable to assist with insulin pen administration as she is blind     - SANIA and RAJWINDER for safe dc plan     - Please call your doctor if you fs is 70 or below and or 250 and above     - Reviewed importance of medication adherence,  glucose monitoring, and following a consistent carb diet     - Hypoglycemia and intervention reviewed    - Please follow up with your pcp, podiatry, endocrinology, and opthalmology as an outpt     - Please follow up with [facility] at [place]; [address] [place] [address]; [phone]: requested appointment on 12/2    - D/w Daughter Toney [phone]    #Hypertension  - Goal BP <130/80  - Management as per primary team  - check urine albumin level as outpatient    #Hyperlipidemia  - LDL goal <70  - Last LDL: 68 mg/dL (06-22-24)  - on atorvastatin 10 mg qhs  - check lipid panel as outpatient on a yearly basis    d/w Dr. Joe Olivo  Nurse Practitioner  Division of Endocrinology & Diabetes  In house pager #[phone]    If before 9AM or after 6PM, or on weekends/holidays, please call endocrine answering service for assistance ([phone]).For nonurgent matters email [email or web address] for assistance.  The patient is a 84y Male with PMH of T2DM, HIV who presented to the hospital with chest pain and noted to be hyperglycemic  Endocrinology consulted for hyperglycemia and uncontrolled Dm2.   Patient is high risk with high level decision making due to uncontrolled diabetes with A1C>11 and severe hyperglycemia to > 500s which places patient at high risk for cardiovascular and cerebrovascular events. Patient with lability of glucose requiring close monitoring and insulin adjustments.    #Uncontrolled Type 2 Diabetes Mellitus with hyperglycemia  - Follows with: PCP, no endocrinologist  - A1C with Estimated Average Glucose Result: 11.7 % (11-30-24)  - home regimen: Lantus 12 (stopped taking one week ago)   - eGFR: 61 mL/min/1.73m2 (12-01-24)  - Weight (kg): 63.5 (11-30-24)  - glucose elevated, no evidence of DKA on labs      INPATIENT PLAN:  - glucose target 100-180mg/dl: below goal preprandial.  Stable this am. Advised RN to hold premeal insulin for lunch today  - Continue Lantus 12 units q am 1st dose today will see affect in am (as per family will continue basal insulin in the morning)   - dc standing premeal insulin   - Start Tradjenta 5mg p.o. daily in am   - Recommend Low dose admelog correction scale TIDQAC and separate low dose scale QHS  - Please check FSG before meals and QHS, or q6h while NPO: please check 3 am FS   - consistent carb diet when eating  - RD consult  - Hypoglycemia Protocol   - Provider to Rn for insulin pen review   - Advised daughter to bring in food from home but to follow a consistent carb diet, follow the hospital meal schedule, and to avoid pt eating double portions.   - If remain on low premeal insulin and glucose well controlled consider discontinuing premeal insulin and adding Tradjenta 5mg p.o. daily       DISCHARGE PLANNING:  - Discharge recs pending clinical course, basal + metformin (if labs allow) + Tradjenta 5mg p.o. daily vs basal/bolus (final plan TBD based on inpt glucose trend and insulin requirements)    - GLP1a daughter declines she doesn't want any medication that will cause weight loss and or reduce his appetite    - Insulin pen review prior to discharge with family member     - Please send Lantus solostar pen and humalog kwikpen as test script to check insurance coverage.  Ok to send with current doses and update prior to d/c    If Lantus not covered- can try alternating with one of following   tresiba/basaglar/toujeo/Levemir    If Humalog not covered- can try alternating with one of following  novolog/apidra/admelog/fiasp    - Ensure patient has working glucometer, test strips, lancets, alcohol pads, and BD melonie pen needles    - For severe hypoglycemia: Please prescribe Glucose tablets 4G (take 4 tablets) or 15G tablets for blood sugar less than 70 mG/dL repeat fingerstick in 15 minutes.     - CDPAP will learn pen administrations and how to monitor glucose prior to discharge (as per daughter)    - Family is taking full responsibility for insulin pen administration and glucose monitoring     - Daughter unable to assist with insulin pen administration as she is blind     - SANIA and RAJWINDER for safe dc plan     - Please call your doctor if you fs is 70 or below and or 250 and above     - Reviewed importance of medication adherence,  glucose monitoring, and following a consistent carb diet     - Hypoglycemia and intervention reviewed    - Please follow up with your pcp, podiatry, endocrinology, and opthalmology as an outpt     - Please follow up with [facility] at [place]; [address] [place] [address]; [phone]: requested appointment on 12/2    - D/w Daughter Toney [phone]    #Hypertension  - Goal BP <130/80  - Management as per primary team  - check urine albumin level as outpatient    #Hyperlipidemia  - LDL goal <70  - Last LDL: 68 mg/dL (06-22-24)  - on atorvastatin 10 mg qhs  - check lipid panel as outpatient on a yearly basis    d/w Dr. Joe Olivo  Nurse Practitioner  Division of Endocrinology & Diabetes  In house pager #[phone]    If before 9AM or after 6PM, or on weekends/holidays, please call endocrine answering service for assistance ([phone]).For nonurgent matters email [email or web address] for assistance.

## 2024-12-03 NOTE — PROGRESS NOTE ADULT - PROBLEM SELECTOR PLAN 8
VTE PPx: Heparin SQ  GI PPx: Pantoprazole  Nutrition: Consistent Carb  Electrolytes: Maintain K>4, Mag >2, Phos > 3  - Mg 1.3 in AM (12/2) --> replete with PO mag.   Access: PIV  Dispo: pending clinical improvement VTE PPx: Heparin SQ  GI PPx: Pantoprazole  Nutrition: Consistent Carb  Electrolytes: Maintain K>4, Mag >2, Phos > 3  Access: PIV  Dispo: pending clinical improvement

## 2024-12-03 NOTE — PROGRESS NOTE ADULT - PROBLEM SELECTOR PLAN 2
::Source: UTI vs Intraabdominal infection  -Tmax 101.9, Tachycardic to 11, WBC 10.84 --> 7.84., Lactate 2.2 --> 2.0 Glucose 569 ->-> 310, BHB 2.4 -> -> 0.0   -UA positive for mod LE, 87 WBC, moderate bacteria, >1000 glucose  -Blood/UCx pos. ESBL Klebsiella  - CTAP demonstrating 1.7cm tubular fluid collection along left kidney c/f possible small renal/perirenal abscess with areas of hypodensity c/f pyelonephritis vs worsening renal scarring, no hydronephrosis bilaterally, bladder wall thickening c/f cystitis vs chronic outlet obstruction  - s/p 2L LR Bolus    Plan  - Per Urology, repeat scan in 3-4 days, if findings persistent, re-consult.   - Per ID pharm, d/c vanc --> c/w Ertapenem 1g IV  - Ertapenem time course pending ID recs given no source ctrl  - Vitals Q4H ::Source: UTI vs Intraabdominal infection  -Tmax 101.9, Tachycardic to 11, WBC 10.84 --> 7.84., Lactate 2.2 --> 2.0 Glucose 569 ->-> 310, BHB 2.4 -> -> 0.0   -UA positive for mod LE, 87 WBC, moderate bacteria, >1000 glucose  -Blood/UCx pos. ESBL Klebsiella  - CTAP demonstrating 1.7cm tubular fluid collection along left kidney c/f possible small renal/perirenal abscess with areas of hypodensity c/f pyelonephritis vs worsening renal scarring, no hydronephrosis bilaterally, bladder wall thickening c/f cystitis vs chronic outlet obstruction  - s/p 2L LR Bolus    Plan  - Per Urology, repeat scan in 3-4 days, if findings persistent, re-consult.   - Per ID pharm c/w Ertapenem 1g IV, agreed with Uro recs --> inpt CT in 3-4 days.  - Ertapenem time course pending ID recs.  - Vitals Q4H ::Source: UTI vs Intraabdominal infection  -Tmax 101.9, Tachycardic to 11, WBC 10.84 --> 7.84., Lactate 2.2 --> 2.0 Glucose 569 ->-> 310, BHB 2.4 -> -> 0.0   -UA positive for mod LE, 87 WBC, moderate bacteria, >1000 glucose  -Blood/UCx pos. ESBL Klebsiella  - CTAP demonstrating 1.7cm tubular fluid collection along left kidney c/f possible small renal/perirenal abscess with areas of hypodensity c/f pyelonephritis vs worsening renal scarring, no hydronephrosis bilaterally, bladder wall thickening c/f cystitis vs chronic outlet obstruction  - s/p 2L LR Bolus    Plan  - Per Urology, repeat scan in 3-4 days, if findings persistent, re-consult.   - Per ID c/w Ertapenem 1g IV, agreed with Uro recs --> inpt CT in 3 days.  - Ertapenem time course pending ID recs.  - Vitals Q4H

## 2024-12-03 NOTE — PROGRESS NOTE ADULT - SUBJECTIVE AND OBJECTIVE BOX
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 When asked, pt denies any recent or active fever, chills, nausea, vomiting, headache, acute sob, chest pain, abdominal pain, genitourinary sx, extremity pain or swelling.    MEDICATIONS  (STANDING):  amLODIPine   Tablet 5 milliGRAM(s) Oral daily  atorvastatin 10 milliGRAM(s) Oral at bedtime  dextrose 5%. 1000 milliLiter(s) (50 mL/Hr) IV Continuous <Continuous>  dextrose 5%. 1000 milliLiter(s) (100 mL/Hr) IV Continuous <Continuous>  dextrose 50% Injectable 25 Gram(s) IV Push once  ertapenem  IVPB 1000 milliGRAM(s) IV Intermittent every 24 hours  finasteride 5 milliGRAM(s) Oral daily  glucagon  Injectable 1 milliGRAM(s) IntraMuscular once  heparin   Injectable 5000 Unit(s) SubCutaneous every 8 hours  influenza  Vaccine (HIGH DOSE) 0.5 milliLiter(s) IntraMuscular once  insulin glargine Injectable (LANTUS) 12 Unit(s) SubCutaneous <User Schedule>  insulin lispro (ADMELOG) corrective regimen sliding scale   SubCutaneous three times a day before meals  insulin lispro (ADMELOG) corrective regimen sliding scale   SubCutaneous at bedtime  insulin lispro Injectable (ADMELOG) 4 Unit(s) SubCutaneous three times a day before meals  lidocaine   4% Patch 1 Patch Transdermal every 24 hours  metoprolol succinate ER 25 milliGRAM(s) Oral daily  oxybutynin XL 5 milliGRAM(s) Oral daily  tamsulosin 0.4 milliGRAM(s) Oral at bedtime    MEDICATIONS  (PRN):  acetaminophen     Tablet .. 650 milliGRAM(s) Oral every 6 hours PRN Temp greater or equal to 38C (100.4F), Mild Pain (1 - 3)  dextrose Oral Gel 15 Gram(s) Oral once PRN Blood Glucose LESS THAN 70 milliGRAM(s)/deciliter      CAPILLARY BLOOD GLUCOSE      POCT Blood Glucose.: 108 mg/dL (03 Dec 2024 02:58)  POCT Blood Glucose.: 72 mg/dL (02 Dec 2024 21:43)  POCT Blood Glucose.: 84 mg/dL (02 Dec 2024 17:29)  POCT Blood Glucose.: 109 mg/dL (02 Dec 2024 12:39)  POCT Blood Glucose.: 300 mg/dL (02 Dec 2024 09:11)    I&O's Summary      PHYSICAL EXAM:  Vital Signs Last 24 Hrs  T(C): 36.9 (03 Dec 2024 05:27), Max: 36.9 (02 Dec 2024 22:19)  T(F): 98.5 (03 Dec 2024 05:27), Max: 98.5 (03 Dec 2024 05:27)  HR: 65 (03 Dec 2024 05:27) (65 - 93)  BP: 137/69 (03 Dec 2024 05:27) (121/77 - 143/75)  BP(mean): --  RR: 18 (03 Dec 2024 05:27) (18 - 18)  SpO2: 98% (03 Dec 2024 05:27) (97% - 98%)    Parameters below as of 03 Dec 2024 05:27  Patient On (Oxygen Delivery Method): room air        CONSTITUTIONAL: NAD; well-developed  HEENT: PERRL, clear conjunctiva  RESPIRATORY: Normal respiratory effort; lungs are clear to auscultation bilaterally; No Crackles/Rhonchi/Wheezing  CARDIOVASCULAR: Regular rate and rhythm, normal S1 and S2, no murmur/rub/gallop; No lower extremity edema; Peripheral pulses are 2+ bilaterally  ABDOMEN: Nontender to palpation, normoactive bowel sounds, no rebound/guarding; No hepatosplenomegaly  MUSCULOSKELETAL: no clubbing or cyanosis of digits; no joint swelling or tenderness to palpation  EXTREMITY: Lower extremities Non-tender to palpation; non-erythematous B/L  NEURO: A&Ox3; no focal deficits   PSYCH: normal mood; Affect appropirate    LABS:                        9.8    6.34  )-----------( 312      ( 03 Dec 2024 05:20 )             30.3     12-03    133[L]  |  99  |  12  ----------------------------<  95  3.9   |  21[L]  |  1.04    Ca    8.9      03 Dec 2024 06:55  Phos  3.2     12-03  Mg     1.90     12-03    TPro  7.4  /  Alb  2.9[L]  /  TBili  0.4  /  DBili  x   /  AST  23  /  ALT  11  /  AlkPhos  99  12-03          Urinalysis Basic - ( 03 Dec 2024 06:55 )    Color: x / Appearance: x / SG: x / pH: x  Gluc: 95 mg/dL / Ketone: x  / Bili: x / Urobili: x   Blood: x / Protein: x / Nitrite: x   Leuk Esterase: x / RBC: x / WBC x   Sq Epi: x / Non Sq Epi: x / Bacteria: x        Culture - Urine (collected 30 Nov 2024 23:28)  Source: Clean Catch  Preliminary Report (02 Dec 2024 16:04):    50,000 - 99,000 CFU/mL Klebsiella pneumoniae    Urinalysis with Rflx Culture (collected 30 Nov 2024 23:28)    Culture - Blood (collected 30 Nov 2024 21:00)  Source: .Blood BLOOD  Gram Stain (02 Dec 2024 01:42):    Growth in aerobic bottle: Gram Negative Rods  Preliminary Report (02 Dec 2024 21:46):    Growth in aerobic bottle: Klebsiella pneumoniae    Direct identification is available within approximately 3-5    hours either by Blood Panel Multiplexed PCR or Direct    MALDI-TOF. 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 Daughter provided Ashley-to-English translation over the phone. Patient had reported weakness and chest discomfort yesterday, now improving, however he continues to endorse a poor appetite. Additionally, he mentions that over the past couple of weeks to months, he has been experiencing dyspnea and mild chest pain on exertion, and reports lightheadedness prior to his arrival to the ED, but denies any syncopal episodes. He does not follow a cardiologist, but per chart review, received an unremarkable TTE on 1/30/23. When asked, he denies any recent or active fever, chills, N/V, headache, acute SOB, abdominal pain, or urinary sx.     MEDICATIONS  (STANDING):  amLODIPine   Tablet 5 milliGRAM(s) Oral daily  atorvastatin 10 milliGRAM(s) Oral at bedtime  dextrose 5%. 1000 milliLiter(s) (50 mL/Hr) IV Continuous <Continuous>  dextrose 5%. 1000 milliLiter(s) (100 mL/Hr) IV Continuous <Continuous>  dextrose 50% Injectable 25 Gram(s) IV Push once  ertapenem  IVPB 1000 milliGRAM(s) IV Intermittent every 24 hours  finasteride 5 milliGRAM(s) Oral daily  glucagon  Injectable 1 milliGRAM(s) IntraMuscular once  heparin   Injectable 5000 Unit(s) SubCutaneous every 8 hours  influenza  Vaccine (HIGH DOSE) 0.5 milliLiter(s) IntraMuscular once  insulin glargine Injectable (LANTUS) 12 Unit(s) SubCutaneous <User Schedule>  insulin lispro (ADMELOG) corrective regimen sliding scale   SubCutaneous three times a day before meals  insulin lispro (ADMELOG) corrective regimen sliding scale   SubCutaneous at bedtime  insulin lispro Injectable (ADMELOG) 4 Unit(s) SubCutaneous three times a day before meals  lidocaine   4% Patch 1 Patch Transdermal every 24 hours  metoprolol succinate ER 25 milliGRAM(s) Oral daily  oxybutynin XL 5 milliGRAM(s) Oral daily  tamsulosin 0.4 milliGRAM(s) Oral at bedtime    MEDICATIONS  (PRN):  acetaminophen     Tablet .. 650 milliGRAM(s) Oral every 6 hours PRN Temp greater or equal to 38C (100.4F), Mild Pain (1 - 3)  dextrose Oral Gel 15 Gram(s) Oral once PRN Blood Glucose LESS THAN 70 milliGRAM(s)/deciliter    CAPILLARY BLOOD GLUCOSE      POCT Blood Glucose.: 111 mg/dL (03 Dec 2024 08:53)  POCT Blood Glucose.: 108 mg/dL (03 Dec 2024 02:58)  POCT Blood Glucose.: 72 mg/dL (02 Dec 2024 21:43)  POCT Blood Glucose.: 84 mg/dL (02 Dec 2024 17:29)  POCT Blood Glucose.: 109 mg/dL (02 Dec 2024 12:39)      PHYSICAL EXAM:  Vital Signs Last 24 Hrs  T(C): 36.9 (03 Dec 2024 05:27), Max: 36.9 (02 Dec 2024 22:19)  T(F): 98.5 (03 Dec 2024 05:27), Max: 98.5 (03 Dec 2024 05:27)  HR: 65 (03 Dec 2024 05:27) (65 - 93)  BP: 137/69 (03 Dec 2024 05:27) (121/77 - 143/75)  BP(mean): --  RR: 18 (03 Dec 2024 05:27) (18 - 18)  SpO2: 98% (03 Dec 2024 05:27) (97% - 98%)    Parameters below as of 03 Dec 2024 05:27  Patient On (Oxygen Delivery Method): room air      PHYSICAL EXAM:  GENERAL: Awake, alert and interactive, NAD, appears comfortable  NEURO: A&Ox2, no focal deficits, 5/5 strength in all ext, reflexes 2+ throughout, CN 2-12 intact  HEENT: Normocephalic, atraumatic, no conjunctivitis or scleral icterus, oral mucosa moist, no oral lesions noted  NECK: Supple, no LAD, no JVD, thyroid not palpable  CARDIAC: Regular rate and rhythm, +S1/S2, no murmurs/rubs/gallops  PULM: Breathing comfortably on RA, clear to auscultation bilaterally, no wheezes/rales/rhonchi  ABDOMEN: Soft, nontender, nondistended, +bs, no hepatosplenomegaly, no rebound tenderness or fluid wave, no CVA tenderness  SKIN: Warm and dry, no rashes, lesions  VASC: Cap refill <2 sec, 2+ peripheral pulses, no edema, no LE tenderness  Psych: Appropriate affect        LABS:                        9.8    6.34  )-----------( 312      ( 03 Dec 2024 05:20 )             30.3     12-03    133[L]  |  99  |  12  ----------------------------<  95  3.9   |  21[L]  |  1.04    Ca    8.9      03 Dec 2024 06:55  Phos  3.2     12-03  Mg     1.90     12-03    TPro  7.4  /  Alb  2.9[L]  /  TBili  0.4  /  DBili  x   /  AST  23  /  ALT  11  /  AlkPhos  99  12-03      Urinalysis Basic - ( 03 Dec 2024 06:55 )    Color: x / Appearance: x / SG: x / pH: x  Gluc: 95 mg/dL / Ketone: x  / Bili: x / Urobili: x   Blood: x / Protein: x / Nitrite: x   Leuk Esterase: x / RBC: x / WBC x   Sq Epi: x / Non Sq Epi: x / Bacteria: x        Culture - Urine (collected 30 Nov 2024 23:28)  Source: Clean Catch  Preliminary Report (02 Dec 2024 16:04):    50,000 - 99,000 CFU/mL Klebsiella pneumoniae    Urinalysis with Rflx Culture (collected 30 Nov 2024 23:28)    Culture - Blood (collected 30 Nov 2024 21:00)  Source: .Blood BLOOD  Gram Stain (02 Dec 2024 01:42):    Growth in aerobic bottle: Gram Negative Rods  Preliminary Report (02 Dec 2024 21:46):    Growth in aerobic bottle: Klebsiella pneumoniae    Direct identification is available within approximately 3-5    hours either by Blood Panel Multiplexed PCR or Direct    MALDI-TOF. Details: 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 Daughter provided Ashley-to-English translation over the phone. Patient had reported weakness and chest discomfort yesterday, now improving, however he continues to endorse a poor appetite. Additionally, he mentions that over the past couple of weeks to months, he has been experiencing dyspnea and mild chest pain on exertion, and reports lightheadedness prior to his arrival to the ED, but denies any syncopal episodes. He does not follow a cardiologist, but per chart review, received an unremarkable TTE on 1/30/23. When asked, he denies any recent or active fever, chills, N/V, headache, acute SOB, abdominal pain, or urinary sx.     MEDICATIONS  (STANDING):  amLODIPine   Tablet 5 milliGRAM(s) Oral daily  atorvastatin 10 milliGRAM(s) Oral at bedtime  dextrose 5%. 1000 milliLiter(s) (50 mL/Hr) IV Continuous <Continuous>  dextrose 5%. 1000 milliLiter(s) (100 mL/Hr) IV Continuous <Continuous>  dextrose 50% Injectable 25 Gram(s) IV Push once  ertapenem  IVPB 1000 milliGRAM(s) IV Intermittent every 24 hours  finasteride 5 milliGRAM(s) Oral daily  glucagon  Injectable 1 milliGRAM(s) IntraMuscular once  heparin   Injectable 5000 Unit(s) SubCutaneous every 8 hours  influenza  Vaccine (HIGH DOSE) 0.5 milliLiter(s) IntraMuscular once  insulin glargine Injectable (LANTUS) 12 Unit(s) SubCutaneous <User Schedule>  insulin lispro (ADMELOG) corrective regimen sliding scale   SubCutaneous three times a day before meals  insulin lispro (ADMELOG) corrective regimen sliding scale   SubCutaneous at bedtime  insulin lispro Injectable (ADMELOG) 4 Unit(s) SubCutaneous three times a day before meals  lidocaine   4% Patch 1 Patch Transdermal every 24 hours  metoprolol succinate ER 25 milliGRAM(s) Oral daily  oxybutynin XL 5 milliGRAM(s) Oral daily  tamsulosin 0.4 milliGRAM(s) Oral at bedtime    MEDICATIONS  (PRN):  acetaminophen     Tablet .. 650 milliGRAM(s) Oral every 6 hours PRN Temp greater or equal to 38C (100.4F), Mild Pain (1 - 3)  dextrose Oral Gel 15 Gram(s) Oral once PRN Blood Glucose LESS THAN 70 milliGRAM(s)/deciliter    CAPILLARY BLOOD GLUCOSE      POCT Blood Glucose.: 111 mg/dL (03 Dec 2024 08:53)  POCT Blood Glucose.: 108 mg/dL (03 Dec 2024 02:58)  POCT Blood Glucose.: 72 mg/dL (02 Dec 2024 21:43)  POCT Blood Glucose.: 84 mg/dL (02 Dec 2024 17:29)  POCT Blood Glucose.: 109 mg/dL (02 Dec 2024 12:39)      PHYSICAL EXAM:  Vital Signs Last 24 Hrs  T(C): 36.9 (03 Dec 2024 05:27), Max: 36.9 (02 Dec 2024 22:19)  T(F): 98.5 (03 Dec 2024 05:27), Max: 98.5 (03 Dec 2024 05:27)  HR: 65 (03 Dec 2024 05:27) (65 - 93)  BP: 137/69 (03 Dec 2024 05:27) (121/77 - 143/75)  BP(mean): --  RR: 18 (03 Dec 2024 05:27) (18 - 18)  SpO2: 98% (03 Dec 2024 05:27) (97% - 98%)    Parameters below as of 03 Dec 2024 05:27  Patient On (Oxygen Delivery Method): room air      PHYSICAL EXAM:  GENERAL: Awake, alert and interactive, NAD, appears comfortable  NEURO: A&Ox2, no focal deficits, 5/5 strength in all ext, reflexes 2+ throughout, CN 2-12 intact  HEENT: Normocephalic, atraumatic, no conjunctivitis or scleral icterus, oral mucosa moist, no oral lesions noted  NECK: Supple, no LAD, no JVD, thyroid not palpable  CARDIAC: Regular rate and rhythm, +S1/S2, no murmurs/rubs/gallops  PULM: Breathing comfortably on RA, clear to auscultation bilaterally, no wheezes/rales/rhonchi  ABDOMEN: Soft, nontender, nondistended, +bs, no hepatosplenomegaly, no rebound tenderness or fluid wave, no CVA tenderness  SKIN: Warm and dry, no rashes, lesions  VASC: Cap refill <2 sec, 2+ peripheral pulses, no edema, no LE tenderness  Psych: Appropriate affect        LABS:                        9.8    6.34  )-----------( 312      ( 03 Dec 2024 05:20 )             30.3     12-03    133[L]  |  99  |  12  ----------------------------<  95  3.9   |  21[L]  |  1.04    Ca    8.9      03 Dec 2024 06:55  Phos  3.2     12-03  Mg     1.90     12-03    TPro  7.4  /  Alb  2.9[L]  /  TBili  0.4  /  DBili  x   /  AST  23  /  ALT  11  /  AlkPhos  99  12-03      Urinalysis Basic - ( 03 Dec 2024 06:55 )    Color: x / Appearance: x / SG: x / pH: x  Gluc: 95 mg/dL / Ketone: x  / Bili: x / Urobili: x   Blood: x / Protein: x / Nitrite: x   Leuk Esterase: x / RBC: x / WBC x   Sq Epi: x / Non Sq Epi: x / Bacteria: x        Culture - Urine (collected 30 Nov 2024 23:28)  Source: Clean Catch  Preliminary Report (02 Dec 2024 16:04):    50,000 - 99,000 CFU/mL Klebsiella pneumoniae    Urinalysis with Rflx Culture (collected 30 Nov 2024 23:28)    Culture - Blood (collected 30 Nov 2024 21:00)  Source: .Blood BLOOD  Gram Stain (02 Dec 2024 01:42):    Growth in aerobic bottle: Gram Negative Rods  Preliminary Report (02 Dec 2024 21:46):    Growth in aerobic bottle: Klebsiella pneumoniae    Direct identification is available within approximately 3-5    hours either by Blood Panel Multiplexed PCR or Direct    MALDI-TOF. Details: [email or web address]  Organism: Blood Culture PCR (02 Dec 2024 02:48)  Organism: Blood Culture PCR (02 Dec 2024 02:48)

## 2024-12-03 NOTE — PROGRESS NOTE ADULT - PROBLEM SELECTOR PLAN 1
::Hyperglycemia with ketosis in the setting of sepsis  ::A1c 11.7%, Glucose 569 -> 345 --> 218 --> 300. Lactate 2.2 ->2.1 -> 2.0, BHB 2.4 -> 2.1 --> 0.0, pH 7.37  -Home regimen: Lantus (dose unclear)   -s/p 2L NS Bolus, Lantus 10 units, and Lispro 8 units x2 in ED.   Glucose improved to 217.     Plan -   - d/c D5NS   - Diet: consistent carb   - Per Endo, 15U Lantus AM, 5U Admelog pre-meal TID  - Lispro Corrective Q4H (mild)  - FS glucose q4h  - Hypoglycemia precautions ::Hyperglycemia with ketosis in the setting of sepsis  ::A1c 11.7%, Glucose 569 -> 345 --> 218 --> 300 --> 111 (12/3 AM). Lactate 2.2 ->2.1 -> 2.0, BHB 2.4 -> 2.1 --> 0.0, pH 7.37  NOW RESOLVED  -Home regimen: Lantus (dose unclear)   -s/p 2L NS Bolus, Lantus 10 units, and Lispro 8 units x2 in ED.   Glucose improved to 217.     Plan -   - d/c D5NS   - Diet: consistent carb   - Per Endo, 12U Lantus AM, 4U Admelog pre-meal TID i/s/o poor PO intake  - Lispro Corrective Q4H (mild)  - FS glucose q4h  - Hypoglycemia precautions ::Hyperglycemia with ketosis in the setting of sepsis  ::A1c 11.7%, Glucose 569 -> 345 --> 218 --> 300 --> 111 (12/3 AM). Lactate 2.2 ->2.1 -> 2.0, BHB 2.4 -> 2.1 --> 0.0, pH 7.37  NOW RESOLVED  -Home regimen: Lantus (dose unclear)   -s/p 2L NS Bolus, Lantus 10 units, and Lispro 8 units x2 in ED.   Glucose improved to 217.     Plan -   - d/c D5NS   - Diet: consistent carb   - Per Endo, insulin regimen decreased to 12U Lantus AM, 4U Admelog pre-meal TID i/s/o poor PO intake  - Lispro Corrective Q4H (mild)  - FS glucose q4h  - Hypoglycemia precautions

## 2024-12-04 LAB
ALBUMIN SERPL ELPH-MCNC: 3.2 G/DL — LOW (ref 3.3–5)
ALP SERPL-CCNC: 110 U/L — SIGNIFICANT CHANGE UP (ref 40–120)
ALT FLD-CCNC: 13 U/L — SIGNIFICANT CHANGE UP (ref 4–41)
ANION GAP SERPL CALC-SCNC: 13 MMOL/L — SIGNIFICANT CHANGE UP (ref 7–14)
AST SERPL-CCNC: 21 U/L — SIGNIFICANT CHANGE UP (ref 4–40)
BILIRUB SERPL-MCNC: 0.5 MG/DL — SIGNIFICANT CHANGE UP (ref 0.2–1.2)
BUN SERPL-MCNC: 13 MG/DL — SIGNIFICANT CHANGE UP (ref 7–23)
CALCIUM SERPL-MCNC: 9.3 MG/DL — SIGNIFICANT CHANGE UP (ref 8.4–10.5)
CHLORIDE SERPL-SCNC: 99 MMOL/L — SIGNIFICANT CHANGE UP (ref 98–107)
CO2 SERPL-SCNC: 22 MMOL/L — SIGNIFICANT CHANGE UP (ref 22–31)
CREAT SERPL-MCNC: 1.06 MG/DL — SIGNIFICANT CHANGE UP (ref 0.5–1.3)
CULTURE RESULTS: ABNORMAL
CULTURE RESULTS: ABNORMAL
EGFR: 69 ML/MIN/1.73M2 — SIGNIFICANT CHANGE UP
GLUCOSE BLDC GLUCOMTR-MCNC: 128 MG/DL — HIGH (ref 70–99)
GLUCOSE BLDC GLUCOMTR-MCNC: 138 MG/DL — HIGH (ref 70–99)
GLUCOSE BLDC GLUCOMTR-MCNC: 139 MG/DL — HIGH (ref 70–99)
GLUCOSE BLDC GLUCOMTR-MCNC: 147 MG/DL — HIGH (ref 70–99)
GLUCOSE BLDC GLUCOMTR-MCNC: 234 MG/DL — HIGH (ref 70–99)
GLUCOSE SERPL-MCNC: 111 MG/DL — HIGH (ref 70–99)
GRAM STN FLD: ABNORMAL
HCT VFR BLD CALC: 32 % — LOW (ref 39–50)
HGB BLD-MCNC: 10.5 G/DL — LOW (ref 13–17)
MAGNESIUM SERPL-MCNC: 2 MG/DL — SIGNIFICANT CHANGE UP (ref 1.6–2.6)
MCHC RBC-ENTMCNC: 27.5 PG — SIGNIFICANT CHANGE UP (ref 27–34)
MCHC RBC-ENTMCNC: 32.8 G/DL — SIGNIFICANT CHANGE UP (ref 32–36)
MCV RBC AUTO: 83.8 FL — SIGNIFICANT CHANGE UP (ref 80–100)
NRBC # BLD: 0 /100 WBCS — SIGNIFICANT CHANGE UP (ref 0–0)
NRBC # FLD: 0 K/UL — SIGNIFICANT CHANGE UP (ref 0–0)
ORGANISM # SPEC MICROSCOPIC CNT: ABNORMAL
ORGANISM # SPEC MICROSCOPIC CNT: ABNORMAL
PHOSPHATE SERPL-MCNC: 2.9 MG/DL — SIGNIFICANT CHANGE UP (ref 2.5–4.5)
PLATELET # BLD AUTO: 333 K/UL — SIGNIFICANT CHANGE UP (ref 150–400)
POTASSIUM SERPL-MCNC: 3.9 MMOL/L — SIGNIFICANT CHANGE UP (ref 3.5–5.3)
POTASSIUM SERPL-SCNC: 3.9 MMOL/L — SIGNIFICANT CHANGE UP (ref 3.5–5.3)
PROT SERPL-MCNC: 8.2 G/DL — SIGNIFICANT CHANGE UP (ref 6–8.3)
RBC # BLD: 3.82 M/UL — LOW (ref 4.2–5.8)
RBC # FLD: 13.5 % — SIGNIFICANT CHANGE UP (ref 10.3–14.5)
SODIUM SERPL-SCNC: 134 MMOL/L — LOW (ref 135–145)
TROPONIN T, HIGH SENSITIVITY RESULT: 16 NG/L — SIGNIFICANT CHANGE UP
WBC # BLD: 5 K/UL — SIGNIFICANT CHANGE UP (ref 3.8–10.5)
WBC # FLD AUTO: 5 K/UL — SIGNIFICANT CHANGE UP (ref 3.8–10.5)

## 2024-12-04 PROCEDURE — 99232 SBSQ HOSP IP/OBS MODERATE 35: CPT

## 2024-12-04 PROCEDURE — 99232 SBSQ HOSP IP/OBS MODERATE 35: CPT | Mod: GC

## 2024-12-04 RX ORDER — INSULIN GLARGINE 100 [IU]/ML
8 INJECTION, SOLUTION SUBCUTANEOUS
Refills: 0 | Status: DISCONTINUED | OUTPATIENT
Start: 2024-12-05 | End: 2024-12-05

## 2024-12-04 RX ORDER — PANTOPRAZOLE SODIUM 40 MG/1
40 TABLET, DELAYED RELEASE ORAL
Refills: 0 | Status: DISCONTINUED | OUTPATIENT
Start: 2024-12-04 | End: 2024-12-06

## 2024-12-04 RX ADMIN — Medication 2: at 12:38

## 2024-12-04 RX ADMIN — LIDOCAINE 1 PATCH: 40 CREAM TOPICAL at 21:29

## 2024-12-04 RX ADMIN — Medication 5000 UNIT(S): at 21:25

## 2024-12-04 RX ADMIN — Medication 5 MILLIGRAM(S): at 12:37

## 2024-12-04 RX ADMIN — DARUNAVIR ETHANOLATE AND COBICISTAT 1 TABLET(S): 800; 150 TABLET, FILM COATED ORAL at 12:37

## 2024-12-04 RX ADMIN — TAMSULOSIN HYDROCHLORIDE 0.4 MILLIGRAM(S): 0.4 CAPSULE ORAL at 21:25

## 2024-12-04 RX ADMIN — Medication 5000 UNIT(S): at 05:36

## 2024-12-04 RX ADMIN — LIDOCAINE 1 PATCH: 40 CREAM TOPICAL at 08:47

## 2024-12-04 RX ADMIN — LIDOCAINE 1 PATCH: 40 CREAM TOPICAL at 19:30

## 2024-12-04 RX ADMIN — METOPROLOL TARTRATE 25 MILLIGRAM(S): 100 TABLET, FILM COATED ORAL at 05:36

## 2024-12-04 RX ADMIN — ERTAPENEM 120 MILLIGRAM(S): 1 INJECTION, POWDER, LYOPHILIZED, FOR SOLUTION INTRAMUSCULAR; INTRAVENOUS at 03:51

## 2024-12-04 RX ADMIN — EMTRICITABINE AND TENOFOVIR DISOPROXIL FUMARATE 1 TABLET(S): 200; 300 TABLET, FILM COATED ORAL at 12:37

## 2024-12-04 RX ADMIN — INSULIN GLARGINE 12 UNIT(S): 100 INJECTION, SOLUTION SUBCUTANEOUS at 08:50

## 2024-12-04 RX ADMIN — Medication 10 MILLIGRAM(S): at 21:25

## 2024-12-04 RX ADMIN — AMLODIPINE BESYLATE 5 MILLIGRAM(S): 10 TABLET ORAL at 05:36

## 2024-12-04 RX ADMIN — Medication 5000 UNIT(S): at 14:33

## 2024-12-04 NOTE — PROGRESS NOTE ADULT - PROBLEM SELECTOR PLAN 2
::Source: UTI vs Intraabdominal infection  -Tmax 101.9, Tachycardic to 11, WBC 10.84 --> 7.84., Lactate 2.2 --> 2.0 Glucose 569 ->-> 310, BHB 2.4 -> -> 0.0   -UA positive for mod LE, 87 WBC, moderate bacteria, >1000 glucose  -Blood/UCx pos. ESBL Klebsiella  - CTAP demonstrating 1.7cm tubular fluid collection along left kidney c/f possible small renal/perirenal abscess with areas of hypodensity c/f pyelonephritis vs worsening renal scarring, no hydronephrosis bilaterally, bladder wall thickening c/f cystitis vs chronic outlet obstruction  - s/p 2L LR Bolus    Plan  - Per Urology, repeat scan in 3-4 days, if findings persistent, re-consult.   - Per ID c/w Ertapenem 1g IV, agreed with Uro recs --> inpt CT in 3 days.  - Ertapenem time course pending ID recs.  - Vitals Q4H

## 2024-12-04 NOTE — CHART NOTE - NSCHARTNOTEFT_GEN_A_CORE
Nurse notified NF that patient having CP. Of note, patient has known hx of chest pain w/ prior ECG and trops negative x2. At bedside, patient stated that chest pain was epigastric and same as prior. Nurse and PCA stated that chest pain occurred when moving patient. Vitals showing afebrile, HR 66, /63, sat 100 on RA, with cardiac exam and lung exam wnl. ECG showing normal sinus rhythm w/ 1st degree AV block (seen on prior ECG) with PAC, w/o signs of ischemia. Troponin ordered with AM labs, CP unlikely to be cardiac etiology possibly MSK. Will offer acetaminophen and CTM.

## 2024-12-04 NOTE — PROGRESS NOTE ADULT - PROBLEM SELECTOR PLAN 6
-HIV VL not detected   -Has not received home Symtuza since last dose Saturday (11/30).  -Continue with home Symtuza (daughter to bring in PM)

## 2024-12-04 NOTE — PROGRESS NOTE ADULT - PROBLEM SELECTOR PLAN 1
::Hyperglycemia with ketosis in the setting of sepsis  ::A1c 11.7%, Glucose 569 -> 345 --> 218 --> 300 --> 111 (12/3 AM). Lactate 2.2 ->2.1 -> 2.0, BHB 2.4 -> 2.1 --> 0.0, pH 7.37  NOW RESOLVED  -Home regimen: Lantus (dose unclear)   -s/p 2L NS Bolus, Lantus 10 units, and Lispro 8 units x2 in ED.   Glucose improved to 217.     Plan -   - d/c D5NS   - Diet: consistent carb   - Per Endo, insulin regimen decreased to 12U Lantus AM, 4U Admelog pre-meal TID i/s/o poor PO intake  - Lispro Corrective Q4H (mild)  - FS glucose q4h  - Hypoglycemia precautions

## 2024-12-04 NOTE — PROGRESS NOTE ADULT - SUBJECTIVE AND OBJECTIVE BOX
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 Complaining of C/P, likely epigastric in nature per NF. EKG w/ no ischemic changes, pending AM troponin.    Pt seen in AM at bedside, resting comfortably in bed, and does not appear to be in any acute distress. Daughter provided Ashley-to-English translation over the phone. When asked, pt denies any recent or active fever, chills, nausea, vomiting, headache, acute sob, chest pain, abdominal pain, genitourinary sx, extremity pain or swelling.    MEDICATIONS  (STANDING):  amLODIPine   Tablet 5 milliGRAM(s) Oral daily  atorvastatin 10 milliGRAM(s) Oral at bedtime  darunavir 800 mG/cobicstat 150 mG 1 Tablet(s) Oral daily  dextrose 5%. 1000 milliLiter(s) (50 mL/Hr) IV Continuous <Continuous>  dextrose 5%. 1000 milliLiter(s) (100 mL/Hr) IV Continuous <Continuous>  dextrose 50% Injectable 25 Gram(s) IV Push once  emtricitabine 200 mG/tenofovir alafenamide 25 mG (DESCOVY) Tablet 1 Tablet(s) Oral daily  ertapenem  IVPB 1000 milliGRAM(s) IV Intermittent every 24 hours  finasteride 5 milliGRAM(s) Oral daily  glucagon  Injectable 1 milliGRAM(s) IntraMuscular once  heparin   Injectable 5000 Unit(s) SubCutaneous every 8 hours  influenza  Vaccine (HIGH DOSE) 0.5 milliLiter(s) IntraMuscular once  insulin glargine Injectable (LANTUS) 12 Unit(s) SubCutaneous <User Schedule>  insulin lispro (ADMELOG) corrective regimen sliding scale   SubCutaneous at bedtime  insulin lispro (ADMELOG) corrective regimen sliding scale   SubCutaneous three times a day before meals  lidocaine   4% Patch 1 Patch Transdermal every 24 hours  linagliptin 5 milliGRAM(s) Oral daily  metoprolol succinate ER 25 milliGRAM(s) Oral daily  oxybutynin XL 5 milliGRAM(s) Oral daily  tamsulosin 0.4 milliGRAM(s) Oral at bedtime    MEDICATIONS  (PRN):  acetaminophen     Tablet .. 650 milliGRAM(s) Oral every 6 hours PRN Temp greater or equal to 38C (100.4F), Mild Pain (1 - 3)  dextrose Oral Gel 15 Gram(s) Oral once PRN Blood Glucose LESS THAN 70 milliGRAM(s)/deciliter      CAPILLARY BLOOD GLUCOSE      POCT Blood Glucose.: 138 mg/dL (04 Dec 2024 03:13)  POCT Blood Glucose.: 110 mg/dL (03 Dec 2024 22:55)  POCT Blood Glucose.: 93 mg/dL (03 Dec 2024 22:39)  POCT Blood Glucose.: 74 mg/dL (03 Dec 2024 22:13)  POCT Blood Glucose.: 65 mg/dL (03 Dec 2024 22:12)  POCT Blood Glucose.: 65 mg/dL (03 Dec 2024 21:55)  POCT Blood Glucose.: 94 mg/dL (03 Dec 2024 17:41)  POCT Blood Glucose.: 81 mg/dL (03 Dec 2024 12:33)  POCT Blood Glucose.: 123 mg/dL (03 Dec 2024 10:10)  POCT Blood Glucose.: 111 mg/dL (03 Dec 2024 08:53)    I&O's Summary      PHYSICAL EXAM:  Vital Signs Last 24 Hrs  T(C): 36.2 (04 Dec 2024 05:04), Max: 36.9 (04 Dec 2024 03:43)  T(F): 97.1 (04 Dec 2024 05:04), Max: 98.4 (04 Dec 2024 03:43)  HR: 67 (04 Dec 2024 05:04) (64 - 67)  BP: 149/67 (04 Dec 2024 05:04) (125/64 - 157/63)  BP(mean): --  RR: 18 (04 Dec 2024 05:04) (18 - 18)  SpO2: 97% (04 Dec 2024 05:04) (97% - 100%)    Parameters below as of 04 Dec 2024 05:04  Patient On (Oxygen Delivery Method): room air        CONSTITUTIONAL: NAD; well-developed  HEENT: PERRL, clear conjunctiva  RESPIRATORY: Normal respiratory effort; lungs are clear to auscultation bilaterally; No Crackles/Rhonchi/Wheezing  CARDIOVASCULAR: Regular rate and rhythm, normal S1 and S2, no murmur/rub/gallop; No lower extremity edema; Peripheral pulses are 2+ bilaterally  ABDOMEN: Nontender to palpation, normoactive bowel sounds, no rebound/guarding; No hepatosplenomegaly  MUSCULOSKELETAL: no clubbing or cyanosis of digits; no joint swelling or tenderness to palpation  EXTREMITY: Lower extremities Non-tender to palpation; non-erythematous B/L  NEURO: A&Ox3; no focal deficits   PSYCH: normal mood; Affect appropirate    LABS:                        9.8    6.34  )-----------( 312      ( 03 Dec 2024 05:20 )             30.3     12-03    133[L]  |  99  |  12  ----------------------------<  95  3.9   |  21[L]  |  1.04    Ca    8.9      03 Dec 2024 06:55  Phos  3.2     12-03  Mg     1.90     12-03    TPro  7.4  /  Alb  2.9[L]  /  TBili  0.4  /  DBili  x   /  AST  23  /  ALT  11  /  AlkPhos  99  12-03          Urinalysis Basic - ( 03 Dec 2024 06:55 )    Color: x / Appearance: x / SG: x / pH: x  Gluc: 95 mg/dL / Ketone: x  / Bili: x / Urobili: x   Blood: x / Protein: x / Nitrite: x   Leuk Esterase: x / RBC: x / WBC x   Sq Epi: x / Non Sq Epi: x / Bacteria: x          RADIOLOGY & ADDITIONAL TESTS:  Results Reviewed:   Imaging Personally Reviewed:  Electrocardiogram Personally Reviewed: --------------------------  Chandler Moncada  3rd-year Medical Student  --------------------------    SUBJECTIVE / OVERNIGHT EVENTS: Hypoglycemic to 65 in PM --> 93 after 4oz juice. Complaining of C/P in PM, likely epigastric in nature per NF. EKG w/ no ischemic changes, AM troponin 16.    Pt seen in AM at bedside, resting comfortably in bed, and does not appear to be in any acute distress. Daughter provided Ashley-to-English translation over the phone. Reported no chest pain/discomfort at this time. Discussed possibility of discharging patient home, likely 12/6, with midline for infusion of remaining time course of IV ertapenem, pending results of CT A/P on 12/5 and final recommendations from ID. Daughter and patient agree with plan. When asked, pt denies any recent or active fever, chills, nausea, vomiting, headache, acute sob, abdominal pain, genitourinary sx.     MEDICATIONS  (STANDING):  amLODIPine   Tablet 5 milliGRAM(s) Oral daily  atorvastatin 10 milliGRAM(s) Oral at bedtime  darunavir 800 mG/cobicstat 150 mG 1 Tablet(s) Oral daily  dextrose 5%. 1000 milliLiter(s) (50 mL/Hr) IV Continuous <Continuous>  dextrose 5%. 1000 milliLiter(s) (100 mL/Hr) IV Continuous <Continuous>  dextrose 50% Injectable 25 Gram(s) IV Push once  emtricitabine 200 mG/tenofovir alafenamide 25 mG (DESCOVY) Tablet 1 Tablet(s) Oral daily  ertapenem  IVPB 1000 milliGRAM(s) IV Intermittent every 24 hours  finasteride 5 milliGRAM(s) Oral daily  glucagon  Injectable 1 milliGRAM(s) IntraMuscular once  heparin   Injectable 5000 Unit(s) SubCutaneous every 8 hours  influenza  Vaccine (HIGH DOSE) 0.5 milliLiter(s) IntraMuscular once  insulin glargine Injectable (LANTUS) 12 Unit(s) SubCutaneous <User Schedule>  insulin lispro (ADMELOG) corrective regimen sliding scale   SubCutaneous at bedtime  insulin lispro (ADMELOG) corrective regimen sliding scale   SubCutaneous three times a day before meals  lidocaine   4% Patch 1 Patch Transdermal every 24 hours  linagliptin 5 milliGRAM(s) Oral daily  metoprolol succinate ER 25 milliGRAM(s) Oral daily  oxybutynin XL 5 milliGRAM(s) Oral daily  tamsulosin 0.4 milliGRAM(s) Oral at bedtime    MEDICATIONS  (PRN):  acetaminophen     Tablet .. 650 milliGRAM(s) Oral every 6 hours PRN Temp greater or equal to 38C (100.4F), Mild Pain (1 - 3)  dextrose Oral Gel 15 Gram(s) Oral once PRN Blood Glucose LESS THAN 70 milliGRAM(s)/deciliter      CAPILLARY BLOOD GLUCOSE      POCT Blood Glucose.: 138 mg/dL (04 Dec 2024 03:13)  POCT Blood Glucose.: 110 mg/dL (03 Dec 2024 22:55)  POCT Blood Glucose.: 93 mg/dL (03 Dec 2024 22:39)  POCT Blood Glucose.: 74 mg/dL (03 Dec 2024 22:13)  POCT Blood Glucose.: 65 mg/dL (03 Dec 2024 22:12)  POCT Blood Glucose.: 65 mg/dL (03 Dec 2024 21:55)  POCT Blood Glucose.: 94 mg/dL (03 Dec 2024 17:41)  POCT Blood Glucose.: 81 mg/dL (03 Dec 2024 12:33)  POCT Blood Glucose.: 123 mg/dL (03 Dec 2024 10:10)  POCT Blood Glucose.: 111 mg/dL (03 Dec 2024 08:53)    I&O's Summary      PHYSICAL EXAM:  Vital Signs Last 24 Hrs  T(C): 36.2 (04 Dec 2024 05:04), Max: 36.9 (04 Dec 2024 03:43)  T(F): 97.1 (04 Dec 2024 05:04), Max: 98.4 (04 Dec 2024 03:43)  HR: 67 (04 Dec 2024 05:04) (64 - 67)  BP: 149/67 (04 Dec 2024 05:04) (125/64 - 157/63)  BP(mean): --  RR: 18 (04 Dec 2024 05:04) (18 - 18)  SpO2: 97% (04 Dec 2024 05:04) (97% - 100%)    Parameters below as of 04 Dec 2024 05:04  Patient On (Oxygen Delivery Method): room air        CONSTITUTIONAL: NAD; well-developed  HEENT: PERRL, clear conjunctiva  RESPIRATORY: Normal respiratory effort; lungs are clear to auscultation bilaterally; No Crackles/Rhonchi/Wheezing  CARDIOVASCULAR: Regular rate and rhythm, normal S1 and S2, no murmur/rub/gallop; No lower extremity edema; Peripheral pulses are 2+ bilaterally  ABDOMEN: Nontender to palpation, normoactive bowel sounds, no rebound/guarding; No hepatosplenomegaly  MUSCULOSKELETAL: no clubbing or cyanosis of digits; no joint swelling or tenderness to palpation  EXTREMITY: Lower extremities Non-tender to palpation; non-erythematous B/L  NEURO: A&Ox3; no focal deficits   PSYCH: normal mood; Affect appropirate    LABS:                          10.5   5.00  )-----------( 333      ( 04 Dec 2024 07:02 )             32.0     12-04    134[L]  |  99  |  13  ----------------------------<  111[H]  3.9   |  22  |  1.06    Ca    9.3      04 Dec 2024 07:02  Phos  2.9     12-04  Mg     2.00     12-04    TPro  8.2  /  Alb  3.2[L]  /  TBili  0.5  /  DBili  x   /  AST  21  /  ALT  13  /  AlkPhos  110  12-04      Urinalysis Basic - ( 03 Dec 2024 06:55 )    Color: x / Appearance: x / SG: x / pH: x  Gluc: 95 mg/dL / Ketone: x  / Bili: x / Urobili: x   Blood: x / Protein: x / Nitrite: x   Leuk Esterase: x / RBC: x / WBC x   Sq Epi: x / Non Sq Epi: x / Bacteria: x      RADIOLOGY & ADDITIONAL TESTS:  Results Reviewed:   Imaging Personally Reviewed:  Electrocardiogram Personally Reviewed:      < from: TTE W or WO Ultrasound Enhancing Agent (12.03.24 @ 15:42) >    TRANSTHORACIC ECHOCARDIOGRAM REPORT  ________________________________________________________________________________                                      _______       Pt. Name:       JAMES WAGNER Study Date:    12/3/2024  MRN:            [id number]        YOB: 1940  Accession #:    [id number]        Age:           84 years  Account#:       [id number]         Gender:        M  Heart Rate:                      Height:        65.00 in (165.10 cm)  Rhythm:                          Weight:        140.00 lb (63.50 kg)  Blood Pressure: 137/68 mmHg      BSA/BMI:       1.70 m² / 23.30 kg/m²  ________________________________________________________________________________________  Referring Physician: [id number] Kathe Colin  Primary Sonographer: Genesis Gaston [facility]    CPT:               ECHO TTE WO CON COMP W DOPP - 91237.m  Indication(s):     Chest pain, unspecified - R07.9  Procedure:         Transthoracic echocardiogram with 2-D, M-mode and complete                     spectral and color flow Doppler.  Ordering Location: [facility]  Admission Status:  Inpatient  Study Information: Image quality for this study is technically difficult.    _______________________________________________________________________________________     CONCLUSIONS:      1. Technically difficult image quality.   2. Left ventricular systolic function is normal with an ejection fraction of 63 % by Wilkes's method of disks. There are no regional wall motion abnormalities seen.   3. There is mild (grade 1) left ventricular diastolic dysfunction.   4. Normal right ventricular cavity size and normal right ventricular systolic function.   5. Structurally normal mitral valve with normal leaflet excursion. There is calcification of the mitral valve annulus. There is mild mitral regurgitation.    ________________________________________________________________________________________  FINDINGS:     Left Ventricle:  Left ventricular systolic function is normal with a calculated ejection fraction of63 % by the Wilkes's biplane method of disks. There are no regional wall motion abnormalities seen. There is mild (grade 1) left ventricular diastolic dysfunction.     Right Ventricle:  The right ventricular cavity is normal in size and right ventricular systolic function is normal.     Left Atrium:  The left atrium is normal in size with an indexed volume of 19.02 ml/m².     Right Atrium:  The right atrium is normal in size with an indexed volume of 16.47 ml/m² and an indexed area of 7.59 cm²/m².     Aortic Valve:  The aortic valve appears trileaflet with normal systolic excursion. There is calcification of the aortic valve leaflets. There is no evidence of aortic regurgitation.     Mitral Valve:  Structurally normal mitral valve with normal leaflet excursion. There is calcification of the mitral valve annulus. There is mild mitral regurgitation.     Tricuspid Valve:  Structurally normal tricuspid valve with normal leaflet excursion. There is trace tricuspid regurgitation.     Pulmonic Valve:  Structurally normal pulmonic valve with normal leaflet excursion. There is trace pulmonic regurgitation.     Aorta:  The aortic root at the sinuses of Valsalva is normal in size, measuring 2.90 cm (indexed 1.71 cm/m²). The ascending aorta is normal in size, measuring 2.90 cm (indexed 1.71 cm/m²).     Pericardium:  No pericardial effusion seen.     Systemic Veins:  The inferior vena cava is normal in size (normal <2.1cm) with normal inspiratory collapse (normal >50%) consistent with normal right atrial pressure (~3, range 0-5mmHg).  ____________________________________________________________________  QUANTITATIVE DATA:  Left Ventricle Measurements: (Indexed to BSA)     LV Vol d, MOD A2C: 55.5 ml 32.65 ml/m²  LV Vol d, MOD A4C: 56.3 ml 33.14 ml/m²  LV Vol d, MOD BP:  58.1 ml 34.19 ml/m²  LV Vol s, MOD A2C: 22.7 ml 13.38 ml/m²  LV Vol s, MOD A4C: 20.9 ml 12.28 ml/m²  LV Vol s, MOD BP:  21.6 ml 12.69 ml/m²  LVOT SV MOD BP:    36.5 ml  LV EF% MOD BP:     63 %     MV E Vmax:    0.59 m/s  MV A Vmax:    1.01 m/s  MV E/A:       0.58  e' lateral:   7.99 cm/s  e' medial:    6.77 cm/s  E/e' lateral: 7.35  E/e' medial:  8.67  E/e' Average: 8.03  MV DT:        355 msec    Aorta Measurements: (Normal range) (Indexed to BSA)     Ao Root d    2.90 cm (3.1 - 3.7 cm) 1.71 cm/m²  Ao Root      2.9 cm  Ao Asc:      2.9 cm  Ao Asc prox: 2.90 cm                1.71 cm/m²       Left Atrium Measurements: (Indexed to BSA)  LA Diam 2D:        3.42 cm  LA Vol s, MOD A4C: 34.46 ml.  LA Vol s, MOD A2C: 26.24 ml.  LA Vol s, MOD BP:  32.33 ml  19.02 ml/m²         Right Ventricle Measurements: Right Atrial Measurements:     RV S' Vmax: 11.83 cm/s        RA Vol:           28.00 ml                                RA Vol s, MOD A4C 28.4 ml                RA Vol Index:     16.47 ml/m²       LVOT / RVOT/ Qp/Qs Data: (Indexed to BSA)  LVOT Diameter:  2.03 cm  LVOT Area:      3.25 cm²  LVOT Vmax:      0.87 m/s  LVOT VTI:       16.78 cm  LVOT peak grad: 3 mmHg  LVOT mean grad: 1.6 mmHg  LVOTSV:        54.5 ml  32.05 ml/m²    Aortic Valve Measurements:  AV Vmax:          1.5 m/s  AV Peak Gradient: 8.5 mmHg    Mitral Valve Measurements:     MV E Vmax: 0.6 m/s  MV A Vmax: 1.0 m/s  MV E/A:    0.6       Tricuspid Valve Measurements:     RA Pressure: 3 mmHg    ________________________________________________________________________________________  Electronically signed on 12/3/2024 at 4:50:09 PM by Brian Will M.D.         *** Final ***    < end of copied text >

## 2024-12-04 NOTE — PROGRESS NOTE ADULT - SUBJECTIVE AND OBJECTIVE BOX
Chief Complaint: Type 2 DM     History: Pt seen at bedside. Pt tolerating oral diet. Pt denies nausea and vomiting/any signs of hypoglycemia. Pt reports an adequate appetite.     MEDICATIONS  (STANDING):  amLODIPine   Tablet 5 milliGRAM(s) Oral daily  atorvastatin 10 milliGRAM(s) Oral at bedtime  darunavir 800 mG/cobicstat 150 mG 1 Tablet(s) Oral daily  dextrose 5%. 1000 milliLiter(s) (50 mL/Hr) IV Continuous <Continuous>  dextrose 5%. 1000 milliLiter(s) (100 mL/Hr) IV Continuous <Continuous>  dextrose 50% Injectable 25 Gram(s) IV Push once  emtricitabine 200 mG/tenofovir alafenamide 25 mG (DESCOVY) Tablet 1 Tablet(s) Oral daily  ertapenem  IVPB 1000 milliGRAM(s) IV Intermittent every 24 hours  finasteride 5 milliGRAM(s) Oral daily  glucagon  Injectable 1 milliGRAM(s) IntraMuscular once  heparin   Injectable 5000 Unit(s) SubCutaneous every 8 hours  influenza  Vaccine (HIGH DOSE) 0.5 milliLiter(s) IntraMuscular once  insulin lispro (ADMELOG) corrective regimen sliding scale   SubCutaneous three times a day before meals  insulin lispro (ADMELOG) corrective regimen sliding scale   SubCutaneous at bedtime  lidocaine   4% Patch 1 Patch Transdermal every 24 hours  metoprolol succinate ER 25 milliGRAM(s) Oral daily  oxybutynin XL 5 milliGRAM(s) Oral daily  pantoprazole    Tablet 40 milliGRAM(s) Oral before breakfast  tamsulosin 0.4 milliGRAM(s) Oral at bedtime    MEDICATIONS  (PRN):  acetaminophen     Tablet .. 650 milliGRAM(s) Oral every 6 hours PRN Temp greater or equal to 38C (100.4F), Mild Pain (1 - 3)  dextrose Oral Gel 15 Gram(s) Oral once PRN Blood Glucose LESS THAN 70 milliGRAM(s)/deciliter      Allergies: No Known Allergies      Review of Systems:  Respiratory: No SOB, no cough  GI: No nausea, vomiting, abdominal pain  Endocrine: no polyuria, polydipsia    PHYSICAL EXAM:  VITALS: T(C): 36.4 (12-04-24 @ 12:45)  T(F): 97.6 (12-04-24 @ 12:45), Max: 98.4 (12-04-24 @ 03:43)  HR: 73 (12-04-24 @ 12:45) (64 - 73)  BP: 133/62 (12-04-24 @ 12:45) (125/64 - 157/63)  RR:  (17 - 18)  SpO2:  (97% - 100%)  Wt(kg): --  GENERAL: NAD, well-groomed, well-developed  RESPIRATORY: No labored breathing   GI: Soft, nontender, non distended  PSYCH: Alert and oriented x 3, normal affect, normal mood      CAPILLARY BLOOD GLUCOSE  POCT Blood Glucose.: 234 mg/dL (04 Dec 2024 12:08)  POCT Blood Glucose.: 128 mg/dL (04 Dec 2024 08:36)  POCT Blood Glucose.: 138 mg/dL (04 Dec 2024 03:13)  POCT Blood Glucose.: 110 mg/dL (03 Dec 2024 22:55)  POCT Blood Glucose.: 93 mg/dL (03 Dec 2024 22:39)  POCT Blood Glucose.: 74 mg/dL (03 Dec 2024 22:13)  POCT Blood Glucose.: 65 mg/dL (03 Dec 2024 22:12)  POCT Blood Glucose.: 65 mg/dL (03 Dec 2024 21:55)  POCT Blood Glucose.: 94 mg/dL (03 Dec 2024 17:41)    A1C with Estimated Average Glucose (11.30.24 @ 19:47)    A1C with Estimated Average Glucose Result: 11.7   Estimated Average Glucose: 289      12-04    134[L]  |  99  |  13  ----------------------------<  111[H]  3.9   |  22  |  1.06    eGFR: 69    Ca    9.3      12-04  Mg     2.00     12-04  Phos  2.9     12-04    TPro  8.2  /  Alb  3.2[L]  /  TBili  0.5  /  DBili  x   /  AST  21  /  ALT  13  /  AlkPhos  110  12-04    Thyroid Function Tests:  11-30 @ 19:47 TSH 0.35 FreeT4 -- T3 -- Anti TPO -- Anti Thyroglobulin Ab -- TSI --    Diet, Consistent Carbohydrate w/Evening Snack (12-01-24 @ 13:24) [Active]

## 2024-12-04 NOTE — PROGRESS NOTE ADULT - SUBJECTIVE AND OBJECTIVE BOX
Follow Up:      Interval History/ROS:Patient is a 84y old Male who presents with a chief complaint of Sepsis; Hyperglycemia.  Seen at bedside, afebrile, feels better, no new complaints.     Allergies  No Known Allergies    ANTIMICROBIALS:    darunavir 800 mG/cobicstat 150 mG 1 daily  emtricitabine 200 mG/tenofovir alafenamide 25 mG (DESCOVY) Tablet 1 daily  ertapenem  IVPB 1000 every 24 hours    OTHER MEDS: MEDICATIONS  (STANDING):  acetaminophen     Tablet .. 650 every 6 hours PRN  amLODIPine   Tablet 5 daily  atorvastatin 10 at bedtime  dextrose 50% Injectable 25 once  dextrose Oral Gel 15 once PRN  finasteride 5 daily  glucagon  Injectable 1 once  heparin   Injectable 5000 every 8 hours  influenza  Vaccine (HIGH DOSE) 0.5 once  insulin lispro (ADMELOG) corrective regimen sliding scale  three times a day before meals  insulin lispro (ADMELOG) corrective regimen sliding scale  at bedtime  metoprolol succinate ER 25 daily  oxybutynin XL 5 daily  pantoprazole    Tablet 40 before breakfast  tamsulosin 0.4 at bedtime      Vital Signs Last 24 Hrs  T(F): 97.6 (12-04-24 @ 12:45), Max: 101.9 (11-30-24 @ 20:11)    Vital Signs Last 24 Hrs  HR: 73 (12-04-24 @ 12:45) (64 - 73)  BP: 133/62 (12-04-24 @ 12:45) (125/64 - 157/63)  RR: 17 (12-04-24 @ 12:45)  SpO2: 100% (12-04-24 @ 12:45) (97% - 100%)  Wt(kg): --    EXAM:    Physical Exam:  Constitutional: comfortable  LUNGS:  CTA  CVS:  normal S1, S2, no murmur  Abd:  soft, nontender  Ext:  no edema  Vascular:  IV site no erythema tenderness or discharge  Neuro: AAO X 3    Labs:                        10.5   5.00  )-----------( 333      ( 04 Dec 2024 07:02 )             32.0     12-04    134[L]  |  99  |  13  ----------------------------<  111[H]  3.9   |  22  |  1.06    Ca    9.3      04 Dec 2024 07:02  Phos  2.9     12-04  Mg     2.00     12-04    TPro  8.2  /  Alb  3.2[L]  /  TBili  0.5  /  DBili  x   /  AST  21  /  ALT  13  /  AlkPhos  110  12-04      WBC Trend:  WBC Count: 5.00 (12-04-24 @ 07:02)  WBC Count: 6.34 (12-03-24 @ 05:20)  WBC Count: 7.44 (12-02-24 @ 06:05)  WBC Count: 10.84 (11-30-24 @ 19:47)      Creatine Trend:  Creatinine: 1.06 (12-04)  Creatinine: 1.04 (12-03)  Creatinine: 1.06 (12-02)  Creatinine: 1.17 (12-01)      Liver Biochemical Testing Trend:  Alanine Aminotransferase (ALT/SGPT): 13 (12-04)  Alanine Aminotransferase (ALT/SGPT): 11 (12-03)  Alanine Aminotransferase (ALT/SGPT): 8 (12-02)  Alanine Aminotransferase (ALT/SGPT): 6 (11-30)  Alanine Aminotransferase (ALT/SGPT): 21 (09-06)  Aspartate Aminotransferase (AST/SGOT): 21 (12-04-24 @ 07:02)  Aspartate Aminotransferase (AST/SGOT): 23 (12-03-24 @ 06:55)  Aspartate Aminotransferase (AST/SGOT): 16 (12-02-24 @ 06:05)  Aspartate Aminotransferase (AST/SGOT): 8 (11-30-24 @ 19:47)  Aspartate Aminotransferase (AST/SGOT): 17 (09-06-24 @ 05:44)  Bilirubin Total: 0.5 (12-04)  Bilirubin Total: 0.4 (12-03)  Bilirubin Total: 0.3 (12-02)  Bilirubin Total: 0.8 (11-30)  Bilirubin Total: 0.3 (09-06)      Trend LDH      Urinalysis Basic - ( 04 Dec 2024 07:02 )    Color: x / Appearance: x / SG: x / pH: x  Gluc: 111 mg/dL / Ketone: x  / Bili: x / Urobili: x   Blood: x / Protein: x / Nitrite: x   Leuk Esterase: x / RBC: x / WBC x   Sq Epi: x / Non Sq Epi: x / Bacteria: x    MICROBIOLOGY:  Vancomycin Level, Trough: 4.5 (12-01 @ 19:40)    MRSA PCR Result.: NotDetec (12-03-24 @ 10:12)  MRSA PCR Result.: NotDetec (08-31-24 @ 12:52)  MRSA PCR Result.: NotDetec (08-05-24 @ 19:00)  MRSA PCR Result.: NotDetec (07-06-24 @ 18:58)    Culture - Urine (collected 30 Nov 2024 23:28)  Source: Clean Catch  Final Report:    50,000 - 99,000 CFU/mL Klebsiella pneumoniae ESBL  Organism: Klebsiella pneumoniae ESBL  Organism: Klebsiella pneumoniae ESBL    Sensitivities:      Method Type: ZAIDA      -  Ampicillin: R >16 These ampicillin results predict results for amoxicillin      -  Ampicillin/Sulbactam: R >16/8      -  Aztreonam: R >16      -  Cefazolin: R >16 For uncomplicated UTI with K. pneumoniae, E. coli, or P. mirablis: ZAIDA <=16 is sensitive and ZAIDA >=32 is resistant. This also predicts results for oral agents cefaclor, cefdinir, cefpodoxime, cefprozil, cefuroxime axetil, cephalexin and locarbef for uncomplicated UTI. Note that some isolates may be susceptible to these agents while testing resistant to cefazolin.      -  Cefepime: R >16      -  Ceftriaxone: R >32      -  Cefuroxime: R >16      -  Ciprofloxacin: I 0.5      -  Ertapenem: S <=0.5      -  Gentamicin: S <=2      -  Imipenem: S <=1      -  Levofloxacin: S <=0.5      -  Meropenem: S <=1      -  Nitrofurantoin: I 64 Should not be used to treat pyelonephritis      -  Piperacillin/Tazobactam: S <=8      -  Tobramycin: S <=2      -  Trimethoprim/Sulfamethoxazole: R >2/38    Urinalysis with Rflx Culture (collected 30 Nov 2024 23:28)    Culture - Blood (collected 30 Nov 2024 21:00)  Source: .Blood BLOOD  Final Report:    Growth in aerobic bottle: Klebsiella pneumoniae ESBL    Growth in anaerobic bottle: Gram Negative Rods    Direct identification is available within approximately 3-5    hours either by Blood Panel Multiplexed PCR or Direct    MALDI-TOF. Details: [email or web address]  Organism: Klebsiella pneumoniae ESBL    Sensitivities:      Method Type: ZAIDA      -  Ampicillin: R >16 These ampicillin results predict results for amoxicillin      -  Ampicillin/Sulbactam: R 16/8      -  Aztreonam: R 16      -  Cefazolin: R >16      -  Cefepime: R >16      -  Ceftriaxone: R >32      -  Ciprofloxacin: I 0.5      -  Ertapenem: S <=0.5      -  Gentamicin: S <=2      -  Imipenem: S <=1      -  Levofloxacin: S <=0.5      -  Meropenem: S <=1      -  Piperacillin/Tazobactam: R <=8      -  Tobramycin: S <=2      -  Trimethoprim/Sulfamethoxazole: R >2/38  Organism: Blood Culture PCR    Sensitivities:      Method Type: PCR      -  K. pneumoniae group: Detec (K. pneumoniae, K. quasipneumoniae, K. variicola)      -  ESBL: Detec      -  CTX-M Resistance Marker: Detec  Organism: Blood Culture PCR  Klebsiella pneumoniae ESBL    Culture - Blood (collected 30 Nov 2024 20:40)  Source: .Blood BLOOD  Preliminary Report:    No growth at 72 Hours    Culture - Blood (collected 03 Sep 2024 12:00)  Source: .Blood Blood-Peripheral  Final Report:    No growth at 5 days    Culture - Blood (collected 03 Sep 2024 12:00)  Source: .Blood Blood-Peripheral  Final Report:    No growth at 5 days    Culture - Urine (collected 30 Aug 2024 22:22)  Source: Clean Catch Clean Catch (Midstream)  Final Report:    >100,000 CFU/ml Klebsiella pneumoniae ESBL  Organism: Klebsiella pneumoniae ESBL  Organism: Klebsiella pneumoniae ESBL    Sensitivities:      Method Type: ZAIDA      -  Ampicillin: R >16 These ampicillin results predict results for amoxicillin      -  Ampicillin/Sulbactam: I 16/8      -  Aztreonam: R 16      -  Cefazolin: R >16 For uncomplicated UTI with K. pneumoniae, E. coli, or P. mirablis: ZAIDA <=16 is sensitive and ZAIDA >=32 is resistant. This also predicts results for oral agents cefaclor, cefdinir, cefpodoxime, cefprozil, cefuroxime axetil, cephalexin and locarbef for uncomplicated UTI. Note that some isolates may be susceptible to these agents while testing resistant to cefazolin.      -  Cefepime: R 8      -  Ceftriaxone: R >32      -  Cefuroxime: R >16      -  Ciprofloxacin: S <=0.25      -  Ertapenem: S <=0.5      -  Gentamicin: S <=2      -  Imipenem: S <=1      -  Levofloxacin: S <=0.5      -  Meropenem: S <=1      -  Nitrofurantoin: S <=32 Should not be used to treat pyelonephritis      -  Piperacillin/Tazobactam: S <=8      -  Tobramycin: S <=2      -  Trimethoprim/Sulfamethoxazole: R >2/38    Culture - Urine (collected 22 Aug 2024 09:37)  Source: Catheterized Catheterized  Final Report:    10,000 - 49,000 CFU/mL Candida parapsilosis "Susceptibilities not    performed"    Culture - Urine (collected 05 Aug 2024 07:42)  Source: Catheterized Catheterized  Final Report:    >=3 organisms. Probable collection contamination.    Culture - Urine (collected 05 Aug 2024 00:23)  Source: Catheterized  Final Report:    >=3 organisms. Probable collection contamination.    ABS CD4: 599 cells/uL (07-05-24 @ 12:03)    HIV-1 RNA Quantitative, Viral Load: 36 (12-01-24 @ 05:27)  HIV-1 Viral Load Result: DET. (12-01-24 @ 05:27)  HIV-1 RNA Quantitative, Viral Load: DET. <30 copies/mL (07-04-24 @ 06:04)  HIV-1 Viral Load Result: DET. (07-04-24 @ 06:04)    Rapid RVP Result: NotDetec (11-30 @ 19:28)    Troponin T High Sensitivity Result: 16 (12-04)  Troponin T, High Sensitivity Result: 29 (12-01)  Troponin T, High Sensitivity Result: 24 (11-30)    RADIOLOGY:  imaging below personally reviewed    < from: CT Abdomen and Pelvis w/ IV Cont (12.01.24 @ 00:47) >    IMPRESSION:  1.7 cm peripherally enhancing tubular fluid collection along the   posterior aspect of the mid left kidney, possibly in a subcapsular space.   Mild asymmetric left perinephric fluid/stranding. Findings are suspicious   for infectious process with possible small renal/perirenal abscess.   Additional areas of new hypodensity in the left upper pole could reflect   pyelonephritis versus interval worsening scarring.    Nonspecific diffuse bladder wall thickening, which could reflect cystitis   or chronic outlet obstruction secondary to markedly enlarged prostate.  2.    Clinical correlation and correlation with urinalysis is recommended.   Close interval follow-up imaging is recommended.    --- End of Report ---            NAHOMY SEXTON MD; Attending Radiologist  This document has been electronically signed. Dec  1 2024  1:44AM    < end of copied text >

## 2024-12-04 NOTE — PROGRESS NOTE ADULT - ASSESSMENT
83 yo M with PMHx of DM2, HTN, HLD, HIV, BPH, and spinal stenosis presented to the ED with nausea, vomiting, and abdominal pain.    Upon arrival, patient was febrile to Tmax 101.9F.  Labs with mils leukocytosis, hyperglycemia     Workup:   -UA +   -Urine Cx : GNR  -Blood Cx: + Klebsiella ESBL   -CT AP: 1.7 cm peripherally enhancing tubular fluid collection along the posterior aspect of the mid left kidney, possibly in a subcapsular space. Mild asymmetric left perinephric fluid/stranding. Findings are suspicious for infectious process with possible small renal/perirenal abscess. Additional areas of new hypodensity in the left upper pole could reflect pyelonephritis versus interval worsening scarring.Nonspecific diffuse bladder wall thickening, which could reflect cystitis or chronic outlet obstruction secondary to markedly enlarged prostate.    #Klebsiella ESBL bacteremia   #L pyelonephritis with questionable small perirenal abscess  #Fever, leukocytosis  #Hx of HIV on Symtuza     Recommendations:  -Continue Ertapenem 1 g IV daily   -Final antibiotics duration pending repeat CT AP   -Urology following - no indication for drainage  -Continue Prezcobix/Descovy for HIV     Aly Ortega MD  ID physician  [employer] Preferred  After 5pm/weekends call [phone]

## 2024-12-04 NOTE — PROGRESS NOTE ADULT - PROBLEM SELECTOR PLAN 3
Reports improving chest pain from yesterday PM. Endorses chronic history of mild dyspnea and chest pain on exertion with infrequent lightheadedness (most recently prior to arrival in ED), but denies syncopal episodes.   TTE on 1/30/23 with LVEF 65% and mild mitral annular calcification.   Troponin: 29 (12/1), CK 39, CKMB <1.0,      Plan  - TTE ordered 12/3   - Outpt referral to Cardiologist for further testing Reports improving chest pain from yesterday PM. Endorses chronic history of mild dyspnea and chest pain on exertion with infrequent lightheadedness (most recently prior to arrival in ED), but denies syncopal episodes.   TTE on 1/30/23 with LVEF 65% and mild mitral annular calcification.   Troponin: 29 (12/1) --> 16 (12/4), CK 39, CKMB <1.0.   C/p likely non-ischemic/epigastric in nature.   TTE (12/3/24): LVEF 63%, mild LV diastolic dysfunction, mild mitral regurgitation.       Plan  - Start pantoprazole 40mg PO, daily for GI ppx  - No further inpatient cardiac testing indicated at this time.  - Possible Outpt referral to Cardiologist upon d/c.

## 2024-12-04 NOTE — PROGRESS NOTE ADULT - ASSESSMENT
84-year-old male with PMHx of DM2, HTN, HLD, HIV, BPH, and spinal stenosis who presents to the ED with nausea, vomiting, and abdominal pain of 4 days duration. Labwork notable for hyperglycemia in the setting of sepsis. UA with +LE, 87 WBC. CT A/P notable for 1.7 cm peripherally enhancing tubular fluid collection along the posterior aspect of the mid left kidney, possibly in a subcapsular space. Additional areas of new hypodensity in the left upper pole could reflect pyelonephritis versus interval worsening scarring. Nonspecific diffuse bladder wall thickening, which could reflect cystitis or chronic outlet obstruction secondary to markedly enlarged prostate. Urology consulted in ED. Recommended no urological interventions. Initially on Vanc/Zosyn per sepsis protocol --> Blood Cx growing ESBL Klebsiella/UCx pending final gram stain --> switched to Vanc/Ertapenem --> d/c Vanc per ID pharm --> now on Ertapenem 1g (12/2 -  ).     Patient s/p 2L LR Bolus, Lantus 10 units, and Lispro 8 units x2. Glucose improved to 345 --> 218. pH 7.37, AG 17, BHB 2.4 -> 2.1 -> 0.0. Patient admitted to medicine for further management and monitoring.

## 2024-12-04 NOTE — PROGRESS NOTE ADULT - ASSESSMENT
The patient is a 84y Male with PMH of T2DM, HIV who presented to the hospital with chest pain and noted to be hyperglycemic  Endocrinology consulted for hyperglycemia and uncontrolled Dm2.   Patient is high risk with high level decision making due to uncontrolled diabetes with A1C>11 and severe hyperglycemia to > 500s which places patient at high risk for cardiovascular and cerebrovascular events. Patient with lability of glucose requiring close monitoring and insulin adjustments.    #Uncontrolled Type 2 Diabetes Mellitus with hyperglycemia  - Follows with: PCP, no endocrinologist  - A1C with Estimated Average Glucose Result: 11.7 % (11-30-24)  - home regimen: Lantus 12 (stopped taking one week ago);?metformin daughter is unclear; Pt stopped taking meds on and off since wife passed away in March  - eGFR: 61 mL/min/1.73m2 (12-01-24)  - Weight (kg): 63.5 (11-30-24)  - glucose elevated, no evidence of DKA on labs      INPATIENT PLAN:  - glucose target 100-180mg/dl: hypoglycemia at bedtime. Pt didn't eat dinner as per daughter.  Stable this am. Now above goal for lunch.    - Received Lantus 12 units q am today decreased further to Lantus 8 units sq q am to avoid hypoglycemia (1st dose in am)   - continue to hold standing Admelog for now   - Hold off on starting Tradjenta for now  - Recommend Low dose admelog correction scale TIDQAC and separate low dose scale QHS  - Please check FSG before meals and QHS, or q6h while NPO: please check 3 am FS   - consistent carb diet when eating  - RD consult  - Hypoglycemia Protocol   - Provider to Rn for insulin pen review   - Advised daughter to bring in food from home but to follow a consistent carb diet, follow the hospital meal schedule, and to avoid pt eating double portions.   - If remain on low premeal insulin and glucose well controlled consider discontinuing premeal insulin and adding Tradjenta 5mg p.o. daily       DISCHARGE PLANNING:  - Discharge recs pending clinical course, basal +- Tradjenta 5mg p.o. daily (final plan TBD based on inpt glucose trend and insulin requirements)    - GLP1a daughter declines she doesn't want any medication that will cause weight loss and or reduce his appetite    - Insulin pen review prior to discharge with family member     - Please send Lantus solostar pen and humalog kwikpen as test script to check insurance coverage.  Ok to send with current doses and update prior to d/c    If Lantus not covered- can try alternating with one of following   tresiba/basaglar/toujeo/Levemir    If Humalog not covered- can try alternating with one of following  novolog/apidra/admelog/fiasp    - Ensure patient has working glucometer, test strips, lancets, alcohol pads, and BD melonie pen needles    - For severe hypoglycemia: Please prescribe Glucose tablets 4G (take 4 tablets) or 15G tablets for blood sugar less than 70 mG/dL repeat fingerstick in 15 minutes.     - CDPAP will learn pen administrations and how to monitor glucose prior to discharge (as per daughter)    - Family is taking full responsibility for insulin pen administration and glucose monitoring     - Daughter unable to assist with insulin pen administration as she is blind     - SANIA and RAJWINDER for safe dc plan     - Please call your doctor if you fs is 70 or below and or 250 and above     - Reviewed importance of medication adherence,  glucose monitoring, and following a consistent carb diet     - Hypoglycemia and intervention reviewed    - Please follow up with your pcp, podiatry, endocrinology, and opthalmology as an outpt     - Please follow up with [facility] [place]; [address] [place] [address]; [phone]: requested appointment on 12/2    - D/w Daughter Toney [phone]    #Hypertension  - Goal BP <130/80  - Management as per primary team  - check urine albumin level as outpatient    #Hyperlipidemia  - LDL goal <70  - Last LDL: 68 mg/dL (06-22-24)  - on atorvastatin 10 mg qhs  - check lipid panel as outpatient on a yearly basis    d/w Dr. Joe Olivo  Nurse Practitioner  Division of Endocrinology & Diabetes  In house pager #[phone]    If before 9AM or after 6PM, or on weekends/holidays, please call endocrine answering service for assistance ([phone]).For nonurgent matters email [email or web address] for assistance.  The patient is a 84y Male with PMH of T2DM, HIV who presented to the hospital with chest pain and noted to be hyperglycemic  Endocrinology consulted for hyperglycemia and uncontrolled Dm2.   Patient is high risk with high level decision making due to uncontrolled diabetes with A1C>11 and severe hyperglycemia to > 500s which places patient at high risk for cardiovascular and cerebrovascular events. Patient with lability of glucose requiring close monitoring and insulin adjustments.    #Uncontrolled Type 2 Diabetes Mellitus with hyperglycemia  - Follows with: PCP, no endocrinologist  - A1C with Estimated Average Glucose Result: 11.7 % (11-30-24)  - home regimen: Lantus 12 (stopped taking one week ago);?metformin daughter is unclear; Pt stopped taking meds on and off since wife passed away in March  - eGFR: 61 mL/min/1.73m2 (12-01-24)  - Weight (kg): 63.5 (11-30-24)  - glucose elevated, no evidence of DKA on labs      INPATIENT PLAN:  - glucose target 100-180mg/dl: hypoglycemia at bedtime. Pt didn't eat dinner as per daughter.  Stable this am. Now above goal for lunch.  As per RN pt ate outside food prior to lunch FS.   - Received Lantus 12 units q am today decreased further to Lantus 8 units sq q am to avoid hypoglycemia (1st dose in am)   - continue to hold standing Admelog for now   - Hold off on starting Tradjenta for now  - Recommend Low dose admelog correction scale TIDQAC and separate low dose scale QHS  - Please check FSG before meals and QHS, or q6h while NPO: please check 3 am FS   - consistent carb diet when eating  - RD consult  - Hypoglycemia Protocol   - Provider to Rn for insulin pen review   - Advised daughter to bring in food from home but to follow a consistent carb diet, follow the hospital meal schedule, and to avoid pt eating double portions.   - If remain on low premeal insulin and glucose well controlled consider discontinuing premeal insulin and adding Tradjenta 5mg p.o. daily       DISCHARGE PLANNING:  - Discharge recs pending clinical course, basal +- Tradjenta 5mg p.o. daily (final plan TBD based on inpt glucose trend and insulin requirements)    - GLP1a daughter declines she doesn't want any medication that will cause weight loss and or reduce his appetite    - Insulin pen review prior to discharge with family member     - Please send Lantus solostar pen and humalog kwikpen as test script to check insurance coverage.  Ok to send with current doses and update prior to d/c    If Lantus not covered- can try alternating with one of following   tresiba/basaglar/toujeo/Levemir    If Humalog not covered- can try alternating with one of following  novolog/apidra/admelog/fiasp    - Ensure patient has working glucometer, test strips, lancets, alcohol pads, and BD melonie pen needles    - For severe hypoglycemia: Please prescribe Glucose tablets 4G (take 4 tablets) or 15G tablets for blood sugar less than 70 mG/dL repeat fingerstick in 15 minutes.     - CDPAP will learn pen administrations and how to monitor glucose prior to discharge (as per daughter)    - Family is taking full responsibility for insulin pen administration and glucose monitoring     - Daughter unable to assist with insulin pen administration as she is blind     - SANIA and CM for safe dc plan     - Please call your doctor if you fs is 70 or below and or 250 and above     - Reviewed importance of medication adherence,  glucose monitoring, and following a consistent carb diet     - Hypoglycemia and intervention reviewed    - Please follow up with your pcp, podiatry, endocrinology, and opthalmology as an outpt     - Please follow up with [facility] at [place]; [address] [place] [address]; [phone]: requested appointment on 12/2    - D/w Daughter Toney [phone]    #Hypertension  - Goal BP <130/80  - Management as per primary team  - check urine albumin level as outpatient    #Hyperlipidemia  - LDL goal <70  - Last LDL: 68 mg/dL (06-22-24)  - on atorvastatin 10 mg qhs  - check lipid panel as outpatient on a yearly basis    d/w Dr. Joe Olivo  Nurse Practitioner  Division of Endocrinology & Diabetes  In house pager #[phone]    If before 9AM or after 6PM, or on weekends/holidays, please call endocrine answering service for assistance ([phone]).For nonurgent matters email [email or web address] for assistance.

## 2024-12-04 NOTE — PROGRESS NOTE ADULT - PROBLEM SELECTOR PLAN 8
VTE PPx: Heparin SQ  GI PPx: Pantoprazole  Nutrition: Consistent Carb  Electrolytes: Maintain K>4, Mag >2, Phos > 3  Access: PIV  Dispo: pending clinical improvement

## 2024-12-05 LAB
ANION GAP SERPL CALC-SCNC: 14 MMOL/L — SIGNIFICANT CHANGE UP (ref 7–14)
BUN SERPL-MCNC: 13 MG/DL — SIGNIFICANT CHANGE UP (ref 7–23)
CALCIUM SERPL-MCNC: 9.3 MG/DL — SIGNIFICANT CHANGE UP (ref 8.4–10.5)
CHLORIDE SERPL-SCNC: 100 MMOL/L — SIGNIFICANT CHANGE UP (ref 98–107)
CO2 SERPL-SCNC: 21 MMOL/L — LOW (ref 22–31)
CREAT SERPL-MCNC: 0.98 MG/DL — SIGNIFICANT CHANGE UP (ref 0.5–1.3)
EGFR: 76 ML/MIN/1.73M2 — SIGNIFICANT CHANGE UP
GLUCOSE BLDC GLUCOMTR-MCNC: 122 MG/DL — HIGH (ref 70–99)
GLUCOSE BLDC GLUCOMTR-MCNC: 135 MG/DL — HIGH (ref 70–99)
GLUCOSE BLDC GLUCOMTR-MCNC: 142 MG/DL — HIGH (ref 70–99)
GLUCOSE BLDC GLUCOMTR-MCNC: 195 MG/DL — HIGH (ref 70–99)
GLUCOSE BLDC GLUCOMTR-MCNC: 202 MG/DL — HIGH (ref 70–99)
GLUCOSE BLDC GLUCOMTR-MCNC: 314 MG/DL — HIGH (ref 70–99)
GLUCOSE SERPL-MCNC: 112 MG/DL — HIGH (ref 70–99)
HCT VFR BLD CALC: 32.2 % — LOW (ref 39–50)
HGB BLD-MCNC: 10.7 G/DL — LOW (ref 13–17)
MAGNESIUM SERPL-MCNC: 2 MG/DL — SIGNIFICANT CHANGE UP (ref 1.6–2.6)
MCHC RBC-ENTMCNC: 27.6 PG — SIGNIFICANT CHANGE UP (ref 27–34)
MCHC RBC-ENTMCNC: 33.2 G/DL — SIGNIFICANT CHANGE UP (ref 32–36)
MCV RBC AUTO: 83.2 FL — SIGNIFICANT CHANGE UP (ref 80–100)
NRBC # BLD: 0 /100 WBCS — SIGNIFICANT CHANGE UP (ref 0–0)
NRBC # FLD: 0 K/UL — SIGNIFICANT CHANGE UP (ref 0–0)
PHOSPHATE SERPL-MCNC: 2.3 MG/DL — LOW (ref 2.5–4.5)
PLATELET # BLD AUTO: 352 K/UL — SIGNIFICANT CHANGE UP (ref 150–400)
POTASSIUM SERPL-MCNC: 4.3 MMOL/L — SIGNIFICANT CHANGE UP (ref 3.5–5.3)
POTASSIUM SERPL-SCNC: 4.3 MMOL/L — SIGNIFICANT CHANGE UP (ref 3.5–5.3)
RBC # BLD: 3.87 M/UL — LOW (ref 4.2–5.8)
RBC # FLD: 13.3 % — SIGNIFICANT CHANGE UP (ref 10.3–14.5)
SODIUM SERPL-SCNC: 135 MMOL/L — SIGNIFICANT CHANGE UP (ref 135–145)
WBC # BLD: 4.79 K/UL — SIGNIFICANT CHANGE UP (ref 3.8–10.5)
WBC # FLD AUTO: 4.79 K/UL — SIGNIFICANT CHANGE UP (ref 3.8–10.5)

## 2024-12-05 PROCEDURE — 99232 SBSQ HOSP IP/OBS MODERATE 35: CPT

## 2024-12-05 PROCEDURE — 74177 CT ABD & PELVIS W/CONTRAST: CPT | Mod: 26

## 2024-12-05 PROCEDURE — 99232 SBSQ HOSP IP/OBS MODERATE 35: CPT | Mod: GC

## 2024-12-05 RX ORDER — SODIUM,POTASSIUM PHOSPHATES 278-250MG
1 POWDER IN PACKET (EA) ORAL EVERY 6 HOURS
Refills: 0 | Status: COMPLETED | OUTPATIENT
Start: 2024-12-05 | End: 2024-12-05

## 2024-12-05 RX ORDER — INSULIN GLARGINE 100 [IU]/ML
10 INJECTION, SOLUTION SUBCUTANEOUS
Refills: 0 | Status: DISCONTINUED | OUTPATIENT
Start: 2024-12-05 | End: 2024-12-05

## 2024-12-05 RX ORDER — INSULIN GLARGINE 100 [IU]/ML
10 INJECTION, SOLUTION SUBCUTANEOUS
Qty: 15 | Refills: 0
Start: 2024-12-05 | End: 2025-01-03

## 2024-12-05 RX ORDER — ISOPROPYL ALCOHOL, BENZOCAINE .7; .06 ML/ML; ML/ML
0 SWAB TOPICAL
Qty: 100 | Refills: 1
Start: 2024-12-05

## 2024-12-05 RX ORDER — LINAGLIPTIN 5 MG/1
5 TABLET, FILM COATED ORAL DAILY
Refills: 0 | Status: DISCONTINUED | OUTPATIENT
Start: 2024-12-05 | End: 2024-12-06

## 2024-12-05 RX ORDER — INSULIN GLARGINE 100 [IU]/ML
10 INJECTION, SOLUTION SUBCUTANEOUS
Refills: 0 | Status: DISCONTINUED | OUTPATIENT
Start: 2024-12-06 | End: 2024-12-06

## 2024-12-05 RX ADMIN — Medication 4: at 12:40

## 2024-12-05 RX ADMIN — METOPROLOL TARTRATE 25 MILLIGRAM(S): 100 TABLET, FILM COATED ORAL at 06:01

## 2024-12-05 RX ADMIN — Medication 5 MILLIGRAM(S): at 12:41

## 2024-12-05 RX ADMIN — LINAGLIPTIN 5 MILLIGRAM(S): 5 TABLET, FILM COATED ORAL at 13:53

## 2024-12-05 RX ADMIN — AMLODIPINE BESYLATE 5 MILLIGRAM(S): 10 TABLET ORAL at 06:01

## 2024-12-05 RX ADMIN — Medication 5000 UNIT(S): at 13:53

## 2024-12-05 RX ADMIN — ERTAPENEM 120 MILLIGRAM(S): 1 INJECTION, POWDER, LYOPHILIZED, FOR SOLUTION INTRAMUSCULAR; INTRAVENOUS at 04:34

## 2024-12-05 RX ADMIN — Medication 5000 UNIT(S): at 21:55

## 2024-12-05 RX ADMIN — Medication 5000 UNIT(S): at 06:01

## 2024-12-05 RX ADMIN — LIDOCAINE 1 PATCH: 40 CREAM TOPICAL at 19:30

## 2024-12-05 RX ADMIN — Medication 5 MILLIGRAM(S): at 12:42

## 2024-12-05 RX ADMIN — PANTOPRAZOLE SODIUM 40 MILLIGRAM(S): 40 TABLET, DELAYED RELEASE ORAL at 06:01

## 2024-12-05 RX ADMIN — Medication 1 PACKET(S): at 18:11

## 2024-12-05 RX ADMIN — LIDOCAINE 1 PATCH: 40 CREAM TOPICAL at 10:16

## 2024-12-05 RX ADMIN — Medication 1 PACKET(S): at 12:43

## 2024-12-05 RX ADMIN — LIDOCAINE 1 PATCH: 40 CREAM TOPICAL at 22:30

## 2024-12-05 RX ADMIN — INSULIN GLARGINE 10 UNIT(S): 100 INJECTION, SOLUTION SUBCUTANEOUS at 10:15

## 2024-12-05 RX ADMIN — EMTRICITABINE AND TENOFOVIR DISOPROXIL FUMARATE 1 TABLET(S): 200; 300 TABLET, FILM COATED ORAL at 12:42

## 2024-12-05 RX ADMIN — DARUNAVIR ETHANOLATE AND COBICISTAT 1 TABLET(S): 800; 150 TABLET, FILM COATED ORAL at 12:41

## 2024-12-05 RX ADMIN — TAMSULOSIN HYDROCHLORIDE 0.4 MILLIGRAM(S): 0.4 CAPSULE ORAL at 21:55

## 2024-12-05 RX ADMIN — Medication 1: at 18:10

## 2024-12-05 RX ADMIN — Medication 10 MILLIGRAM(S): at 21:55

## 2024-12-05 NOTE — PROGRESS NOTE ADULT - PROBLEM SELECTOR PLAN 2
::Source: UTI vs Intraabdominal infection  -Tmax 101.9, Tachycardic to 11, WBC 10.84 --> 7.84., Lactate 2.2 --> 2.0 Glucose 569 ->-> 310, BHB 2.4 -> -> 0.0   -UA positive for mod LE, 87 WBC, moderate bacteria, >1000 glucose  -Blood/UCx pos. ESBL Klebsiella  - CTAP demonstrating 1.7cm tubular fluid collection along left kidney c/f possible small renal/perirenal abscess with areas of hypodensity c/f pyelonephritis vs worsening renal scarring, no hydronephrosis bilaterally, bladder wall thickening c/f cystitis vs chronic outlet obstruction  - s/p 2L LR Bolus    Plan  - Pending repeat CT (12/5)   - Per ID c/w Ertapenem 1g IV, awaiting final recs for abx duration  - Vitals Q4H ::Source: UTI vs Intraabdominal infection  -Tmax 101.9, Tachycardic to 11, WBC 10.84 --> 7.84., Lactate 2.2 --> 2.0 Glucose 569 ->-> 310, BHB 2.4 -> -> 0.0   -UA positive for mod LE, 87 WBC, moderate bacteria, >1000 glucose  -Blood/UCx pos. ESBL Klebsiella  - CTAP demonstrating 1.7cm tubular fluid collection along left kidney c/f possible small renal/perirenal abscess with areas of hypodensity c/f pyelonephritis vs worsening renal scarring, no hydronephrosis bilaterally, bladder wall thickening c/f cystitis vs chronic outlet obstruction  - s/p 2L LR Bolus    Plan  - Pending repeat CT (12/5)   - Per ID c/w Ertapenem 1g IV, awaiting final recs for abx duration  - UCx sens --> requiring IV abx  - Vitals Q4H ::Source: UTI vs Intraabdominal infection  -Tmax 101.9, Tachycardic to 11, WBC 10.84 --> 7.84., Lactate 2.2 --> 2.0 Glucose 569 ->-> 310, BHB 2.4 -> -> 0.0   -UA positive for mod LE, 87 WBC, moderate bacteria, >1000 glucose  -Blood/UCx pos. ESBL Klebsiella  - CTAP demonstrating 1.7cm tubular fluid collection along left kidney c/f possible small renal/perirenal abscess with areas of hypodensity c/f pyelonephritis vs worsening renal scarring, no hydronephrosis bilaterally, bladder wall thickening c/f cystitis vs chronic outlet obstruction  - s/p 2L LR Bolus    Plan  - Pending repeat CT (12/5)   - Per ID c/w Ertapenem 1g IV, awaiting final recs for abx duration  - UCx sens --> requiring IV abx.   - Vitals Q4H

## 2024-12-05 NOTE — PROGRESS NOTE ADULT - SUBJECTIVE AND OBJECTIVE BOX
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 When asked, pt denies any recent or active fever, chills, nausea, vomiting, headache, acute sob, chest pain, abdominal pain, genitourinary sx, extremity pain or swelling.    MEDICATIONS  (STANDING):  amLODIPine   Tablet 5 milliGRAM(s) Oral daily  atorvastatin 10 milliGRAM(s) Oral at bedtime  darunavir 800 mG/cobicstat 150 mG 1 Tablet(s) Oral daily  dextrose 5%. 1000 milliLiter(s) (50 mL/Hr) IV Continuous <Continuous>  dextrose 5%. 1000 milliLiter(s) (100 mL/Hr) IV Continuous <Continuous>  dextrose 50% Injectable 25 Gram(s) IV Push once  emtricitabine 200 mG/tenofovir alafenamide 25 mG (DESCOVY) Tablet 1 Tablet(s) Oral daily  ertapenem  IVPB 1000 milliGRAM(s) IV Intermittent every 24 hours  finasteride 5 milliGRAM(s) Oral daily  glucagon  Injectable 1 milliGRAM(s) IntraMuscular once  heparin   Injectable 5000 Unit(s) SubCutaneous every 8 hours  influenza  Vaccine (HIGH DOSE) 0.5 milliLiter(s) IntraMuscular once  insulin glargine Injectable (LANTUS) 8 Unit(s) SubCutaneous <User Schedule>  insulin lispro (ADMELOG) corrective regimen sliding scale   SubCutaneous three times a day before meals  insulin lispro (ADMELOG) corrective regimen sliding scale   SubCutaneous at bedtime  lidocaine   4% Patch 1 Patch Transdermal every 24 hours  metoprolol succinate ER 25 milliGRAM(s) Oral daily  oxybutynin XL 5 milliGRAM(s) Oral daily  pantoprazole    Tablet 40 milliGRAM(s) Oral before breakfast  tamsulosin 0.4 milliGRAM(s) Oral at bedtime    MEDICATIONS  (PRN):  acetaminophen     Tablet .. 650 milliGRAM(s) Oral every 6 hours PRN Temp greater or equal to 38C (100.4F), Mild Pain (1 - 3)  dextrose Oral Gel 15 Gram(s) Oral once PRN Blood Glucose LESS THAN 70 milliGRAM(s)/deciliter      CAPILLARY BLOOD GLUCOSE      POCT Blood Glucose.: 135 mg/dL (05 Dec 2024 03:40)  POCT Blood Glucose.: 147 mg/dL (04 Dec 2024 21:57)  POCT Blood Glucose.: 139 mg/dL (04 Dec 2024 17:47)  POCT Blood Glucose.: 234 mg/dL (04 Dec 2024 12:08)  POCT Blood Glucose.: 128 mg/dL (04 Dec 2024 08:36)    I&O's Summary    04 Dec 2024 07:01  -  05 Dec 2024 07:00  --------------------------------------------------------  IN: 0 mL / OUT: 150 mL / NET: -150 mL        PHYSICAL EXAM:  Vital Signs Last 24 Hrs  T(C): 36.4 (05 Dec 2024 05:30), Max: 36.7 (04 Dec 2024 21:26)  T(F): 97.5 (05 Dec 2024 05:30), Max: 98.1 (04 Dec 2024 21:26)  HR: 61 (05 Dec 2024 05:30) (61 - 73)  BP: 114/70 (05 Dec 2024 05:30) (114/70 - 139/61)  BP(mean): --  RR: 18 (05 Dec 2024 05:30) (17 - 18)  SpO2: 97% (05 Dec 2024 05:30) (97% - 100%)    Parameters below as of 05 Dec 2024 05:30  Patient On (Oxygen Delivery Method): room air      CONSTITUTIONAL: NAD; well-developed  HEENT: PERRL, clear conjunctiva  RESPIRATORY: Normal respiratory effort; lungs are clear to auscultation bilaterally; No Crackles/Rhonchi/Wheezing  CARDIOVASCULAR: Regular rate and rhythm, normal S1 and S2, no murmur/rub/gallop; No lower extremity edema; Peripheral pulses are 2+ bilaterally  ABDOMEN: Nontender to palpation, normoactive bowel sounds, no rebound/guarding; No hepatosplenomegaly  MUSCULOSKELETAL: no clubbing or cyanosis of digits; no joint swelling or tenderness to palpation  EXTREMITY: Lower extremities Non-tender to palpation; non-erythematous B/L  NEURO: A&Ox3; no focal deficits   PSYCH: normal mood; Affect appropriate      LABS:                        10.5   5.00  )-----------( 333      ( 04 Dec 2024 07:02 )             32.0     12-04    134[L]  |  99  |  13  ----------------------------<  111[H]  3.9   |  22  |  1.06    Ca    9.3      04 Dec 2024 07:02  Phos  2.9     12-04  Mg     2.00     12-04    TPro  8.2  /  Alb  3.2[L]  /  TBili  0.5  /  DBili  x   /  AST  21  /  ALT  13  /  AlkPhos  110  12-04          Urinalysis Basic - ( 04 Dec 2024 07:02 )    Color: x / Appearance: x / SG: x / pH: x  Gluc: 111 mg/dL / Ketone: x  / Bili: x / Urobili: x   Blood: x / Protein: x / Nitrite: x   Leuk Esterase: x / RBC: x / WBC x   Sq Epi: x / Non Sq Epi: x / Bacteria: x          RADIOLOGY & ADDITIONAL TESTS:  Results Reviewed:   Imaging Personally Reviewed:  Electrocardiogram Personally Reviewed:

## 2024-12-05 NOTE — PROGRESS NOTE ADULT - PROBLEM SELECTOR PLAN 4
HDS on arrival in ED. /70 (12/2 AM)     - C/w home antihypertensives given low concern for hypotension i/s/o sepsis. (Amlodipine 5 mg, Metoprolol 25 mg)

## 2024-12-05 NOTE — PROGRESS NOTE ADULT - ASSESSMENT
85 yo M with PMHx of DM2, HTN, HLD, HIV, BPH, and spinal stenosis presented to the ED with nausea, vomiting, and abdominal pain.    Upon arrival, patient was febrile to Tmax 101.9F.  Labs with mils leukocytosis, hyperglycemia     Workup:   -UA +   -Urine Cx : GNR  -Blood Cx: + Klebsiella ESBL   -CT AP: 1.7 cm peripherally enhancing tubular fluid collection along the posterior aspect of the mid left kidney, possibly in a subcapsular space. Mild asymmetric left perinephric fluid/stranding. Findings are suspicious for infectious process with possible small renal/perirenal abscess. Additional areas of new hypodensity in the left upper pole could reflect pyelonephritis versus interval worsening scarring.Nonspecific diffuse bladder wall thickening, which could reflect cystitis or chronic outlet obstruction secondary to markedly enlarged prostate.    #Klebsiella ESBL bacteremia   #L pyelonephritis with questionable small perirenal abscess  #Fever, leukocytosis  #Hx of HIV on Symtuza     Recommendations:  -Continue Ertapenem 1 g IV daily   -Final antibiotics duration pending repeat CT AP   -No po options based on klebsiella sensitivity, patient will need midline/PICC line   -Urology following - no indication for drainage  -Continue Prezcobix/Descovy for HIV     Discussed with primary team     Aly Ortega MD  ID physician  [facility] Preferred  After 5pm/weekends call [phone]

## 2024-12-05 NOTE — PROGRESS NOTE ADULT - PROBLEM SELECTOR PLAN 1
::Hyperglycemia with ketosis in the setting of sepsis  ::A1c 11.7%, Glucose 569 -> 345 --> 218 --> 300 --> 111 (12/3 AM). Lactate 2.2 ->2.1 -> 2.0, BHB 2.4 -> 2.1 --> 0.0, pH 7.37  NOW RESOLVED  -Home regimen: Lantus (dose unclear)   -s/p 2L NS Bolus, Lantus 10 units, and Lispro 8 units x2 in ED.   Glucose improved to 217.     Plan -   - d/c D5NS   - Diet: consistent carb   - Per Endo, insulin regimen decreased to 12U Lantus AM, 4U Admelog pre-meal TID i/s/o poor PO intake  - Lispro Corrective Q4H (mild)  - FS glucose q4h  - Hypoglycemia precautions ::Hyperglycemia with ketosis in the setting of sepsis  ::A1c 11.7%, Glucose 569 -> 345 --> 218 --> 300 --> 111 (12/3 AM). Lactate 2.2 ->2.1 -> 2.0, BHB 2.4 -> 2.1 --> 0.0, pH 7.37  NOW RESOLVED  -Home regimen: Lantus (dose unclear)   -s/p 2L NS Bolus, Lantus 10 units, and Lispro 8 units x2 in ED.   Glucose improved to 217.     Plan -   - d/c D5NS   - Diet: consistent carb   - Per Endo, insulin regimen decreased to 12U --> 8U Lantus AM. Admelog held i/s/o of reduced PO intake  - Lispro Corrective Q4H (mild)  - FS glucose q4h  - Hypoglycemia precautions ::Hyperglycemia with ketosis in the setting of sepsis  ::A1c 11.7%, Glucose 569 -> 345 --> 218 --> 300 --> 111 (12/3 AM). Lactate 2.2 ->2.1 -> 2.0, BHB 2.4 -> 2.1 --> 0.0, pH 7.37  -Home regimen: Lantus (dose unclear)   -s/p 2L NS Bolus, Lantus 10 units, and Lispro 8 units x2 in ED. Glucose improved to 217.   NOW RESOLVED    Plan -   - Diet: consistent carb   - Per Endo, now Lantus 10U AM, hold 5U Admelog, start Tradjenta 5mg PO, daily  - Lispro Corrective Q4H (mild)  - FS glucose q4h  - Hypoglycemia precautions

## 2024-12-05 NOTE — PROGRESS NOTE ADULT - PROBLEM SELECTOR PLAN 3
Reports improving chest pain from yesterday PM. Endorses chronic history of mild dyspnea and chest pain on exertion with infrequent lightheadedness (most recently prior to arrival in ED), but denies syncopal episodes.   TTE on 1/30/23 with LVEF 65% and mild mitral annular calcification.   Troponin: 29 (12/1) --> 16 (12/4), CK 39, CKMB <1.0.   C/p likely non-ischemic/epigastric in nature.   TTE (12/3/24): LVEF 63%, mild LV diastolic dysfunction, mild mitral regurgitation.       Plan  - Start pantoprazole 40mg PO, daily for GI ppx  - No further inpatient cardiac testing indicated at this time.  - Possible Outpt referral to Cardiologist upon d/c. Reports improving chest pain from yesterday PM. Endorses chronic history of mild dyspnea and chest pain on exertion with infrequent lightheadedness (most recently prior to arrival in ED), but denies syncopal episodes.   TTE (1/30/23) with LVEF 65% and mild mitral annular calcification.   Troponin: 29 (12/1) --> 16 (12/4), CK 39, CKMB <1.0.   C/p likely non-ischemic/epigastric in nature.   TTE (12/3/24): LVEF 63%, mild LV diastolic dysfunction, mild mitral regurgitation.       Plan  - C/w pantoprazole 40mg PO, daily  - No further inpatient cardiac testing indicated at this time.  - Possible outpt referral to Cardiologist upon d/c.

## 2024-12-05 NOTE — PROGRESS NOTE ADULT - SUBJECTIVE AND OBJECTIVE BOX
Follow Up:      Interval History/ROS:Patient is a 84y old  Male who presents with a chief complaint of Sepsis; Hyperglycemia (05 Dec 2024 13:37)  Seen at bedside, no new complaints. Pending repeat CT AP.      Allergies  No Known Allergies    ANTIMICROBIALS:    darunavir 800 mG/cobicstat 150 mG 1 daily  emtricitabine 200 mG/tenofovir alafenamide 25 mG (DESCOVY) Tablet 1 daily  ertapenem  IVPB 1000 every 24 hours        OTHER MEDS: MEDICATIONS  (STANDING):  acetaminophen     Tablet .. 650 every 6 hours PRN  amLODIPine   Tablet 5 daily  atorvastatin 10 at bedtime  dextrose 50% Injectable 25 once  dextrose Oral Gel 15 once PRN  finasteride 5 daily  glucagon  Injectable 1 once  heparin   Injectable 5000 every 8 hours  influenza  Vaccine (HIGH DOSE) 0.5 once  insulin lispro (ADMELOG) corrective regimen sliding scale  three times a day before meals  insulin lispro (ADMELOG) corrective regimen sliding scale  at bedtime  linagliptin 5 daily  metoprolol succinate ER 25 daily  oxybutynin XL 5 daily  pantoprazole    Tablet 40 before breakfast  tamsulosin 0.4 at bedtime      Vital Signs Last 24 Hrs  T(F): 97.8 (12-05-24 @ 13:18), Max: 101.9 (11-30-24 @ 20:11)    Vital Signs Last 24 Hrs  HR: 65 (12-05-24 @ 13:18) (61 - 65)  BP: 106/60 (12-05-24 @ 13:18) (106/60 - 139/61)  RR: 17 (12-05-24 @ 13:18)  SpO2: 98% (12-05-24 @ 13:18) (97% - 99%)  Wt(kg): --    EXAM:    Physical Exam:  Constitutional: comfortable  LUNGS:  CTA  CVS:  normal S1, S2, no murmur  Abd:  soft, nontender  Ext:  no edema  Vascular:  IV site no erythema tenderness or discharge  Neuro: AAO X 3    Labs:                        10.7   4.79  )-----------( 352      ( 05 Dec 2024 08:43 )             32.2     12-05    135  |  100  |  13  ----------------------------<  112[H]  4.3   |  21[L]  |  0.98    Ca    9.3      05 Dec 2024 06:50  Phos  2.3     12-05  Mg     2.00     12-05    TPro  8.2  /  Alb  3.2[L]  /  TBili  0.5  /  DBili  x   /  AST  21  /  ALT  13  /  AlkPhos  110  12-04      WBC Trend:  WBC Count: 4.79 (12-05-24 @ 08:43)  WBC Count: 5.00 (12-04-24 @ 07:02)  WBC Count: 6.34 (12-03-24 @ 05:20)  WBC Count: 7.44 (12-02-24 @ 06:05)      Creatine Trend:  Creatinine: 0.98 (12-05)  Creatinine: 1.06 (12-04)  Creatinine: 1.04 (12-03)  Creatinine: 1.06 (12-02)      Liver Biochemical Testing Trend:  Alanine Aminotransferase (ALT/SGPT): 13 (12-04)  Alanine Aminotransferase (ALT/SGPT): 11 (12-03)  Alanine Aminotransferase (ALT/SGPT): 8 (12-02)  Alanine Aminotransferase (ALT/SGPT): 6 (11-30)  Alanine Aminotransferase (ALT/SGPT): 21 (09-06)  Aspartate Aminotransferase (AST/SGOT): 21 (12-04-24 @ 07:02)  Aspartate Aminotransferase (AST/SGOT): 23 (12-03-24 @ 06:55)  Aspartate Aminotransferase (AST/SGOT): 16 (12-02-24 @ 06:05)  Aspartate Aminotransferase (AST/SGOT): 8 (11-30-24 @ 19:47)  Aspartate Aminotransferase (AST/SGOT): 17 (09-06-24 @ 05:44)  Bilirubin Total: 0.5 (12-04)  Bilirubin Total: 0.4 (12-03)  Bilirubin Total: 0.3 (12-02)  Bilirubin Total: 0.8 (11-30)  Bilirubin Total: 0.3 (09-06)      Trend LDH      Urinalysis Basic - ( 05 Dec 2024 06:50 )    Color: x / Appearance: x / SG: x / pH: x  Gluc: 112 mg/dL / Ketone: x  / Bili: x / Urobili: x   Blood: x / Protein: x / Nitrite: x   Leuk Esterase: x / RBC: x / WBC x   Sq Epi: x / Non Sq Epi: x / Bacteria: x        MICROBIOLOGY:  Vancomycin Level, Trough: 4.5 (12-01 @ 19:40)    MRSA PCR Result.: NotDetec (12-03-24 @ 10:12)  MRSA PCR Result.: NotDetec (08-31-24 @ 12:52)  MRSA PCR Result.: NotDetec (08-05-24 @ 19:00)  MRSA PCR Result.: NotDetec (07-06-24 @ 18:58)      Culture - Urine (collected 30 Nov 2024 23:28)  Source: Clean Catch  Final Report:    50,000 - 99,000 CFU/mL Klebsiella pneumoniae ESBL  Organism: Klebsiella pneumoniae ESBL  Organism: Klebsiella pneumoniae ESBL    Sensitivities:      Method Type: ZAIDA      -  Ampicillin: R >16 These ampicillin results predict results for amoxicillin      -  Ampicillin/Sulbactam: R >16/8      -  Aztreonam: R >16      -  Cefazolin: R >16 For uncomplicated UTI with K. pneumoniae, E. coli, or P. mirablis: ZAIDA <=16 is sensitive and ZAIDA >=32 is resistant. This also predicts results for oral agents cefaclor, cefdinir, cefpodoxime, cefprozil, cefuroxime axetil, cephalexin and locarbef for uncomplicated UTI. Note that some isolates may be susceptible to these agents while testing resistant to cefazolin.      -  Cefepime: R >16      -  Ceftriaxone: R >32      -  Cefuroxime: R >16      -  Ciprofloxacin: I 0.5      -  Ertapenem: S <=0.5      -  Gentamicin: S <=2      -  Imipenem: S <=1      -  Levofloxacin: S <=0.5      -  Meropenem: S <=1      -  Nitrofurantoin: I 64 Should not be used to treat pyelonephritis      -  Piperacillin/Tazobactam: S <=8      -  Tobramycin: S <=2      -  Trimethoprim/Sulfamethoxazole: R >2/38    Urinalysis with Rflx Culture (collected 30 Nov 2024 23:28)    Culture - Blood (collected 30 Nov 2024 21:00)  Source: .Blood BLOOD  Final Report:    Growth in aerobic and anaerobic bottles: Klebsiella pneumoniae ESBL    Direct identification is available within approximately 3-5    hours either by Blood Panel Multiplexed PCR or Direct    MALDI-TOF. Details: [email or web address]  Organism: Klebsiella pneumoniae ESBL    Sensitivities:      Method Type: ZAIDA      -  Ampicillin: R >16 These ampicillin results predict results for amoxicillin      -  Ampicillin/Sulbactam: R 16/8      -  Aztreonam: R 16      -  Cefazolin: R >16      -  Cefepime: R >16      -  Ceftriaxone: R >32      -  Ciprofloxacin: I 0.5      -  Ertapenem: S <=0.5      -  Gentamicin: S <=2      -  Imipenem: S <=1      -  Levofloxacin: S <=0.5      -  Meropenem: S <=1      -  Piperacillin/Tazobactam: R <=8      -  Tobramycin: S <=2      -  Trimethoprim/Sulfamethoxazole: R >2/38  Organism: Blood Culture PCR    Sensitivities:      Method Type: PCR      -  K. pneumoniae group: Detec (K. pneumoniae, K. quasipneumoniae, K. variicola)      -  ESBL: Detec      -  CTX-M Resistance Marker: Detec  Organism: Blood Culture PCR  Klebsiella pneumoniae ESBL    Culture - Blood (collected 30 Nov 2024 20:40)  Source: .Blood BLOOD  Preliminary Report:    No growth at 4 days    Culture - Blood (collected 03 Sep 2024 12:00)  Source: .Blood Blood-Peripheral  Final Report:    No growth at 5 days    Culture - Blood (collected 03 Sep 2024 12:00)  Source: .Blood Blood-Peripheral  Final Report:    No growth at 5 days    Culture - Urine (collected 30 Aug 2024 22:22)  Source: Clean Catch Clean Catch (Midstream)  Final Report:    >100,000 CFU/ml Klebsiella pneumoniae ESBL  Organism: Klebsiella pneumoniae ESBL  Organism: Klebsiella pneumoniae ESBL    Sensitivities:      Method Type: ZAIDA      -  Ampicillin: R >16 These ampicillin results predict results for amoxicillin      -  Ampicillin/Sulbactam: I 16/8      -  Aztreonam: R 16      -  Cefazolin: R >16 For uncomplicated UTI with K. pneumoniae, E. coli, or P. mirablis: ZAIDA <=16 is sensitive and ZAIDA >=32 is resistant. This also predicts results for oral agents cefaclor, cefdinir, cefpodoxime, cefprozil, cefuroxime axetil, cephalexin and locarbef for uncomplicated UTI. Note that some isolates may be susceptible to these agents while testing resistant to cefazolin.      -  Cefepime: R 8      -  Ceftriaxone: R >32      -  Cefuroxime: R >16      -  Ciprofloxacin: S <=0.25      -  Ertapenem: S <=0.5      -  Gentamicin: S <=2      -  Imipenem: S <=1      -  Levofloxacin: S <=0.5      -  Meropenem: S <=1      -  Nitrofurantoin: S <=32 Should not be used to treat pyelonephritis      -  Piperacillin/Tazobactam: S <=8      -  Tobramycin: S <=2      -  Trimethoprim/Sulfamethoxazole: R >2/38    Culture - Urine (collected 22 Aug 2024 09:37)  Source: Catheterized Catheterized  Final Report:    10,000 - 49,000 CFU/mL Candida parapsilosis "Susceptibilities not    performed"    Culture - Urine (collected 05 Aug 2024 07:42)  Source: Catheterized Catheterized  Final Report:    >=3 organisms. Probable collection contamination.    Culture - Urine (collected 05 Aug 2024 00:23)  Source: Catheterized  Final Report:    >=3 organisms. Probable collection contamination.        ABS CD4: 599 cells/uL (07-05-24 @ 12:03)    HIV-1 RNA Quantitative, Viral Load: 36 (12-01-24 @ 05:27)  HIV-1 Viral Load Result: DET. (12-01-24 @ 05:27)  HIV-1 RNA Quantitative, Viral Load: DET. <30 copies/mL (07-04-24 @ 06:04)  HIV-1 Viral Load Result: DET. (07-04-24 @ 06:04)                Rapid RVP Result: NotDetec (11-30 @ 19:28)                    Troponin T, High Sensitivity Result: 16 (12-04)  Troponin T, High Sensitivity Result: 29 (12-01)  Troponin T, High Sensitivity Result: 24 (11-30)          RADIOLOGY:  imaging below personally reviewed      < from: CT Abdomen and Pelvis w/ IV Cont (12.01.24 @ 00:47) >  IMPRESSION:  1.7 cm peripherally enhancing tubular fluid collection along the   posterior aspect of the mid left kidney, possibly in a subcapsular space.   Mild asymmetric left perinephric fluid/stranding. Findings are suspicious   for infectious process with possible small renal/perirenal abscess.   Additional areas of new hypodensity in the left upper pole could reflect   pyelonephritis versus interval worsening scarring.    Nonspecific diffuse bladder wall thickening, which could reflect cystitis   or chronic outlet obstruction secondary to markedly enlarged prostate.  2.    Clinical correlation and correlation with urinalysis is recommended.   Close interval follow-up imaging is recommended.    --- End of Report ---    < end of copied text >

## 2024-12-05 NOTE — PROGRESS NOTE ADULT - ASSESSMENT
The patient is a 84y Male with PMH of T2DM, HIV who presented to the hospital with chest pain and noted to be hyperglycemic  Endocrinology consulted for hyperglycemia and uncontrolled Dm2.   Patient is high risk with high level decision making due to uncontrolled diabetes with A1C>11 and severe hyperglycemia to > 500s which places patient at high risk for cardiovascular and cerebrovascular events. Patient with lability of glucose requiring close monitoring and insulin adjustments.    #Uncontrolled Type 2 Diabetes Mellitus with hyperglycemia  - Follows with: PCP, no endocrinologist  - A1C with Estimated Average Glucose Result: 11.7 % (11-30-24)  - home regimen: Lantus 12 (stopped taking one week ago);?metformin daughter is unclear; Pt stopped taking meds on and off since wife passed away in March  - eGFR: 61 mL/min/1.73m2 (12-01-24)  - Weight (kg): 63.5 (11-30-24)  - glucose elevated, no evidence of DKA on labs      INPATIENT PLAN:  - glucose target 100-180mg/dl: above goal again for lunch today.. yesterday ate before lunch fs. As per RN pt only had his breakfast this am and didn't eat in between meals. Pt previously with tight glucose control on Lantus 12 without receiving premeal insulin. Yesterday with Lantus 12 units glucose within normal limits. Reduce am Lantus slightly due to age and to reduce risk of hypoglycemia.   - Decrease Lantus to 10 units sq q 8am (1st dose today )  - Continue to hold standing Admelog for now   - Add Tradjenta 5mg p.o. daily (first dose today)  - Continue Low dose Admelog correction scale TIDQAC and separate low dose Admelog correction scale QHS  - Please check FS before meals and at bedtime   - consistent carb diet when eating  - RD consult  - Hypoglycemia Protocol   - Provider to Rn for insulin pen review to CDPAP (already performed)      DISCHARGE PLANNING:  - Discharge recs pending clinical course, basal + Tradjenta 5mg p.o. daily +- Metformin if labs allow (final plan TBD based on inpt glucose trend and insulin requirements)    - GLP1a daughter declines she doesn't want any medication that will cause weight loss and or reduce his appetite    - Insulin pen review prior to discharge with family member (already performed)    - Please send Lantus solostar pen as test script to check insurance coverage.  Ok to send with current doses and update prior to d/c    If Lantus not covered- can try alternating with one of following   tresiba/basaglar/toujeo/Levemir    - Ensure patient has working glucometer, test strips, lancets, alcohol pads, and BD melonie pen needles    - For severe hypoglycemia: Please prescribe Glucose tablets 4G (take 4 tablets) or 15G tablets for blood sugar less than 70 mG/dL repeat fingerstick in 15 minutes.     - CDPAP will learn pen administrations and how to monitor glucose prior to discharge (as per daughter)    - Family is taking full responsibility for insulin pen administration and glucose monitoring     - Daughter unable to assist with insulin pen administration as she is blind     - SANIA and CM for safe dc plan     - Please call your doctor if you fs is 70 or below and or 250 and above     - Reviewed importance of medication adherence,  glucose monitoring, and following a consistent carb diet     - Hypoglycemia and intervention reviewed    - Please follow up with your pcp, podiatry, endocrinology, and opthalmology as an outpt     - [facility] doesn't accept pts insurance. Please follow up [facility]; [id number] [place] [address]; [phone]: emailed office on 12/6     - D/w Daughter Toney [phone]    #Hypertension  - Goal BP <130/80  - Management as per primary team  - check urine albumin level as outpatient    #Hyperlipidemia  - LDL goal <70  - Last LDL: 68 mg/dL (06-22-24)  - on atorvastatin 10 mg qhs  - check lipid panel as outpatient on a yearly basis    d/w Dr. Joe Olivo  Nurse Practitioner  Division of Endocrinology & Diabetes  In house pager #[phone]    If before 9AM or after 6PM, or on weekends/holidays, please call endocrine answering service for assistance ([phone]).For nonurgent matters email [email or web address] for assistance.  The patient is a 84y Male with PMH of T2DM, HIV who presented to the hospital with chest pain and noted to be hyperglycemic  Endocrinology consulted for hyperglycemia and uncontrolled Dm2.   Patient is high risk with high level decision making due to uncontrolled diabetes with A1C>11 and severe hyperglycemia to > 500s which places patient at high risk for cardiovascular and cerebrovascular events. Patient with lability of glucose requiring close monitoring and insulin adjustments.    #Uncontrolled Type 2 Diabetes Mellitus with hyperglycemia  - Follows with: PCP, no endocrinologist  - A1C with Estimated Average Glucose Result: 11.7 % (11-30-24)  - home regimen: Lantus 12 (stopped taking one week ago);?metformin daughter is unclear; Pt stopped taking meds on and off since wife passed away in March as per daughter  - eGFR: 61 mL/min/1.73m2 (12-01-24)  - Weight (kg): 63.5 (11-30-24)  - glucose elevated, no evidence of DKA on labs      INPATIENT PLAN:  - glucose target 100-200mg/dl due to age. Above goal again for lunch today.. yesterday ate before lunch fs. As per RN pt only had his breakfast this am and didn't eat in between meals. Pt previously with tight glucose control on Lantus 12 without receiving premeal insulin. Yesterday with Lantus 12 units glucose within normal limits. Reduce am Lantus slightly due to age and to reduce risk of hypoglycemia.   - Decrease Lantus to 10 units sq q 8am (1st dose today )  - Continue to hold standing Admelog for now   - Add Tradjenta 5mg p.o. daily (first dose today)  - Continue Low dose Admelog correction scale TIDQAC and separate low dose Admelog correction scale QHS  - Please check FS before meals and at bedtime   - consistent carb diet when eating  - RD consult  - Hypoglycemia Protocol   - Provider to Rn for insulin pen review to CDPAP (already performed)      DISCHARGE PLANNING:  - Discharge recs pending clinical course, basal + Tradjenta 5mg p.o. daily +- Metformin if labs allow (final plan TBD based on inpt glucose trend and insulin requirements)    - GLP1a daughter declines she doesn't want any medication that will cause weight loss and or reduce his appetite    - Insulin pen review prior to discharge with family member (already performed)    - Please send Lantus solostar pen as test script to check insurance coverage.  Ok to send with current doses and update prior to d/c    If Lantus not covered- can try alternating with one of following   tresiba/basaglar/toujeo/Levemir    - Ensure patient has working glucometer, test strips, lancets, alcohol pads, and BD melonie pen needles    - For severe hypoglycemia: Please prescribe Glucose tablets 4G (take 4 tablets) or 15G tablets for blood sugar less than 70 mG/dL repeat fingerstick in 15 minutes.     - CDPAP will learn pen administrations and how to monitor glucose prior to discharge (as per daughter)    - Family is taking full responsibility for insulin pen administration and glucose monitoring     - Daughter unable to assist with insulin pen administration as she is blind     - SANIA and CM for safe dc plan     - Please call your doctor if you fs is 70 or below and or 250 and above     - Reviewed importance of medication adherence,  glucose monitoring, and following a consistent carb diet     - Hypoglycemia and intervention reviewed    - Please follow up with your pcp, podiatry, endocrinology, and opthalmology as an outpt     - [facility] doesn't accept pts insurance. Please follow up [facility] [place]; [phone] [place] [address]; [phone]: emailed office on 12/6     - D/w Malathi Ziegler [phone]    #Hypertension  - Goal BP <130/80  - Management as per primary team  - check urine albumin level as outpatient    #Hyperlipidemia  - LDL goal <70  - Last LDL: 68 mg/dL (06-22-24)  - on atorvastatin 10 mg qhs  - check lipid panel as outpatient on a yearly basis    d/w Dr. Joe Olivo  Nurse Practitioner  Division of Endocrinology & Diabetes  In house pager #[phone]    If before 9AM or after 6PM, or on weekends/holidays, please call endocrine answering service for assistance ([phone]).For nonurgent matters email [email or web address] for assistance.

## 2024-12-05 NOTE — PROGRESS NOTE ADULT - PROBLEM SELECTOR PLAN 8
VTE PPx: Heparin SQ  GI PPx: Pantoprazole  Nutrition: Consistent Carb  Electrolytes: Maintain K>4, Mag >2, Phos > 3  Access: PIV  Dispo: pending clinical improvement VTE PPx: Heparin SQ  GI PPx: Pantoprazole  Nutrition: Consistent Carb  Electrolytes: Maintain K>4, Mag >2, Phos > 3  Access: PIV    Dispo: pending clinical improvement, d/c likely 12/5 on midline vs PICC with IV abx infusion VTE PPx: Heparin SQ  GI PPx: Pantoprazole  Nutrition: Consistent Carb  Electrolytes: Maintain K>4, Mag >2, Phos > 3  Access: PIV    Dispo: pending clinical improvement, d/c likely 12/6 with midline vs PICC with IV abx infusion VTE PPx: Heparin SQ  GI PPx: Pantoprazole  Nutrition: Consistent Carb  Electrolytes: Maintain K>4, Mag >2, Phos > 3  Access: PIV    Dispo: pending clinical improvement

## 2024-12-05 NOTE — PROGRESS NOTE ADULT - ATTENDING COMMENTS
84-year-old male with PMHx of DM2, HTN, HLD, HIV, BPH, and spinal stenosis who presents to the ED with nausea, vomiting, and abdominal pain of 4 days duration. CT noted with LT 1.7 cm abscess in setting of pyelonephritis. + SIRS. c/b hyperglycemia. AG in setting elevated lactate and BOH butyrate     Chest pain- Lt rib reproducible; EKG non ischemic; lidocaine patch; poor hx/language barrier will check troponin   Sepsis secondary to pyelonephritis with abscess- urology no acute intervention; f/u cx; empiric antibiotics vanco/zosyn; vanco trough   Poorly controlled DM- A1c 11.7; Ph normal FS in 500's with elevated BoH butyrate; NPO and IVF; lantus 10 given this AM and Humalog 8 U x 2; FS improved; AG and BoH buytrate resolved; dysphagia screen and start PO diet; endocrine c/s
84M hx of T2DM, HTN, HLD, HIV, BPH and spinal stenosis presents for nausea/vomiting and abdominal pain with sepsis 2/2 pyelo/renal abscess + ESBL klebsiella bacteremia.  #Sepsis 2/2 ESBL klebsiella bacteremia 2/2 renal abscess/pyelonephritis. Sepsis resolved. Urology consulted. Repeat CT scan in 3-4 days - ordered for 12/5. C/w ertapenem. ID consulted – f/u recs   #Uncontrolled T2DM, A1C 11.7. Hyperglycemic on admission. C/w basal/bolus regimen. F/u endocrine recommendations.   #CP, likely costochondritis vs gastritis. Less likely ACS. Trop 29. TTE unchanged from prior. Will trial PPI PO qd. Tylenol for pain control.   #HIV, c/w Marline   Attempted to call daughter at bedside at [phone] with no response.   Remainder of plan as stated above. Plan discussed with HS.
84M hx of T2DM, HTN, HLD, HIV, BPH and spinal stenosis presents for nausea/vomiting and abdominal pain with sepsis 2/2 pyelo/renal abscess + ESBL klebsiella bacteremia.    #Sepsis 2/2 ESBL klebsiella bacteremia 2/2 renal abscess/pyelonephritis. Sepsis resolved. Urology consulted. Repeat CT scan in 3-4 days - ordered for 12/5. C/w ertapenem. ID consulted – f/u recs     #Uncontrolled T2DM, A1C 11.7. Hyperglycemic on admission. C/w basal/bolus regimen. F/u endocrine recommendations.     #CP, likely costochondritis vs gastritis. Less likely ACS. Trop 29. TTE unchanged from prior. Will trial PPI PO qd. Tylenol for pain control.     #HIV, c/w Marline     Called daughter at bedside at [phone].     Remainder of plan as stated above. Plan discussed with HS.
84M hx of T2DM, HTN, HLD, HIV, BPH and spinal stenosis presents for nausea/vomiting and abdominal pain with sepsis 2/2 pyelo/renal abscess + ESBL klebsiella bacteremia.  #Sepsis 2/2 ESBL klebsiella bacteremia 2/2 renal abscess/pyelonephritis. Sepsis resolved. Urology consulted. Repeat CT scan in 3-4 days - ordered for 12/5. C/w ertapenem. ID consulted – f/u recs   #Uncontrolled T2DM, A1C 11.7. Hyperglycemic on admission. C/w basal/bolus regimen. F/u endocrine recommendations.   #CP, likely costochondritis. Less likely ACS. Trop 29.   Remainder of plan as stated above. Plan discussed with HS.
84M hx of T2DM, HTN, HLD, HIV, BPH and spinal stenosis presents for nausea/vomiting and abdominal pain with sepsis 2/2 pyelo/renal abscess + ESBL klebsiella bacteremia.  #Sepsis 2/2 ESBL klebsiella bacteremia 2/2 renal abscess/pyelonephritis. Sepsis resolved. Urology consulted. Repeat CT scan in 3-4 days. C/w ertapenem. ID consulted – f/u recs   #Uncontrolled T2DM, A1C 11.7. Hyperglycemic on admission. C/w basal/bolus regimen. F/u endocrine recommendations.   #CP, likely costochondritis. Less likely ACS. Trop 29.   Remainder of plan as stated above. Plan discussed with HS.

## 2024-12-05 NOTE — PROGRESS NOTE ADULT - SUBJECTIVE AND OBJECTIVE BOX
Chief Complaint: Type 2 DM    History: Pt seen at bedside. Pt tolerating oral diet. Pt denies nausea and vomiting/any signs of hypoglycemia. Pt reports an adequate appetite.     MEDICATIONS  (STANDING):  amLODIPine   Tablet 5 milliGRAM(s) Oral daily  atorvastatin 10 milliGRAM(s) Oral at bedtime  darunavir 800 mG/cobicstat 150 mG 1 Tablet(s) Oral daily  dextrose 5%. 1000 milliLiter(s) (50 mL/Hr) IV Continuous <Continuous>  dextrose 5%. 1000 milliLiter(s) (100 mL/Hr) IV Continuous <Continuous>  dextrose 50% Injectable 25 Gram(s) IV Push once  emtricitabine 200 mG/tenofovir alafenamide 25 mG (DESCOVY) Tablet 1 Tablet(s) Oral daily  ertapenem  IVPB 1000 milliGRAM(s) IV Intermittent every 24 hours  finasteride 5 milliGRAM(s) Oral daily  glucagon  Injectable 1 milliGRAM(s) IntraMuscular once  heparin   Injectable 5000 Unit(s) SubCutaneous every 8 hours  influenza  Vaccine (HIGH DOSE) 0.5 milliLiter(s) IntraMuscular once  insulin glargine Injectable (LANTUS) 10 Unit(s) SubCutaneous <User Schedule>  insulin lispro (ADMELOG) corrective regimen sliding scale   SubCutaneous three times a day before meals  insulin lispro (ADMELOG) corrective regimen sliding scale   SubCutaneous at bedtime  lidocaine   4% Patch 1 Patch Transdermal every 24 hours  linagliptin 5 milliGRAM(s) Oral daily  metoprolol succinate ER 25 milliGRAM(s) Oral daily  oxybutynin XL 5 milliGRAM(s) Oral daily  pantoprazole    Tablet 40 milliGRAM(s) Oral before breakfast  potassium phosphate / sodium phosphate Powder (PHOS-NaK) 1 Packet(s) Oral every 6 hours  tamsulosin 0.4 milliGRAM(s) Oral at bedtime    MEDICATIONS  (PRN):  acetaminophen     Tablet .. 650 milliGRAM(s) Oral every 6 hours PRN Temp greater or equal to 38C (100.4F), Mild Pain (1 - 3)  dextrose Oral Gel 15 Gram(s) Oral once PRN Blood Glucose LESS THAN 70 milliGRAM(s)/deciliter      Allergies: No Known Allergies    Review of Systems:  Respiratory: No SOB, no cough  GI: No nausea, vomiting, abdominal pain  Endocrine: no polyuria, polydipsia      PHYSICAL EXAM:  VITALS: T(C): 36.6 (12-05-24 @ 13:18)  T(F): 97.8 (12-05-24 @ 13:18), Max: 98.1 (12-04-24 @ 21:26)  HR: 65 (12-05-24 @ 13:18) (61 - 65)  BP: 106/60 (12-05-24 @ 13:18) (106/60 - 139/61)  RR:  (17 - 18)  SpO2:  (97% - 99%)  Wt(kg): --  GENERAL: NAD, well-groomed, well-developed  RESPIRATORY: No labored breathing   GI: Soft, nontender, non distended  PSYCH: Alert and oriented x 3, normal affect, normal mood      CAPILLARY BLOOD GLUCOSE  POCT Blood Glucose.: 314 mg/dL (05 Dec 2024 12:02)  POCT Blood Glucose.: 202 mg/dL (05 Dec 2024 10:14)  POCT Blood Glucose.: 122 mg/dL (05 Dec 2024 08:30)  POCT Blood Glucose.: 135 mg/dL (05 Dec 2024 03:40)  POCT Blood Glucose.: 147 mg/dL (04 Dec 2024 21:57)  POCT Blood Glucose.: 139 mg/dL (04 Dec 2024 17:47)    A1C with Estimated Average Glucose (11.30.24 @ 19:47)    A1C with Estimated Average Glucose Result: 11.7   Estimated Average Glucose: 289      12-05    135  |  100  |  13  ----------------------------<  112[H]  4.3   |  21[L]  |  0.98    eGFR: 76    Ca    9.3      12-05  Mg     2.00     12-05  Phos  2.3     12-05    TPro  8.2  /  Alb  3.2[L]  /  TBili  0.5  /  DBili  x   /  AST  21  /  ALT  13  /  AlkPhos  110  12-04    Thyroid Function Tests:  11-30 @ 19:47 TSH 0.35 FreeT4 -- T3 -- Anti TPO -- Anti Thyroglobulin Ab -- TSI --    Diet, Consistent Carbohydrate w/Evening Snack (12-01-24 @ 13:24) [Active]                       Chief Complaint: Type 2 DM    History: Pt seen at bedside. Pt tolerating oral diet. Pt denies nausea and vomiting/any signs of hypoglycemia. Pt reports an adequate appetite. Pt deferred translation to daughter ravindra   MEDICATIONS  (STANDING):  amLODIPine   Tablet 5 milliGRAM(s) Oral daily  atorvastatin 10 milliGRAM(s) Oral at bedtime  darunavir 800 mG/cobicstat 150 mG 1 Tablet(s) Oral daily  dextrose 5%. 1000 milliLiter(s) (50 mL/Hr) IV Continuous <Continuous>  dextrose 5%. 1000 milliLiter(s) (100 mL/Hr) IV Continuous <Continuous>  dextrose 50% Injectable 25 Gram(s) IV Push once  emtricitabine 200 mG/tenofovir alafenamide 25 mG (DESCOVY) Tablet 1 Tablet(s) Oral daily  ertapenem  IVPB 1000 milliGRAM(s) IV Intermittent every 24 hours  finasteride 5 milliGRAM(s) Oral daily  glucagon  Injectable 1 milliGRAM(s) IntraMuscular once  heparin   Injectable 5000 Unit(s) SubCutaneous every 8 hours  influenza  Vaccine (HIGH DOSE) 0.5 milliLiter(s) IntraMuscular once  insulin glargine Injectable (LANTUS) 10 Unit(s) SubCutaneous <User Schedule>  insulin lispro (ADMELOG) corrective regimen sliding scale   SubCutaneous three times a day before meals  insulin lispro (ADMELOG) corrective regimen sliding scale   SubCutaneous at bedtime  lidocaine   4% Patch 1 Patch Transdermal every 24 hours  linagliptin 5 milliGRAM(s) Oral daily  metoprolol succinate ER 25 milliGRAM(s) Oral daily  oxybutynin XL 5 milliGRAM(s) Oral daily  pantoprazole    Tablet 40 milliGRAM(s) Oral before breakfast  potassium phosphate / sodium phosphate Powder (PHOS-NaK) 1 Packet(s) Oral every 6 hours  tamsulosin 0.4 milliGRAM(s) Oral at bedtime    MEDICATIONS  (PRN):  acetaminophen     Tablet .. 650 milliGRAM(s) Oral every 6 hours PRN Temp greater or equal to 38C (100.4F), Mild Pain (1 - 3)  dextrose Oral Gel 15 Gram(s) Oral once PRN Blood Glucose LESS THAN 70 milliGRAM(s)/deciliter      Allergies: No Known Allergies    Review of Systems:  Respiratory: No SOB, no cough  GI: No nausea, vomiting, abdominal pain  Endocrine: no polyuria, polydipsia      PHYSICAL EXAM:  VITALS: T(C): 36.6 (12-05-24 @ 13:18)  T(F): 97.8 (12-05-24 @ 13:18), Max: 98.1 (12-04-24 @ 21:26)  HR: 65 (12-05-24 @ 13:18) (61 - 65)  BP: 106/60 (12-05-24 @ 13:18) (106/60 - 139/61)  RR:  (17 - 18)  SpO2:  (97% - 99%)  Wt(kg): --  GENERAL: NAD, well-groomed, well-developed  RESPIRATORY: No labored breathing   GI: Soft, nontender, non distended  PSYCH: Alert and oriented x 3, normal affect, normal mood      CAPILLARY BLOOD GLUCOSE  POCT Blood Glucose.: 314 mg/dL (05 Dec 2024 12:02)  POCT Blood Glucose.: 202 mg/dL (05 Dec 2024 10:14)  POCT Blood Glucose.: 122 mg/dL (05 Dec 2024 08:30)  POCT Blood Glucose.: 135 mg/dL (05 Dec 2024 03:40)  POCT Blood Glucose.: 147 mg/dL (04 Dec 2024 21:57)  POCT Blood Glucose.: 139 mg/dL (04 Dec 2024 17:47)    A1C with Estimated Average Glucose (11.30.24 @ 19:47)    A1C with Estimated Average Glucose Result: 11.7   Estimated Average Glucose: 289      12-05    135  |  100  |  13  ----------------------------<  112[H]  4.3   |  21[L]  |  0.98    eGFR: 76    Ca    9.3      12-05  Mg     2.00     12-05  Phos  2.3     12-05    TPro  8.2  /  Alb  3.2[L]  /  TBili  0.5  /  DBili  x   /  AST  21  /  ALT  13  /  AlkPhos  110  12-04    Thyroid Function Tests:  11-30 @ 19:47 TSH 0.35 FreeT4 -- T3 -- Anti TPO -- Anti Thyroglobulin Ab -- TSI --    Diet, Consistent Carbohydrate w/Evening Snack (12-01-24 @ 13:24) [Active]

## 2024-12-06 ENCOUNTER — NON-APPOINTMENT (OUTPATIENT)
Age: 84
End: 2024-12-06

## 2024-12-06 ENCOUNTER — TRANSCRIPTION ENCOUNTER (OUTPATIENT)
Age: 84
End: 2024-12-06

## 2024-12-06 VITALS
SYSTOLIC BLOOD PRESSURE: 123 MMHG | RESPIRATION RATE: 18 BRPM | OXYGEN SATURATION: 100 % | DIASTOLIC BLOOD PRESSURE: 51 MMHG | HEART RATE: 75 BPM | TEMPERATURE: 98 F

## 2024-12-06 LAB
ANION GAP SERPL CALC-SCNC: 14 MMOL/L — SIGNIFICANT CHANGE UP (ref 7–14)
BUN SERPL-MCNC: 16 MG/DL — SIGNIFICANT CHANGE UP (ref 7–23)
CALCIUM SERPL-MCNC: 9.1 MG/DL — SIGNIFICANT CHANGE UP (ref 8.4–10.5)
CHLORIDE SERPL-SCNC: 102 MMOL/L — SIGNIFICANT CHANGE UP (ref 98–107)
CO2 SERPL-SCNC: 20 MMOL/L — LOW (ref 22–31)
CREAT SERPL-MCNC: 1.1 MG/DL — SIGNIFICANT CHANGE UP (ref 0.5–1.3)
CULTURE RESULTS: SIGNIFICANT CHANGE UP
EGFR: 66 ML/MIN/1.73M2 — SIGNIFICANT CHANGE UP
GLUCOSE BLDC GLUCOMTR-MCNC: 109 MG/DL — HIGH (ref 70–99)
GLUCOSE BLDC GLUCOMTR-MCNC: 189 MG/DL — HIGH (ref 70–99)
GLUCOSE BLDC GLUCOMTR-MCNC: 80 MG/DL — SIGNIFICANT CHANGE UP (ref 70–99)
GLUCOSE BLDC GLUCOMTR-MCNC: 90 MG/DL — SIGNIFICANT CHANGE UP (ref 70–99)
GLUCOSE SERPL-MCNC: 87 MG/DL — SIGNIFICANT CHANGE UP (ref 70–99)
HCT VFR BLD CALC: 30.2 % — LOW (ref 39–50)
HGB BLD-MCNC: 10.1 G/DL — LOW (ref 13–17)
MAGNESIUM SERPL-MCNC: 2.1 MG/DL — SIGNIFICANT CHANGE UP (ref 1.6–2.6)
MCHC RBC-ENTMCNC: 27.7 PG — SIGNIFICANT CHANGE UP (ref 27–34)
MCHC RBC-ENTMCNC: 33.4 G/DL — SIGNIFICANT CHANGE UP (ref 32–36)
MCV RBC AUTO: 82.7 FL — SIGNIFICANT CHANGE UP (ref 80–100)
NRBC # BLD: 0 /100 WBCS — SIGNIFICANT CHANGE UP (ref 0–0)
NRBC # FLD: 0 K/UL — SIGNIFICANT CHANGE UP (ref 0–0)
PHOSPHATE SERPL-MCNC: 3 MG/DL — SIGNIFICANT CHANGE UP (ref 2.5–4.5)
PLATELET # BLD AUTO: 371 K/UL — SIGNIFICANT CHANGE UP (ref 150–400)
POTASSIUM SERPL-MCNC: 3.8 MMOL/L — SIGNIFICANT CHANGE UP (ref 3.5–5.3)
POTASSIUM SERPL-SCNC: 3.8 MMOL/L — SIGNIFICANT CHANGE UP (ref 3.5–5.3)
RBC # BLD: 3.65 M/UL — LOW (ref 4.2–5.8)
RBC # FLD: 13.2 % — SIGNIFICANT CHANGE UP (ref 10.3–14.5)
SODIUM SERPL-SCNC: 136 MMOL/L — SIGNIFICANT CHANGE UP (ref 135–145)
SPECIMEN SOURCE: SIGNIFICANT CHANGE UP
WBC # BLD: 4.45 K/UL — SIGNIFICANT CHANGE UP (ref 3.8–10.5)
WBC # FLD AUTO: 4.45 K/UL — SIGNIFICANT CHANGE UP (ref 3.8–10.5)

## 2024-12-06 PROCEDURE — 99239 HOSP IP/OBS DSCHRG MGMT >30: CPT | Mod: GC

## 2024-12-06 PROCEDURE — 99232 SBSQ HOSP IP/OBS MODERATE 35: CPT

## 2024-12-06 PROCEDURE — 99233 SBSQ HOSP IP/OBS HIGH 50: CPT

## 2024-12-06 RX ORDER — INSULIN GLARGINE 100 [IU]/ML
8 INJECTION, SOLUTION SUBCUTANEOUS
Refills: 0 | Status: DISCONTINUED | OUTPATIENT
Start: 2024-12-07 | End: 2024-12-06

## 2024-12-06 RX ORDER — INSULIN GLARGINE 100 [IU]/ML
10 INJECTION, SOLUTION SUBCUTANEOUS
Qty: 15 | Refills: 0
Start: 2024-12-06

## 2024-12-06 RX ORDER — INSULIN GLARGINE 100 [IU]/ML
8 INJECTION, SOLUTION SUBCUTANEOUS
Qty: 15 | Refills: 0
Start: 2024-12-06 | End: 2025-01-04

## 2024-12-06 RX ORDER — ISOPROPYL ALCOHOL, BENZOCAINE .7; .06 ML/ML; ML/ML
0 SWAB TOPICAL
Qty: 100 | Refills: 1
Start: 2024-12-06

## 2024-12-06 RX ORDER — LINAGLIPTIN 5 MG/1
1 TABLET, FILM COATED ORAL
Qty: 30 | Refills: 0
Start: 2024-12-06 | End: 2025-01-04

## 2024-12-06 RX ORDER — PANTOPRAZOLE SODIUM 40 MG/1
1 TABLET, DELAYED RELEASE ORAL
Qty: 30 | Refills: 0
Start: 2024-12-06 | End: 2025-01-04

## 2024-12-06 RX ADMIN — DARUNAVIR ETHANOLATE AND COBICISTAT 1 TABLET(S): 800; 150 TABLET, FILM COATED ORAL at 11:34

## 2024-12-06 RX ADMIN — Medication 1: at 12:21

## 2024-12-06 RX ADMIN — EMTRICITABINE AND TENOFOVIR DISOPROXIL FUMARATE 1 TABLET(S): 200; 300 TABLET, FILM COATED ORAL at 11:34

## 2024-12-06 RX ADMIN — LIDOCAINE 1 PATCH: 40 CREAM TOPICAL at 08:50

## 2024-12-06 RX ADMIN — METOPROLOL TARTRATE 25 MILLIGRAM(S): 100 TABLET, FILM COATED ORAL at 05:41

## 2024-12-06 RX ADMIN — Medication 5000 UNIT(S): at 05:47

## 2024-12-06 RX ADMIN — Medication 5 MILLIGRAM(S): at 11:34

## 2024-12-06 RX ADMIN — PANTOPRAZOLE SODIUM 40 MILLIGRAM(S): 40 TABLET, DELAYED RELEASE ORAL at 05:41

## 2024-12-06 RX ADMIN — INSULIN GLARGINE 10 UNIT(S): 100 INJECTION, SOLUTION SUBCUTANEOUS at 08:49

## 2024-12-06 RX ADMIN — AMLODIPINE BESYLATE 5 MILLIGRAM(S): 10 TABLET ORAL at 05:41

## 2024-12-06 RX ADMIN — Medication 5000 UNIT(S): at 13:08

## 2024-12-06 RX ADMIN — LINAGLIPTIN 5 MILLIGRAM(S): 5 TABLET, FILM COATED ORAL at 11:33

## 2024-12-06 RX ADMIN — ERTAPENEM 120 MILLIGRAM(S): 1 INJECTION, POWDER, LYOPHILIZED, FOR SOLUTION INTRAMUSCULAR; INTRAVENOUS at 04:31

## 2024-12-06 NOTE — PROGRESS NOTE ADULT - PROBLEM SELECTOR PROBLEM 1
Uncontrolled type 2 diabetes mellitus with hyperglycemia
Type 2 diabetes mellitus with hyperglycemia, with long-term current use of insulin
Uncontrolled type 2 diabetes mellitus with hyperglycemia
Uncontrolled type 2 diabetes mellitus with hyperglycemia
Type 2 diabetes mellitus with hyperglycemia, with long-term current use of insulin

## 2024-12-06 NOTE — PROGRESS NOTE ADULT - PROBLEM SELECTOR PLAN 3
Reports improving chest pain from yesterday PM. Endorses chronic history of mild dyspnea and chest pain on exertion with infrequent lightheadedness (most recently prior to arrival in ED), but denies syncopal episodes.   TTE (1/30/23) with LVEF 65% and mild mitral annular calcification.   Troponin: 29 (12/1) --> 16 (12/4), CK 39, CKMB <1.0.   C/p likely non-ischemic/epigastric in nature.   TTE (12/3/24): LVEF 63%, mild LV diastolic dysfunction, mild mitral regurgitation.       Plan  - C/w pantoprazole 40mg PO, daily  - No further inpatient cardiac testing indicated at this time.  - Possible outpt referral to Cardiologist upon d/c.

## 2024-12-06 NOTE — PROGRESS NOTE ADULT - ASSESSMENT
The patient is a 84y Male with PMH of T2DM, HIV who presented to the hospital with chest pain and noted to be hyperglycemic  Endocrinology consulted for hyperglycemia and uncontrolled Dm2.   Patient is high risk with high level decision making due to uncontrolled diabetes with A1C>11 and severe hyperglycemia to > 500s which places patient at high risk for cardiovascular and cerebrovascular events. Patient with lability of glucose requiring close monitoring and insulin adjustments.    #Uncontrolled Type 2 Diabetes Mellitus with hyperglycemia  - Follows with: PCP, no endocrinologist  - A1C with Estimated Average Glucose Result: 11.7 % (11-30-24)  - home regimen: Lantus 12 (stopped taking one week ago);?metformin daughter is unclear; Pt stopped taking meds on and off since wife passed away in March as per daughter  - eGFR: 61 mL/min/1.73m2 (12-01-24)  - Weight (kg): 63.5 (11-30-24)  - glucose elevated, no evidence of DKA on labs      INPATIENT PLAN:  - glucose target 100-200mg/dl due to age. FS below goal this morning and at goal this afternoon.   - Decrease Lantus to 8 units sq q 8am (1st dose tomorrow of reduced dose, received 10 units this morning)  - Continue to hold standing Admelog for now   - Continue Tradjenta 5mg p.o. daily (first dose yesterday 12/5)  - Continue Low dose Admelog correction scale TIDQAC and separate low dose Admelog correction scale QHS  - Please check FS before meals and at bedtime   - consistent carb diet when eating  - RD consult  - Hypoglycemia Protocol   - Provider to Rn for insulin pen review to CDPAP (already performed)      DISCHARGE PLANNING:  - Discharge recs pending clinical course, basal + Tradjenta 5mg p.o. daily +- Metformin if labs allow (final plan TBD based on inpt glucose trend and insulin requirements)    - GLP1a daughter declines she doesn't want any medication that will cause weight loss and or reduce his appetite    - Insulin pen review prior to discharge with family member (already performed)    - Please send Lantus solostar pen as test script to check insurance coverage.  Ok to send with current doses and update prior to d/c    If Lantus not covered- can try alternating with one of following   tresiba/basaglar/toujeo/Levemir    - Ensure patient has working glucometer, test strips, lancets, alcohol pads, and BD melonie pen needles    - For severe hypoglycemia: Please prescribe Glucose tablets 4G (take 4 tablets) or 15G tablets for blood sugar less than 70 mG/dL repeat fingerstick in 15 minutes.     - CDPAP will learn pen administrations and how to monitor glucose prior to discharge (as per daughter)    - Family is taking full responsibility for insulin pen administration and glucose monitoring     - Daughter unable to assist with insulin pen administration as she is blind     - SANIA and RAJWINDER for safe dc plan     - Please call your doctor if you fs is 70 or below and or 250 and above     - Reviewed importance of medication adherence,  glucose monitoring, and following a consistent carb diet     - Hypoglycemia and intervention reviewed    - Please follow up with your pcp, podiatry, endocrinology, and opthalmology as an outpt     - [facility] doesn't accept pts insurance. Please follow up [facility] [place]; [address] [place] [address]; [phone]: emailed office on 12/6     - 12/5 was D/w Daughter Toney [phone]. Attempted to call daughter today as well, went to .     #Hypertension  - Goal BP <130/80  - Management as per primary team  - check urine albumin level as outpatient    #Hyperlipidemia  - LDL goal <70  - Last LDL: 68 mg/dL (06-22-24)  - on atorvastatin 10 mg qhs  - check lipid panel as outpatient on a yearly basis    d/w Dr. Joe Barrett, [facility]-BC  Nurse Practitioner  Division of Endocrinology & Diabetes  pager [phone]     If before 9AM or after 6PM, or on weekends/holidays, please call endocrine answering service for assistance ([phone]).For nonurgent matters email [email or web address] for assistance.  The patient is a 84y Male with PMH of T2DM, HIV who presented to the hospital with chest pain and noted to be hyperglycemic  Endocrinology consulted for hyperglycemia and uncontrolled Dm2.   Patient is high risk with high level decision making due to uncontrolled diabetes with A1C>11 and severe hyperglycemia to > 500s which places patient at high risk for cardiovascular and cerebrovascular events. Patient with lability of glucose requiring close monitoring and insulin adjustments.    #Uncontrolled Type 2 Diabetes Mellitus with hyperglycemia  - Follows with: PCP, no endocrinologist  - A1C with Estimated Average Glucose Result: 11.7 % (11-30-24)  - home regimen: Lantus 12 (stopped taking one week ago);?metformin daughter is unclear; Pt stopped taking meds on and off since wife passed away in March as per daughter  - eGFR: 61 mL/min/1.73m2 (12-01-24)  - Weight (kg): 63.5 (11-30-24)  - glucose elevated, no evidence of DKA on labs      INPATIENT PLAN:  - glucose target 100-200mg/dl due to age. FS below goal this morning and at goal this afternoon.   - Decrease Lantus to 8 units sq q 8am (1st dose tomorrow of reduced dose, received 10 units this morning)  - Continue to hold standing Admelog for now   - Continue Tradjenta 5mg p.o. daily (first dose yesterday 12/5)  - Continue Low dose Admelog correction scale TIDQAC and separate low dose Admelog correction scale QHS  - Please check FS before meals and at bedtime   - consistent carb diet when eating  - RD consult  - Hypoglycemia Protocol   - Provider to Rn for insulin pen review to CDPAP (already performed)      DISCHARGE PLANNING:  - Discharge recs pending clinical course: Intended discharge plan if pt discharged today: basal insulin 8 units in the morning (check if lantus is covered) + Tradjenta 5mg p.o. daily +- Metformin 500mg BID (gfr 66).     - GLP1a daughter declines she doesn't want any medication that will cause weight loss and or reduce his appetite    - Insulin pen review prior to discharge with family member (already performed)    - Please send Lantus solostar pen as test script to check insurance coverage.  Ok to send with current doses and update prior to d/c    If Lantus not covered- can try alternating with one of following   tresiba/basaglar/toujeo/Levemir    - Ensure patient has working glucometer, test strips, lancets, alcohol pads, and BD melonie pen needles    - For severe hypoglycemia: Please prescribe Glucose tablets 4G (take 4 tablets) or 15G tablets for blood sugar less than 70 mG/dL repeat fingerstick in 15 minutes.     - CDPAP will learn pen administrations and how to monitor glucose prior to discharge (as per daughter)    - Family is taking full responsibility for insulin pen administration and glucose monitoring     - Daughter unable to assist with insulin pen administration as she is blind     - SANIA and RAJWINDER for safe dc plan     - Please call your doctor if you fs is 70 or below and or 250 and above     - Reviewed importance of medication adherence,  glucose monitoring, and following a consistent carb diet     - Hypoglycemia and intervention reviewed    - Please follow up with your pcp, podiatry, endocrinology, and opthalmology as an outpt     - [facility] doesn't accept pts insurance. Please follow up [facility] [place]; [address] [place] [id number]; [phone]: emailed office on 12/6     - 12/5 was D/w Daughter Toney [phone]. Attempted to call daughter today as well, went to .     #Hypertension  - Goal BP <130/80  - Management as per primary team  - check urine albumin level as outpatient    #Hyperlipidemia  - LDL goal <70  - Last LDL: 68 mg/dL (06-22-24)  - on atorvastatin 10 mg qhs  - check lipid panel as outpatient on a yearly basis    d/w Dr. Joe Barrett, [facility]-BC  Nurse Practitioner  Division of Endocrinology & Diabetes  pager [phone]     If before 9AM or after 6PM, or on weekends/holidays, please call endocrine answering service for assistance ([phone]).For nonurgent matters email [email or web address] for assistance.

## 2024-12-06 NOTE — PROGRESS NOTE ADULT - PROBLEM SELECTOR PLAN 6
-HIV VL not detected   -Has not received home Symtuza since last dose Saturday (11/30).  -Continue with home Symtuza (daughter to bring in PM) -HIV VL not detected   -Has not received home Symtuza since last dose Saturday (11/30).  - C/w with Prezcobix/Descovy - HIV VL not detected   - Has not received home Symtuza since last dose Saturday (11/30).  - C/w with Prezcobix/Descovy until d/c

## 2024-12-06 NOTE — DISCHARGE NOTE NURSING/CASE MANAGEMENT/SOCIAL WORK - PATIENT PORTAL LINK FT
You can access the FollowMyHealth Patient Portal offered by [facility] by registering at the following website: [email or web address]. By joining [facility]’s FollowMyHealth portal, you will also be able to view your health information using other applications (apps) compatible with our system.

## 2024-12-06 NOTE — PROGRESS NOTE ADULT - PROVIDER SPECIALTY LIST ADULT
Infectious Disease
Endocrinology
Infectious Disease
Endocrinology
Internal Medicine

## 2024-12-06 NOTE — PROGRESS NOTE ADULT - ASSESSMENT
84-year-old male with PMHx of DM2, HTN, HLD, HIV, BPH, and spinal stenosis who presents to the ED with nausea, vomiting, and abdominal pain of 4 days duration. Labwork notable for hyperglycemia in the setting of sepsis. UA with +LE, 87 WBC. CT A/P notable for 1.7 cm peripherally enhancing tubular fluid collection along the posterior aspect of the mid left kidney, possibly in a subcapsular space. Additional areas of new hypodensity in the left upper pole could reflect pyelonephritis versus interval worsening scarring. Nonspecific diffuse bladder wall thickening, which could reflect cystitis or chronic outlet obstruction secondary to markedly enlarged prostate. Urology consulted in ED. Recommended no urological interventions. Initially on Vanc/Zosyn per sepsis protocol --> Blood Cx growing ESBL Klebsiella/UCx pending final gram stain --> switched to Vanc/Ertapenem --> d/c Vanc per ID pharm --> now on Ertapenem 1g (12/2 -  ).     Patient s/p 2L LR Bolus, Lantus 10 units, and Lispro 8 units x2. Glucose improved to 345 --> 218. pH 7.37, AG 17, BHB 2.4 -> 2.1 -> 0.0. Patient admitted to medicine for further management and monitoring.      84-year-old male with PMHx of DM2, HTN, HLD, HIV, BPH, and spinal stenosis who presents to the ED with nausea, vomiting, and abdominal pain of 4 days duration. Labwork notable for hyperglycemia in the setting of sepsis. UA with +LE, 87 WBC. CT A/P notable for 1.7 cm peripherally enhancing tubular fluid collection along the posterior aspect of the mid left kidney, possibly in a subcapsular space. Additional areas of new hypodensity in the left upper pole could reflect pyelonephritis versus interval worsening scarring. Nonspecific diffuse bladder wall thickening, which could reflect cystitis or chronic outlet obstruction secondary to markedly enlarged prostate. Urology consulted in ED. Recommended no urological interventions. Initially on Vanc/Zosyn per sepsis protocol --> Blood Cx growing ESBL Klebsiella/UCx pending final gram stain --> switched to Vanc/Ertapenem --> d/c Vanc per ID pharm --> now on Ertapenem 1g (12/2 -  ).  Repeat CT A/P 12/5 with findings unchanged from prior CT: L pyelonephritis with small perinephric fluid collection; Bladder wall thickening and perivesicular stranding which may be seen in the setting of cystitis; Enlarged prostate.      Patient s/p 2L LR Bolus, Lantus 10 units, and Lispro 8 units x2. Glucose improved to 345 --> 218. pH 7.37, AG 17, BHB 2.4 -> 2.1 -> 0.0. Patient admitted to medicine for further management and monitoring.

## 2024-12-06 NOTE — PROGRESS NOTE ADULT - TIME BILLING
- Chart review  - Independent review and interpretation of data  - Patient examination, evaluation, and assessment  - Discussion at length with patient and family (sister Angela) at bedside and called daughter Thereza over the phone relating to clinical management  - Discussion with primary team relating to long term management on multiple occasions throughout the day  - Care coordination.
Review of laboratory data, radiology results, consultants' recommendations, documentation in [place], discussion with patient/advanced care providers and interdisciplinary staff (such as , social workers, etc). Interventions were performed as documented above.
Review of laboratory data, radiology results, consultants' recommendations, documentation in [place], discussion with patient/advanced care providers and interdisciplinary staff (such as , social workers, etc). Interventions were performed as documented above.
Review of laboratory data, radiology results, consultants' recommendations, documentation in [place], discussion with patient/advanced care providers and interdisciplinary staff (such as , social workers, etc). Interventions were performed as documented above.
Review of laboratory data, radiology results, consultants' recommendations, documentation in [place], discussion with patient/advanced care providers and interdisciplinary staff (such as , social workers, etc). Interventions were performed as documented above.

## 2024-12-06 NOTE — PROGRESS NOTE ADULT - PROBLEM SELECTOR PLAN 2
::Source: UTI vs Intraabdominal infection  -Tmax 101.9, Tachycardic to 11, WBC 10.84 --> 7.84., Lactate 2.2 --> 2.0 Glucose 569 ->-> 310, BHB 2.4 -> -> 0.0   -UA positive for mod LE, 87 WBC, moderate bacteria, >1000 glucose  -Blood/UCx pos. ESBL Klebsiella  - CTAP demonstrating 1.7cm tubular fluid collection along left kidney c/f possible small renal/perirenal abscess with areas of hypodensity c/f pyelonephritis vs worsening renal scarring, no hydronephrosis bilaterally, bladder wall thickening c/f cystitis vs chronic outlet obstruction  - s/p 2L LR Bolus    Plan  - Pending repeat CT (12/6)   - Per ID c/w Ertapenem 1g IV, awaiting final recs for abx duration  - UCx sens --> requiring IV abx.   - Vitals Q4H ::Source: UTI vs Intraabdominal infection  -Tmax 101.9, Tachycardic to 11, WBC 10.84 --> 7.84., Lactate 2.2 --> 2.0 Glucose 569 ->-> 310, BHB 2.4 -> -> 0.0   -UA positive for mod LE, 87 WBC, moderate bacteria, >1000 glucose  -Blood/UCx pos. ESBL Klebsiella  - CTAP demonstrating 1.7cm tubular fluid collection along left kidney c/f possible small renal/perirenal abscess with areas of hypodensity c/f pyelonephritis vs worsening renal scarring, no hydronephrosis bilaterally, bladder wall thickening c/f cystitis vs chronic outlet obstruction  - s/p 2L LR Bolus    Plan  - CT A/P 12/5 results unchanged from prior   - Pending final recs from ID for Ertapenem duration.   - UCx sens --> requiring IV abx.   - Vitals Q4H ::Source: UTI vs Intraabdominal infection  -Tmax 101.9, Tachycardic to 11, WBC 10.84 --> 7.84., Lactate 2.2 --> 2.0 Glucose 569 ->-> 310, BHB 2.4 -> -> 0.0   -UA positive for mod LE, 87 WBC, moderate bacteria, >1000 glucose  -Blood/UCx pos. ESBL Klebsiella  - CTAP demonstrating 1.7cm tubular fluid collection along left kidney c/f possible small renal/perirenal abscess with areas of hypodensity c/f pyelonephritis vs worsening renal scarring, no hydronephrosis bilaterally, bladder wall thickening c/f cystitis vs chronic outlet obstruction  - s/p 2L LR Bolus    Plan  - CT A/P 12/5 results unchanged from prior   - Per ID, d/c on 4wks ertapenem 1g IV via midline.   - F/u outpatient w/ ID for repeat imaging.    - Vitals Q4H

## 2024-12-06 NOTE — ADVANCED PRACTICE NURSE CONSULT - ASSESSMENT
Patient is aware and alert. Midline insertion along with risks, benefits, possible complications and infection prevention explained to patient who verbalized understanding. All questions addressed and patient gave verbal consent to place midline. Left arm cleansed with CHG. Using sterile technique under ultra sound guidance, placed PowerGlide Pro Midline 20G /10cm into Left Basilic vein. Brisk blood return and flushed with 20Mls of normal saline. Minimal blood loss and patient tolerated procedure well. CHG dressing placed. All sharps accounted for. Report given to district RN and ordering provider.  LOT#[id number] ; REF#[id number]

## 2024-12-06 NOTE — PROGRESS NOTE ADULT - PROBLEM SELECTOR PLAN 1
::Hyperglycemia with ketosis in the setting of sepsis  ::A1c 11.7%, Glucose 569 -> 345 --> 218 --> 300 --> 111 (12/3 AM). Lactate 2.2 ->2.1 -> 2.0, BHB 2.4 -> 2.1 --> 0.0, pH 7.37  -Home regimen: Lantus (dose unclear)   -s/p 2L NS Bolus, Lantus 10 units, and Lispro 8 units x2 in ED. Glucose improved to 217.   NOW RESOLVED    Plan   - Diet: consistent carb   - Per Endo, now Lantus 10U AM, hold 5U Admelog, start Tradjenta 5mg PO, daily  - Lispro Corrective Q4H (mild)  - FS glucose q4h  - Hypoglycemia precautions ::Hyperglycemia with ketosis in the setting of sepsis  ::A1c 11.7%, Glucose 569 -> 345 --> 218 --> 300 --> 111 (12/3 AM). Lactate 2.2 ->2.1 -> 2.0, BHB 2.4 -> 2.1 --> 0.0, pH 7.37  -Home regimen: Lantus (dose unclear)   -s/p 2L NS Bolus, Lantus 10 units, and Lispro 8 units x2 in ED. Glucose improved to 217.   NOW RESOLVED    Plan   - Diet: consistent carb   - Per Endo, now Lantus 10U AM, hold 5U Admelog, start Tradjenta 5mg PO, daily  - D/c on Tradjenta, Basal insulin, +/- Metformin  - Lispro Corrective Q4H (mild)  - FS glucose q4h  - Hypoglycemia precautions

## 2024-12-06 NOTE — PROGRESS NOTE ADULT - ASSESSMENT
83 yo M with PMHx of DM2, HTN, HLD, HIV, BPH, and spinal stenosis presented to the ED with nausea, vomiting, and abdominal pain.    Upon arrival, patient was febrile to Tmax 101.9F.  Labs with mils leukocytosis, hyperglycemia     Workup:   -UA +   -Urine Cx : GNR  -Blood Cx: + Klebsiella ESBL   -CT AP: 1.7 cm peripherally enhancing tubular fluid collection along the posterior aspect of the mid left kidney, possibly in a subcapsular space. Mild asymmetric left perinephric fluid/stranding. Findings are suspicious for infectious process with possible small renal/perirenal abscess. Additional areas of new hypodensity in the left upper pole could reflect pyelonephritis versus interval worsening scarring.Nonspecific diffuse bladder wall thickening, which could reflect cystitis or chronic outlet obstruction secondary to markedly enlarged prostate.  -Repeat CT AP (12/5): Left-sided pyelonephritis with small perinephric fluid collection, not significant change from prior examination. Bladder wall thickening and perivesicular stranding which may be seen in the setting of cystitis. Enlarged prostate.    #Klebsiella ESBL bacteremia   #L pyelonephritis with questionable small perirenal abscess unchanged on  #Fever, leukocytosis  #Hx of HIV on Symtuza     Recommendations:  -Continue Ertapenem 1 g IV daily   -Urology following - no indication for drainage  -Plan to complete 4 weeks course of Ertapenem 1 g IV daily ( till 12/29)  -Will need a midline placement   -Weekly CBC/CMP to be emailed to [email or web address]  -Prezcobix/Descovy for HIV while inpatient, on discharge can switch back to home Symtuza  -Per daughter Dk, previous HIV provider Dr Sal Mustafa at [facility], now switching care to NP Stella Diaz (has appointment scheduled for Dec 16th).  -Patient to see me in clinic at [address] [place] [address] , [place], [place] as a post hospitalization f/up for repeat CT AP on 12/26 at 11 am     Discussed with hospitalist     Aly Ortega MD  ID physician  [employer] Teams Preferred  After 5pm/weekends call [phone]   85 yo M with PMHx of DM2, HTN, HLD, HIV, BPH, and spinal stenosis presented to the ED with nausea, vomiting, and abdominal pain.    Upon arrival, patient was febrile to Tmax 101.9F.  Labs with mils leukocytosis, hyperglycemia     Workup:   -UA +   -Urine Cx : GNR  -Blood Cx: + Klebsiella ESBL   -CT AP: 1.7 cm peripherally enhancing tubular fluid collection along the posterior aspect of the mid left kidney, possibly in a subcapsular space. Mild asymmetric left perinephric fluid/stranding. Findings are suspicious for infectious process with possible small renal/perirenal abscess. Additional areas of new hypodensity in the left upper pole could reflect pyelonephritis versus interval worsening scarring.Nonspecific diffuse bladder wall thickening, which could reflect cystitis or chronic outlet obstruction secondary to markedly enlarged prostate.  -Repeat CT AP (12/5): Left-sided pyelonephritis with small perinephric fluid collection, not significant change from prior examination. Bladder wall thickening and perivesicular stranding which may be seen in the setting of cystitis. Enlarged prostate.    #Klebsiella ESBL bacteremia   #L pyelonephritis with questionable small perirenal abscess unchanged on  #Fever, leukocytosis  #Hx of HIV on Symtuza     Recommendations:  -Continue Ertapenem 1 g IV daily   -Urology following - no indication for drainage  -Plan to complete 4 weeks course of Ertapenem 1 g IV daily ( till 12/29)  -Will need a midline placement   -Weekly CBC/CMP to be emailed to [email or web address]  -Prezcobix/Descovy for HIV while inpatient, on discharge can switch back to home Symtuza  -Per daughter Dk, previous HIV provider Dr Sal Mustafa at [facility], now switching care to NP Stella Diaz (has appointment scheduled for Dec 16th).  -Patient to see me in clinic at [address] [place] [address] , [place], [place] as a post hospitalization f/up for repeat CT AP on 12/26 at 11 am     ID signing off, please call if any questions or change in status    Discussed with hospitalist     Aly Ortega MD  ID physician  [employer] Teams Preferred  After 5pm/weekends call [phone]

## 2024-12-06 NOTE — PROGRESS NOTE ADULT - SUBJECTIVE AND OBJECTIVE BOX
Chief Complaint: type 2 DM    History: saw pt at bedside. Pt tolerating oral diet. Pt denies nausea and vomiting/any signs of hypoglycemia. Pt reports an adequate appetite. FS below goal this morning and at goal this afternoon.     MEDICATIONS  (STANDING):  amLODIPine   Tablet 5 milliGRAM(s) Oral daily  atorvastatin 10 milliGRAM(s) Oral at bedtime  darunavir 800 mG/cobicstat 150 mG 1 Tablet(s) Oral daily  dextrose 5%. 1000 milliLiter(s) (50 mL/Hr) IV Continuous <Continuous>  dextrose 5%. 1000 milliLiter(s) (100 mL/Hr) IV Continuous <Continuous>  dextrose 50% Injectable 25 Gram(s) IV Push once  emtricitabine 200 mG/tenofovir alafenamide 25 mG (DESCOVY) Tablet 1 Tablet(s) Oral daily  ertapenem  IVPB 1000 milliGRAM(s) IV Intermittent every 24 hours  finasteride 5 milliGRAM(s) Oral daily  glucagon  Injectable 1 milliGRAM(s) IntraMuscular once  heparin   Injectable 5000 Unit(s) SubCutaneous every 8 hours  influenza  Vaccine (HIGH DOSE) 0.5 milliLiter(s) IntraMuscular once  insulin lispro (ADMELOG) corrective regimen sliding scale   SubCutaneous three times a day before meals  insulin lispro (ADMELOG) corrective regimen sliding scale   SubCutaneous at bedtime  lidocaine   4% Patch 1 Patch Transdermal every 24 hours  linagliptin 5 milliGRAM(s) Oral daily  metoprolol succinate ER 25 milliGRAM(s) Oral daily  oxybutynin XL 5 milliGRAM(s) Oral daily  pantoprazole    Tablet 40 milliGRAM(s) Oral before breakfast  tamsulosin 0.4 milliGRAM(s) Oral at bedtime    MEDICATIONS  (PRN):  acetaminophen     Tablet .. 650 milliGRAM(s) Oral every 6 hours PRN Temp greater or equal to 38C (100.4F), Mild Pain (1 - 3)  dextrose Oral Gel 15 Gram(s) Oral once PRN Blood Glucose LESS THAN 70 milliGRAM(s)/deciliter      Allergies    No Known Allergies    Intolerances      Review of Systems:  Cardiovascular: No chest pain, palpitations  Respiratory: No SOB, no cough  GI: No nausea, vomiting, abdominal pain  : No dysuria  Endocrine: no polyuria, polydipsia      PHYSICAL EXAM:  VITALS: T(C): 36.6 (12-06-24 @ 12:54)  T(F): 97.8 (12-06-24 @ 12:54), Max: 98.4 (12-05-24 @ 21:45)  HR: 75 (12-06-24 @ 12:54) (57 - 75)  BP: 123/51 (12-06-24 @ 12:54) (123/51 - 134/60)  RR:  (18 - 18)  SpO2:  (99% - 100%)  Wt(kg): --  GENERAL: NAD, well-groomed, well-developed  EYES: No proptosis  HEENT:  Atraumatic, Normocephalic  RESPIRATORY: non labored breathing   CARDIOVASCULAR: Regular rate and rhythm  GI: Soft, nontender, non distended  PSYCH: Alert and oriented x 3, normal affect, normal mood     CAPILLARY BLOOD GLUCOSE      POCT Blood Glucose.: 189 mg/dL (06 Dec 2024 12:15)  POCT Blood Glucose.: 80 mg/dL (06 Dec 2024 08:22)  POCT Blood Glucose.: 90 mg/dL (06 Dec 2024 03:06)  POCT Blood Glucose.: 142 mg/dL (05 Dec 2024 21:56)  POCT Blood Glucose.: 195 mg/dL (05 Dec 2024 17:44)      12-06    136  |  102  |  16  ----------------------------<  87  3.8   |  20[L]  |  1.10    eGFR: 66    Ca    9.1      12-06  Mg     2.10     12-06  Phos  3.0     12-06    TPro  8.2  /  Alb  3.2[L]  /  TBili  0.5  /  DBili  x   /  AST  21  /  ALT  13  /  AlkPhos  110  12-04          Thyroid Function Tests:  11-30 @ 19:47 TSH 0.35 FreeT4 -- T3 -- Anti TPO -- Anti Thyroglobulin Ab -- TSI --        A1C with Estimated Average Glucose Result: 11.7 % (11-30-24 @ 19:47)  A1C with Estimated Average Glucose Result: 11.9 % (07-03-24 @ 10:26)  A1C with Estimated Average Glucose Result: 13.3 % (06-21-24 @ 09:30)          Diet, Consistent Carbohydrate w/Evening Snack (12-01-24 @ 13:24)

## 2024-12-06 NOTE — PROGRESS NOTE ADULT - PROBLEM SELECTOR PROBLEM 2
Sepsis
Sepsis
HTN (hypertension)
Sepsis

## 2024-12-06 NOTE — PROGRESS NOTE ADULT - SUBJECTIVE AND OBJECTIVE BOX
Follow Up:      Interval History/ROS:Patient is a 84y old  Male who presents with a chief complaint of Sepsis; Hyperglycemia (06 Dec 2024 07:44)  Seen today, sister Angela at bedside. Explained treatment plan. Sister requested a call to daughter Dk to discuss further plan.   Called daughter Dk at # [phone] discussed current condition, and plan for long term antibiotics therapy.  All question answered.     Allergies  No Known Allergies    ANTIMICROBIALS:    darunavir 800 mG/cobicstat 150 mG 1 daily  emtricitabine 200 mG/tenofovir alafenamide 25 mG (DESCOVY) Tablet 1 daily  ertapenem  IVPB 1000 every 24 hours    OTHER MEDS: MEDICATIONS  (STANDING):  acetaminophen     Tablet .. 650 every 6 hours PRN  amLODIPine   Tablet 5 daily  atorvastatin 10 at bedtime  dextrose 50% Injectable 25 once  dextrose Oral Gel 15 once PRN  finasteride 5 daily  glucagon  Injectable 1 once  heparin   Injectable 5000 every 8 hours  influenza  Vaccine (HIGH DOSE) 0.5 once  insulin lispro (ADMELOG) corrective regimen sliding scale  three times a day before meals  insulin lispro (ADMELOG) corrective regimen sliding scale  at bedtime  linagliptin 5 daily  metoprolol succinate ER 25 daily  oxybutynin XL 5 daily  pantoprazole    Tablet 40 before breakfast  tamsulosin 0.4 at bedtime      Vital Signs Last 24 Hrs  T(F): 97.8 (12-06-24 @ 12:54), Max: 101.9 (11-30-24 @ 20:11)    Vital Signs Last 24 Hrs  HR: 75 (12-06-24 @ 12:54) (57 - 75)  BP: 123/51 (12-06-24 @ 12:54) (123/51 - 134/60)  RR: 18 (12-06-24 @ 12:54)  SpO2: 100% (12-06-24 @ 12:54) (99% - 100%)  Wt(kg): --    EXAM:    Physical Exam:  Constitutional: comfortable  LUNGS:  CTA  CVS:  normal S1, S2, no murmur  Abd:  soft, nontender  Ext:  no edema  Vascular:  IV site no erythema tenderness or discharge  Neuro: alert    Labs:                        10.1   4.45  )-----------( 371      ( 06 Dec 2024 06:05 )             30.2     12-06    136  |  102  |  16  ----------------------------<  87  3.8   |  20[L]  |  1.10    Ca    9.1      06 Dec 2024 06:05  Phos  3.0     12-06  Mg     2.10     12-06        WBC Trend:  WBC Count: 4.45 (12-06-24 @ 06:05)  WBC Count: 4.79 (12-05-24 @ 08:43)  WBC Count: 5.00 (12-04-24 @ 07:02)  WBC Count: 6.34 (12-03-24 @ 05:20)      Creatine Trend:  Creatinine: 1.10 (12-06)  Creatinine: 0.98 (12-05)  Creatinine: 1.06 (12-04)  Creatinine: 1.04 (12-03)      Liver Biochemical Testing Trend:  Alanine Aminotransferase (ALT/SGPT): 13 (12-04)  Alanine Aminotransferase (ALT/SGPT): 11 (12-03)  Alanine Aminotransferase (ALT/SGPT): 8 (12-02)  Alanine Aminotransferase (ALT/SGPT): 6 (11-30)  Alanine Aminotransferase (ALT/SGPT): 21 (09-06)  Aspartate Aminotransferase (AST/SGOT): 21 (12-04-24 @ 07:02)  Aspartate Aminotransferase (AST/SGOT): 23 (12-03-24 @ 06:55)  Aspartate Aminotransferase (AST/SGOT): 16 (12-02-24 @ 06:05)  Aspartate Aminotransferase (AST/SGOT): 8 (11-30-24 @ 19:47)  Aspartate Aminotransferase (AST/SGOT): 17 (09-06-24 @ 05:44)  Bilirubin Total: 0.5 (12-04)  Bilirubin Total: 0.4 (12-03)  Bilirubin Total: 0.3 (12-02)  Bilirubin Total: 0.8 (11-30)  Bilirubin Total: 0.3 (09-06)      Trend LDH      Urinalysis Basic - ( 06 Dec 2024 06:05 )    Color: x / Appearance: x / SG: x / pH: x  Gluc: 87 mg/dL / Ketone: x  / Bili: x / Urobili: x   Blood: x / Protein: x / Nitrite: x   Leuk Esterase: x / RBC: x / WBC x   Sq Epi: x / Non Sq Epi: x / Bacteria: x        MICROBIOLOGY:  Vancomycin Level, Trough: 4.5 (12-01 @ 19:40)    MRSA PCR Result.: NotDetec (12-03-24 @ 10:12)  MRSA PCR Result.: NotDetec (08-31-24 @ 12:52)  MRSA PCR Result.: NotDetec (08-05-24 @ 19:00)  MRSA PCR Result.: NotDetec (07-06-24 @ 18:58)      Culture - Urine (collected 30 Nov 2024 23:28)  Source: Clean Catch  Final Report:    50,000 - 99,000 CFU/mL Klebsiella pneumoniae ESBL  Organism: Klebsiella pneumoniae ESBL  Organism: Klebsiella pneumoniae ESBL    Sensitivities:      Method Type: ZAIDA      -  Ampicillin: R >16 These ampicillin results predict results for amoxicillin      -  Ampicillin/Sulbactam: R >16/8      -  Aztreonam: R >16      -  Cefazolin: R >16 For uncomplicated UTI with K. pneumoniae, E. coli, or P. mirablis: ZAIDA <=16 is sensitive and ZAIDA >=32 is resistant. This also predicts results for oral agents cefaclor, cefdinir, cefpodoxime, cefprozil, cefuroxime axetil, cephalexin and locarbef for uncomplicated UTI. Note that some isolates may be susceptible to these agents while testing resistant to cefazolin.      -  Cefepime: R >16      -  Ceftriaxone: R >32      -  Cefuroxime: R >16      -  Ciprofloxacin: I 0.5      -  Ertapenem: S <=0.5      -  Gentamicin: S <=2      -  Imipenem: S <=1      -  Levofloxacin: S <=0.5      -  Meropenem: S <=1      -  Nitrofurantoin: I 64 Should not be used to treat pyelonephritis      -  Piperacillin/Tazobactam: S <=8      -  Tobramycin: S <=2      -  Trimethoprim/Sulfamethoxazole: R >2/38    Urinalysis with Rflx Culture (collected 30 Nov 2024 23:28)    Culture - Blood (collected 30 Nov 2024 21:00)  Source: .Blood BLOOD  Final Report:    Growth in aerobic and anaerobic bottles: Klebsiella pneumoniae ESBL    Direct identification is available within approximately 3-5    hours either by Blood Panel Multiplexed PCR or Direct    MALDI-TOF. Details: [email or web address]  Organism: Klebsiella pneumoniae ESBL    Sensitivities:      Method Type: ZAIDA      -  Ampicillin: R >16 These ampicillin results predict results for amoxicillin      -  Ampicillin/Sulbactam: R 16/8      -  Aztreonam: R 16      -  Cefazolin: R >16      -  Cefepime: R >16      -  Ceftriaxone: R >32      -  Ciprofloxacin: I 0.5      -  Ertapenem: S <=0.5      -  Gentamicin: S <=2      -  Imipenem: S <=1      -  Levofloxacin: S <=0.5      -  Meropenem: S <=1      -  Piperacillin/Tazobactam: R <=8      -  Tobramycin: S <=2      -  Trimethoprim/Sulfamethoxazole: R >2/38  Organism: Blood Culture PCR    Sensitivities:      Method Type: PCR      -  K. pneumoniae group: Detec (K. pneumoniae, K. quasipneumoniae, K. variicola)      -  ESBL: Detec      -  CTX-M Resistance Marker: Detec  Organism: Blood Culture PCR  Klebsiella pneumoniae ESBL    Culture - Blood (collected 30 Nov 2024 20:40)  Source: .Blood BLOOD  Final Report:    No growth at 5 days    Culture - Blood (collected 03 Sep 2024 12:00)  Source: .Blood Blood-Peripheral  Final Report:    No growth at 5 days    Culture - Blood (collected 03 Sep 2024 12:00)  Source: .Blood Blood-Peripheral  Final Report:    No growth at 5 days    Culture - Urine (collected 30 Aug 2024 22:22)  Source: Clean Catch Clean Catch (Midstream)  Final Report:    >100,000 CFU/ml Klebsiella pneumoniae ESBL  Organism: Klebsiella pneumoniae ESBL  Organism: Klebsiella pneumoniae ESBL    Sensitivities:      Method Type: ZAIDA      -  Ampicillin: R >16 These ampicillin results predict results for amoxicillin      -  Ampicillin/Sulbactam: I 16/8      -  Aztreonam: R 16      -  Cefazolin: R >16 For uncomplicated UTI with K. pneumoniae, E. coli, or P. mirablis: ZAIDA <=16 is sensitive and ZAIDA >=32 is resistant. This also predicts results for oral agents cefaclor, cefdinir, cefpodoxime, cefprozil, cefuroxime axetil, cephalexin and locarbef for uncomplicated UTI. Note that some isolates may be susceptible to these agents while testing resistant to cefazolin.      -  Cefepime: R 8      -  Ceftriaxone: R >32      -  Cefuroxime: R >16      -  Ciprofloxacin: S <=0.25      -  Ertapenem: S <=0.5      -  Gentamicin: S <=2      -  Imipenem: S <=1      -  Levofloxacin: S <=0.5      -  Meropenem: S <=1      -  Nitrofurantoin: S <=32 Should not be used to treat pyelonephritis      -  Piperacillin/Tazobactam: S <=8      -  Tobramycin: S <=2      -  Trimethoprim/Sulfamethoxazole: R >2/38    Culture - Urine (collected 22 Aug 2024 09:37)  Source: Catheterized Catheterized  Final Report:    10,000 - 49,000 CFU/mL Candida parapsilosis "Susceptibilities not    performed"    Culture - Urine (collected 05 Aug 2024 07:42)  Source: Catheterized Catheterized  Final Report:    >=3 organisms. Probable collection contamination.    Culture - Urine (collected 05 Aug 2024 00:23)  Source: Catheterized  Final Report:    >=3 organisms. Probable collection contamination.        ABS CD4: 599 cells/uL (07-05-24 @ 12:03)    HIV-1 RNA Quantitative, Viral Load: 36 (12-01-24 @ 05:27)  HIV-1 Viral Load Result: DET. (12-01-24 @ 05:27)  HIV-1 RNA Quantitative, Viral Load: DET. <30 copies/mL (07-04-24 @ 06:04)  HIV-1 Viral Load Result: DET. (07-04-24 @ 06:04)                Rapid RVP Result: NotDetec (11-30 @ 19:28)                    Troponin T, High Sensitivity Result: 16 (12-04)  Troponin T, High Sensitivity Result: 29 (12-01)  Troponin T, High Sensitivity Result: 24 (11-30)          RADIOLOGY:  imaging below personally reviewed                CT Abdomen and Pelvis w/ IV Cont:   ACC: [id number] EXAM:  CT ABDOMEN AND PELVIS IC   ORDERED BY: MARCELO VALDEZ     PROCEDURE DATE:  12/05/2024          INTERPRETATION:  CLINICAL INFORMATION: Renal abscess/pyelonephritis.   Sepsis.    COMPARISON: 12/1/2024 and 8/22/2024    CONTRAST/COMPLICATIONS:  IV Contrast: Omnipaque 350  90 cc administered   10 cc discarded  Oral Contrast: NONE  .    PROCEDURE:  CT of the Abdomen and Pelvis was performed.  Sagittal and coronal reformats were performed.    FINDINGS:  LOWER CHEST: Linear atelectasis.    LIVER: Within normal limits.  BILE DUCTS: Normal caliber.  GALLBLADDER: Within normal limits.  SPLEEN: Within normal limits.  PANCREAS: Within normal limits.  ADRENALS: Within normal limits.  KIDNEYS/URETERS: Left renal cortical scarring. Patchy left renal   hypoenhancement, similar to findings present on prior examination with   small perinephric fluid collection measuring 1.3 x 0.7 cm, unchanged.    Right upper pole renal cyst and cortical scarring. No hydronephrosis.    BLADDER: Thickened bladder wall with perivesicular stranding.  REPRODUCTIVE ORGANS: Enlarged prostate. Right-sided hypodensity in the   prostate measuring 1.4 cm unchanged since 12/1/2024    BOWEL: No bowel obstruction. Appendix is normal.  PERITONEUM/RETROPERITONEUM: Within normal limits.  VESSELS: Atherosclerotic calcifications.  LYMPH NODES: No lymphadenopathy.  ABDOMINAL WALL: Within normal limits.  BONES: Degenerative changes.    IMPRESSION:  Left-sided pyelonephritis with small perinephric fluid collection, not  significant change from prior examination.    Bladder wall thickening and perivesicular stranding which may be seen in   the setting of cystitis.    Enlarged prostate.          --- End of Report ---            NUPUR ADRIAN MD; Attending Radiologist  This document has been electronically signed. Dec  6 2024  7:42AM (12-05-24 @ 21:31)

## 2024-12-06 NOTE — DISCHARGE NOTE NURSING/CASE MANAGEMENT/SOCIAL WORK - FINANCIAL ASSISTANCE
[facility] provides services at a reduced cost to those who are determined to be eligible through [facility]’s financial assistance program. Information regarding [facility]’s financial assistance program can be found by going to [email or web address] or by calling [phone].

## 2024-12-06 NOTE — PROGRESS NOTE ADULT - REASON FOR ADMISSION
Sepsis; Hyperglycemia

## 2024-12-06 NOTE — DISCHARGE NOTE NURSING/CASE MANAGEMENT/SOCIAL WORK - NSDCFUADDAPPT_GEN_ALL_CORE_FT
APPTS ARE READY TO BE MADE: [ ] YES    Best Family or Patient Contact (if needed):    Additional Information about above appointments (if needed):    1: PCP (Ramsey Huang [phone])  2: Podiatry  3: Endocrinology  4: Ophthalmology    Other comments or requests:

## 2024-12-06 NOTE — DISCHARGE NOTE NURSING/CASE MANAGEMENT/SOCIAL WORK - NSDCPEFALRISK_GEN_ALL_CORE
For information on Fall & Injury Prevention, visit: [email or web address] OR  [email or web address] OR  https://www.cdc.gov/steadi/patient.html

## 2024-12-06 NOTE — PROGRESS NOTE ADULT - SUBJECTIVE AND OBJECTIVE BOX
--------------------------  Alexis Costa MD  Internal Medicine   PGY-1  --------------------------    SUBJECTIVE / OVERNIGHT EVENTS:    Pt seen in AM at bedside, resting comfortably in bed, and does not appear to be in any acute distress. When asked, pt denies any recent or active fever, chills, nausea, vomiting, headache, acute sob, chest pain, abdominal pain, genitourinary sx, extremity pain or swelling.    MEDICATIONS  (STANDING):  amLODIPine   Tablet 5 milliGRAM(s) Oral daily  atorvastatin 10 milliGRAM(s) Oral at bedtime  darunavir 800 mG/cobicstat 150 mG 1 Tablet(s) Oral daily  dextrose 5%. 1000 milliLiter(s) (50 mL/Hr) IV Continuous <Continuous>  dextrose 5%. 1000 milliLiter(s) (100 mL/Hr) IV Continuous <Continuous>  dextrose 50% Injectable 25 Gram(s) IV Push once  emtricitabine 200 mG/tenofovir alafenamide 25 mG (DESCOVY) Tablet 1 Tablet(s) Oral daily  ertapenem  IVPB 1000 milliGRAM(s) IV Intermittent every 24 hours  finasteride 5 milliGRAM(s) Oral daily  glucagon  Injectable 1 milliGRAM(s) IntraMuscular once  heparin   Injectable 5000 Unit(s) SubCutaneous every 8 hours  influenza  Vaccine (HIGH DOSE) 0.5 milliLiter(s) IntraMuscular once  insulin glargine Injectable (LANTUS) 10 Unit(s) SubCutaneous <User Schedule>  insulin lispro (ADMELOG) corrective regimen sliding scale   SubCutaneous three times a day before meals  insulin lispro (ADMELOG) corrective regimen sliding scale   SubCutaneous at bedtime  lidocaine   4% Patch 1 Patch Transdermal every 24 hours  linagliptin 5 milliGRAM(s) Oral daily  metoprolol succinate ER 25 milliGRAM(s) Oral daily  oxybutynin XL 5 milliGRAM(s) Oral daily  pantoprazole    Tablet 40 milliGRAM(s) Oral before breakfast  tamsulosin 0.4 milliGRAM(s) Oral at bedtime    MEDICATIONS  (PRN):  acetaminophen     Tablet .. 650 milliGRAM(s) Oral every 6 hours PRN Temp greater or equal to 38C (100.4F), Mild Pain (1 - 3)  dextrose Oral Gel 15 Gram(s) Oral once PRN Blood Glucose LESS THAN 70 milliGRAM(s)/deciliter      CAPILLARY BLOOD GLUCOSE      POCT Blood Glucose.: 90 mg/dL (06 Dec 2024 03:06)  POCT Blood Glucose.: 142 mg/dL (05 Dec 2024 21:56)  POCT Blood Glucose.: 195 mg/dL (05 Dec 2024 17:44)  POCT Blood Glucose.: 314 mg/dL (05 Dec 2024 12:02)  POCT Blood Glucose.: 202 mg/dL (05 Dec 2024 10:14)  POCT Blood Glucose.: 122 mg/dL (05 Dec 2024 08:30)    I&O's Summary      PHYSICAL EXAM:  Vital Signs Last 24 Hrs  T(C): 36.4 (06 Dec 2024 05:19), Max: 36.9 (05 Dec 2024 21:45)  T(F): 97.5 (06 Dec 2024 05:19), Max: 98.4 (05 Dec 2024 21:45)  HR: 58 (06 Dec 2024 05:19) (57 - 65)  BP: 134/60 (06 Dec 2024 05:19) (106/60 - 134/60)  BP(mean): --  RR: 18 (06 Dec 2024 05:19) (17 - 18)  SpO2: 100% (06 Dec 2024 05:19) (98% - 100%)    Parameters below as of 06 Dec 2024 05:19  Patient On (Oxygen Delivery Method): room air        CONSTITUTIONAL: NAD; well-developed  HEENT: PERRL, clear conjunctiva  RESPIRATORY: Normal respiratory effort; lungs are clear to auscultation bilaterally; No Crackles/Rhonchi/Wheezing  CARDIOVASCULAR: Regular rate and rhythm, normal S1 and S2, no murmur/rub/gallop; No lower extremity edema; Peripheral pulses are 2+ bilaterally  ABDOMEN: Nontender to palpation, normoactive bowel sounds, no rebound/guarding; No hepatosplenomegaly  MUSCULOSKELETAL: no clubbing or cyanosis of digits; no joint swelling or tenderness to palpation  EXTREMITY: Lower extremities Non-tender to palpation; non-erythematous B/L  NEURO: A&Ox3; no focal deficits   PSYCH: normal mood; Affect appropirate    LABS:                        10.7   4.79  )-----------( 352      ( 05 Dec 2024 08:43 )             32.2     12-05    135  |  100  |  13  ----------------------------<  112[H]  4.3   |  21[L]  |  0.98    Ca    9.3      05 Dec 2024 06:50  Phos  2.3     12-05  Mg     2.00     12-05            Urinalysis Basic - ( 05 Dec 2024 06:50 )    Color: x / Appearance: x / SG: x / pH: x  Gluc: 112 mg/dL / Ketone: x  / Bili: x / Urobili: x   Blood: x / Protein: x / Nitrite: x   Leuk Esterase: x / RBC: x / WBC x   Sq Epi: x / Non Sq Epi: x / Bacteria: x          RADIOLOGY & ADDITIONAL TESTS:  Results Reviewed:   Imaging Personally Reviewed:  Electrocardiogram Personally Reviewed: --------------------------  Chandler Moncada  3rd-year Medical Student  --------------------------    SUBJECTIVE / OVERNIGHT EVENTS:    Pt seen in AM at bedside, resting comfortably in bed, and does not appear to be in any acute distress. When asked, pt denies any recent or active fever, chills, nausea, vomiting, headache, acute sob, chest pain, abdominal pain, genitourinary sx, extremity pain or swelling.    MEDICATIONS  (STANDING):  amLODIPine   Tablet 5 milliGRAM(s) Oral daily  atorvastatin 10 milliGRAM(s) Oral at bedtime  darunavir 800 mG/cobicstat 150 mG 1 Tablet(s) Oral daily  dextrose 5%. 1000 milliLiter(s) (50 mL/Hr) IV Continuous <Continuous>  dextrose 5%. 1000 milliLiter(s) (100 mL/Hr) IV Continuous <Continuous>  dextrose 50% Injectable 25 Gram(s) IV Push once  emtricitabine 200 mG/tenofovir alafenamide 25 mG (DESCOVY) Tablet 1 Tablet(s) Oral daily  ertapenem  IVPB 1000 milliGRAM(s) IV Intermittent every 24 hours  finasteride 5 milliGRAM(s) Oral daily  glucagon  Injectable 1 milliGRAM(s) IntraMuscular once  heparin   Injectable 5000 Unit(s) SubCutaneous every 8 hours  influenza  Vaccine (HIGH DOSE) 0.5 milliLiter(s) IntraMuscular once  insulin glargine Injectable (LANTUS) 10 Unit(s) SubCutaneous <User Schedule>  insulin lispro (ADMELOG) corrective regimen sliding scale   SubCutaneous three times a day before meals  insulin lispro (ADMELOG) corrective regimen sliding scale   SubCutaneous at bedtime  lidocaine   4% Patch 1 Patch Transdermal every 24 hours  linagliptin 5 milliGRAM(s) Oral daily  metoprolol succinate ER 25 milliGRAM(s) Oral daily  oxybutynin XL 5 milliGRAM(s) Oral daily  pantoprazole    Tablet 40 milliGRAM(s) Oral before breakfast  tamsulosin 0.4 milliGRAM(s) Oral at bedtime    MEDICATIONS  (PRN):  acetaminophen     Tablet .. 650 milliGRAM(s) Oral every 6 hours PRN Temp greater or equal to 38C (100.4F), Mild Pain (1 - 3)  dextrose Oral Gel 15 Gram(s) Oral once PRN Blood Glucose LESS THAN 70 milliGRAM(s)/deciliter      CAPILLARY BLOOD GLUCOSE      POCT Blood Glucose.: 90 mg/dL (06 Dec 2024 03:06)  POCT Blood Glucose.: 142 mg/dL (05 Dec 2024 21:56)  POCT Blood Glucose.: 195 mg/dL (05 Dec 2024 17:44)  POCT Blood Glucose.: 314 mg/dL (05 Dec 2024 12:02)  POCT Blood Glucose.: 202 mg/dL (05 Dec 2024 10:14)  POCT Blood Glucose.: 122 mg/dL (05 Dec 2024 08:30)    I&O's Summary      PHYSICAL EXAM:  Vital Signs Last 24 Hrs  T(C): 36.4 (06 Dec 2024 05:19), Max: 36.9 (05 Dec 2024 21:45)  T(F): 97.5 (06 Dec 2024 05:19), Max: 98.4 (05 Dec 2024 21:45)  HR: 58 (06 Dec 2024 05:19) (57 - 65)  BP: 134/60 (06 Dec 2024 05:19) (106/60 - 134/60)  BP(mean): --  RR: 18 (06 Dec 2024 05:19) (17 - 18)  SpO2: 100% (06 Dec 2024 05:19) (98% - 100%)    Parameters below as of 06 Dec 2024 05:19  Patient On (Oxygen Delivery Method): room air        CONSTITUTIONAL: NAD; well-developed  HEENT: PERRL, clear conjunctiva  RESPIRATORY: Normal respiratory effort; lungs are clear to auscultation bilaterally; No Crackles/Rhonchi/Wheezing  CARDIOVASCULAR: Regular rate and rhythm, normal S1 and S2, no murmur/rub/gallop; No lower extremity edema; Peripheral pulses are 2+ bilaterally  ABDOMEN: Nontender to palpation, normoactive bowel sounds, no rebound/guarding; No hepatosplenomegaly  MUSCULOSKELETAL: no clubbing or cyanosis of digits; no joint swelling or tenderness to palpation  EXTREMITY: Lower extremities Non-tender to palpation; non-erythematous B/L  NEURO: A&Ox3; no focal deficits   PSYCH: normal mood; Affect appropirate    LABS:                        10.7   4.79  )-----------( 352      ( 05 Dec 2024 08:43 )             32.2     12-05    135  |  100  |  13  ----------------------------<  112[H]  4.3   |  21[L]  |  0.98    Ca    9.3      05 Dec 2024 06:50  Phos  2.3     12-05  Mg     2.00     12-05            Urinalysis Basic - ( 05 Dec 2024 06:50 )    Color: x / Appearance: x / SG: x / pH: x  Gluc: 112 mg/dL / Ketone: x  / Bili: x / Urobili: x   Blood: x / Protein: x / Nitrite: x   Leuk Esterase: x / RBC: x / WBC x   Sq Epi: x / Non Sq Epi: x / Bacteria: x          RADIOLOGY & ADDITIONAL TESTS:  Results Reviewed:   Imaging Personally Reviewed:  Electrocardiogram Personally Reviewed: -----------------------------  Chandler Moncada  3rd-year Medical Student  -----------------------------    SUBJECTIVE / OVERNIGHT EVENTS: REGULO    Pt seen in AM at bedside, resting comfortably in bed, and does not appear to be in any acute distress. Patient reported BM yesterday but none this morning. Explained findings from CT 12/5, unchanged from prior, and patient expresses understanding. When asked, pt denies any recent or active fever, chills, nausea, vomiting, headache, acute sob, chest pain, abdominal pain, genitourinary sx, extremity pain or swelling.     MEDICATIONS  (STANDING):  amLODIPine   Tablet 5 milliGRAM(s) Oral daily  atorvastatin 10 milliGRAM(s) Oral at bedtime  darunavir 800 mG/cobicstat 150 mG 1 Tablet(s) Oral daily  dextrose 5%. 1000 milliLiter(s) (50 mL/Hr) IV Continuous <Continuous>  dextrose 5%. 1000 milliLiter(s) (100 mL/Hr) IV Continuous <Continuous>  dextrose 50% Injectable 25 Gram(s) IV Push once  emtricitabine 200 mG/tenofovir alafenamide 25 mG (DESCOVY) Tablet 1 Tablet(s) Oral daily  ertapenem  IVPB 1000 milliGRAM(s) IV Intermittent every 24 hours  finasteride 5 milliGRAM(s) Oral daily  glucagon  Injectable 1 milliGRAM(s) IntraMuscular once  heparin   Injectable 5000 Unit(s) SubCutaneous every 8 hours  influenza  Vaccine (HIGH DOSE) 0.5 milliLiter(s) IntraMuscular once  insulin glargine Injectable (LANTUS) 10 Unit(s) SubCutaneous <User Schedule>  insulin lispro (ADMELOG) corrective regimen sliding scale   SubCutaneous three times a day before meals  insulin lispro (ADMELOG) corrective regimen sliding scale   SubCutaneous at bedtime  lidocaine   4% Patch 1 Patch Transdermal every 24 hours  linagliptin 5 milliGRAM(s) Oral daily  metoprolol succinate ER 25 milliGRAM(s) Oral daily  oxybutynin XL 5 milliGRAM(s) Oral daily  pantoprazole    Tablet 40 milliGRAM(s) Oral before breakfast  tamsulosin 0.4 milliGRAM(s) Oral at bedtime    MEDICATIONS  (PRN):  acetaminophen     Tablet .. 650 milliGRAM(s) Oral every 6 hours PRN Temp greater or equal to 38C (100.4F), Mild Pain (1 - 3)  dextrose Oral Gel 15 Gram(s) Oral once PRN Blood Glucose LESS THAN 70 milliGRAM(s)/deciliter      CAPILLARY BLOOD GLUCOSE      POCT Blood Glucose.: 90 mg/dL (06 Dec 2024 03:06)  POCT Blood Glucose.: 142 mg/dL (05 Dec 2024 21:56)  POCT Blood Glucose.: 195 mg/dL (05 Dec 2024 17:44)  POCT Blood Glucose.: 314 mg/dL (05 Dec 2024 12:02)  POCT Blood Glucose.: 202 mg/dL (05 Dec 2024 10:14)  POCT Blood Glucose.: 122 mg/dL (05 Dec 2024 08:30)    I&O's Summary      PHYSICAL EXAM:  Vital Signs Last 24 Hrs  T(C): 36.4 (06 Dec 2024 05:19), Max: 36.9 (05 Dec 2024 21:45)  T(F): 97.5 (06 Dec 2024 05:19), Max: 98.4 (05 Dec 2024 21:45)  HR: 58 (06 Dec 2024 05:19) (57 - 65)  BP: 134/60 (06 Dec 2024 05:19) (106/60 - 134/60)  BP(mean): --  RR: 18 (06 Dec 2024 05:19) (17 - 18)  SpO2: 100% (06 Dec 2024 05:19) (98% - 100%)    Parameters below as of 06 Dec 2024 05:19  Patient On (Oxygen Delivery Method): room air        CONSTITUTIONAL: NAD; well-developed  HEENT: PERRL, clear conjunctiva  RESPIRATORY: Normal respiratory effort; lungs are clear to auscultation bilaterally; No Crackles/Rhonchi/Wheezing  CARDIOVASCULAR: Regular rate and rhythm, normal S1 and S2, no murmur/rub/gallop; No lower extremity edema; Peripheral pulses are 2+ bilaterally  ABDOMEN: Nontender to palpation, normoactive bowel sounds, no rebound/guarding; No hepatosplenomegaly  MUSCULOSKELETAL: no clubbing or cyanosis of digits; no joint swelling or tenderness to palpation  EXTREMITY: Lower extremities Non-tender to palpation; non-erythematous B/L  NEURO: A&Ox3; no focal deficits   PSYCH: normal mood; Affect appropriate    LABS:                                   10.1   4.45  )-----------( 371      ( 06 Dec 2024 06:05 )             30.2       12-05    135  |  100  |  13  ----------------------------<  112[H]  4.3   |  21[L]  |  0.98    Ca    9.3      05 Dec 2024 06:50  Phos  2.3     12-05  Mg     2.00     12-05            Urinalysis Basic - ( 05 Dec 2024 06:50 )    Color: x / Appearance: x / SG: x / pH: x  Gluc: 112 mg/dL / Ketone: x  / Bili: x / Urobili: x   Blood: x / Protein: x / Nitrite: x   Leuk Esterase: x / RBC: x / WBC x   Sq Epi: x / Non Sq Epi: x / Bacteria: x          RADIOLOGY & ADDITIONAL TESTS:  Results Reviewed:   Imaging Personally Reviewed:  Electrocardiogram Personally Reviewed:

## 2024-12-09 ENCOUNTER — EMERGENCY (EMERGENCY)
Facility: HOSPITAL | Age: 84
LOS: 1 days | Discharge: HOME | End: 2024-12-09
Attending: STUDENT IN AN ORGANIZED HEALTH CARE EDUCATION/TRAINING PROGRAM | Admitting: STUDENT IN AN ORGANIZED HEALTH CARE EDUCATION/TRAINING PROGRAM
Payer: MEDICAID

## 2024-12-09 VITALS
HEIGHT: 65 IN | WEIGHT: 143.3 LBS | OXYGEN SATURATION: 98 % | TEMPERATURE: 98 F | DIASTOLIC BLOOD PRESSURE: 74 MMHG | SYSTOLIC BLOOD PRESSURE: 133 MMHG | RESPIRATION RATE: 18 BRPM | HEART RATE: 85 BPM

## 2024-12-09 VITALS
RESPIRATION RATE: 14 BRPM | DIASTOLIC BLOOD PRESSURE: 59 MMHG | OXYGEN SATURATION: 100 % | TEMPERATURE: 97 F | HEART RATE: 80 BPM | SYSTOLIC BLOOD PRESSURE: 133 MMHG

## 2024-12-09 PROCEDURE — 99284 EMERGENCY DEPT VISIT MOD MDM: CPT

## 2024-12-09 RX ORDER — ERTAPENEM 1 G/1
1000 INJECTION, POWDER, LYOPHILIZED, FOR SOLUTION INTRAMUSCULAR; INTRAVENOUS ONCE
Refills: 0 | Status: COMPLETED | OUTPATIENT
Start: 2024-12-09 | End: 2024-12-09

## 2024-12-09 RX ADMIN — ERTAPENEM 120 MILLIGRAM(S): 1 INJECTION, POWDER, LYOPHILIZED, FOR SOLUTION INTRAMUSCULAR; INTRAVENOUS at 16:40

## 2024-12-09 NOTE — ED ADULT NURSE NOTE - CAS DISCH TRANSFER METHOD
Private car Bactrim Counseling:  I discussed with the patient the risks of sulfa antibiotics including but not limited to GI upset, allergic reaction, drug rash, diarrhea, dizziness, photosensitivity, and yeast infections.  Rarely, more serious reactions can occur including but not limited to aplastic anemia, agranulocytosis, methemoglobinemia, blood dyscrasias, liver or kidney failure, lung infiltrates or desquamative/blistering drug rashes.

## 2024-12-09 NOTE — ED PROVIDER NOTE - OBJECTIVE STATEMENT
Patient reports that she is this is a 84-year-old male with history of diabetes presenting for PICC line issue.  Patient was discharged 2 days ago after being found to have a renal abscess which he was discharged on ertapenem.  This morning family noted that PICC line had pulled out.  They noticed when they were infusing medicine through it and it was coming out into the bed.  No pain at the site.  No other issues.

## 2024-12-09 NOTE — ED PROVIDER NOTE - CLINICAL SUMMARY MEDICAL DECISION MAKING FREE TEXT BOX
This is a 84-year-old male with history as above presenting for PICC line problem.  Vitals remarkable, exam unremarkable.  No signs of extravasation or other issue with midline.  Will discuss with midline team about replacing, as there is no other issue.

## 2024-12-09 NOTE — ED PROVIDER NOTE - CARE PLAN
Principal Discharge DX:	Encounter for assessment of peripherally inserted central venous catheter (PICC)   1

## 2024-12-09 NOTE — ED PROVIDER NOTE - NSFOLLOWUPINSTRUCTIONS_ED_ALL_ED_FT
Follow up with your primary care doctor to discuss your visit to the ED. Continue using antibiotics as prescribed.

## 2024-12-09 NOTE — ED PROVIDER NOTE - NSICDXPASTMEDICALHX_GEN_ALL_CORE_FT
PAST MEDICAL HISTORY:  BPH (benign prostatic hyperplasia)     Diabetes     HIV infection     HTN (hypertension)     Stage 3 chronic kidney disease     
yes...

## 2024-12-09 NOTE — ED ADULT NURSE REASSESSMENT NOTE - NS ED NURSE REASSESS COMMENT FT1
Pt, aid and son educated on antibiotic administration using teach back method. Family verbalized understanding that pt has received today's dose and not to administer again at home.

## 2024-12-09 NOTE — ED ADULT TRIAGE NOTE - CHIEF COMPLAINT QUOTE
pt living with DM type2,  discharged home with PICC line for iv antibiotics, picc line came out this am, pt due for dose of antibiotic soon, poor historian

## 2024-12-09 NOTE — ED PROVIDER NOTE - PHYSICAL EXAMINATION
Physical Exam:  General: NAD, Conversive  Eyes: EOMI, Conjunctiva and sclera clear  Neck: No JVD  Lungs: Normal respiratory effort  Heart: Normal S1, S2, no murmurs  Abdomen: Soft, nontender, nondistended, no CVA tenderness  Extremities: 2+ peripheral pulses, no edema. Left arm with no hematoma or skin changes at old PICC site  Psych: AAO X3  Neurologic: Non-focal

## 2024-12-09 NOTE — ADVANCED PRACTICE NURSE CONSULT - ASSESSMENT
Patient is aware and alert but confused to situation. Midline insertion along with risks, benefits, possible complications and infection prevention explained to patient and sister Paulette who was bedside at time of insertion who verbalized understanding. All questions addressed and patient and sister gave verbal consent to place midline. Left arm cleansed with CHG. Using sterile technique under ultra sound guidance, placed PowerGlide Pro Midline 20G /10cm into Left Brachial vein. Brisk blood return and flushed with 20Mls of normal saline. Minimal blood loss and patient tolerated procedure well. CHG dressing placed. All sharps accounted for. Report given to district RN and ordering provider. Patient is aware and alert but confused to situation. Midline insertion along with risks, benefits, possible complications and infection prevention explained to patient, daughter Ana Luisa via phone and Paulette who was bedside at time of insertion who verbalized understanding. All questions addressed and patient, daughter and Paulette gave verbal consent to place midline. Left arm cleansed with CHG. Using sterile technique under ultra sound guidance, placed PowerGlide Pro Midline 20G /10cm into Left Brachial vein. Brisk blood return and flushed with 20Mls of normal saline. Minimal blood loss and patient tolerated procedure well. CHG dressing placed. All sharps accounted for. Report given to district RN and ordering provider. REF: [id number] LOT#: [id number]

## 2024-12-09 NOTE — ED PROVIDER NOTE - ATTENDING CONTRIBUTION TO CARE
85 yo M hx DM2, HTN, HIV, CKD3, BPH, discharged 2 days ago s/p tx for renal abscess/pyelo with MIDLINE for ertapenem, presenting with wife after accidentally pulling out midline. No other complaints today. MIDLINE nurse called and midline replaced at bedside. Pt received dose of antibiotics in ER before dc. Appreciate bedside nurse teaching on how to use midline. Pt stable for dc with return recs.     VITALS: reviewed  GEN: NAD, A & O x 4  HEAD/EYES: NCAT, EOMI, anicteric sclerae,   ENT: mucus membranes moist, oropharynx WNL, trachea midline,   RESP: unlabored breathing  CV: distal pulses intact and symmetric bilaterally  MSK: extremities atraumatic and nontender, no edema, no asymmetry. No signs of extravasation/bruising to LUE where midline was.   SKIN: warm, dry, no rash, no bruising, no cyanosis. color appropriate for ethnicity  NEURO: alert, mentating appropriately, no facial asymmetry.  PSYCH: Affect appropriate

## 2024-12-09 NOTE — ED PROVIDER NOTE - PATIENT PORTAL LINK FT
You can access the FollowMyHealth Patient Portal offered by [facility] by registering at the following website: [email or web address]. By joining [employer]’s FollowMyHealth portal, you will also be able to view your health information using other applications (apps) compatible with our system.

## 2024-12-09 NOTE — ED ADULT NURSE NOTE - OBJECTIVE STATEMENT
pt received to room 16, A&Ox4, family at bedside, hx of DM2, coming to ED after being discharged home with PICC line for iv antibiotics, picc line came out this am. Pt  poor historian. Denies chest pain, SOB, abdominal pain, N/V fever or chills. RR even and unlabored. SPo2 100% on Room air, VS obtained. Abdomen soft, nontender and nondistended. skin clean dry and intact. no active bleeding from picc site. No neuro deficits noted. No Iv placed. PICC to be replace. Care plan continued. Safety maintained.

## 2024-12-09 NOTE — ED ADULT TRIAGE NOTE - HEIGHT IN INCHES
Recevied faxed EKG (12/2/2020) from [facility]. OV note dated 12/2/2020 in EMR for reference. Pt cleared for surgery:    Recommendations:  1. From a preop standpoint: He is able to achieve 4 METs of activity without any difficulty. Electrocardiogram does not reveal any acute ST abnormalities. I think he is a reasonable risk for shoulder surgery. 2. Coronary artery disease. He clearly has underlying coronary artery disease. Pressures in the past have not allowed for beta-blockers. This may be something that we can reassess. I will tweak up his dose of ACE inhibitor at this point. 3. Dyslipidemia. He remains on a high intensity statin. He is tolerating that with excellent cholesterol numbers. 4. Carotid bruit. I will check a carotid ultrasound. 5. I want to see him back in a three month timeframe. Thank you again for allowing me to participate in his care.     Transcribed by Nadeem Alonso for Caleb Meléndez MD 5

## 2024-12-10 DIAGNOSIS — N15.1 RENAL AND PERINEPHRIC ABSCESS: ICD-10-CM

## 2024-12-10 NOTE — PROVIDER CONTACT NOTE (OTHER) - ASSESSMENT
CM contacted by Bill [phone] from [facility] requesting updated midline report. Midline report from 12/9 faxed to [facility] [phone] as requested.

## 2024-12-13 ENCOUNTER — NON-APPOINTMENT (OUTPATIENT)
Age: 84
End: 2024-12-13

## 2024-12-16 ENCOUNTER — LABORATORY RESULT (OUTPATIENT)
Age: 84
End: 2024-12-16

## 2024-12-16 ENCOUNTER — NON-APPOINTMENT (OUTPATIENT)
Age: 84
End: 2024-12-16

## 2024-12-16 ENCOUNTER — APPOINTMENT (OUTPATIENT)
Dept: INFECTIOUS DISEASE | Facility: CLINIC | Age: 84
End: 2024-12-16
Payer: COMMERCIAL

## 2024-12-16 VITALS
OXYGEN SATURATION: 100 % | HEART RATE: 94 BPM | TEMPERATURE: 97.7 F | HEIGHT: 65 IN | DIASTOLIC BLOOD PRESSURE: 72 MMHG | SYSTOLIC BLOOD PRESSURE: 117 MMHG | BODY MASS INDEX: 21.66 KG/M2 | WEIGHT: 130 LBS

## 2024-12-16 DIAGNOSIS — Z92.89 PERSONAL HISTORY OF OTHER MEDICAL TREATMENT: ICD-10-CM

## 2024-12-16 DIAGNOSIS — E87.5 HYPERKALEMIA: ICD-10-CM

## 2024-12-16 DIAGNOSIS — E78.2 MIXED HYPERLIPIDEMIA: ICD-10-CM

## 2024-12-16 DIAGNOSIS — B20 HUMAN IMMUNODEFICIENCY VIRUS [HIV] DISEASE: ICD-10-CM

## 2024-12-16 DIAGNOSIS — I10 ESSENTIAL (PRIMARY) HYPERTENSION: ICD-10-CM

## 2024-12-16 DIAGNOSIS — Z79.4 TYPE 2 DIABETES MELLITUS WITH HYPERGLYCEMIA: ICD-10-CM

## 2024-12-16 DIAGNOSIS — E11.65 TYPE 2 DIABETES MELLITUS WITH HYPERGLYCEMIA: ICD-10-CM

## 2024-12-16 PROCEDURE — 99204 OFFICE O/P NEW MOD 45 MIN: CPT

## 2024-12-16 RX ORDER — INSULIN GLARGINE 100 [IU]/ML
100 INJECTION, SOLUTION SUBCUTANEOUS
Qty: 1 | Refills: 6 | Status: ACTIVE | COMMUNITY
Start: 2024-12-16 | End: 1900-01-01

## 2024-12-16 RX ORDER — DARUNAVIR, COBICISTAT, EMTRICITABINE, AND TENOFOVIR ALAFENAMIDE 800; 150; 200; 10 MG/1; MG/1; MG/1; MG/1
800-150-200-10 TABLET, FILM COATED ORAL
Qty: 90 | Refills: 2 | Status: ACTIVE | COMMUNITY
Start: 2024-12-16 | End: 1900-01-01

## 2024-12-16 RX ORDER — ATORVASTATIN CALCIUM 10 MG/1
10 TABLET, FILM COATED ORAL
Qty: 90 | Refills: 2 | Status: ACTIVE | COMMUNITY
Start: 2024-12-16 | End: 1900-01-01

## 2024-12-16 RX ORDER — OXYBUTYNIN CHLORIDE 10 MG/1
10 TABLET, EXTENDED RELEASE ORAL
Qty: 90 | Refills: 2 | Status: ACTIVE | COMMUNITY
Start: 2024-12-16 | End: 1900-01-01

## 2024-12-16 RX ORDER — AMLODIPINE BESYLATE 5 MG/1
5 TABLET ORAL
Qty: 90 | Refills: 2 | Status: ACTIVE | COMMUNITY
Start: 2024-12-16 | End: 1900-01-01

## 2024-12-16 RX ORDER — LINAGLIPTIN 5 MG/1
5 TABLET, FILM COATED ORAL
Qty: 90 | Refills: 2 | Status: ACTIVE | COMMUNITY
Start: 2024-12-16 | End: 1900-01-01

## 2024-12-17 DIAGNOSIS — D64.9 ANEMIA, UNSPECIFIED: ICD-10-CM

## 2024-12-17 DIAGNOSIS — E55.9 VITAMIN D DEFICIENCY, UNSPECIFIED: ICD-10-CM

## 2024-12-17 LAB
25(OH)D3 SERPL-MCNC: 14.4 NG/ML
ANION GAP SERPL CALC-SCNC: 12 MMOL/L
APPEARANCE: CLEAR
BILIRUBIN URINE: NEGATIVE
BLOOD URINE: NEGATIVE
BUN SERPL-MCNC: 17 MG/DL
CALCIUM SERPL-MCNC: 9.7 MG/DL
CHLORIDE SERPL-SCNC: 102 MMOL/L
CHOLEST SERPL-MCNC: 123 MG/DL
CO2 SERPL-SCNC: 23 MMOL/L
COLOR: YELLOW
CREAT SERPL-MCNC: 1.42 MG/DL
EGFR: 48 ML/MIN/1.73M2
ESTIMATED AVERAGE GLUCOSE: 240 MG/DL
GLUCOSE QUALITATIVE U: NEGATIVE MG/DL
GLUCOSE SERPL-MCNC: 107 MG/DL
HBA1C MFR BLD HPLC: 10 %
HBV CORE IGG+IGM SER QL: REACTIVE
HBV SURFACE AB SER QL: REACTIVE
HBV SURFACE AG SER QL: NONREACTIVE
HCT VFR BLD CALC: 34.4 %
HCV AB SER QL: NONREACTIVE
HCV S/CO RATIO: 0.19 S/CO
HDLC SERPL-MCNC: 28 MG/DL
HEPATITIS A IGG ANTIBODY: REACTIVE
HEPB DNA PCR INT: NOT DETECTED
HEPB DNA PCR LOG: NOT DETECTED LOGIU/ML
HGB BLD-MCNC: 10.4 G/DL
HIV1 RNA # SERPL NAA+PROBE: 32
HIV1 RNA # SERPL NAA+PROBE: ABNORMAL
HIV1+2 AB SPEC QL IA.RAPID: REACTIVE
KETONES URINE: NEGATIVE MG/DL
LDLC SERPL CALC-MCNC: 57 MG/DL
LEUKOCYTE ESTERASE URINE: ABNORMAL
MCHC RBC-ENTMCNC: 27.2 PG
MCHC RBC-ENTMCNC: 30.2 G/DL
MCV RBC AUTO: 89.8 FL
MEV IGG FLD QL IA: >300 AU/ML
MEV IGG+IGM SER-IMP: POSITIVE
MUV AB SER-ACNC: POSITIVE
MUV IGG SER QL IA: 75.9 AU/ML
NITRITE URINE: NEGATIVE
NONHDLC SERPL-MCNC: 95 MG/DL
PH URINE: 6.5
PLATELET # BLD AUTO: 429 K/UL
POTASSIUM SERPL-SCNC: 4.8 MMOL/L
PROTEIN URINE: NORMAL MG/DL
PSA SERPL-MCNC: 8.32 NG/ML
RBC # BLD: 3.83 M/UL
RBC # FLD: 13.9 %
RUBV IGG FLD-ACNC: 5.93 INDEX
RUBV IGG SER-IMP: POSITIVE
SODIUM SERPL-SCNC: 137 MMOL/L
SPECIFIC GRAVITY URINE: 1.01
T PALLIDUM AB SER QL IA: NEGATIVE
TRIGL SERPL-MCNC: 233 MG/DL
TSH SERPL-ACNC: 0.94 UIU/ML
UROBILINOGEN URINE: 0.2 MG/DL
VIRAL LOAD INTERP: NORMAL
VIRAL LOAD LOG: 1.51
VZV AB TITR SER: POSITIVE
VZV IGG SER IF-ACNC: 4.52 S/CO
WBC # FLD AUTO: 4.71 K/UL

## 2024-12-17 RX ORDER — ERGOCALCIFEROL 1.25 MG/1
1.25 MG CAPSULE, LIQUID FILLED ORAL
Qty: 24 | Refills: 2 | Status: ACTIVE | COMMUNITY
Start: 2024-12-17 | End: 1900-01-01

## 2024-12-18 LAB
C TRACH RRNA SPEC QL NAA+PROBE: NOT DETECTED
CD3 CELLS # BLD: 1399 CELLS/UL
CD3 CELLS NFR BLD: 74 %
CD3+CD4+ CELLS # BLD: 660 CELLS/UL
CD3+CD4+ CELLS NFR BLD: 35 %
CD3+CD4+ CELLS/CD3+CD8+ CLL SPEC: 0.91 RATIO
CD3+CD8+ CELLS # SPEC: 722 CELLS/UL
CD3+CD8+ CELLS NFR BLD: 38 %
N GONORRHOEA RRNA SPEC QL NAA+PROBE: NOT DETECTED
SOURCE AMPLIFICATION: NORMAL

## 2024-12-18 RX ORDER — PEN NEEDLE, DIABETIC 30 GX3/16"
30G X 5 MM NEEDLE, DISPOSABLE MISCELLANEOUS
Qty: 35 | Refills: 6 | Status: ACTIVE | COMMUNITY
Start: 2024-12-18 | End: 1900-01-01

## 2024-12-18 RX ORDER — INSULIN GLARGINE 100 [IU]/ML
100 INJECTION, SOLUTION SUBCUTANEOUS AT BEDTIME
Qty: 1 | Refills: 6 | Status: ACTIVE | COMMUNITY
Start: 2024-12-18 | End: 1900-01-01

## 2024-12-18 RX ORDER — METFORMIN HYDROCHLORIDE 500 MG/1
500 TABLET, COATED ORAL
Qty: 180 | Refills: 2 | Status: ACTIVE | COMMUNITY
Start: 2024-12-16 | End: 1900-01-01

## 2024-12-19 ENCOUNTER — APPOINTMENT (OUTPATIENT)
Dept: UROLOGY | Facility: CLINIC | Age: 84
End: 2024-12-19

## 2024-12-19 VITALS
RESPIRATION RATE: 16 BRPM | HEART RATE: 92 BPM | BODY MASS INDEX: 21.66 KG/M2 | WEIGHT: 130 LBS | HEIGHT: 65 IN | OXYGEN SATURATION: 98 % | DIASTOLIC BLOOD PRESSURE: 74 MMHG | TEMPERATURE: 97.7 F | SYSTOLIC BLOOD PRESSURE: 136 MMHG

## 2024-12-19 PROCEDURE — 99213 OFFICE O/P EST LOW 20 MIN: CPT

## 2024-12-19 PROCEDURE — G2211 COMPLEX E/M VISIT ADD ON: CPT | Mod: NC

## 2024-12-23 ENCOUNTER — OUTPATIENT (OUTPATIENT)
Dept: OUTPATIENT SERVICES | Facility: HOSPITAL | Age: 84
LOS: 1 days | End: 2024-12-23

## 2024-12-23 ENCOUNTER — APPOINTMENT (OUTPATIENT)
Dept: INTERNAL MEDICINE | Facility: CLINIC | Age: 84
End: 2024-12-23

## 2024-12-23 DIAGNOSIS — R76.12 NONSPECIFIC REACTION TO CELL MEDIATED IMMUNITY MEASUREMENT OF GAMMA INTERFERON ANTIGEN RESPONSE W/OUT ACTIVE TUBERCULOSIS: ICD-10-CM

## 2024-12-23 LAB
M TB IFN-G BLD-IMP: POSITIVE
QUANTIFERON TB PLUS MITOGEN MINUS NIL: 1.57 IU/ML
QUANTIFERON TB PLUS NIL: 0.03 IU/ML
QUANTIFERON TB PLUS TB1 MINUS NIL: 0.89 IU/ML
QUANTIFERON TB PLUS TB2 MINUS NIL: 0.89 IU/ML

## 2024-12-24 ENCOUNTER — RESULT REVIEW (OUTPATIENT)
Age: 84
End: 2024-12-24

## 2024-12-26 ENCOUNTER — APPOINTMENT (OUTPATIENT)
Dept: INFECTIOUS DISEASE | Facility: CLINIC | Age: 84
End: 2024-12-26

## 2024-12-26 VITALS
DIASTOLIC BLOOD PRESSURE: 67 MMHG | WEIGHT: 134 LBS | TEMPERATURE: 97.7 F | HEIGHT: 65 IN | SYSTOLIC BLOOD PRESSURE: 145 MMHG | OXYGEN SATURATION: 98 % | HEART RATE: 81 BPM | BODY MASS INDEX: 22.33 KG/M2

## 2024-12-26 PROCEDURE — 99214 OFFICE O/P EST MOD 30 MIN: CPT

## 2024-12-31 PROBLEM — R33.8 ACUTE URINARY RETENTION: Status: RESOLVED | Noted: 2024-08-09 | Resolved: 2024-12-31

## 2025-01-03 ENCOUNTER — APPOINTMENT (OUTPATIENT)
Dept: RADIOLOGY | Facility: IMAGING CENTER | Age: 85
End: 2025-01-03
Payer: COMMERCIAL

## 2025-01-03 ENCOUNTER — APPOINTMENT (OUTPATIENT)
Dept: CT IMAGING | Facility: IMAGING CENTER | Age: 85
End: 2025-01-03
Payer: COMMERCIAL

## 2025-01-03 ENCOUNTER — OUTPATIENT (OUTPATIENT)
Dept: OUTPATIENT SERVICES | Facility: HOSPITAL | Age: 85
LOS: 1 days | End: 2025-01-03
Payer: COMMERCIAL

## 2025-01-03 DIAGNOSIS — R76.12 NONSPECIFIC REACTION TO CELL MEDIATED IMMUNITY MEASUREMENT OF GAMMA INTERFERON ANTIGEN RESPONSE WITHOUT ACTIVE TUBERCULOSIS: ICD-10-CM

## 2025-01-03 PROCEDURE — 71046 X-RAY EXAM CHEST 2 VIEWS: CPT | Mod: 26

## 2025-01-03 PROCEDURE — 74177 CT ABD & PELVIS W/CONTRAST: CPT

## 2025-01-03 PROCEDURE — 74177 CT ABD & PELVIS W/CONTRAST: CPT | Mod: 26

## 2025-01-03 PROCEDURE — 71046 X-RAY EXAM CHEST 2 VIEWS: CPT

## 2025-01-06 ENCOUNTER — APPOINTMENT (OUTPATIENT)
Dept: GASTROENTEROLOGY | Facility: CLINIC | Age: 85
End: 2025-01-06
Payer: COMMERCIAL

## 2025-01-06 VITALS
TEMPERATURE: 98 F | WEIGHT: 138.38 LBS | HEIGHT: 65 IN | SYSTOLIC BLOOD PRESSURE: 144 MMHG | OXYGEN SATURATION: 98 % | HEART RATE: 81 BPM | BODY MASS INDEX: 23.06 KG/M2 | RESPIRATION RATE: 14 BRPM | DIASTOLIC BLOOD PRESSURE: 60 MMHG

## 2025-01-06 DIAGNOSIS — K21.9 GASTRO-ESOPHAGEAL REFLUX DISEASE W/OUT ESOPHAGITIS: ICD-10-CM

## 2025-01-06 PROCEDURE — 99203 OFFICE O/P NEW LOW 30 MIN: CPT

## 2025-01-06 RX ORDER — PANTOPRAZOLE 40 MG/1
40 TABLET, DELAYED RELEASE ORAL
Qty: 30 | Refills: 4 | Status: ACTIVE | COMMUNITY
Start: 2025-01-06 | End: 1900-01-01

## 2025-01-10 ENCOUNTER — APPOINTMENT (OUTPATIENT)
Dept: INFECTIOUS DISEASE | Facility: CLINIC | Age: 85
End: 2025-01-10

## 2025-01-10 DIAGNOSIS — S37.009A UNSPECIFIED INJURY OF UNSPECIFIED KIDNEY, INITIAL ENCOUNTER: ICD-10-CM

## 2025-01-10 PROCEDURE — 99214 OFFICE O/P EST MOD 30 MIN: CPT | Mod: 93

## 2025-01-17 DIAGNOSIS — E87.5 HYPERKALEMIA: ICD-10-CM

## 2025-01-17 DIAGNOSIS — Z79.4 TYPE 2 DIABETES MELLITUS WITH HYPERGLYCEMIA: ICD-10-CM

## 2025-01-17 DIAGNOSIS — E11.65 TYPE 2 DIABETES MELLITUS WITH HYPERGLYCEMIA: ICD-10-CM

## 2025-01-17 DIAGNOSIS — D64.9 ANEMIA, UNSPECIFIED: ICD-10-CM

## 2025-01-22 ENCOUNTER — APPOINTMENT (OUTPATIENT)
Dept: CARDIOLOGY | Facility: CLINIC | Age: 85
End: 2025-01-22
Payer: COMMERCIAL

## 2025-01-22 ENCOUNTER — NON-APPOINTMENT (OUTPATIENT)
Age: 85
End: 2025-01-22

## 2025-01-22 VITALS
HEIGHT: 65 IN | SYSTOLIC BLOOD PRESSURE: 148 MMHG | BODY MASS INDEX: 22.49 KG/M2 | OXYGEN SATURATION: 98 % | WEIGHT: 135 LBS | DIASTOLIC BLOOD PRESSURE: 57 MMHG | HEART RATE: 94 BPM | TEMPERATURE: 97.4 F

## 2025-01-22 DIAGNOSIS — S37.009A UNSPECIFIED INJURY OF UNSPECIFIED KIDNEY, INITIAL ENCOUNTER: ICD-10-CM

## 2025-01-22 DIAGNOSIS — E78.2 MIXED HYPERLIPIDEMIA: ICD-10-CM

## 2025-01-22 PROCEDURE — 93000 ELECTROCARDIOGRAM COMPLETE: CPT

## 2025-01-22 PROCEDURE — G2211 COMPLEX E/M VISIT ADD ON: CPT | Mod: NC

## 2025-01-22 PROCEDURE — 99204 OFFICE O/P NEW MOD 45 MIN: CPT

## 2025-01-27 ENCOUNTER — APPOINTMENT (OUTPATIENT)
Dept: INFECTIOUS DISEASE | Facility: CLINIC | Age: 85
End: 2025-01-27
Payer: COMMERCIAL

## 2025-01-27 ENCOUNTER — NON-APPOINTMENT (OUTPATIENT)
Age: 85
End: 2025-01-27

## 2025-01-27 VITALS
HEART RATE: 92 BPM | OXYGEN SATURATION: 99 % | WEIGHT: 141 LBS | HEIGHT: 65 IN | BODY MASS INDEX: 23.49 KG/M2 | SYSTOLIC BLOOD PRESSURE: 144 MMHG | TEMPERATURE: 97.8 F | DIASTOLIC BLOOD PRESSURE: 79 MMHG

## 2025-01-27 DIAGNOSIS — E78.1 PURE HYPERGLYCERIDEMIA: ICD-10-CM

## 2025-01-27 DIAGNOSIS — N13.8 BENIGN PROSTATIC HYPERPLASIA WITH LOWER URINARY TRACT SYMPMS: ICD-10-CM

## 2025-01-27 DIAGNOSIS — E11.65 TYPE 2 DIABETES MELLITUS WITH HYPERGLYCEMIA: ICD-10-CM

## 2025-01-27 DIAGNOSIS — Z79.4 TYPE 2 DIABETES MELLITUS WITH HYPERGLYCEMIA: ICD-10-CM

## 2025-01-27 DIAGNOSIS — N40.1 BENIGN PROSTATIC HYPERPLASIA WITH LOWER URINARY TRACT SYMPMS: ICD-10-CM

## 2025-01-27 DIAGNOSIS — R97.20 ELEVATED PROSTATE, SPECIFIC ANTIGEN [PSA]: ICD-10-CM

## 2025-01-27 DIAGNOSIS — B35.1 TINEA UNGUIUM: ICD-10-CM

## 2025-01-27 DIAGNOSIS — Z22.7 LATENT TUBERCULOSIS: ICD-10-CM

## 2025-01-27 DIAGNOSIS — K21.9 GASTRO-ESOPHAGEAL REFLUX DISEASE W/OUT ESOPHAGITIS: ICD-10-CM

## 2025-01-27 DIAGNOSIS — I10 ESSENTIAL (PRIMARY) HYPERTENSION: ICD-10-CM

## 2025-01-27 DIAGNOSIS — M79.671 PAIN IN RIGHT FOOT: ICD-10-CM

## 2025-01-27 DIAGNOSIS — E55.9 VITAMIN D DEFICIENCY, UNSPECIFIED: ICD-10-CM

## 2025-01-27 DIAGNOSIS — B20 HUMAN IMMUNODEFICIENCY VIRUS [HIV] DISEASE: ICD-10-CM

## 2025-01-27 PROCEDURE — G2212 PROLONG OUTPT/OFFICE VIS: CPT

## 2025-01-27 PROCEDURE — 99215 OFFICE O/P EST HI 40 MIN: CPT

## 2025-01-28 LAB
HIV1 RNA # SERPL NAA+PROBE: ABNORMAL
HIV1 RNA # SERPL NAA+PROBE: ABNORMAL COPIES/ML
VIRAL LOAD INTERP: NORMAL
VIRAL LOAD LOG: ABNORMAL LG COP/ML

## 2025-01-29 PROBLEM — B35.1 ONYCHOMYCOSIS: Status: ACTIVE | Noted: 2025-01-29

## 2025-01-29 PROBLEM — E78.1 HYPERTRIGLYCERIDEMIA: Status: ACTIVE | Noted: 2025-01-29

## 2025-01-29 PROBLEM — Z22.7 LTBI (LATENT TUBERCULOSIS INFECTION): Status: ACTIVE | Noted: 2025-01-29

## 2025-02-06 ENCOUNTER — APPOINTMENT (OUTPATIENT)
Dept: GASTROENTEROLOGY | Facility: CLINIC | Age: 85
End: 2025-02-06

## 2025-02-11 ENCOUNTER — NON-APPOINTMENT (OUTPATIENT)
Age: 85
End: 2025-02-11

## 2025-02-11 ENCOUNTER — OUTPATIENT (OUTPATIENT)
Dept: OUTPATIENT SERVICES | Facility: HOSPITAL | Age: 85
LOS: 1 days | Discharge: HOME | End: 2025-02-11

## 2025-02-11 DIAGNOSIS — D64.9 ANEMIA, UNSPECIFIED: ICD-10-CM

## 2025-02-12 ENCOUNTER — APPOINTMENT (OUTPATIENT)
Dept: HEMATOLOGY ONCOLOGY | Facility: CLINIC | Age: 85
End: 2025-02-12

## 2025-02-13 NOTE — ED CDU PROVIDER SUBSEQUENT DAY NOTE - NSICDXPASTMEDICALHX_GEN_ALL_CORE_FT
Fee/No Show (Use Numbers Only, No Text Please. Leave Blank If No Fee): 50.00
Detail Level: Zone
PAST MEDICAL HISTORY:  BPH (benign prostatic hyperplasia)     Diabetes     HIV infection     HTN (hypertension)     Stage 3 chronic kidney disease     
PAST MEDICAL HISTORY:  BPH (benign prostatic hyperplasia)     Diabetes     HIV infection     HTN (hypertension)     Stage 3 chronic kidney disease

## 2025-03-20 ENCOUNTER — APPOINTMENT (OUTPATIENT)
Age: 85
End: 2025-03-20
Payer: COMMERCIAL

## 2025-03-20 VITALS
HEIGHT: 65 IN | DIASTOLIC BLOOD PRESSURE: 70 MMHG | WEIGHT: 140 LBS | HEART RATE: 86 BPM | BODY MASS INDEX: 23.32 KG/M2 | OXYGEN SATURATION: 98 % | TEMPERATURE: 98.6 F | SYSTOLIC BLOOD PRESSURE: 163 MMHG

## 2025-03-20 DIAGNOSIS — R30.0 DYSURIA: ICD-10-CM

## 2025-03-20 DIAGNOSIS — N13.8 BENIGN PROSTATIC HYPERPLASIA WITH LOWER URINARY TRACT SYMPMS: ICD-10-CM

## 2025-03-20 DIAGNOSIS — N40.1 BENIGN PROSTATIC HYPERPLASIA WITH LOWER URINARY TRACT SYMPMS: ICD-10-CM

## 2025-03-20 DIAGNOSIS — R39.9 UNSPECIFIED SYMPTOMS AND SIGNS INVOLVING THE GENITOURINARY SYSTEM: ICD-10-CM

## 2025-03-20 PROCEDURE — 99214 OFFICE O/P EST MOD 30 MIN: CPT

## 2025-03-20 PROCEDURE — G2211 COMPLEX E/M VISIT ADD ON: CPT | Mod: NC

## 2025-03-20 RX ORDER — CIPROFLOXACIN HYDROCHLORIDE 500 MG/1
500 TABLET, FILM COATED ORAL TWICE DAILY
Qty: 14 | Refills: 0 | Status: ACTIVE | COMMUNITY
Start: 2025-03-20 | End: 1900-01-01

## 2025-03-20 RX ORDER — SULFAMETHOXAZOLE AND TRIMETHOPRIM 800; 160 MG/1; MG/1
800-160 TABLET ORAL
Qty: 14 | Refills: 0 | Status: DISCONTINUED | COMMUNITY
Start: 2025-03-20 | End: 2025-03-20

## 2025-03-24 LAB
APPEARANCE: CLEAR
BACTERIA UR CULT: ABNORMAL
BACTERIA: ABNORMAL /HPF
BILIRUBIN URINE: NEGATIVE
BLOOD URINE: NEGATIVE
CAST: 1 /LPF
COLOR: YELLOW
EPITHELIAL CELLS: 1 /HPF
GLUCOSE QUALITATIVE U: 500 MG/DL
KETONES URINE: NEGATIVE MG/DL
LEUKOCYTE ESTERASE URINE: ABNORMAL
MICROSCOPIC-UA: NORMAL
NITRITE URINE: NEGATIVE
PH URINE: 6.5
PROTEIN URINE: NEGATIVE MG/DL
RED BLOOD CELLS URINE: 0 /HPF
SPECIFIC GRAVITY URINE: 1.01
UROBILINOGEN URINE: 0.2 MG/DL
WHITE BLOOD CELLS URINE: 86 /HPF

## 2025-04-01 ENCOUNTER — APPOINTMENT (OUTPATIENT)
Dept: ENDOCRINOLOGY | Facility: CLINIC | Age: 85
End: 2025-04-01
Payer: COMMERCIAL

## 2025-04-01 VITALS
DIASTOLIC BLOOD PRESSURE: 71 MMHG | HEART RATE: 74 BPM | SYSTOLIC BLOOD PRESSURE: 150 MMHG | BODY MASS INDEX: 23.99 KG/M2 | WEIGHT: 144 LBS | TEMPERATURE: 97.9 F | HEIGHT: 65 IN | OXYGEN SATURATION: 97 %

## 2025-04-01 DIAGNOSIS — E11.65 TYPE 2 DIABETES MELLITUS WITH HYPERGLYCEMIA: ICD-10-CM

## 2025-04-01 DIAGNOSIS — E78.1 PURE HYPERGLYCERIDEMIA: ICD-10-CM

## 2025-04-01 DIAGNOSIS — I10 ESSENTIAL (PRIMARY) HYPERTENSION: ICD-10-CM

## 2025-04-01 DIAGNOSIS — S37.009A UNSPECIFIED INJURY OF UNSPECIFIED KIDNEY, INITIAL ENCOUNTER: ICD-10-CM

## 2025-04-01 DIAGNOSIS — Z79.4 TYPE 2 DIABETES MELLITUS WITH HYPERGLYCEMIA: ICD-10-CM

## 2025-04-01 PROCEDURE — 82962 GLUCOSE BLOOD TEST: CPT

## 2025-04-01 PROCEDURE — 83036 HEMOGLOBIN GLYCOSYLATED A1C: CPT | Mod: QW

## 2025-04-01 PROCEDURE — G2211 COMPLEX E/M VISIT ADD ON: CPT | Mod: NC

## 2025-04-01 PROCEDURE — 99214 OFFICE O/P EST MOD 30 MIN: CPT

## 2025-04-01 RX ORDER — BLOOD-GLUCOSE,RECEIVER,CONT
EACH MISCELLANEOUS
Qty: 1 | Refills: 0 | Status: ACTIVE | COMMUNITY
Start: 2025-04-01 | End: 1900-01-01

## 2025-04-01 RX ORDER — ELECTROLYTES/DEXTROSE
32G X 4 MM SOLUTION, ORAL ORAL
Qty: 2 | Refills: 3 | Status: ACTIVE | COMMUNITY
Start: 2025-04-01 | End: 1900-01-01

## 2025-04-01 RX ORDER — BLOOD-GLUCOSE SENSOR
EACH MISCELLANEOUS
Qty: 9 | Refills: 3 | Status: ACTIVE | COMMUNITY
Start: 2025-04-01 | End: 1900-01-01

## 2025-04-02 LAB
GLUCOSE BLDC GLUCOMTR-MCNC: 539
HBA1C MFR BLD HPLC: 10.4

## 2025-04-03 ENCOUNTER — APPOINTMENT (OUTPATIENT)
Dept: UROLOGY | Facility: CLINIC | Age: 85
End: 2025-04-03
Payer: COMMERCIAL

## 2025-04-03 VITALS
OXYGEN SATURATION: 97 % | TEMPERATURE: 98.8 F | SYSTOLIC BLOOD PRESSURE: 128 MMHG | BODY MASS INDEX: 23.99 KG/M2 | HEART RATE: 77 BPM | WEIGHT: 144 LBS | HEIGHT: 65 IN | DIASTOLIC BLOOD PRESSURE: 71 MMHG

## 2025-04-03 DIAGNOSIS — Z87.898 PERSONAL HISTORY OF OTHER SPECIFIED CONDITIONS: ICD-10-CM

## 2025-04-03 DIAGNOSIS — N40.1 BENIGN PROSTATIC HYPERPLASIA WITH LOWER URINARY TRACT SYMPMS: ICD-10-CM

## 2025-04-03 DIAGNOSIS — N13.8 BENIGN PROSTATIC HYPERPLASIA WITH LOWER URINARY TRACT SYMPMS: ICD-10-CM

## 2025-04-03 PROCEDURE — G2211 COMPLEX E/M VISIT ADD ON: CPT | Mod: NC

## 2025-04-03 PROCEDURE — 99214 OFFICE O/P EST MOD 30 MIN: CPT

## 2025-04-23 ENCOUNTER — APPOINTMENT (OUTPATIENT)
Dept: CARDIOLOGY | Facility: CLINIC | Age: 85
End: 2025-04-23

## 2025-04-28 ENCOUNTER — APPOINTMENT (OUTPATIENT)
Dept: INFECTIOUS DISEASE | Facility: CLINIC | Age: 85
End: 2025-04-28

## 2025-05-05 ENCOUNTER — APPOINTMENT (OUTPATIENT)
Dept: INFECTIOUS DISEASE | Facility: CLINIC | Age: 85
End: 2025-05-05

## 2025-05-05 ENCOUNTER — APPOINTMENT (OUTPATIENT)
Dept: ENDOCRINOLOGY | Facility: CLINIC | Age: 85
End: 2025-05-05
Payer: COMMERCIAL

## 2025-05-05 VITALS
OXYGEN SATURATION: 98 % | DIASTOLIC BLOOD PRESSURE: 67 MMHG | HEART RATE: 77 BPM | TEMPERATURE: 97.9 F | BODY MASS INDEX: 23.82 KG/M2 | HEIGHT: 65 IN | SYSTOLIC BLOOD PRESSURE: 128 MMHG | WEIGHT: 143 LBS

## 2025-05-05 DIAGNOSIS — E11.65 TYPE 2 DIABETES MELLITUS WITH HYPERGLYCEMIA: ICD-10-CM

## 2025-05-05 DIAGNOSIS — Z79.4 TYPE 2 DIABETES MELLITUS WITH HYPERGLYCEMIA: ICD-10-CM

## 2025-05-05 PROCEDURE — 82962 GLUCOSE BLOOD TEST: CPT

## 2025-05-05 PROCEDURE — 99213 OFFICE O/P EST LOW 20 MIN: CPT

## 2025-05-07 ENCOUNTER — NON-APPOINTMENT (OUTPATIENT)
Age: 85
End: 2025-05-07

## 2025-05-23 ENCOUNTER — NON-APPOINTMENT (OUTPATIENT)
Age: 85
End: 2025-05-23

## 2025-07-02 ENCOUNTER — APPOINTMENT (OUTPATIENT)
Dept: ENDOCRINOLOGY | Facility: CLINIC | Age: 85
End: 2025-07-02

## 2025-07-03 ENCOUNTER — APPOINTMENT (OUTPATIENT)
Age: 85
End: 2025-07-03
Payer: COMMERCIAL

## 2025-07-03 VITALS
DIASTOLIC BLOOD PRESSURE: 67 MMHG | OXYGEN SATURATION: 96 % | SYSTOLIC BLOOD PRESSURE: 118 MMHG | HEART RATE: 76 BPM | TEMPERATURE: 97.3 F

## 2025-07-03 PROCEDURE — G2211 COMPLEX E/M VISIT ADD ON: CPT | Mod: NC

## 2025-07-03 PROCEDURE — 99214 OFFICE O/P EST MOD 30 MIN: CPT

## 2025-07-07 ENCOUNTER — APPOINTMENT (OUTPATIENT)
Dept: ENDOCRINOLOGY | Facility: CLINIC | Age: 85
End: 2025-07-07
Payer: COMMERCIAL

## 2025-07-07 VITALS
HEIGHT: 65 IN | TEMPERATURE: 97.4 F | HEART RATE: 75 BPM | OXYGEN SATURATION: 97 % | DIASTOLIC BLOOD PRESSURE: 67 MMHG | WEIGHT: 147 LBS | BODY MASS INDEX: 24.49 KG/M2 | SYSTOLIC BLOOD PRESSURE: 152 MMHG

## 2025-07-07 LAB
GLUCOSE BLDC GLUCOMTR-MCNC: 185
HBA1C MFR BLD HPLC: 7.4

## 2025-07-07 PROCEDURE — 99215 OFFICE O/P EST HI 40 MIN: CPT

## 2025-07-07 PROCEDURE — 82962 GLUCOSE BLOOD TEST: CPT

## 2025-07-07 PROCEDURE — 83036 HEMOGLOBIN GLYCOSYLATED A1C: CPT | Mod: QW

## 2025-07-08 RX ORDER — BLOOD-GLUCOSE METER
W/DEVICE KIT MISCELLANEOUS
Qty: 1 | Refills: 1 | Status: ACTIVE | COMMUNITY
Start: 2025-07-08 | End: 1900-01-01

## 2025-07-08 RX ORDER — BLOOD SUGAR DIAGNOSTIC
STRIP MISCELLANEOUS DAILY
Qty: 1 | Refills: 3 | Status: ACTIVE | COMMUNITY
Start: 2025-07-08 | End: 1900-01-01

## 2025-07-08 RX ORDER — LANCETS 33 GAUGE
EACH MISCELLANEOUS
Qty: 1 | Refills: 3 | Status: ACTIVE | COMMUNITY
Start: 2025-07-08 | End: 1900-01-01

## 2025-07-08 RX ORDER — METFORMIN HYDROCHLORIDE 1000 MG/1
1000 TABLET, COATED ORAL TWICE DAILY
Qty: 180 | Refills: 3 | Status: ACTIVE | COMMUNITY
Start: 2025-07-08 | End: 1900-01-01

## 2025-07-17 ENCOUNTER — NON-APPOINTMENT (OUTPATIENT)
Age: 85
End: 2025-07-17

## 2025-08-25 ENCOUNTER — APPOINTMENT (OUTPATIENT)
Dept: ENDOCRINOLOGY | Facility: CLINIC | Age: 85
End: 2025-08-25
Payer: COMMERCIAL

## 2025-08-25 VITALS
HEART RATE: 79 BPM | BODY MASS INDEX: 23.49 KG/M2 | TEMPERATURE: 98.1 F | WEIGHT: 141 LBS | HEIGHT: 65 IN | DIASTOLIC BLOOD PRESSURE: 71 MMHG | OXYGEN SATURATION: 99 % | RESPIRATION RATE: 15 BRPM | SYSTOLIC BLOOD PRESSURE: 130 MMHG

## 2025-08-25 PROCEDURE — 99214 OFFICE O/P EST MOD 30 MIN: CPT

## 2025-08-26 ENCOUNTER — NON-APPOINTMENT (OUTPATIENT)
Age: 85
End: 2025-08-26

## 2025-08-27 ENCOUNTER — NON-APPOINTMENT (OUTPATIENT)
Age: 85
End: 2025-08-27

## 2025-08-28 ENCOUNTER — APPOINTMENT (OUTPATIENT)
Dept: INFECTIOUS DISEASE | Facility: CLINIC | Age: 85
End: 2025-08-28

## 2025-08-28 VITALS
HEIGHT: 65 IN | DIASTOLIC BLOOD PRESSURE: 78 MMHG | HEART RATE: 67 BPM | BODY MASS INDEX: 24.16 KG/M2 | WEIGHT: 145 LBS | OXYGEN SATURATION: 97 % | TEMPERATURE: 98.1 F | SYSTOLIC BLOOD PRESSURE: 127 MMHG

## 2025-08-28 DIAGNOSIS — R76.12 NONSPECIFIC REACTION TO CELL MEDIATED IMMUNITY MEASUREMENT OF GAMMA INTERFERON ANTIGEN RESPONSE W/OUT ACTIVE TUBERCULOSIS: ICD-10-CM

## 2025-08-28 DIAGNOSIS — Z92.89 PERSONAL HISTORY OF OTHER MEDICAL TREATMENT: ICD-10-CM

## 2025-08-28 DIAGNOSIS — B35.1 TINEA UNGUIUM: ICD-10-CM

## 2025-08-28 DIAGNOSIS — R97.20 ELEVATED PROSTATE, SPECIFIC ANTIGEN [PSA]: ICD-10-CM

## 2025-08-28 DIAGNOSIS — E11.65 TYPE 2 DIABETES MELLITUS WITH HYPERGLYCEMIA: ICD-10-CM

## 2025-08-28 DIAGNOSIS — E78.1 PURE HYPERGLYCERIDEMIA: ICD-10-CM

## 2025-08-28 DIAGNOSIS — E55.9 VITAMIN D DEFICIENCY, UNSPECIFIED: ICD-10-CM

## 2025-08-28 DIAGNOSIS — N40.1 BENIGN PROSTATIC HYPERPLASIA WITH LOWER URINARY TRACT SYMPMS: ICD-10-CM

## 2025-08-28 DIAGNOSIS — Z22.7 LATENT TUBERCULOSIS: ICD-10-CM

## 2025-08-28 DIAGNOSIS — Z79.4 TYPE 2 DIABETES MELLITUS WITH HYPERGLYCEMIA: ICD-10-CM

## 2025-08-28 DIAGNOSIS — N13.8 BENIGN PROSTATIC HYPERPLASIA WITH LOWER URINARY TRACT SYMPMS: ICD-10-CM

## 2025-08-28 DIAGNOSIS — B20 HUMAN IMMUNODEFICIENCY VIRUS [HIV] DISEASE: ICD-10-CM

## 2025-08-28 PROCEDURE — G2211 COMPLEX E/M VISIT ADD ON: CPT | Mod: NC

## 2025-08-28 PROCEDURE — 99215 OFFICE O/P EST HI 40 MIN: CPT

## 2025-08-28 RX ORDER — METFORMIN HYDROCHLORIDE 500 MG/1
500 TABLET, COATED ORAL
Qty: 180 | Refills: 2 | Status: ACTIVE | COMMUNITY
Start: 2025-08-28 | End: 1900-01-01

## 2025-08-28 RX ORDER — DOLUTEGRAVIR SODIUM AND RILPIVIRINE HYDROCHLORIDE 50; 25 MG/1; MG/1
50-25 TABLET, FILM COATED ORAL
Qty: 90 | Refills: 1 | Status: ACTIVE | COMMUNITY
Start: 2025-08-28 | End: 1900-01-01

## 2025-08-29 ENCOUNTER — NON-APPOINTMENT (OUTPATIENT)
Age: 85
End: 2025-08-29

## 2025-08-29 LAB
25(OH)D3 SERPL-MCNC: 207 NG/ML
ALBUMIN SERPL ELPH-MCNC: 4.6 G/DL
ALP BLD-CCNC: 84 U/L
ALT SERPL-CCNC: 7 U/L
ANION GAP SERPL CALC-SCNC: 17 MMOL/L
AST SERPL-CCNC: 19 U/L
BILIRUB SERPL-MCNC: 0.3 MG/DL
BUN SERPL-MCNC: 22 MG/DL
CALCIUM SERPL-MCNC: 10.3 MG/DL
CD3 CELLS # BLD: 2013 CELLS/UL
CD3 CELLS NFR BLD: 66 %
CD3+CD4+ CELLS # BLD: 814 CELLS/UL
CD3+CD4+ CELLS NFR BLD: 27 %
CD3+CD4+ CELLS/CD3+CD8+ CLL SPEC: 0.68 RATIO
CD3+CD8+ CELLS # SPEC: 1189 CELLS/UL
CD3+CD8+ CELLS NFR BLD: 39 %
CHLORIDE SERPL-SCNC: 102 MMOL/L
CHOLEST SERPL-MCNC: 214 MG/DL
CO2 SERPL-SCNC: 20 MMOL/L
CREAT SERPL-MCNC: 1.76 MG/DL
EGFRCR SERPLBLD CKD-EPI 2021: 37 ML/MIN/1.73M2
GLUCOSE SERPL-MCNC: 99 MG/DL
HCT VFR BLD CALC: 35.6 %
HDLC SERPL-MCNC: 35 MG/DL
HGB BLD-MCNC: 11.7 G/DL
LDLC SERPL-MCNC: 105 MG/DL
MCHC RBC-ENTMCNC: 29.3 PG
MCHC RBC-ENTMCNC: 32.9 G/DL
MCV RBC AUTO: 89.2 FL
NONHDLC SERPL-MCNC: 179 MG/DL
PLATELET # BLD AUTO: 231 K/UL
POTASSIUM SERPL-SCNC: 5 MMOL/L
PROT SERPL-MCNC: 8.5 G/DL
RBC # BLD: 3.99 M/UL
RBC # FLD: 13.2 %
SODIUM SERPL-SCNC: 140 MMOL/L
TRIGL SERPL-MCNC: 431 MG/DL
VIABILITY: NORMAL
WBC # FLD AUTO: 6.93 K/UL

## 2025-09-02 ENCOUNTER — NON-APPOINTMENT (OUTPATIENT)
Age: 85
End: 2025-09-02

## 2025-09-02 LAB
HIV1 RNA # SERPL NAA+PROBE: 34
HIV1 RNA # SERPL NAA+PROBE: ABNORMAL
M TB IFN-G BLD-IMP: POSITIVE
QUANTIFERON TB PLUS MITOGEN MINUS NIL: >10 IU/ML
QUANTIFERON TB PLUS NIL: 0.03 IU/ML
QUANTIFERON TB PLUS TB1 MINUS NIL: 2.94 IU/ML
QUANTIFERON TB PLUS TB2 MINUS NIL: 3.85 IU/ML
VIRAL LOAD INTERP: NORMAL
VIRAL LOAD LOG: 1.53

## 2025-09-12 ENCOUNTER — APPOINTMENT (OUTPATIENT)
Dept: INTERNAL MEDICINE | Facility: CLINIC | Age: 85
End: 2025-09-12